# Patient Record
Sex: FEMALE | Race: WHITE | NOT HISPANIC OR LATINO | Employment: OTHER | URBAN - METROPOLITAN AREA
[De-identification: names, ages, dates, MRNs, and addresses within clinical notes are randomized per-mention and may not be internally consistent; named-entity substitution may affect disease eponyms.]

---

## 2017-01-01 ENCOUNTER — ANESTHESIA EVENT (OUTPATIENT)
Dept: PERIOP | Facility: HOSPITAL | Age: 82
End: 2017-01-01

## 2017-01-01 ENCOUNTER — APPOINTMENT (OUTPATIENT)
Dept: GENERAL RADIOLOGY | Facility: HOSPITAL | Age: 82
End: 2017-01-01
Attending: SURGERY

## 2017-01-01 ENCOUNTER — ANESTHESIA (OUTPATIENT)
Dept: PERIOP | Facility: HOSPITAL | Age: 82
End: 2017-01-01

## 2017-01-01 ENCOUNTER — APPOINTMENT (OUTPATIENT)
Dept: CT IMAGING | Facility: HOSPITAL | Age: 82
End: 2017-01-01

## 2017-01-01 ENCOUNTER — HOSPITAL ENCOUNTER (INPATIENT)
Facility: HOSPITAL | Age: 82
LOS: 4 days | Discharge: NURSING, REHAB OR LONG-TERM CARE | End: 2017-12-06
Attending: EMERGENCY MEDICINE | Admitting: INTERNAL MEDICINE

## 2017-01-01 VITALS
SYSTOLIC BLOOD PRESSURE: 107 MMHG | OXYGEN SATURATION: 94 % | DIASTOLIC BLOOD PRESSURE: 57 MMHG | TEMPERATURE: 97.1 F | WEIGHT: 110.4 LBS | HEART RATE: 86 BPM | RESPIRATION RATE: 16 BRPM | HEIGHT: 62 IN | BODY MASS INDEX: 20.32 KG/M2

## 2017-01-01 DIAGNOSIS — N39.0 ACUTE UTI: Primary | ICD-10-CM

## 2017-01-01 DIAGNOSIS — D64.9 ANEMIA, UNSPECIFIED TYPE: ICD-10-CM

## 2017-01-01 DIAGNOSIS — K94.03 COLOSTOMY MALFUNCTION (HCC): ICD-10-CM

## 2017-01-01 LAB
ABO GROUP BLD: NORMAL
ALBUMIN SERPL-MCNC: 2.7 G/DL (ref 3.5–5.2)
ALBUMIN SERPL-MCNC: 3 G/DL (ref 3.5–5.2)
ALBUMIN/GLOB SERPL: 0.6 G/DL
ALBUMIN/GLOB SERPL: 0.7 G/DL
ALP SERPL-CCNC: 120 U/L (ref 39–117)
ALP SERPL-CCNC: 95 U/L (ref 39–117)
ALT SERPL W P-5'-P-CCNC: 12 U/L (ref 1–33)
ALT SERPL W P-5'-P-CCNC: 9 U/L (ref 1–33)
ANION GAP SERPL CALCULATED.3IONS-SCNC: 10.7 MMOL/L
ANION GAP SERPL CALCULATED.3IONS-SCNC: 13.6 MMOL/L
AST SERPL-CCNC: 12 U/L (ref 1–32)
AST SERPL-CCNC: 13 U/L (ref 1–32)
BACTERIA SPEC AEROBE CULT: NORMAL
BACTERIA SPEC AEROBE CULT: NORMAL
BACTERIA UR QL AUTO: ABNORMAL /HPF
BASOPHILS # BLD AUTO: 0.01 10*3/MM3 (ref 0–0.2)
BASOPHILS # BLD AUTO: 0.01 10*3/MM3 (ref 0–0.2)
BASOPHILS NFR BLD AUTO: 0.1 % (ref 0–1.5)
BASOPHILS NFR BLD AUTO: 0.1 % (ref 0–1.5)
BILIRUB SERPL-MCNC: 0.2 MG/DL (ref 0.1–1.2)
BILIRUB SERPL-MCNC: <0.2 MG/DL (ref 0.1–1.2)
BILIRUB UR QL STRIP: NEGATIVE
BLD GP AB SCN SERPL QL: NEGATIVE
BUN BLD-MCNC: 23 MG/DL (ref 8–23)
BUN BLD-MCNC: 29 MG/DL (ref 8–23)
BUN/CREAT SERPL: 31.9 (ref 7–25)
BUN/CREAT SERPL: 34.1 (ref 7–25)
CALCIUM SPEC-SCNC: 8.6 MG/DL (ref 8.6–10.5)
CALCIUM SPEC-SCNC: 9.3 MG/DL (ref 8.6–10.5)
CHLORIDE SERPL-SCNC: 101 MMOL/L (ref 98–107)
CHLORIDE SERPL-SCNC: 104 MMOL/L (ref 98–107)
CLARITY UR: ABNORMAL
CO2 SERPL-SCNC: 24.3 MMOL/L (ref 22–29)
CO2 SERPL-SCNC: 25.4 MMOL/L (ref 22–29)
COLOR UR: YELLOW
CREAT BLD-MCNC: 0.72 MG/DL (ref 0.57–1)
CREAT BLD-MCNC: 0.85 MG/DL (ref 0.57–1)
DEPRECATED RDW RBC AUTO: 57.3 FL (ref 37–54)
DEPRECATED RDW RBC AUTO: 58.3 FL (ref 37–54)
EOSINOPHIL # BLD AUTO: 0.08 10*3/MM3 (ref 0–0.7)
EOSINOPHIL # BLD AUTO: 0.1 10*3/MM3 (ref 0–0.7)
EOSINOPHIL NFR BLD AUTO: 1.1 % (ref 0.3–6.2)
EOSINOPHIL NFR BLD AUTO: 1.1 % (ref 0.3–6.2)
ERYTHROCYTE [DISTWIDTH] IN BLOOD BY AUTOMATED COUNT: 18.4 % (ref 11.7–13)
ERYTHROCYTE [DISTWIDTH] IN BLOOD BY AUTOMATED COUNT: 18.4 % (ref 11.7–13)
FERRITIN SERPL-MCNC: 30.56 NG/ML (ref 13–150)
FOLATE SERPL-MCNC: >20 NG/ML (ref 4.78–24.2)
GFR SERPL CREATININE-BSD FRML MDRD: 64 ML/MIN/1.73
GFR SERPL CREATININE-BSD FRML MDRD: 77 ML/MIN/1.73
GLOBULIN UR ELPH-MCNC: 4.1 GM/DL
GLOBULIN UR ELPH-MCNC: 4.9 GM/DL
GLUCOSE BLD-MCNC: 83 MG/DL (ref 65–99)
GLUCOSE BLD-MCNC: 94 MG/DL (ref 65–99)
GLUCOSE UR STRIP-MCNC: NEGATIVE MG/DL
HCT VFR BLD AUTO: 27.3 % (ref 35.6–45.5)
HCT VFR BLD AUTO: 27.5 % (ref 35.6–45.5)
HCT VFR BLD AUTO: 28.1 % (ref 35.6–45.5)
HCT VFR BLD AUTO: 29 % (ref 35.6–45.5)
HCT VFR BLD AUTO: 29.9 % (ref 35.6–45.5)
HCT VFR BLD AUTO: 30.8 % (ref 35.6–45.5)
HGB BLD-MCNC: 7.8 G/DL (ref 11.9–15.5)
HGB BLD-MCNC: 7.8 G/DL (ref 11.9–15.5)
HGB BLD-MCNC: 8 G/DL (ref 11.9–15.5)
HGB BLD-MCNC: 8.3 G/DL (ref 11.9–15.5)
HGB BLD-MCNC: 8.6 G/DL (ref 11.9–15.5)
HGB BLD-MCNC: 8.8 G/DL (ref 11.9–15.5)
HGB UR QL STRIP.AUTO: ABNORMAL
HYALINE CASTS UR QL AUTO: ABNORMAL /LPF
IMM GRANULOCYTES # BLD: 0.02 10*3/MM3 (ref 0–0.03)
IMM GRANULOCYTES # BLD: 0.03 10*3/MM3 (ref 0–0.03)
IMM GRANULOCYTES NFR BLD: 0.3 % (ref 0–0.5)
IMM GRANULOCYTES NFR BLD: 0.3 % (ref 0–0.5)
IRON 24H UR-MRATE: 13 MCG/DL (ref 37–145)
IRON SATN MFR SERPL: 4 % (ref 20–50)
KETONES UR QL STRIP: NEGATIVE
LEUKOCYTE ESTERASE UR QL STRIP.AUTO: ABNORMAL
LYMPHOCYTES # BLD AUTO: 2.58 10*3/MM3 (ref 0.9–4.8)
LYMPHOCYTES # BLD AUTO: 2.82 10*3/MM3 (ref 0.9–4.8)
LYMPHOCYTES NFR BLD AUTO: 31.2 % (ref 19.6–45.3)
LYMPHOCYTES NFR BLD AUTO: 36.9 % (ref 19.6–45.3)
MCH RBC QN AUTO: 24.3 PG (ref 26.9–32)
MCH RBC QN AUTO: 24.7 PG (ref 26.9–32)
MCHC RBC AUTO-ENTMCNC: 28.6 G/DL (ref 32.4–36.3)
MCHC RBC AUTO-ENTMCNC: 28.6 G/DL (ref 32.4–36.3)
MCV RBC AUTO: 85 FL (ref 80.5–98.2)
MCV RBC AUTO: 86.5 FL (ref 80.5–98.2)
MONOCYTES # BLD AUTO: 0.57 10*3/MM3 (ref 0.2–1.2)
MONOCYTES # BLD AUTO: 0.86 10*3/MM3 (ref 0.2–1.2)
MONOCYTES NFR BLD AUTO: 8.2 % (ref 5–12)
MONOCYTES NFR BLD AUTO: 9.5 % (ref 5–12)
NEUTROPHILS # BLD AUTO: 3.73 10*3/MM3 (ref 1.9–8.1)
NEUTROPHILS # BLD AUTO: 5.21 10*3/MM3 (ref 1.9–8.1)
NEUTROPHILS NFR BLD AUTO: 53.4 % (ref 42.7–76)
NEUTROPHILS NFR BLD AUTO: 57.8 % (ref 42.7–76)
NITRITE UR QL STRIP: NEGATIVE
NRBC BLD MANUAL-RTO: 0 /100 WBC (ref 0–0)
PH UR STRIP.AUTO: 8.5 [PH] (ref 5–8)
PLATELET # BLD AUTO: 292 10*3/MM3 (ref 140–500)
PLATELET # BLD AUTO: 294 10*3/MM3 (ref 140–500)
PMV BLD AUTO: 10.3 FL (ref 6–12)
PMV BLD AUTO: 9.5 FL (ref 6–12)
POTASSIUM BLD-SCNC: 4.2 MMOL/L (ref 3.5–5.2)
POTASSIUM BLD-SCNC: 4.3 MMOL/L (ref 3.5–5.2)
PROT SERPL-MCNC: 6.8 G/DL (ref 6–8.5)
PROT SERPL-MCNC: 7.9 G/DL (ref 6–8.5)
PROT UR QL STRIP: ABNORMAL
RBC # BLD AUTO: 3.21 10*6/MM3 (ref 3.9–5.2)
RBC # BLD AUTO: 3.56 10*6/MM3 (ref 3.9–5.2)
RBC # UR: ABNORMAL /HPF
REF LAB TEST METHOD: ABNORMAL
RETICS/RBC NFR AUTO: 4.27 % (ref 0.5–1.5)
RH BLD: POSITIVE
SODIUM BLD-SCNC: 139 MMOL/L (ref 136–145)
SODIUM BLD-SCNC: 140 MMOL/L (ref 136–145)
SP GR UR STRIP: 1.02 (ref 1–1.03)
SQUAMOUS #/AREA URNS HPF: ABNORMAL /HPF
TIBC SERPL-MCNC: 341 MCG/DL
TRANS CELLS #/AREA URNS HPF: ABNORMAL /HPF
TRANSFERRIN SERPL-MCNC: 229 MG/DL (ref 200–360)
UROBILINOGEN UR QL STRIP: ABNORMAL
VIT B12 BLD-MCNC: 1190 PG/ML (ref 211–946)
WBC NRBC COR # BLD: 6.99 10*3/MM3 (ref 4.5–10.7)
WBC NRBC COR # BLD: 9.03 10*3/MM3 (ref 4.5–10.7)
WBC UR QL AUTO: ABNORMAL /HPF
YEAST URNS QL MICRO: ABNORMAL /HPF

## 2017-01-01 PROCEDURE — 85014 HEMATOCRIT: CPT | Performed by: HOSPITALIST

## 2017-01-01 PROCEDURE — 85018 HEMOGLOBIN: CPT | Performed by: HOSPITALIST

## 2017-01-01 PROCEDURE — 45915 REMOVE RECTAL OBSTRUCTION: CPT | Performed by: SURGERY

## 2017-01-01 PROCEDURE — 82607 VITAMIN B-12: CPT | Performed by: INTERNAL MEDICINE

## 2017-01-01 PROCEDURE — 85025 COMPLETE CBC W/AUTO DIFF WBC: CPT | Performed by: INTERNAL MEDICINE

## 2017-01-01 PROCEDURE — 83540 ASSAY OF IRON: CPT | Performed by: INTERNAL MEDICINE

## 2017-01-01 PROCEDURE — 25010000002 HEPARIN (PORCINE) PER 1000 UNITS: Performed by: INTERNAL MEDICINE

## 2017-01-01 PROCEDURE — 25010000002 MORPHINE PER 10 MG: Performed by: INTERNAL MEDICINE

## 2017-01-01 PROCEDURE — 87086 URINE CULTURE/COLONY COUNT: CPT | Performed by: PHYSICIAN ASSISTANT

## 2017-01-01 PROCEDURE — 25010000002 ONDANSETRON PER 1 MG: Performed by: INTERNAL MEDICINE

## 2017-01-01 PROCEDURE — 25010000002 CEFTRIAXONE PER 250 MG: Performed by: PHYSICIAN ASSISTANT

## 2017-01-01 PROCEDURE — 0WJP7ZZ INSPECTION OF GASTROINTESTINAL TRACT, VIA NATURAL OR ARTIFICIAL OPENING APPROACH: ICD-10-PCS | Performed by: SURGERY

## 2017-01-01 PROCEDURE — 0 DIATRIZOATE MEGLUMINE & SODIUM PER 1 ML: Performed by: PHYSICIAN ASSISTANT

## 2017-01-01 PROCEDURE — 74177 CT ABD & PELVIS W/CONTRAST: CPT

## 2017-01-01 PROCEDURE — 82746 ASSAY OF FOLIC ACID SERUM: CPT | Performed by: INTERNAL MEDICINE

## 2017-01-01 PROCEDURE — 86901 BLOOD TYPING SEROLOGIC RH(D): CPT | Performed by: ANESTHESIOLOGY

## 2017-01-01 PROCEDURE — 86850 RBC ANTIBODY SCREEN: CPT | Performed by: ANESTHESIOLOGY

## 2017-01-01 PROCEDURE — 86900 BLOOD TYPING SEROLOGIC ABO: CPT | Performed by: ANESTHESIOLOGY

## 2017-01-01 PROCEDURE — 82728 ASSAY OF FERRITIN: CPT | Performed by: INTERNAL MEDICINE

## 2017-01-01 PROCEDURE — 0 IOPAMIDOL 61 % SOLUTION: Performed by: EMERGENCY MEDICINE

## 2017-01-01 PROCEDURE — 99221 1ST HOSP IP/OBS SF/LOW 40: CPT | Performed by: SURGERY

## 2017-01-01 PROCEDURE — 81001 URINALYSIS AUTO W/SCOPE: CPT | Performed by: PHYSICIAN ASSISTANT

## 2017-01-01 PROCEDURE — 25010000002 PROPOFOL 10 MG/ML EMULSION: Performed by: ANESTHESIOLOGY

## 2017-01-01 PROCEDURE — 92610 EVALUATE SWALLOWING FUNCTION: CPT

## 2017-01-01 PROCEDURE — 99024 POSTOP FOLLOW-UP VISIT: CPT | Performed by: SURGERY

## 2017-01-01 PROCEDURE — 85025 COMPLETE CBC W/AUTO DIFF WBC: CPT | Performed by: PHYSICIAN ASSISTANT

## 2017-01-01 PROCEDURE — 25010000002 MIDAZOLAM PER 1 MG: Performed by: ANESTHESIOLOGY

## 2017-01-01 PROCEDURE — 85045 AUTOMATED RETICULOCYTE COUNT: CPT | Performed by: INTERNAL MEDICINE

## 2017-01-01 PROCEDURE — 80053 COMPREHEN METABOLIC PANEL: CPT | Performed by: PHYSICIAN ASSISTANT

## 2017-01-01 PROCEDURE — BT1BZZZ FLUOROSCOPY OF BLADDER AND URETHRA: ICD-10-PCS | Performed by: RADIOLOGY

## 2017-01-01 PROCEDURE — 74430 CONTRAST X-RAY BLADDER: CPT

## 2017-01-01 PROCEDURE — 99285 EMERGENCY DEPT VISIT HI MDM: CPT

## 2017-01-01 PROCEDURE — 84466 ASSAY OF TRANSFERRIN: CPT | Performed by: INTERNAL MEDICINE

## 2017-01-01 PROCEDURE — 80053 COMPREHEN METABOLIC PANEL: CPT | Performed by: INTERNAL MEDICINE

## 2017-01-01 RX ORDER — FUROSEMIDE 20 MG/1
20 TABLET ORAL DAILY
Status: DISCONTINUED | OUTPATIENT
Start: 2017-01-01 | End: 2017-01-01 | Stop reason: HOSPADM

## 2017-01-01 RX ORDER — ROCURONIUM BROMIDE 10 MG/ML
INJECTION, SOLUTION INTRAVENOUS AS NEEDED
Status: DISCONTINUED | OUTPATIENT
Start: 2017-01-01 | End: 2017-01-01 | Stop reason: SURG

## 2017-01-01 RX ORDER — SODIUM CHLORIDE, SODIUM LACTATE, POTASSIUM CHLORIDE, CALCIUM CHLORIDE 600; 310; 30; 20 MG/100ML; MG/100ML; MG/100ML; MG/100ML
9 INJECTION, SOLUTION INTRAVENOUS CONTINUOUS
Status: DISCONTINUED | OUTPATIENT
Start: 2017-01-01 | End: 2017-01-01

## 2017-01-01 RX ORDER — MIDAZOLAM HYDROCHLORIDE 1 MG/ML
2 INJECTION INTRAMUSCULAR; INTRAVENOUS
Status: DISCONTINUED | OUTPATIENT
Start: 2017-01-01 | End: 2017-01-01 | Stop reason: HOSPADM

## 2017-01-01 RX ORDER — POTASSIUM CHLORIDE 1.5 G/1.77G
20 POWDER, FOR SOLUTION ORAL DAILY
Status: DISCONTINUED | OUTPATIENT
Start: 2017-01-01 | End: 2017-01-01 | Stop reason: HOSPADM

## 2017-01-01 RX ORDER — CEFTRIAXONE SODIUM 1 G/50ML
1 INJECTION, SOLUTION INTRAVENOUS EVERY 24 HOURS
Status: DISCONTINUED | OUTPATIENT
Start: 2017-01-01 | End: 2017-01-01

## 2017-01-01 RX ORDER — LANSOPRAZOLE
30 KIT
Status: DISCONTINUED | OUTPATIENT
Start: 2017-01-01 | End: 2017-01-01 | Stop reason: HOSPADM

## 2017-01-01 RX ORDER — PROMETHAZINE HYDROCHLORIDE 25 MG/ML
6.25 INJECTION, SOLUTION INTRAMUSCULAR; INTRAVENOUS ONCE AS NEEDED
Status: DISCONTINUED | OUTPATIENT
Start: 2017-01-01 | End: 2017-01-01 | Stop reason: HOSPADM

## 2017-01-01 RX ORDER — SENNA AND DOCUSATE SODIUM 50; 8.6 MG/1; MG/1
1 TABLET, FILM COATED ORAL DAILY
Status: DISCONTINUED | OUTPATIENT
Start: 2017-01-01 | End: 2017-01-01 | Stop reason: HOSPADM

## 2017-01-01 RX ORDER — HEPARIN SODIUM 5000 [USP'U]/ML
5000 INJECTION, SOLUTION INTRAVENOUS; SUBCUTANEOUS EVERY 12 HOURS SCHEDULED
Status: DISCONTINUED | OUTPATIENT
Start: 2017-01-01 | End: 2017-01-01 | Stop reason: HOSPADM

## 2017-01-01 RX ORDER — MORPHINE SULFATE 20 MG/ML
7.5 SOLUTION ORAL EVERY 4 HOURS PRN
Status: ON HOLD | COMMUNITY
End: 2017-01-01

## 2017-01-01 RX ORDER — SODIUM CHLORIDE 0.9 % (FLUSH) 0.9 %
10 SYRINGE (ML) INJECTION AS NEEDED
Status: DISCONTINUED | OUTPATIENT
Start: 2017-01-01 | End: 2017-01-01 | Stop reason: HOSPADM

## 2017-01-01 RX ORDER — MIDAZOLAM HYDROCHLORIDE 1 MG/ML
1 INJECTION INTRAMUSCULAR; INTRAVENOUS
Status: DISCONTINUED | OUTPATIENT
Start: 2017-01-01 | End: 2017-01-01 | Stop reason: HOSPADM

## 2017-01-01 RX ORDER — NYSTATIN 100000 [USP'U]/G
POWDER TOPICAL 2 TIMES DAILY
Status: DISCONTINUED | OUTPATIENT
Start: 2017-01-01 | End: 2017-01-01

## 2017-01-01 RX ORDER — ONDANSETRON 2 MG/ML
4 INJECTION INTRAMUSCULAR; INTRAVENOUS EVERY 6 HOURS PRN
Status: DISCONTINUED | OUTPATIENT
Start: 2017-01-01 | End: 2017-01-01 | Stop reason: HOSPADM

## 2017-01-01 RX ORDER — CALCIUM CARBONATE 200(500)MG
1 TABLET,CHEWABLE ORAL AS NEEDED
Status: DISCONTINUED | OUTPATIENT
Start: 2017-01-01 | End: 2017-01-01 | Stop reason: HOSPADM

## 2017-01-01 RX ORDER — POTASSIUM CHLORIDE 20MEQ/15ML
20 LIQUID (ML) ORAL DAILY
Status: DISCONTINUED | OUTPATIENT
Start: 2017-01-01 | End: 2017-01-01 | Stop reason: CLARIF

## 2017-01-01 RX ORDER — FAMOTIDINE 10 MG/ML
20 INJECTION, SOLUTION INTRAVENOUS ONCE
Status: DISCONTINUED | OUTPATIENT
Start: 2017-01-01 | End: 2017-01-01 | Stop reason: HOSPADM

## 2017-01-01 RX ORDER — ALBUTEROL SULFATE 2.5 MG/3ML
2.5 SOLUTION RESPIRATORY (INHALATION) EVERY 4 HOURS PRN
COMMUNITY

## 2017-01-01 RX ORDER — FENTANYL CITRATE 50 UG/ML
50 INJECTION, SOLUTION INTRAMUSCULAR; INTRAVENOUS
Status: DISCONTINUED | OUTPATIENT
Start: 2017-01-01 | End: 2017-01-01 | Stop reason: HOSPADM

## 2017-01-01 RX ORDER — SODIUM CHLORIDE 9 MG/ML
50 INJECTION, SOLUTION INTRAVENOUS CONTINUOUS
Status: DISCONTINUED | OUTPATIENT
Start: 2017-01-01 | End: 2017-01-01

## 2017-01-01 RX ORDER — GABAPENTIN 100 MG/1
100 CAPSULE ORAL DAILY
Status: DISCONTINUED | OUTPATIENT
Start: 2017-01-01 | End: 2017-01-01 | Stop reason: HOSPADM

## 2017-01-01 RX ORDER — ALBUTEROL SULFATE 2.5 MG/3ML
2.5 SOLUTION RESPIRATORY (INHALATION) EVERY 4 HOURS PRN
Status: DISCONTINUED | OUTPATIENT
Start: 2017-01-01 | End: 2017-01-01 | Stop reason: HOSPADM

## 2017-01-01 RX ORDER — DIPHENOXYLATE HYDROCHLORIDE AND ATROPINE SULFATE 2.5; .025 MG/1; MG/1
1 TABLET ORAL DAILY
Status: DISCONTINUED | OUTPATIENT
Start: 2017-01-01 | End: 2017-01-01 | Stop reason: HOSPADM

## 2017-01-01 RX ORDER — HYDRALAZINE HYDROCHLORIDE 20 MG/ML
5 INJECTION INTRAMUSCULAR; INTRAVENOUS
Status: DISCONTINUED | OUTPATIENT
Start: 2017-01-01 | End: 2017-01-01 | Stop reason: HOSPADM

## 2017-01-01 RX ORDER — MORPHINE SULFATE 2 MG/ML
1 INJECTION, SOLUTION INTRAMUSCULAR; INTRAVENOUS EVERY 4 HOURS PRN
Status: DISCONTINUED | OUTPATIENT
Start: 2017-01-01 | End: 2017-01-01 | Stop reason: HOSPADM

## 2017-01-01 RX ORDER — NALBUPHINE HCL 10 MG/ML
2 AMPUL (ML) INJECTION EVERY 4 HOURS PRN
Status: DISCONTINUED | OUTPATIENT
Start: 2017-01-01 | End: 2017-01-01 | Stop reason: HOSPADM

## 2017-01-01 RX ORDER — SODIUM CHLORIDE 0.9 % (FLUSH) 0.9 %
1-10 SYRINGE (ML) INJECTION AS NEEDED
Status: DISCONTINUED | OUTPATIENT
Start: 2017-01-01 | End: 2017-01-01 | Stop reason: HOSPADM

## 2017-01-01 RX ORDER — NALOXONE HCL 0.4 MG/ML
0.4 VIAL (ML) INJECTION
Status: DISCONTINUED | OUTPATIENT
Start: 2017-01-01 | End: 2017-01-01 | Stop reason: HOSPADM

## 2017-01-01 RX ORDER — DIPHENHYDRAMINE HYDROCHLORIDE 50 MG/ML
12.5 INJECTION INTRAMUSCULAR; INTRAVENOUS
Status: DISCONTINUED | OUTPATIENT
Start: 2017-01-01 | End: 2017-01-01 | Stop reason: HOSPADM

## 2017-01-01 RX ORDER — POVIDONE-IODINE 10 MG/G
OINTMENT TOPICAL 2 TIMES DAILY
Status: ON HOLD | COMMUNITY
End: 2018-01-01

## 2017-01-01 RX ORDER — MORPHINE SULFATE 20 MG/ML
7.5 SOLUTION ORAL EVERY 4 HOURS PRN
Qty: 6.84 ML | Refills: 0 | Status: SHIPPED | OUTPATIENT
Start: 2017-01-01 | End: 2017-01-01

## 2017-01-01 RX ORDER — MORPHINE SULFATE 20 MG/ML
15 SOLUTION ORAL EVERY 4 HOURS PRN
Status: DISCONTINUED | OUTPATIENT
Start: 2017-01-01 | End: 2017-01-01 | Stop reason: HOSPADM

## 2017-01-01 RX ORDER — PROPOFOL 10 MG/ML
VIAL (ML) INTRAVENOUS AS NEEDED
Status: DISCONTINUED | OUTPATIENT
Start: 2017-01-01 | End: 2017-01-01 | Stop reason: SURG

## 2017-01-01 RX ORDER — NYSTATIN 100000 [USP'U]/G
POWDER TOPICAL EVERY 12 HOURS SCHEDULED
Status: DISCONTINUED | OUTPATIENT
Start: 2017-01-01 | End: 2017-01-01 | Stop reason: HOSPADM

## 2017-01-01 RX ORDER — FERROUS SULFATE 300 MG/5ML
300 LIQUID (ML) ORAL DAILY
Status: DISCONTINUED | OUTPATIENT
Start: 2017-01-01 | End: 2017-01-01 | Stop reason: HOSPADM

## 2017-01-01 RX ORDER — CEFTRIAXONE SODIUM 1 G/50ML
1 INJECTION, SOLUTION INTRAVENOUS ONCE
Status: COMPLETED | OUTPATIENT
Start: 2017-01-01 | End: 2017-01-01

## 2017-01-01 RX ORDER — POLYETHYLENE GLYCOL 3350 17 G/17G
17 POWDER, FOR SOLUTION ORAL DAILY
Status: DISCONTINUED | OUTPATIENT
Start: 2017-01-01 | End: 2017-01-01 | Stop reason: HOSPADM

## 2017-01-01 RX ORDER — FENTANYL CITRATE 50 UG/ML
INJECTION, SOLUTION INTRAMUSCULAR; INTRAVENOUS
Status: DISCONTINUED
Start: 2017-01-01 | End: 2017-01-01 | Stop reason: WASHOUT

## 2017-01-01 RX ORDER — MORPHINE SULFATE 20 MG/ML
7.5 SOLUTION ORAL EVERY 4 HOURS PRN
Status: DISCONTINUED | OUTPATIENT
Start: 2017-01-01 | End: 2017-01-01

## 2017-01-01 RX ORDER — POVIDONE-IODINE 10 MG/G
OINTMENT TOPICAL 2 TIMES DAILY
Status: DISCONTINUED | OUTPATIENT
Start: 2017-01-01 | End: 2017-01-01

## 2017-01-01 RX ORDER — NYSTATIN 100000 [USP'U]/G
1 POWDER TOPICAL 2 TIMES DAILY
COMMUNITY

## 2017-01-01 RX ORDER — ACETAMINOPHEN 160 MG/5ML
650 SOLUTION ORAL EVERY 4 HOURS PRN
Status: DISCONTINUED | OUTPATIENT
Start: 2017-01-01 | End: 2017-01-01 | Stop reason: HOSPADM

## 2017-01-01 RX ORDER — FENTANYL CITRATE 50 UG/ML
25 INJECTION, SOLUTION INTRAMUSCULAR; INTRAVENOUS
Status: DISCONTINUED | OUTPATIENT
Start: 2017-01-01 | End: 2017-01-01 | Stop reason: HOSPADM

## 2017-01-01 RX ORDER — NALBUPHINE HCL 10 MG/ML
10 AMPUL (ML) INJECTION EVERY 4 HOURS PRN
Status: DISCONTINUED | OUTPATIENT
Start: 2017-01-01 | End: 2017-01-01 | Stop reason: HOSPADM

## 2017-01-01 RX ORDER — PROMETHAZINE HYDROCHLORIDE 25 MG/1
25 TABLET ORAL ONCE AS NEEDED
Status: DISCONTINUED | OUTPATIENT
Start: 2017-01-01 | End: 2017-01-01 | Stop reason: HOSPADM

## 2017-01-01 RX ORDER — PROMETHAZINE HYDROCHLORIDE 25 MG/1
25 SUPPOSITORY RECTAL ONCE AS NEEDED
Status: DISCONTINUED | OUTPATIENT
Start: 2017-01-01 | End: 2017-01-01 | Stop reason: HOSPADM

## 2017-01-01 RX ORDER — ACETAMINOPHEN 160 MG/5ML
640 SOLUTION ORAL EVERY 6 HOURS PRN
Status: DISCONTINUED | OUTPATIENT
Start: 2017-01-01 | End: 2017-01-01 | Stop reason: HOSPADM

## 2017-01-01 RX ADMIN — MORPHINE SULFATE 15 MG: 100 SOLUTION ORAL at 00:05

## 2017-01-01 RX ADMIN — Medication 1 TABLET: at 09:17

## 2017-01-01 RX ADMIN — MORPHINE SULFATE 1 MG: 2 INJECTION, SOLUTION INTRAMUSCULAR; INTRAVENOUS at 20:02

## 2017-01-01 RX ADMIN — MORPHINE SULFATE 1 MG: 2 INJECTION, SOLUTION INTRAMUSCULAR; INTRAVENOUS at 03:17

## 2017-01-01 RX ADMIN — GABAPENTIN 100 MG: 100 CAPSULE ORAL at 08:49

## 2017-01-01 RX ADMIN — GABAPENTIN 100 MG: 100 CAPSULE ORAL at 09:18

## 2017-01-01 RX ADMIN — SUGAMMADEX 200 MG: 100 INJECTION, SOLUTION INTRAVENOUS at 18:01

## 2017-01-01 RX ADMIN — ONDANSETRON 4 MG: 2 INJECTION INTRAMUSCULAR; INTRAVENOUS at 09:57

## 2017-01-01 RX ADMIN — FUROSEMIDE 20 MG: 20 TABLET ORAL at 12:17

## 2017-01-01 RX ADMIN — Medication 1 TABLET: at 09:41

## 2017-01-01 RX ADMIN — HEPARIN SODIUM 5000 UNITS: 5000 INJECTION, SOLUTION INTRAVENOUS; SUBCUTANEOUS at 21:13

## 2017-01-01 RX ADMIN — NYSTATIN: 100000 POWDER TOPICAL at 21:13

## 2017-01-01 RX ADMIN — GABAPENTIN 100 MG: 100 CAPSULE ORAL at 09:41

## 2017-01-01 RX ADMIN — MORPHINE SULFATE 15 MG: 100 SOLUTION ORAL at 15:30

## 2017-01-01 RX ADMIN — POLYETHYLENE GLYCOL 3350 17 G: 17 POWDER, FOR SOLUTION ORAL at 09:20

## 2017-01-01 RX ADMIN — LANSOPRAZOLE 30 MG: KIT at 08:46

## 2017-01-01 RX ADMIN — ONDANSETRON 4 MG: 2 INJECTION INTRAMUSCULAR; INTRAVENOUS at 18:00

## 2017-01-01 RX ADMIN — MORPHINE SULFATE 1 MG: 2 INJECTION, SOLUTION INTRAMUSCULAR; INTRAVENOUS at 07:34

## 2017-01-01 RX ADMIN — POTASSIUM CHLORIDE 20 MEQ: 1.5 POWDER, FOR SOLUTION ORAL at 09:18

## 2017-01-01 RX ADMIN — NYSTATIN: 100000 POWDER TOPICAL at 09:18

## 2017-01-01 RX ADMIN — LANSOPRAZOLE 30 MG: KIT at 05:25

## 2017-01-01 RX ADMIN — HEPARIN SODIUM 5000 UNITS: 5000 INJECTION, SOLUTION INTRAVENOUS; SUBCUTANEOUS at 09:18

## 2017-01-01 RX ADMIN — SODIUM CHLORIDE 50 ML/HR: 9 INJECTION, SOLUTION INTRAVENOUS at 10:04

## 2017-01-01 RX ADMIN — Medication 1 TABLET: at 08:46

## 2017-01-01 RX ADMIN — MIDAZOLAM 0.5 MG: 1 INJECTION INTRAMUSCULAR; INTRAVENOUS at 17:15

## 2017-01-01 RX ADMIN — FUROSEMIDE 20 MG: 20 TABLET ORAL at 09:17

## 2017-01-01 RX ADMIN — SENNOSIDES AND DOCUSATE SODIUM 1 TABLET: 8.6; 5 TABLET ORAL at 12:14

## 2017-01-01 RX ADMIN — Medication 1 TABLET: at 15:32

## 2017-01-01 RX ADMIN — MORPHINE SULFATE 1 MG: 2 INJECTION, SOLUTION INTRAMUSCULAR; INTRAVENOUS at 03:16

## 2017-01-01 RX ADMIN — LANSOPRAZOLE 30 MG: KIT at 06:15

## 2017-01-01 RX ADMIN — SODIUM CHLORIDE 1000 ML: 9 INJECTION, SOLUTION INTRAVENOUS at 20:38

## 2017-01-01 RX ADMIN — HEPARIN SODIUM 5000 UNITS: 5000 INJECTION, SOLUTION INTRAVENOUS; SUBCUTANEOUS at 20:13

## 2017-01-01 RX ADMIN — HEPARIN SODIUM 5000 UNITS: 5000 INJECTION, SOLUTION INTRAVENOUS; SUBCUTANEOUS at 09:41

## 2017-01-01 RX ADMIN — HEPARIN SODIUM 5000 UNITS: 5000 INJECTION, SOLUTION INTRAVENOUS; SUBCUTANEOUS at 20:02

## 2017-01-01 RX ADMIN — POLYETHYLENE GLYCOL 3350 17 G: 17 POWDER, FOR SOLUTION ORAL at 09:41

## 2017-01-01 RX ADMIN — SODIUM CHLORIDE, POTASSIUM CHLORIDE, SODIUM LACTATE AND CALCIUM CHLORIDE 9 ML/HR: 600; 310; 30; 20 INJECTION, SOLUTION INTRAVENOUS at 18:38

## 2017-01-01 RX ADMIN — MORPHINE SULFATE 7.6 MG: 100 SOLUTION ORAL at 12:15

## 2017-01-01 RX ADMIN — MUPIROCIN: 20 OINTMENT TOPICAL at 20:13

## 2017-01-01 RX ADMIN — Medication 1 TABLET: at 12:15

## 2017-01-01 RX ADMIN — NYSTATIN: 100000 POWDER TOPICAL at 20:13

## 2017-01-01 RX ADMIN — GABAPENTIN 100 MG: 100 CAPSULE ORAL at 12:20

## 2017-01-01 RX ADMIN — SODIUM CHLORIDE 75 ML/HR: 9 INJECTION, SOLUTION INTRAVENOUS at 00:39

## 2017-01-01 RX ADMIN — PROPOFOL 100 MG: 10 INJECTION, EMULSION INTRAVENOUS at 17:50

## 2017-01-01 RX ADMIN — POTASSIUM CHLORIDE 20 MEQ: 1.5 POWDER, FOR SOLUTION ORAL at 12:16

## 2017-01-01 RX ADMIN — MORPHINE SULFATE 15 MG: 100 SOLUTION ORAL at 09:17

## 2017-01-01 RX ADMIN — SENNOSIDES AND DOCUSATE SODIUM 1 TABLET: 8.6; 5 TABLET ORAL at 09:41

## 2017-01-01 RX ADMIN — MORPHINE SULFATE 1 MG: 2 INJECTION, SOLUTION INTRAMUSCULAR; INTRAVENOUS at 08:18

## 2017-01-01 RX ADMIN — SENNOSIDES AND DOCUSATE SODIUM 1 TABLET: 8.6; 5 TABLET ORAL at 09:17

## 2017-01-01 RX ADMIN — FUROSEMIDE 20 MG: 20 TABLET ORAL at 08:46

## 2017-01-01 RX ADMIN — HEPARIN SODIUM 5000 UNITS: 5000 INJECTION, SOLUTION INTRAVENOUS; SUBCUTANEOUS at 00:44

## 2017-01-01 RX ADMIN — SENNOSIDES AND DOCUSATE SODIUM 1 TABLET: 8.6; 5 TABLET ORAL at 08:46

## 2017-01-01 RX ADMIN — MINERAL SUPPLEMENT IRON 300 MG / 5 ML STRENGTH LIQUID 100 PER BOX UNFLAVORED 300 MG: at 08:46

## 2017-01-01 RX ADMIN — MUPIROCIN: 20 OINTMENT TOPICAL at 09:43

## 2017-01-01 RX ADMIN — IOPAMIDOL 85 ML: 612 INJECTION, SOLUTION INTRAVENOUS at 18:10

## 2017-01-01 RX ADMIN — POLYETHYLENE GLYCOL 3350 17 G: 17 POWDER, FOR SOLUTION ORAL at 12:15

## 2017-01-01 RX ADMIN — POTASSIUM CHLORIDE 20 MEQ: 1.5 POWDER, FOR SOLUTION ORAL at 09:42

## 2017-01-01 RX ADMIN — CEFTRIAXONE SODIUM 1 G: 1 INJECTION, SOLUTION INTRAVENOUS at 20:58

## 2017-01-01 RX ADMIN — SODIUM CHLORIDE, POTASSIUM CHLORIDE, SODIUM LACTATE AND CALCIUM CHLORIDE 9 ML/HR: 600; 310; 30; 20 INJECTION, SOLUTION INTRAVENOUS at 17:15

## 2017-01-01 RX ADMIN — MINERAL SUPPLEMENT IRON 300 MG / 5 ML STRENGTH LIQUID 100 PER BOX UNFLAVORED 300 MG: at 12:15

## 2017-01-01 RX ADMIN — MORPHINE SULFATE 1 MG: 2 INJECTION, SOLUTION INTRAMUSCULAR; INTRAVENOUS at 12:12

## 2017-01-01 RX ADMIN — MORPHINE SULFATE 15 MG: 100 SOLUTION ORAL at 20:13

## 2017-01-01 RX ADMIN — SODIUM CHLORIDE 50 ML/HR: 9 INJECTION, SOLUTION INTRAVENOUS at 19:59

## 2017-01-01 RX ADMIN — MUPIROCIN: 20 OINTMENT TOPICAL at 21:13

## 2017-01-01 RX ADMIN — FUROSEMIDE 20 MG: 20 TABLET ORAL at 09:41

## 2017-01-01 RX ADMIN — MORPHINE SULFATE 1 MG: 2 INJECTION, SOLUTION INTRAMUSCULAR; INTRAVENOUS at 09:02

## 2017-01-01 RX ADMIN — MUPIROCIN: 20 OINTMENT TOPICAL at 09:18

## 2017-01-01 RX ADMIN — ROCURONIUM BROMIDE 30 MG: 10 INJECTION INTRAVENOUS at 17:50

## 2017-01-01 RX ADMIN — MORPHINE SULFATE 1 MG: 2 INJECTION, SOLUTION INTRAMUSCULAR; INTRAVENOUS at 00:37

## 2017-01-01 RX ADMIN — DIATRIZOATE MEGLUMINE AND DIATRIZOATE SODIUM 30 ML: 600; 100 SOLUTION ORAL; RECTAL at 16:33

## 2017-01-01 RX ADMIN — LANSOPRAZOLE 30 MG: KIT at 06:06

## 2017-01-01 RX ADMIN — MORPHINE SULFATE 1 MG: 2 INJECTION, SOLUTION INTRAMUSCULAR; INTRAVENOUS at 13:17

## 2017-01-01 RX ADMIN — POLYETHYLENE GLYCOL 3350 17 G: 17 POWDER, FOR SOLUTION ORAL at 08:46

## 2017-01-01 RX ADMIN — NYSTATIN: 100000 POWDER TOPICAL at 09:43

## 2017-01-01 RX ADMIN — MINERAL SUPPLEMENT IRON 300 MG / 5 ML STRENGTH LIQUID 100 PER BOX UNFLAVORED 300 MG: at 09:41

## 2017-01-01 RX ADMIN — MINERAL SUPPLEMENT IRON 300 MG / 5 ML STRENGTH LIQUID 100 PER BOX UNFLAVORED 300 MG: at 09:18

## 2017-08-08 ENCOUNTER — APPOINTMENT (OUTPATIENT)
Dept: GENERAL RADIOLOGY | Facility: HOSPITAL | Age: 82
End: 2017-08-08

## 2017-08-08 ENCOUNTER — APPOINTMENT (OUTPATIENT)
Dept: CT IMAGING | Facility: HOSPITAL | Age: 82
End: 2017-08-08

## 2017-08-08 ENCOUNTER — HOSPITAL ENCOUNTER (INPATIENT)
Facility: HOSPITAL | Age: 82
LOS: 3 days | Discharge: NURSING, REHAB OR LONG-TERM CARE | End: 2017-08-11
Attending: FAMILY MEDICINE | Admitting: INTERNAL MEDICINE

## 2017-08-08 DIAGNOSIS — J18.9 PNEUMONIA OF LEFT LOWER LOBE DUE TO INFECTIOUS ORGANISM: Primary | ICD-10-CM

## 2017-08-08 DIAGNOSIS — N20.0 STAGHORN CALCULUS: ICD-10-CM

## 2017-08-08 PROBLEM — N39.0 UTI (URINARY TRACT INFECTION): Status: ACTIVE | Noted: 2017-08-08

## 2017-08-08 PROBLEM — I48.91 A-FIB (HCC): Status: ACTIVE | Noted: 2017-08-08

## 2017-08-08 PROBLEM — J44.9 COPD (CHRONIC OBSTRUCTIVE PULMONARY DISEASE) (HCC): Status: ACTIVE | Noted: 2017-08-08

## 2017-08-08 LAB
ALBUMIN SERPL-MCNC: 3.3 G/DL (ref 3.5–5.2)
ALBUMIN/GLOB SERPL: 0.6 G/DL
ALP SERPL-CCNC: 116 U/L (ref 39–117)
ALT SERPL W P-5'-P-CCNC: 11 U/L (ref 1–33)
AMORPH URATE CRY URNS QL MICRO: ABNORMAL /HPF
ANION GAP SERPL CALCULATED.3IONS-SCNC: 13.3 MMOL/L
AST SERPL-CCNC: 13 U/L (ref 1–32)
BACTERIA UR QL AUTO: ABNORMAL /HPF
BASOPHILS # BLD AUTO: 0.01 10*3/MM3 (ref 0–0.2)
BASOPHILS NFR BLD AUTO: 0.1 % (ref 0–1.5)
BILIRUB SERPL-MCNC: 0.2 MG/DL (ref 0.1–1.2)
BILIRUB UR QL STRIP: NEGATIVE
BUN BLD-MCNC: 37 MG/DL (ref 8–23)
BUN/CREAT SERPL: 39.8 (ref 7–25)
CALCIUM SPEC-SCNC: 9.4 MG/DL (ref 8.6–10.5)
CHLORIDE SERPL-SCNC: 97 MMOL/L (ref 98–107)
CLARITY UR: ABNORMAL
CO2 SERPL-SCNC: 25.7 MMOL/L (ref 22–29)
COLOR UR: YELLOW
CREAT BLD-MCNC: 0.93 MG/DL (ref 0.57–1)
D-LACTATE SERPL-SCNC: 1.4 MMOL/L (ref 0.5–2)
DEPRECATED RDW RBC AUTO: 52.6 FL (ref 37–54)
EOSINOPHIL # BLD AUTO: 0 10*3/MM3 (ref 0–0.7)
EOSINOPHIL NFR BLD AUTO: 0 % (ref 0.3–6.2)
ERYTHROCYTE [DISTWIDTH] IN BLOOD BY AUTOMATED COUNT: 16.9 % (ref 11.7–13)
GFR SERPL CREATININE-BSD FRML MDRD: 58 ML/MIN/1.73
GLOBULIN UR ELPH-MCNC: 5.4 GM/DL
GLUCOSE BLD-MCNC: 124 MG/DL (ref 65–99)
GLUCOSE UR STRIP-MCNC: NEGATIVE MG/DL
HCT VFR BLD AUTO: 38.9 % (ref 35.6–45.5)
HGB BLD-MCNC: 11.3 G/DL (ref 11.9–15.5)
HGB UR QL STRIP.AUTO: ABNORMAL
HOLD SPECIMEN: NORMAL
HYALINE CASTS UR QL AUTO: ABNORMAL /LPF
IMM GRANULOCYTES # BLD: 0.04 10*3/MM3 (ref 0–0.03)
IMM GRANULOCYTES NFR BLD: 0.4 % (ref 0–0.5)
KETONES UR QL STRIP: NEGATIVE
LEUKOCYTE ESTERASE UR QL STRIP.AUTO: ABNORMAL
LIPASE SERPL-CCNC: 20 U/L (ref 13–60)
LYMPHOCYTES # BLD AUTO: 1.69 10*3/MM3 (ref 0.9–4.8)
LYMPHOCYTES NFR BLD AUTO: 16.8 % (ref 19.6–45.3)
MCH RBC QN AUTO: 24.6 PG (ref 26.9–32)
MCHC RBC AUTO-ENTMCNC: 29 G/DL (ref 32.4–36.3)
MCV RBC AUTO: 84.7 FL (ref 80.5–98.2)
MONOCYTES # BLD AUTO: 0.91 10*3/MM3 (ref 0.2–1.2)
MONOCYTES NFR BLD AUTO: 9 % (ref 5–12)
NEUTROPHILS # BLD AUTO: 7.42 10*3/MM3 (ref 1.9–8.1)
NEUTROPHILS NFR BLD AUTO: 73.7 % (ref 42.7–76)
NITRITE UR QL STRIP: NEGATIVE
NRBC BLD MANUAL-RTO: 0 /100 WBC (ref 0–0)
PH UR STRIP.AUTO: 8.5 [PH] (ref 5–8)
PLATELET # BLD AUTO: 302 10*3/MM3 (ref 140–500)
PMV BLD AUTO: 9.8 FL (ref 6–12)
POTASSIUM BLD-SCNC: 3.6 MMOL/L (ref 3.5–5.2)
PROT SERPL-MCNC: 8.7 G/DL (ref 6–8.5)
PROT UR QL STRIP: ABNORMAL
RBC # BLD AUTO: 4.59 10*6/MM3 (ref 3.9–5.2)
RBC # UR: ABNORMAL /HPF
REF LAB TEST METHOD: ABNORMAL
SODIUM BLD-SCNC: 136 MMOL/L (ref 136–145)
SP GR UR STRIP: 1.01 (ref 1–1.03)
SQUAMOUS #/AREA URNS HPF: ABNORMAL /HPF
TRI-PHOS CRY URNS QL MICRO: ABNORMAL /HPF
TROPONIN T SERPL-MCNC: <0.01 NG/ML (ref 0–0.03)
UROBILINOGEN UR QL STRIP: ABNORMAL
WBC NRBC COR # BLD: 10.07 10*3/MM3 (ref 4.5–10.7)
WBC UR QL AUTO: ABNORMAL /HPF
WHOLE BLOOD HOLD SPECIMEN: NORMAL

## 2017-08-08 PROCEDURE — 81003 URINALYSIS AUTO W/O SCOPE: CPT

## 2017-08-08 PROCEDURE — 80053 COMPREHEN METABOLIC PANEL: CPT

## 2017-08-08 PROCEDURE — 81001 URINALYSIS AUTO W/SCOPE: CPT

## 2017-08-08 PROCEDURE — 87086 URINE CULTURE/COLONY COUNT: CPT

## 2017-08-08 PROCEDURE — 93005 ELECTROCARDIOGRAM TRACING: CPT

## 2017-08-08 PROCEDURE — 25010000002 MEROPENEM: Performed by: FAMILY MEDICINE

## 2017-08-08 PROCEDURE — 25010000002 VANCOMYCIN 10 G RECONSTITUTED SOLUTION: Performed by: FAMILY MEDICINE

## 2017-08-08 PROCEDURE — 71020 HC CHEST PA AND LATERAL: CPT

## 2017-08-08 PROCEDURE — 0 IOPAMIDOL PER 1 ML: Performed by: FAMILY MEDICINE

## 2017-08-08 PROCEDURE — 87040 BLOOD CULTURE FOR BACTERIA: CPT | Performed by: FAMILY MEDICINE

## 2017-08-08 PROCEDURE — 83690 ASSAY OF LIPASE: CPT

## 2017-08-08 PROCEDURE — 83605 ASSAY OF LACTIC ACID: CPT | Performed by: FAMILY MEDICINE

## 2017-08-08 PROCEDURE — 99285 EMERGENCY DEPT VISIT HI MDM: CPT

## 2017-08-08 PROCEDURE — 51702 INSERT TEMP BLADDER CATH: CPT

## 2017-08-08 PROCEDURE — 85025 COMPLETE CBC W/AUTO DIFF WBC: CPT

## 2017-08-08 PROCEDURE — 93010 ELECTROCARDIOGRAM REPORT: CPT | Performed by: INTERNAL MEDICINE

## 2017-08-08 PROCEDURE — 87186 SC STD MICRODIL/AGAR DIL: CPT | Performed by: FAMILY MEDICINE

## 2017-08-08 PROCEDURE — 71275 CT ANGIOGRAPHY CHEST: CPT

## 2017-08-08 PROCEDURE — 87077 CULTURE AEROBIC IDENTIFY: CPT | Performed by: FAMILY MEDICINE

## 2017-08-08 PROCEDURE — 84484 ASSAY OF TROPONIN QUANT: CPT

## 2017-08-08 RX ORDER — ONDANSETRON 4 MG/1
4 TABLET, FILM COATED ORAL EVERY 4 HOURS PRN
COMMUNITY

## 2017-08-08 RX ORDER — OMEPRAZOLE 20 MG/1
20 CAPSULE, DELAYED RELEASE ORAL DAILY
COMMUNITY
End: 2018-01-01 | Stop reason: HOSPADM

## 2017-08-08 RX ORDER — ONDANSETRON 4 MG/1
4 TABLET, FILM COATED ORAL EVERY 6 HOURS PRN
Status: DISCONTINUED | OUTPATIENT
Start: 2017-08-08 | End: 2017-08-11 | Stop reason: HOSPADM

## 2017-08-08 RX ORDER — CALCIUM CARBONATE 200(500)MG
1 TABLET,CHEWABLE ORAL EVERY 6 HOURS PRN
COMMUNITY

## 2017-08-08 RX ORDER — ALUMINA, MAGNESIA, AND SIMETHICONE 2400; 2400; 240 MG/30ML; MG/30ML; MG/30ML
15 SUSPENSION ORAL ONCE
Status: COMPLETED | OUTPATIENT
Start: 2017-08-08 | End: 2017-08-08

## 2017-08-08 RX ORDER — SENNA AND DOCUSATE SODIUM 50; 8.6 MG/1; MG/1
1 TABLET, FILM COATED ORAL DAILY
Status: DISCONTINUED | OUTPATIENT
Start: 2017-08-09 | End: 2017-08-11 | Stop reason: HOSPADM

## 2017-08-08 RX ORDER — ACETAMINOPHEN 160 MG/5ML
640 SOLUTION ORAL EVERY 6 HOURS PRN
COMMUNITY

## 2017-08-08 RX ORDER — ONDANSETRON 2 MG/ML
4 INJECTION INTRAMUSCULAR; INTRAVENOUS EVERY 6 HOURS PRN
Status: DISCONTINUED | OUTPATIENT
Start: 2017-08-08 | End: 2017-08-11 | Stop reason: HOSPADM

## 2017-08-08 RX ORDER — FERROUS SULFATE 300 MG/5ML
300 LIQUID (ML) ORAL DAILY
COMMUNITY

## 2017-08-08 RX ORDER — SODIUM CHLORIDE 9 MG/ML
125 INJECTION, SOLUTION INTRAVENOUS CONTINUOUS
Status: DISCONTINUED | OUTPATIENT
Start: 2017-08-08 | End: 2017-08-11

## 2017-08-08 RX ORDER — MORPHINE SULFATE 100 MG/5ML
15 SOLUTION ORAL EVERY 4 HOURS PRN
Status: ON HOLD | COMMUNITY
End: 2017-08-11

## 2017-08-08 RX ORDER — POTASSIUM CHLORIDE 1.5 G/1.77G
20 POWDER, FOR SOLUTION ORAL DAILY
COMMUNITY
End: 2017-08-08 | Stop reason: SDUPTHER

## 2017-08-08 RX ORDER — SENNA AND DOCUSATE SODIUM 50; 8.6 MG/1; MG/1
1 TABLET, FILM COATED ORAL DAILY
Status: ON HOLD | COMMUNITY
End: 2018-01-01 | Stop reason: CLARIF

## 2017-08-08 RX ORDER — SODIUM CHLORIDE 0.9 % (FLUSH) 0.9 %
10 SYRINGE (ML) INJECTION AS NEEDED
Status: DISCONTINUED | OUTPATIENT
Start: 2017-08-08 | End: 2017-08-11 | Stop reason: HOSPADM

## 2017-08-08 RX ORDER — GABAPENTIN 100 MG/1
100 CAPSULE ORAL DAILY
Status: ON HOLD | COMMUNITY
End: 2018-01-01

## 2017-08-08 RX ORDER — FUROSEMIDE 20 MG/1
20 TABLET ORAL DAILY
COMMUNITY

## 2017-08-08 RX ORDER — FERROUS SULFATE 300 MG/5ML
300 LIQUID (ML) ORAL DAILY
Status: DISCONTINUED | OUTPATIENT
Start: 2017-08-09 | End: 2017-08-11 | Stop reason: HOSPADM

## 2017-08-08 RX ORDER — GABAPENTIN 100 MG/1
100 CAPSULE ORAL DAILY
Status: DISCONTINUED | OUTPATIENT
Start: 2017-08-09 | End: 2017-08-11 | Stop reason: HOSPADM

## 2017-08-08 RX ORDER — HEPARIN SODIUM 5000 [USP'U]/ML
5000 INJECTION, SOLUTION INTRAVENOUS; SUBCUTANEOUS EVERY 12 HOURS SCHEDULED
Status: DISCONTINUED | OUTPATIENT
Start: 2017-08-09 | End: 2017-08-11 | Stop reason: HOSPADM

## 2017-08-08 RX ORDER — POLYETHYLENE GLYCOL 3350 17 G/17G
17 POWDER, FOR SOLUTION ORAL DAILY PRN
COMMUNITY

## 2017-08-08 RX ORDER — SODIUM CHLORIDE 0.9 % (FLUSH) 0.9 %
1-10 SYRINGE (ML) INJECTION AS NEEDED
Status: DISCONTINUED | OUTPATIENT
Start: 2017-08-08 | End: 2017-08-11 | Stop reason: HOSPADM

## 2017-08-08 RX ORDER — FUROSEMIDE 20 MG/1
20 TABLET ORAL DAILY
Status: DISCONTINUED | OUTPATIENT
Start: 2017-08-09 | End: 2017-08-11 | Stop reason: HOSPADM

## 2017-08-08 RX ORDER — OXYBUTYNIN CHLORIDE 5 MG/5ML
5 SYRUP ORAL DAILY
COMMUNITY
End: 2017-01-01 | Stop reason: HOSPADM

## 2017-08-08 RX ORDER — OXYBUTYNIN CHLORIDE 5 MG/1
5 TABLET ORAL DAILY
COMMUNITY
End: 2017-08-08 | Stop reason: SDUPTHER

## 2017-08-08 RX ORDER — POTASSIUM CHLORIDE 20MEQ/15ML
20 LIQUID (ML) ORAL DAILY
COMMUNITY

## 2017-08-08 RX ADMIN — SODIUM CHLORIDE 125 ML/HR: 9 INJECTION, SOLUTION INTRAVENOUS at 17:18

## 2017-08-08 RX ADMIN — VANCOMYCIN HYDROCHLORIDE 1500 MG: 10 INJECTION, POWDER, LYOPHILIZED, FOR SOLUTION INTRAVENOUS at 19:38

## 2017-08-08 RX ADMIN — MEROPENEM 1 G: 1 INJECTION, POWDER, FOR SOLUTION INTRAVENOUS at 18:37

## 2017-08-08 RX ADMIN — IOPAMIDOL 85 ML: 755 INJECTION, SOLUTION INTRAVENOUS at 17:37

## 2017-08-08 RX ADMIN — LIDOCAINE HYDROCHLORIDE 15 ML: 20 SOLUTION ORAL; TOPICAL at 16:55

## 2017-08-08 RX ADMIN — SODIUM CHLORIDE 250 ML: 9 INJECTION, SOLUTION INTRAVENOUS at 17:16

## 2017-08-08 RX ADMIN — ALUMINUM HYDROXIDE, MAGNESIUM HYDROXIDE, AND DIMETHICONE 15 ML: 400; 400; 40 SUSPENSION ORAL at 16:55

## 2017-08-08 NOTE — PROGRESS NOTES
Clinical Pharmacy Services: Medication History    Simona Brooks is a 82 y.o. female presenting to [facility] with chief complaint of:  Abdominal pain     Past Medical History:  Past Medical History:   Diagnosis Date   • Anemia    • Atrial fibrillation    • Colitis    • COPD (chronic obstructive pulmonary disease)    • Dysphagia    • Hernia    • Hypokalemia    • Pneumonia        Allergies   Allergen Reactions   • Penicillins        Medication information was obtained from: Nursing home documentation     Medication notes: Copied from nursing home paperwork     Current Outpatient Medications:    Prior to Admission medications    Medication Sig Start Date End Date Taking? Authorizing Provider   acetaminophen (TYLENOL) 160 MG/5ML solution Take 640 mg by mouth Every 6 (Six) Hours As Needed for Mild Pain (1-3).   Yes Historical Provider, MD   calcium carbonate (TUMS) 500 MG chewable tablet Chew 1 tablet As Needed for Indigestion or Heartburn.   Yes Historical Provider, MD   ferrous sulfate 300 (60 FE) MG/5ML syrup 300 mg by Per G Tube route Daily.   Yes Historical Provider, MD   furosemide (LASIX) 20 MG tablet 20 mg by Per G Tube route Daily.   Yes Historical Provider, MD   gabapentin (NEURONTIN) 100 MG capsule Take 100 mg by mouth Daily.   Yes Historical Provider, MD   morphine 20 MG/ML concentrated solution Take 15 mg by mouth Every 4 (Four) Hours As Needed for Severe Pain (7-10).   Yes Historical Provider, MD   Multiple Vitamin (TAB-A-EBONY PO) 1 tablet by Per G Tube route Daily.   Yes Historical Provider, MD   omeprazole (priLOSEC) 20 MG capsule 20 mg by Per G Tube route Daily.   Yes Historical Provider, MD   ondansetron (ZOFRAN) 4 MG tablet Take 4 mg by mouth Every 6 (Six) Hours As Needed for Nausea or Vomiting.   Yes Historical Provider, MD   oxybutynin (DITROPAN) 5 MG/5ML syrup 5 mg by Per G Tube route Daily.   Yes Historical Provider, MD   polyethylene glycol (MIRALAX) packet Take 17 g by  mouth Daily As Needed.   Yes Historical Provider, MD   potassium chloride (KAYCIEL) 20 MEQ/15ML (10%) solution 20 mEq by Per G Tube route Daily.   Yes Historical Provider, MD   sennosides-docusate sodium (SENOKOT-S) 8.6-50 MG tablet 1 tablet by Per G Tube route Daily.   Yes Historical Provider, MD       This medication list is complete to the best of my knowledge as of 8/8/2017    Please call if questions.     Jackson Vasquez, Student Pharmacist

## 2017-08-08 NOTE — ED NOTES
Nursing home reports pt had nausea with vomiting yesterday. Pt reported epigastric pain and nausea this morning and was given Zofran. Pt states she felt better after the Zofran, but continues to have epigastric pain described as aching.      Rosalba James RN  08/08/17 7312

## 2017-08-08 NOTE — ED PROVIDER NOTES
" EMERGENCY DEPARTMENT ENCOUNTER    CHIEF COMPLAINT  Chief Complaint: Abd pain  History given by: Patient  History limited by: Vague historian  Room Number: 38/38  PMD: Leonor Echevarria MD      HPI:  Pt is a 82 y.o. female who presents complaining of \"burning\" epigastric Abd/Chest  pain onset this morning when she woke up. Pt has subxiphoid pain that is not reproducible by pressure that may or may not have been pleuritic (she is vague). Pt also c/o SOA. Pt denies cough, CP and fever. She has a Gtube and a soft history of dysphagia, but takes PO diet. No definite history of aspiration recently.    Duration:  7 hours  Onset: Constant  Timing: Constat  Location: Abd  Radiation: None  Quality: Sharp  Intensity/Severity: Moderate  Progression: Worsening  Associated Symptoms: SOA  Aggravating Factors: None  Alleviating Factors: None  Previous Episodes: Pt reports no prior episodes of Abd pain like this.  Treatment before arrival: None    PAST MEDICAL HISTORY  Active Ambulatory Problems     Diagnosis Date Noted   • No Active Ambulatory Problems     Resolved Ambulatory Problems     Diagnosis Date Noted   • No Resolved Ambulatory Problems     Past Medical History:   Diagnosis Date   • Anemia    • Atrial fibrillation    • Colitis    • COPD (chronic obstructive pulmonary disease)    • Dysphagia    • Hernia    • Hypokalemia    • Pneumonia        PAST SURGICAL HISTORY  History reviewed. No pertinent surgical history.    FAMILY HISTORY  History reviewed. No pertinent family history.    SOCIAL HISTORY  Social History     Social History   • Marital status:      Spouse name: N/A   • Number of children: N/A   • Years of education: N/A     Occupational History   • Not on file.     Social History Main Topics   • Smoking status: Not on file   • Smokeless tobacco: Not on file   • Alcohol use Not on file   • Drug use: Not on file   • Sexual activity: Not on file     Other Topics Concern   • Not on file     Social History " Narrative   • No narrative on file       ALLERGIES  Penicillins    REVIEW OF SYSTEMS  Review of Systems   Constitutional: Negative for fever.   HENT: Negative for sore throat.    Eyes: Negative.    Respiratory: Positive for shortness of breath. Negative for cough.    Cardiovascular: Negative for chest pain.   Gastrointestinal: Positive for abdominal pain. Negative for diarrhea and vomiting.   Genitourinary: Negative for dysuria.   Musculoskeletal: Negative for neck pain.   Skin: Negative for rash.   Allergic/Immunologic: Negative.    Neurological: Negative for weakness, numbness and headaches.   Hematological: Negative.    Psychiatric/Behavioral: Negative.    All other systems reviewed and are negative.      PHYSICAL EXAM  ED Triage Vitals   Temp Heart Rate Resp BP SpO2   08/08/17 1228 08/08/17 1228 08/08/17 1228 08/08/17 1228 08/08/17 1228   98.5 °F (36.9 °C) 78 18 155/65 99 %      Temp src Heart Rate Source Patient Position BP Location FiO2 (%)   -- 08/08/17 1402 08/08/17 1402 08/08/17 1402 --    Monitor Sitting Right arm        Physical Exam   Constitutional: She is oriented to person, place, and time and well-developed, well-nourished, and in no distress. No distress.   HENT:   Head: Normocephalic and atraumatic.   Eyes: EOM are normal. Pupils are equal, round, and reactive to light.   Neck: Normal range of motion. Neck supple.   Cardiovascular: Normal rate, regular rhythm and normal heart sounds.    Frequent ectopy.   Pulmonary/Chest: Effort normal and breath sounds normal. No respiratory distress.   Abdominal: Soft. There is no tenderness. There is no rebound and no guarding.   G tube in LUQ   Colostomy in LLQ   In dwelling barger catheter   Musculoskeletal: Normal range of motion. She exhibits no edema.   Right AKA amputation   Neurological: She is alert and oriented to person, place, and time. She has normal sensation and normal strength.   Skin: Skin is warm and dry. No rash noted.   Psychiatric: Mood and  affect normal.   Nursing note and vitals reviewed.      LAB RESULTS  Lab Results (last 24 hours)     Procedure Component Value Units Date/Time    CBC & Differential [[id number]] Collected:  08/08/17 1251    Specimen:  Blood Updated:  08/08/17 1307    Narrative:       The following orders were created for panel order CBC & Differential.  Procedure                               Abnormality         Status                     ---------                               -----------         ------                     CBC Auto Differential[[id number]]         Abnormal            Final result                 Please view results for these tests on the individual orders.    Comprehensive Metabolic Panel [[id number]]  (Abnormal) Collected:  08/08/17 1251    Specimen:  Blood Updated:  08/08/17 1329     Glucose 124 (H) mg/dL      BUN 37 (H) mg/dL      Creatinine 0.93 mg/dL      Sodium 136 mmol/L      Potassium 3.6 mmol/L      Chloride 97 (L) mmol/L      CO2 25.7 mmol/L      Calcium 9.4 mg/dL      Total Protein 8.7 (H) g/dL      Albumin 3.30 (L) g/dL      ALT (SGPT) 11 U/L      AST (SGOT) 13 U/L      Alkaline Phosphatase 116 U/L      Total Bilirubin 0.2 mg/dL      eGFR Non African Amer 58 (L) mL/min/1.73      Globulin 5.4 gm/dL      A/G Ratio 0.6 g/dL      BUN/Creatinine Ratio 39.8 (H)     Anion Gap 13.3 mmol/L     Narrative:       The MDRD GFR formula is only valid for adults with stable renal function between ages 18 and 70.    Lipase [[id number]]  (Normal) Collected:  08/08/17 1251    Specimen:  Blood Updated:  08/08/17 1329     Lipase 20 U/L     Troponin [[id number]]  (Normal) Collected:  08/08/17 1251    Specimen:  Blood Updated:  08/08/17 1329     Troponin T <0.010 ng/mL     Narrative:       Troponin T Reference Ranges:  Less than 0.03 ng/mL:    Negative for AMI  0.03 to 0.09 ng/mL:      Indeterminant for AMI  Greater than 0.09 ng/mL: Positive for AMI    CBC Auto Differential [[id number]]  (Abnormal) Collected:  08/08/17 1251    Specimen:   Blood Updated:  08/08/17 1307     WBC 10.07 10*3/mm3      RBC 4.59 10*6/mm3      Hemoglobin 11.3 (L) g/dL      Hematocrit 38.9 %      MCV 84.7 fL      MCH 24.6 (L) pg      MCHC 29.0 (L) g/dL      RDW 16.9 (H) %      RDW-SD 52.6 fl      MPV 9.8 fL      Platelets 302 10*3/mm3      Neutrophil % 73.7 %      Lymphocyte % 16.8 (L) %      Monocyte % 9.0 %      Eosinophil % 0.0 (L) %      Basophil % 0.1 %      Immature Grans % 0.4 %      Neutrophils, Absolute 7.42 10*3/mm3      Lymphocytes, Absolute 1.69 10*3/mm3      Monocytes, Absolute 0.91 10*3/mm3      Eosinophils, Absolute 0.00 10*3/mm3      Basophils, Absolute 0.01 10*3/mm3      Immature Grans, Absolute 0.04 (H) 10*3/mm3      nRBC 0.0 /100 WBC     Urinalysis With / Culture If Indicated [[id number]]  (Abnormal) Collected:  08/08/17 1255    Specimen:  Urine from Urine, Catheter Updated:  08/08/17 1329     Color, UA Yellow     Appearance, UA Cloudy (A)     pH, UA 8.5 (H)     Specific Gravity, UA 1.012     Glucose, UA Negative     Ketones, UA Negative     Bilirubin, UA Negative     Blood, UA Large (3+) (A)     Protein,  mg/dL (2+) (A)     Leuk Esterase, UA Large (3+) (A)     Nitrite, UA Negative     Urobilinogen, UA 0.2 E.U./dL    Urine Culture [[id number]] Collected:  08/08/17 1255    Specimen:  Urine from Urine, Catheter Updated:  08/08/17 1259    Urinalysis, Microscopic Only [[id number]]  (Abnormal) Collected:  08/08/17 1255    Specimen:  Urine from Urine, Catheter Updated:  08/08/17 1340     RBC, UA  /HPF      Unable to determine due to loaded field (A)     WBC, UA  /HPF      Unable to determine due to loaded field (A)     Bacteria, UA  /HPF      Unable to determine due to loaded field (A)     Squamous Epithelial Cells, UA  /HPF      Unable to determine due to loaded field (A)     Hyaline Casts, UA  /LPF      Unable to determine due to loaded field     Triple Phosphate Crystals, UA Large/3+ /HPF      Amorphous Crystals, UA Large/3+ /HPF      Methodology Manual Light  Microscopy    Urine Culture [[id number]] Collected:  08/08/17 1255    Specimen:  Urine from Urine, Catheter Updated:  08/08/17 1305    Lactic Acid, Plasma [[id number]]  (Normal) Collected:  08/08/17 1658    Specimen:  Blood Updated:  08/08/17 1730     Lactate 1.4 mmol/L           I ordered the above labs and reviewed the results    RADIOLOGY  CT Angiogram Chest With Contrast   Final Result   Dense consolidation and volume loss in the left lower lobe   consistent with atelectasis and probable pneumonia. There is mild linear   atelectasis in the right lower lobe. There is no CT evidence of   pulmonary embolism. A large staghorn calculus is partially imaged in the   left kidney.       This report was finalized on 8/8/2017 5:57 PM by Dr. Ken Ortiz MD.          XR Chest 2 View   Final Result           I ordered the above noted radiological studies. Interpreted by radiologist. Discussed with radiologist. Reviewed by me in PACS.       PROCEDURES  Procedures    EKG         EKG time: 1250   Rhythm/Rate: sinus rhythm, rate: NSR, 73   Normal P waves and normal OR interval   Normal QRS, Normal axis Occasional PAC  Normal ST and T waves    Interpreted Contemporaneously by me, independently viewed  No prior EKG for comparison      PROGRESS AND CONSULTS  ED Course     1651 Ordered GI cocktail and xylocaine for Abd pain.    1658 Ordered Lactic acid for further evaluation.    1706 BP- 122/59 HR- 76 Temp- 98.5 °F (36.9 °C) O2 sat- 96%  Rechecked pt. Pt is resting comfortably. Discussed with pt her improving pain and need for another XR. Pt states the GI cocktail had mild relief. Discussed plan for discharge and pt is agreeable. RTER warning were given and all questions were answered. Pt has epigastric sensitivity upon re-examination.    1710 ordered IVF for hydration. Ordered CTA chest for further evaluation.    CTA shows a left sided pneumonia and a staghorn calculus.    MEDICAL DECISION MAKING  Results were reviewed/discussed  with the patient and they were also made aware of online access. Pt also made aware that some labs, such as cultures, will not be resulted during ER visit and follow up with PMD is necessary.     MDM  Number of Diagnoses or Management Options     Amount and/or Complexity of Data Reviewed  Clinical lab tests: reviewed and ordered (Glucose 124 BUN 37)  Tests in the radiology section of CPT®: reviewed and ordered (CTA head shows  XR chest shows)  Tests in the medicine section of CPT®: reviewed and ordered (See EKG procedure note)  Decide to obtain previous medical records or to obtain history from someone other than the patient: yes  Review and summarize past medical records: yes  Independent visualization of images, tracings, or specimens: yes           DIAGNOSIS  Final diagnoses:   Pneumonia of left lower lobe due to infectious organism   Staghorn calculus       DISPOSITION  admit    Latest Documented Vital Signs:  As of 6:40 PM  BP- 139/57 HR- 74 Temp- 98.5 °F (36.9 °C) O2 sat- 95%    --  Documentation assistance provided by angel Wilson for .  Information recorded by the scribe was done at my direction and has been verified and validated by me.     Michael Wilson  08/08/17 1457       Michael Wilson  08/08/17 1610       Michael Wilson  08/08/17 1724       Michael Wilson  08/08/17 1728       Travis Peter MD  08/08/17 [id number]

## 2017-08-09 ENCOUNTER — APPOINTMENT (OUTPATIENT)
Dept: CT IMAGING | Facility: HOSPITAL | Age: 82
End: 2017-08-09
Attending: HOSPITALIST

## 2017-08-09 LAB
ALBUMIN SERPL-MCNC: 2.7 G/DL (ref 3.5–5.2)
ALBUMIN/GLOB SERPL: 0.6 G/DL
ALP SERPL-CCNC: 86 U/L (ref 39–117)
ALT SERPL W P-5'-P-CCNC: 10 U/L (ref 1–33)
ANION GAP SERPL CALCULATED.3IONS-SCNC: 13.1 MMOL/L
AST SERPL-CCNC: 12 U/L (ref 1–32)
BASOPHILS # BLD AUTO: 0.01 10*3/MM3 (ref 0–0.2)
BASOPHILS NFR BLD AUTO: 0.1 % (ref 0–1.5)
BILIRUB SERPL-MCNC: 0.2 MG/DL (ref 0.1–1.2)
BUN BLD-MCNC: 23 MG/DL (ref 8–23)
BUN/CREAT SERPL: 38.3 (ref 7–25)
CALCIUM SPEC-SCNC: 8.5 MG/DL (ref 8.6–10.5)
CHLORIDE SERPL-SCNC: 103 MMOL/L (ref 98–107)
CO2 SERPL-SCNC: 21.9 MMOL/L (ref 22–29)
CREAT BLD-MCNC: 0.6 MG/DL (ref 0.57–1)
DEPRECATED RDW RBC AUTO: 52.9 FL (ref 37–54)
EOSINOPHIL # BLD AUTO: 0 10*3/MM3 (ref 0–0.7)
EOSINOPHIL NFR BLD AUTO: 0 % (ref 0.3–6.2)
ERYTHROCYTE [DISTWIDTH] IN BLOOD BY AUTOMATED COUNT: 16.9 % (ref 11.7–13)
GFR SERPL CREATININE-BSD FRML MDRD: 96 ML/MIN/1.73
GLOBULIN UR ELPH-MCNC: 4.8 GM/DL
GLUCOSE BLD-MCNC: 105 MG/DL (ref 65–99)
HCT VFR BLD AUTO: 35.9 % (ref 35.6–45.5)
HGB BLD-MCNC: 10.3 G/DL (ref 11.9–15.5)
IMM GRANULOCYTES # BLD: 0.03 10*3/MM3 (ref 0–0.03)
IMM GRANULOCYTES NFR BLD: 0.4 % (ref 0–0.5)
LIPASE SERPL-CCNC: 20 U/L (ref 13–60)
LYMPHOCYTES # BLD AUTO: 1.9 10*3/MM3 (ref 0.9–4.8)
LYMPHOCYTES NFR BLD AUTO: 26.3 % (ref 19.6–45.3)
MCH RBC QN AUTO: 24.5 PG (ref 26.9–32)
MCHC RBC AUTO-ENTMCNC: 28.7 G/DL (ref 32.4–36.3)
MCV RBC AUTO: 85.3 FL (ref 80.5–98.2)
MONOCYTES # BLD AUTO: 0.65 10*3/MM3 (ref 0.2–1.2)
MONOCYTES NFR BLD AUTO: 9 % (ref 5–12)
NEUTROPHILS # BLD AUTO: 4.63 10*3/MM3 (ref 1.9–8.1)
NEUTROPHILS NFR BLD AUTO: 64.2 % (ref 42.7–76)
NRBC BLD MANUAL-RTO: 0 /100 WBC (ref 0–0)
PLATELET # BLD AUTO: 296 10*3/MM3 (ref 140–500)
PMV BLD AUTO: 10.2 FL (ref 6–12)
POTASSIUM BLD-SCNC: 3.3 MMOL/L (ref 3.5–5.2)
PROT SERPL-MCNC: 7.5 G/DL (ref 6–8.5)
RBC # BLD AUTO: 4.21 10*6/MM3 (ref 3.9–5.2)
SODIUM BLD-SCNC: 138 MMOL/L (ref 136–145)
WBC NRBC COR # BLD: 7.22 10*3/MM3 (ref 4.5–10.7)

## 2017-08-09 PROCEDURE — 74176 CT ABD & PELVIS W/O CONTRAST: CPT

## 2017-08-09 PROCEDURE — 94799 UNLISTED PULMONARY SVC/PX: CPT

## 2017-08-09 PROCEDURE — 25010000002 HEPARIN (PORCINE) PER 1000 UNITS: Performed by: INTERNAL MEDICINE

## 2017-08-09 PROCEDURE — 85025 COMPLETE CBC W/AUTO DIFF WBC: CPT | Performed by: INTERNAL MEDICINE

## 2017-08-09 PROCEDURE — 80053 COMPREHEN METABOLIC PANEL: CPT | Performed by: INTERNAL MEDICINE

## 2017-08-09 PROCEDURE — 25010000002 MEROPENEM: Performed by: INTERNAL MEDICINE

## 2017-08-09 PROCEDURE — 25010000002 ONDANSETRON PER 1 MG: Performed by: INTERNAL MEDICINE

## 2017-08-09 PROCEDURE — 83690 ASSAY OF LIPASE: CPT | Performed by: HOSPITALIST

## 2017-08-09 PROCEDURE — 87081 CULTURE SCREEN ONLY: CPT | Performed by: INTERNAL MEDICINE

## 2017-08-09 RX ADMIN — MINERAL SUPPLEMENT IRON 300 MG / 5 ML STRENGTH LIQUID 100 PER BOX UNFLAVORED 300 MG: at 08:09

## 2017-08-09 RX ADMIN — HEPARIN SODIUM 5000 UNITS: 5000 INJECTION, SOLUTION INTRAVENOUS; SUBCUTANEOUS at 00:56

## 2017-08-09 RX ADMIN — HEPARIN SODIUM 5000 UNITS: 5000 INJECTION, SOLUTION INTRAVENOUS; SUBCUTANEOUS at 20:57

## 2017-08-09 RX ADMIN — HEPARIN SODIUM 5000 UNITS: 5000 INJECTION, SOLUTION INTRAVENOUS; SUBCUTANEOUS at 08:09

## 2017-08-09 RX ADMIN — ONDANSETRON 4 MG: 2 INJECTION INTRAMUSCULAR; INTRAVENOUS at 01:33

## 2017-08-09 RX ADMIN — ONDANSETRON 4 MG: 2 INJECTION INTRAMUSCULAR; INTRAVENOUS at 07:30

## 2017-08-09 RX ADMIN — FUROSEMIDE 20 MG: 20 TABLET ORAL at 08:08

## 2017-08-09 RX ADMIN — MEROPENEM 500 MG: 500 INJECTION, POWDER, FOR SOLUTION INTRAVENOUS at 22:35

## 2017-08-09 RX ADMIN — MEROPENEM 500 MG: 500 INJECTION, POWDER, FOR SOLUTION INTRAVENOUS at 16:16

## 2017-08-09 RX ADMIN — MEROPENEM 1 G: 1 INJECTION, POWDER, FOR SOLUTION INTRAVENOUS at 05:34

## 2017-08-09 RX ADMIN — VANCOMYCIN HYDROCHLORIDE 750 MG: 750 INJECTION, POWDER, LYOPHILIZED, FOR SOLUTION INTRAVENOUS at 10:35

## 2017-08-09 RX ADMIN — DOCUSATE SODIUM -SENNOSIDES 1 TABLET: 50; 8.6 TABLET, COATED ORAL at 08:08

## 2017-08-09 RX ADMIN — SODIUM CHLORIDE 125 ML/HR: 9 INJECTION, SOLUTION INTRAVENOUS at 16:19

## 2017-08-09 RX ADMIN — GABAPENTIN 100 MG: 100 CAPSULE ORAL at 08:09

## 2017-08-09 NOTE — PLAN OF CARE
Problem: Skin Integrity Impairment, Risk/Actual (Adult)  Intervention: Promote/Optimize Nutrition    08/09/17 1405   Nutrition Interventions   Oral Nutrition Promotion nutritional therapy counseling provided. Nutrition assessment complete. Await po diet and resume of TF.         Goal: Skin Integrity/Wound Healing  Outcome: Ongoing (interventions implemented as appropriate)

## 2017-08-09 NOTE — CONSULTS
Group: [facility]         CONSULT NOTE    Patient Identification:  Simona Brooks  82 y.o.  female  1934  [id number]            Requesting physician: [facility] hospitalist Dr. Singer    Reason for Consultation:  Healthcare associated pneumonia    CC:     History of Present Illness:  82-year-old female admitted to the hospital with abdominal pain and possible pneumonia to the hospitalist service.  Patient not the best of historian and answers in yes and no only.  Apparently she had some abdominal pain with associated chest discomfort that woke her up last night.  Currently she denies any cough or shortness of breath.  Denies aspiration.  Denies hemoptysis.  No significant history of pneumonia in the past.  Nursing home had reported some nausea and vomiting.      Review of Systems  Constitutional: Negative for fever.   HENT: Negative for sore throat.    Eyes: Negative.    Respiratory: Positive for shortness of breath on admission none at this time. Negative for cough.    Cardiovascular: Negative for chest pain.   Gastrointestinal: Positive for abdominal pain on admission none at this time. Negative for diarrhea and vomiting.   Genitourinary: Negative for dysuria.   Musculoskeletal: Negative for neck pain.   Skin: Negative for rash.   Allergic/Immunologic: Negative.    Neurological: Negative for weakness, numbness and headaches.   Hematological: Negative.    Psychiatric/Behavioral: Negative.    All other systems reviewed and are negative.     Past Medical History:  Past Medical History:   Diagnosis Date   • Anemia    • Atrial fibrillation    • Colitis    • COPD (chronic obstructive pulmonary disease)    • Dysphagia    • Hernia    • Hypokalemia    • Pneumonia        Past Surgical History:  History reviewed. No pertinent surgical history.     Home Meds:  Prescriptions Prior to Admission   Medication Sig Dispense Refill Last Dose   • acetaminophen (TYLENOL) 160 MG/5ML solution Take 640 mg by mouth Every 6  "(Six) Hours As Needed for Mild Pain (1-3).      • calcium carbonate (TUMS) 500 MG chewable tablet Chew 1 tablet As Needed for Indigestion or Heartburn.      • ferrous sulfate 300 (60 FE) MG/5ML syrup 300 mg by Per G Tube route Daily.      • furosemide (LASIX) 20 MG tablet 20 mg by Per G Tube route Daily.      • gabapentin (NEURONTIN) 100 MG capsule Take 100 mg by mouth Daily.      • morphine 20 MG/ML concentrated solution Take 15 mg by mouth Every 4 (Four) Hours As Needed for Severe Pain (7-10).      • Multiple Vitamin (TAB-A-EBONY PO) 1 tablet by Per G Tube route Daily.      • omeprazole (priLOSEC) 20 MG capsule 20 mg by Per G Tube route Daily.      • ondansetron (ZOFRAN) 4 MG tablet Take 4 mg by mouth Every 6 (Six) Hours As Needed for Nausea or Vomiting.      • oxybutynin (DITROPAN) 5 MG/5ML syrup 5 mg by Per G Tube route Daily.      • polyethylene glycol (MIRALAX) packet Take 17 g by mouth Daily As Needed.      • potassium chloride (KAYCIEL) 20 MEQ/15ML (10%) solution 20 mEq by Per G Tube route Daily.      • sennosides-docusate sodium (SENOKOT-S) 8.6-50 MG tablet 1 tablet by Per G Tube route Daily.          Allergies:  Allergies   Allergen Reactions   • Penicillins      Tolerated meropenem in 08/2017.       Social History:   Social History     Social History   • Marital status:      Spouse name: N/A   • Number of children: N/A   • Years of education: N/A     Occupational History   • Not on file.     Social History Main Topics   • Smoking status: Former Smoker   • Smokeless tobacco: Not on file   • Alcohol use No   • Drug use: No   • Sexual activity: Defer     Other Topics Concern   • Not on file     Social History Narrative   • No narrative on file       Family History:  History reviewed. No pertinent family history.    Physical Exam:  /70 (BP Location: Left arm, Patient Position: Lying)  Pulse 95  Temp 97.7 °F (36.5 °C) (Oral)   Resp 20  Ht 62.4\" (158.5 cm)  Wt 118 lb 3.2 oz (53.6 kg)  SpO2 95% "  BMI 21.34 kg/m2 Body mass index is 21.34 kg/(m^2). 95% 118 lb 3.2 oz (53.6 kg)  Physical Exam  Elderly female resting comfortably no distress no labored breathing  Following simple commands  Oral cavity moist mucous membrane  Neck is supple no bruit slightly contracted  Chest diminished breath sounds rhonchi on the bases more prominent on the left side  CVS regular rate and rhythm no murmurs or gallop  Abdomen is soft nontender bowel sounds positive  Extremities no edema the sinuses no clubbing  CNS no deficits  No joint deformities skin rashes noted  No hallucination or lesion  Lab Review:   LABS:  Lab Results   Component Value Date    CALCIUM 8.5 (L) 08/09/2017     Results from last 7 days  Lab Units 08/09/17  0605 08/08/17  1251   SODIUM mmol/L 138 136   POTASSIUM mmol/L 3.3* 3.6   CHLORIDE mmol/L 103 97*   CO2 mmol/L 21.9* 25.7   BUN mg/dL 23 37*   CREATININE mg/dL 0.60 0.93   GLUCOSE mg/dL 105* 124*   CALCIUM mg/dL 8.5* 9.4   WBC 10*3/mm3 7.22 10.07   HEMOGLOBIN g/dL 10.3* 11.3*   PLATELETS 10*3/mm3 296 302   ALT (SGPT) U/L 10 11   AST (SGOT) U/L 12 13     Lab Results   Component Value Date    TROPONINT <0.010 08/08/2017       Results from last 7 days  Lab Units 08/08/17  1251   TROPONIN T ng/mL <0.010       Results from last 7 days  Lab Units 08/08/17  1848 08/08/17  1832 08/08/17  1255   BLOODCX  No growth at less than 24 hours No growth at less than 24 hours  --    URINECX   --   --  >100,000 CFU/mL Gram Negative Bacilli*  >100,000 CFU/mL Gram Negative Bacilli*       Results from last 7 days  Lab Units 08/08/17  1658   LACTATE mmol/L 1.4           No results found for: TSH  Estimated Creatinine Clearance: 45.9 mL/min (by C-G formula based on Cr of 0.6).    Results from last 7 days  Lab Units 08/08/17  1255   NITRITE UA  Negative   WBC UA /HPF Unable to determine due to loaded field*   BACTERIA UA /HPF Unable to determine due to loaded field*   SQUAM EPITHEL UA /HPF Unable to determine due to loaded field*    URINECX  >100,000 CFU/mL Gram Negative Bacilli*  >100,000 CFU/mL Gram Negative Bacilli*        Imaging: I personally visualized the images of scans/x-rays performed within last 3 days.      Assessment:  Healthcare associated pneumonia  UTI  COPD  A. 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  Agree with current antibiotic management.  Will order MRSA screen and de-escalate antibiotics based on cultures.  Aggressive pulmonate toilet.  Agree with ruling out aspiration.  Wean off oxygen.  We will follow.          Emily Garcia MD  8/9/2017  12:08 PM    EMR Dragon/Transcription disclaimer:     Much of this encounter note is an electronic transcription/translation of spoken language to printed text. The electronic translation of spoken language may permit erroneous, or at times, nonsensical words or phrases to be inadvertently transcribed; Although I have reviewed the note for such errors, some may still exist.

## 2017-08-09 NOTE — DISCHARGE PLACEMENT REQUEST
"Simona Kelsey (82 y.o. Female)     Date of Birth Social Security Number Address Home Phone MRN    1934  [address] [facility] [address] [phone] [id number]    Gnosticist Marital Status          Non-Orthodox        Admission Date Admission Type Admitting Provider Attending Provider Department, Room/Bed    8/8/17 Emergency Justice Ya MD Edling, Stephen A, MD [address], 621/1    Discharge Date Discharge Disposition Discharge Destination                      Attending Provider: Chet Singer MD     Allergies:  Penicillins    Isolation:  None   Infection:  None   Code Status:  FULL    Ht:  62.4\" (158.5 cm)   Wt:  118 lb 3.2 oz (53.6 kg)    Admission Cmt:  None   Principal Problem:  Pneumonia of left lower lobe due to infectious organism [J18.9]                 Active Insurance as of 8/8/2017     Primary Coverage     Payor Plan Insurance Group Employer/Plan Group    HUMANA MEDICARE REPLACEMENT HUMANA MEDICARE REPL [id number]     Payor Plan Address Payor Plan Phone Number Effective From Effective To    [address] [address] [phone] 1/1/2014     [place] [address]       Subscriber Name Subscriber Birth Date Member ID       SIMONA KELSEY 1934 [id number]                 Emergency Contacts      (Rel.) Home Phone Work Phone Mobile Phone    Jeyson Kelsey (Son) [phone] -- [phone]    Rashard Roque (Brother) [phone] -- [phone]    ToddMeliton garza (Son) [phone] -- [phone]              "

## 2017-08-09 NOTE — SIGNIFICANT NOTE
08/09/17 1029   Rehab Treatment   Discipline speech language pathologist   Rehab Evaluation   Evaluation Not Performed patient/family decline, not feeling well  (Pt refused eval d/t nausea.Pt has g-tube and ER MD reported that she still takes PO at NH but pt said she gets her food via tube.RN has requested NH records.Pt and family are poor historians.)   Recommendations   SLP - Next Appointment (Continue NPO and SLP will attempt to complete swallow eval tomorrow if pt is appropriate.)

## 2017-08-09 NOTE — PROGRESS NOTES
Discharge Planning Assessment  [facility]     Patient Name: Simona Brooks  MRN: [id number]  Today's Date: 8/9/2017    Admit Date: 8/8/2017          Discharge Needs Assessment       08/09/17 1412    Living Environment    Lives With facility resident    Living Arrangements extended care facility    Transportation Available ambulance    Living Environment    Quality Of Family Relationships supportive    Able to Return to Prior Living Arrangements yes    Discharge Needs Assessment    Concerns To Be Addressed denies needs/concerns at this time    Equipment Currently Used at Home wheelchair;hospital bed    Discharge Planning Comments Rich Brooks [phone]            Discharge Plan       08/09/17 1413    Case Management/Social Work Plan    Plan Return to [facility]    Patient/Family In Agreement With Plan yes    Additional Comments Patient very drowsy and has not arroused when knocking on door.  Left message for rich Benítez and awaiting return call.  Spoke with Michell - patient is from a  LTC bed at [facility] and can return.  Packet started and in cubbie.  CCP will follow.         Discharge Placement     Facility/Agency Request Status Selected? Address Phone Number Fax Number    [facility] Accepted    Yes [address] [facility] [address] [phone]                Demographic Summary       08/09/17 1408    Referral Information    Admission Type inpatient    Arrived From admitted as an inpatient;long-term care    Referral Source admission list    Reason For Consult discharge planning    Record Reviewed history and physical    Primary Care Physician Information    Name [facility]            Functional Status       08/09/17 1410    Functional Status Current    Ambulation 4-->completely dependent    Transferring 2-->assistive person    Toileting 3-->assistive equipment and person    Bathing 4-->completely dependent    Dressing 2-->assistive person    Eating  0-->independent    Communication 0-->understands/communicates without difficulty    Change in Functional Status Since Onset of Current Illness/Injury no    Functional Status Prior    Ambulation 3-->assistive equipment and person    Transferring 3-->assistive equipment and person    Toileting 2-->assistive person    Bathing 2-->assistive person    Dressing 2-->assistive person    Eating 0-->independent    Communication 0-->understands/communicates without difficulty    IADL    Medications assistive person    Meal Preparation assistive person    Housekeeping assistive person    Laundry assistive person    Shopping assistive person    Oral Care independent    Activity Tolerance    Current Activity Limitations other (see comments)   RT AKA    Usual Activity Tolerance poor    Current Activity Tolerance poor    Cognitive/Perceptual/Developmental    Current Mental Status/Cognitive Functioning no deficits noted    Recent Changes in Mental Status/Cognitive Functioning no changes            Psychosocial     None            Abuse/Neglect     None            Legal     None            Substance Abuse     None            Patient Forms     None          Becky S. [place], RN

## 2017-08-09 NOTE — PROGRESS NOTES
"Pharmacokinetic Consult - Vancomycin Dosing (Initial Note)    Simona Riker has been consulted for pharmacy to dose vancomycin for HCAP per Dr. Ya's request. Goal trough: 15-20 mg/L.     Other antimicrobials: Meropenem 1 g IV q12h    Relevant clinical data and objective history reviewed:  82 y.o. female 62.4\" (158.5 cm) 118 lb 3.2 oz (53.6 kg)   Nursing home patient presents to the hospital with discomfort in her chest and associated SOA. CT chest from 8/8 shows a dense consolidation and volume loss in the LLL consistent with probably PNA.    Creatinine   Date Value Ref Range Status   08/08/2017 0.93 0.57 - 1.00 mg/dL Final     BUN   Date Value Ref Range Status   08/08/2017 37 (H) 8 - 23 mg/dL Final     Estimated Creatinine Clearance: 39.5 mL/min (by C-G formula based on Cr of 0.93).    Lab Results   Component Value Date    WBC 10.07 08/08/2017     Temp Readings from Last 3 Encounters:   08/08/17 97.9 °F (36.6 °C) (Oral)      Baseline culture/source/susceptibility:  8/8: Urine cultures x 2-in process  8/8: Blood cultures x 2- in process    Assessment/Plan    1. Patient received a vancomycin loading dose of 1500 mg (20 mg/kg based on estimated wt) IV once in the ED. Will start a maintenance dose of 750 mg (~15 mg/kg) IV q24h around 0900 tomorrow so that it does not run at the same time as the meropenem.     2. Will monitor serum creatinine every 24 hours for the first 3 days then at least every 48 hours per dosing recommendations. SCr was 0.93 at baseline today before starting antibiotics.    3. Patient is on continuous fluids, which will help to prevent toxic accumulation; monitor for s/sxn of toxicity including increase in SCr and decrease in UOP. RN/physician should monitor for signs and symptoms of nephrotoxicity and/or ototoxicity including tinnitus, roaring, ringing or buzzing in ears, or nausea/vomiting, dizziness, and vertigo.    4. Pharmacy will continue to follow daily while on vancomycin and adjust as " needed.     Thank you for allowing me to participate in your patient's care,  Sunitha De Leon, Pharm.D., BCPS

## 2017-08-09 NOTE — CONSULTS
Adult Nutrition  Assessment/PES    Patient Name:  Simona Brooks  YOB: 1934  MRN: [id number]  Admit Date:  8/8/2017    Assessment Date:  8/9/2017     Comments:  Nutrition assessment complete. 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 Has Gtube for nocturnal TF but also takes Regular diet with thin liquids at NH.  When OK to resume, Osmolite 1.2 at 85cc/hr 7p-6a and 40cc water will mimic NH regimen.  Will follow.        Reason for Assessment       08/09/17 1344    Reason for Assessment    Reason For Assessment/Visit skin risk;admission assessment    Identified At Risk By Screening Criteria MST SCORE 2+    Diagnosis Diagnosis    Cardiac PAF    Pulmonary/Critical Care Pneumonia;COPD    Skin Pressure ulcer              Nutrition/Diet History       08/09/17 1345    Nutrition/Diet History    Typical Food/Fluid Intake NH: Regular with thin liquids; 7P Isosource HN ar 85cc/hr until 900cc infused. 40cc water while infusing.             Anthropometrics       08/09/17 1348    Anthropometrics    RD Documented Current Weight  53.6 kg (118 lb 2.7 oz)    Anthropometrics (Special Considerations)    Weight Used for Calculations 63.7 kg (140 lb 8 oz)   adjusted for amputation    RD Calculated BMI (kg/m2) 25.69    Usual Body Weight (UBW)    Usual Body Weight 58.1 kg (128 lb)   11/14/16    Weight Loss 4.536 kg (10 lb)    % Weight Loss  8 %    Weight Loss Time Frame 9 months    Body Mass Index (BMI)    BMI Grade 25 - 29.9 - overweight            Labs/Tests/Procedures/Meds       08/09/17 1353    Labs/Tests/Procedures/Meds    Diagnostic Test/Procedure Review reviewed, pertinent    Labs/Tests Review Reviewed;Glucose;K+;Alb    Procedure Review SLP    Swallow eval status Pending   pt refused    Type of SLP Evaluation Bedside    Medication Review Reviewed, pertinent;Diuretic;Antibiotic;Laxative    Significant Vitals reviewed            Physical Findings       08/09/17 1352    Physical Findings/Assessment    Additional Documentation Physical  Appearance (Group)    Physical Appearance    Overall Physical Appearance amputee   R AKA    Gastrointestinal feeding tube;colostomy    Tubes gastrostomy tube    Skin pressure ulcer(s);other (see comments)   stage 4 coccyx; fistula            Estimated/Assessed Needs       08/09/17 1355    Calculation Measurements    Weight Used For Calculations 53.5 kg (118 lb)    Estimated/Assessed Energy Needs    Energy Need Method Kcal/kg    kcal/kg 30    30 Kcal/Kg (kcal) 1605.72    Estimated Kcal Range  3556-5091    Estimated/Assessed Protein Needs    Weight Used for Protein Calculation 53.5 kg (118 lb)    Protein (gm/kg) 1.2    1.2 Gm Protein (gm) 64.23    Estimated/Assessed Fluid Needs    Fluid Need Method RDA method    RDA Method (mL)  1605            Nutrition Prescription Ordered       08/09/17 1356    Nutrition Prescription PO    Current PO Diet NPO      Problem/Interventions:        Problem 1       08/09/17 1358    Nutrition Diagnoses Problem 1    Problem 1 Inadequate Intake/Infusion    Inadequate Intake Type Enteral;Oral    Macronutrient Kcal;Protein    Signs/Symptoms (evidenced by) Unintended Weight Change    Unintended Weight Change Loss    Number of Pounds Lost 10lb    Weight loss time period 9 months              Intervention Goal       08/09/17 1356    Intervention Goal    General Maintain nutrition;Nutrition support treatment;Disease management/therapy;Meet nutritional needs for age/condition;Reduce/improve symptoms    PO Initiate feeding    TF/PN Inititiate TF/PN    Weight Maintain weight            Nutrition Intervention       08/09/17 1357    Nutrition Intervention    RD/Tech Action Care plan reviewd;Follow Tx progress;Await begin PO;Recommend/ordered    Recommended/Ordered EN            Nutrition Prescription       08/09/17 1358    Nutrition Prescription EN    Enteral Prescription Enteral to supply;Enteral begin/change    Enteral Route Gastrostomy    Product Osmolite 1.2 skyler    TF Delivery Method Cyclic     Cyclic TF Goal Rate (mL/hr) 85 mL/hr    Cyclic TF Starting Rate (mL/hr) 50 mL/hr    Cyclic TF Cycle Over (hrs)  7p-6a    Water flush (mL)  40 mL   x 11 hrs    Water Flush Frequency Per hour    New EN Prescription Ordered? No, recommended    EN to Supply    Kcal/Day 1122 Kcal/Day    Protein (gm/day) 51.43 gm/day    Meet Estimated Kcal Need (%) 84 %    Meet Estimated Protein Need (%) 80 %    TF Free H2O (mL) 766.7 mL    Total Free H2O (mL/day) 1207 mL/day            Education/Evaluation       08/09/17 1401    Education    Education Will Instruct as appropriate    Monitor/Evaluation    Monitor Per protocol;Symptoms;I&O;PO intake;Pertinent labs;TF delivery/tolerance;Weight;Skin status    Education Follow-up Reinforce PRN            Electronically signed by:  Elsa Fleming RD  08/09/17 2:01 PM

## 2017-08-09 NOTE — PLAN OF CARE
Problem: Patient Care Overview (Adult)  Goal: Plan of Care Review  Outcome: Ongoing (interventions implemented as appropriate)    08/09/17 0501   Coping/Psychosocial Response Interventions   Plan Of Care Reviewed With patient   Patient Care Overview   Progress no change   Outcome Evaluation   Outcome Summary/Follow up Plan Patient is being treated for LLL pneumonia. Lung sounds are clear and diminished. Non-productive cough and gagging noted. PT. given PRN anti-nausea med. VSS. Area around suprapubic cath. is more moist that rest of skin body surface area. RLE stump healed and is clean and dry. Post. debredied area of coccyx pink with no discharge noted. Very large area of involvement, so much so that bottom 3 inches of rectum is visible. Area adjacent to wound contains a 4X4.5 stage IV pressure ulcer. Eschar and mild serosaguinous fluid noted. Area irrigated with NS and sterile wet to dry dsg. with mepilex applied. Pt. does not complian of pain to the area. She does, however complain of intermittant buring/knawing pain in upper gastric region.          Problem: Dysphagia (Adult)  Goal: Identify Related Risk Factors and Signs and Symptoms  Outcome: Ongoing (interventions implemented as appropriate)  Goal: Functional/Safe Swallow  Outcome: Ongoing (interventions implemented as appropriate)  Goal: Compensatory Techniques to Improve Safety/Function with Swallowing  Outcome: Ongoing (interventions implemented as appropriate)    Problem: Mobility, Physical Impaired (Adult)  Goal: Identify Related Risk Factors and Signs and Symptoms  Outcome: Ongoing (interventions implemented as appropriate)  Goal: Enhanced Mobility Skills  Outcome: Ongoing (interventions implemented as appropriate)  Goal: Enhanced Functionality Ability  Outcome: Ongoing (interventions implemented as appropriate)

## 2017-08-09 NOTE — PLAN OF CARE
Problem: Skin Integrity Impairment, Risk/Actual (Adult)  Goal: Identify Related Risk Factors and Signs and Symptoms  Outcome: Ongoing (interventions implemented as appropriate)  Goal: Skin Integrity/Wound Healing  Outcome: Ongoing (interventions implemented as appropriate)    Problem: Pressure Ulcer Risk (Darin Scale) (Adult,Obstetrics,Pediatric)  Goal: Identify Related Risk Factors and Signs and Symptoms  Outcome: Ongoing (interventions implemented as appropriate)  Goal: Skin Integrity  Outcome: Ongoing (interventions implemented as appropriate)

## 2017-08-09 NOTE — NURSING NOTE
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  Wound approximately 1 x 1.3 cms; pink base with slight edge of purplish periwound skin.  Pt has prolapsed rectum and healed area noted at coccyx.  Cleansed with saline; applied GENBAND ag and covered with silicone border drsg.  Pt also has colostomy;  Appliance intact with good seal.  Ostomy supplies left at the bedside.  Pt on accumax mattress with pump.

## 2017-08-09 NOTE — H&P
Internal medicine history and physical    INTERNAL MEDICINE   [facility]       Patient Identification:  Name: Simona Brooks  Age: 82 y.o.  Sex: female  :  1934  MRN: [id number]                   Primary Care Physician: Leonor Echevarria MD                                   Chief Complaint:  Complaining of abdominal chest discomfort that woke her up this morning.    History of Present Illness:   Patient is a 82-year-old female who has conjugated past medical history and is a resident of nursing home woke up this morning and felt discomfort in the middle of her chest going into her abdominal area.  She has associated shortness of breath.By the time I saw the patient patient's symptom has resolved.  She is very vague in describing the timing and the clinical course for symptoms any other associated exacerbating or relieving factor.      Past Medical History:  Past Medical History:   Diagnosis Date   • Anemia    • Atrial fibrillation    • Colitis    • COPD (chronic obstructive pulmonary disease)    • Dysphagia    • Hernia    • Hypokalemia    • Pneumonia      Past Surgical History:  History reviewed. No pertinent surgical history.   Home Meds:  Prescriptions Prior to Admission   Medication Sig Dispense Refill Last Dose   • acetaminophen (TYLENOL) 160 MG/5ML solution Take 640 mg by mouth Every 6 (Six) Hours As Needed for Mild Pain (1-3).      • calcium carbonate (TUMS) 500 MG chewable tablet Chew 1 tablet As Needed for Indigestion or Heartburn.      • ferrous sulfate 300 (60 FE) MG/5ML syrup 300 mg by Per G Tube route Daily.      • furosemide (LASIX) 20 MG tablet 20 mg by Per G Tube route Daily.      • gabapentin (NEURONTIN) 100 MG capsule Take 100 mg by mouth Daily.      • morphine 20 MG/ML concentrated solution Take 15 mg by mouth Every 4 (Four) Hours As Needed for Severe Pain (7-10).      • Multiple Vitamin (TAB-A-EBONY PO) 1 tablet by Per G Tube route Daily.      • omeprazole (priLOSEC)  "20 MG capsule 20 mg by Per G Tube route Daily.      • ondansetron (ZOFRAN) 4 MG tablet Take 4 mg by mouth Every 6 (Six) Hours As Needed for Nausea or Vomiting.      • oxybutynin (DITROPAN) 5 MG/5ML syrup 5 mg by Per G Tube route Daily.      • polyethylene glycol (MIRALAX) packet Take 17 g by mouth Daily As Needed.      • potassium chloride (KAYCIEL) 20 MEQ/15ML (10%) solution 20 mEq by Per G Tube route Daily.      • sennosides-docusate sodium (SENOKOT-S) 8.6-50 MG tablet 1 tablet by Per G Tube route Daily.        Current Meds:     Current Facility-Administered Medications:   •  sodium chloride 0.9 % flush 10 mL, 10 mL, Intravenous, PRN, Travis Peter MD  •  sodium chloride 0.9 % infusion, 125 mL/hr, Intravenous, Continuous, Travis Peter MD, Last Rate: 125 mL/hr at 08/08/17 1718, 125 mL/hr at 08/08/17 1718  Allergies:  Allergies   Allergen Reactions   • Penicillins      Social History:   Social History   Substance Use Topics   • Smoking status: Former Smoker   • Smokeless tobacco: Not on file   • Alcohol use No      Family History:  History reviewed. No pertinent family history.       Review of Systems  See history of present illness and past medical history.    Constitutional: Negative for fever.   HENT: Negative for sore throat.    Eyes: Negative.    Respiratory: Positive for shortness of breath. Negative for cough.    Cardiovascular: Negative for chest pain.   Gastrointestinal: Positive for abdominal pain. Negative for diarrhea and vomiting.   Genitourinary: Negative for dysuria.   Musculoskeletal: Negative for neck pain.   Skin: Negative for rash.   Allergic/Immunologic: Negative.    Neurological: Negative for weakness, numbness and headaches.   Hematological: Negative.    Psychiatric/Behavioral: Negative.    Vitals:   /55 (BP Location: Left arm, Patient Position: Lying)  Pulse 88  Temp 97.9 °F (36.6 °C) (Oral)   Resp 16  Ht 62.4\" (158.5 cm)  Wt 118 lb 3.2 oz (53.6 kg)  SpO2 95%  BMI 21.34 " kg/m2  I/O:   Intake/Output Summary (Last 24 hours) at 08/08/17 2139  Last data filed at 08/08/17 1938   Gross per 24 hour   Intake              850 ml   Output              450 ml   Net              400 ml     Exam:  General Appearance:    Alert, cooperative, no distress, appears stated age,Does not appear to be toxic or septic    Head:    Normocephalic, without obvious abnormality, atraumatic   Eyes:    PERRL, conjunctiva/corneas clear, EOM's intact, both eyes   Ears:    Normal external ear canals, both ears   Nose:   Nares normal, septum midline, mucosa normal, no drainage    or sinus tenderness   Throat:   Lips, tongue, gums normal; oral mucosa pink and moist   Neck:   Supple, symmetrical, trachea midline, no adenopathy;     thyroid:  no enlargement/tenderness/nodules; no carotid    bruit or JVD   Back:     Symmetric, no curvature, ROM normal, Mild left CVA tenderness   Lungs:     Clear to auscultation bilaterally, respirations unlabored   Chest Wall:    No tenderness or deformity    Heart:    Regular rate and rhythm, S1 and S2 normal, no murmur, rub   or gallop   Abdomen:     Soft, left colostomy present,  non-tender, bowel sounds active all four quadrants,     no masses, no hepatomegaly, no splenomegaly   Extremities:   Right AKA    Pulses:   Pulses palpable in all extremities; symmetric all extremities   Skin:   Klimas stage IV sacrococcygeal decubitus ulcer present    Neurologic:   CNII-XII intact, motor strength grossly intact, sensation grossly intact to light touch, no focal deficits noted       Data Review:      I reviewed the patient's new clinical results.    Results from last 7 days  Lab Units 08/08/17  1251   WBC 10*3/mm3 10.07   HEMOGLOBIN g/dL 11.3*   PLATELETS 10*3/mm3 302       Results from last 7 days  Lab Units 08/08/17  1251   SODIUM mmol/L 136   POTASSIUM mmol/L 3.6   CHLORIDE mmol/L 97*   CO2 mmol/L 25.7   BUN mg/dL 37*   CREATININE mg/dL 0.93   CALCIUM mg/dL 9.4   GLUCOSE mg/dL 124*      Urinalysis reviewed  Assessment:  Active Hospital Problems (** Indicates Principal Problem)    Diagnosis Date Noted   • **Pneumonia of left lower lobe due to infectious organism [J18.9] 08/08/2017   • A-fib [I48.91] 08/08/2017   • COPD (chronic obstructive pulmonary disease) [J44.9] 08/08/2017   • UTI (urinary tract infection) [N39.0] 08/08/2017   • Calculus of left kidney [N20.0] 08/08/2017      Resolved Hospital Problems    Diagnosis Date Noted Date Resolved   No resolved problems to display.       Plan:  Admit the patient, start IV meropenem and vancomycin for healthcare associated left-sided pneumonia as well as calculus pyelonephritis involving the left kidney.  Get speech evaluation and is start her diet accordingly.  Further management as her condition evolves.    Justice Ya MD   8/8/2017  9:39 PM  Much of this encounter note is an electronic transcription/translation of spoken language to printed text. The electronic translation of spoken language may permit erroneous, or at times, nonsensical words or phrases to be inadvertently transcribed; Although I have reviewed the note for such errors, some may still exist

## 2017-08-09 NOTE — PROGRESS NOTES
"Pharmacokinetic Consult - Vancomycin Dosing (Follow-up Note)    Simona Brooks is on day 1 pharmacy to dose vancomycin for pneumonia.  Consult for Dr. Ya  Goal trough: 15-20 mg/L     Relevant clinical data and objective history reviewed:  82 y.o. female 62.4\" (158.5 cm) 118 lb 3.2 oz (53.6 kg)    Past Medical History:   Diagnosis Date   • Anemia    • Atrial fibrillation    • Colitis    • COPD (chronic obstructive pulmonary disease)    • Dysphagia    • Hernia    • Hypokalemia    • Pneumonia      Creatinine   Date Value Ref Range Status   08/09/2017 0.60 0.57 - 1.00 mg/dL Final   08/08/2017 0.93 0.57 - 1.00 mg/dL Final     BUN   Date Value Ref Range Status   08/09/2017 23 8 - 23 mg/dL Final     Estimated Creatinine Clearance: 45.9 mL/min (by C-G formula based on Cr of 0.6).    Lab Results   Component Value Date    WBC 7.22 08/09/2017     Temp Readings from Last 3 Encounters:   08/09/17 97.7 °F (36.5 °C) (Oral)     Baseline culture/source/susceptibility:   Blood= NG < 24 hours    IV Anti-Infectives     Ordered     Dose/Rate Route Frequency Start Stop    08/08/17 2335  vancomycin 750 mg/250 mL 0.9% NS add-vantage     Ordering Provider:  Justice Ya MD    750 mg  over 75 Minutes Intravenous Every 24 Hours 08/09/17 0900      08/08/17 2321  meropenem (MERREM) 1 g/100 mL 0.9% NS VTB (mbp)     Ordering Provider:  Justice Ya MD    1 g  over 30 Minutes Intravenous Every 12 Hours 08/09/17 0600      08/08/17 2321  Pharmacy to dose vancomycin     Ordering Provider:  Justice Ya MD     Does not apply Continuous PRN 08/08/17 2320             Assessment/Plan  Patient currently on vancomycin 750mg (14mg/kg) IV q24h.  Vancomycin trough ordered prior to morning dose on 8/11.  Pharmacy will continue to follow and adjust as needed.    Haydee De Souza, PharmD  8/9/2017  8:41 AM    "

## 2017-08-09 NOTE — PROGRESS NOTES
[facility]     LOS: 1 day     Subjective:  Nauseated, zofran    At [facility]    Objective:    Vital Signs:  Temp:  [97.7 °F (36.5 °C)-98.5 °F (36.9 °C)] 97.7 °F (36.5 °C)  Heart Rate:  [74-95] 95  Resp:  [16-20] 20  BP: (122-160)/(55-80) 160/70    General Appearance: no acute distress, appears comfortable  Heart: regular rate and rhythm  Lungs: clear to auscultation bilaterally, respirations unlabored, good air entry  Abdomen: soft, non-tender, no guarding, no rebound, non-distended  Extremities: no edema, no cyanosis  Neurology: speech normal, CN grossly normal  Psychiatric: normal mood and affect    Results Review:    Glucose   Date Value Ref Range Status   08/09/2017 105 (H) 65 - 99 mg/dL Final   08/08/2017 124 (H) 65 - 99 mg/dL Final       Results from last 7 days  Lab Units 08/09/17  0605   WBC 10*3/mm3 7.22   HEMOGLOBIN g/dL 10.3*   HEMATOCRIT % 35.9   PLATELETS 10*3/mm3 296       Results from last 7 days  Lab Units 08/09/17  0605   SODIUM mmol/L 138   POTASSIUM mmol/L 3.3*   CHLORIDE mmol/L 103   CO2 mmol/L 21.9*   BUN mg/dL 23   CREATININE mg/dL 0.60   CALCIUM mg/dL 8.5*   BILIRUBIN mg/dL 0.2   ALK PHOS U/L 86   ALT (SGPT) U/L 10   AST (SGOT) U/L 12   GLUCOSE mg/dL 105*                 Results from last 7 days  Lab Units 08/08/17  1251   TROPONIN T ng/mL <0.010     Blood Culture   Date Value Ref Range Status   08/08/2017 No growth at less than 24 hours  Preliminary                          I have reviewed daily medications and changes in CPOE    Scheduled meds    ferrous sulfate 300 mg Per G Tube Daily   furosemide 20 mg Per G Tube Daily   gabapentin 100 mg Oral Daily   heparin (porcine) 5,000 Units Subcutaneous Q12H   meropenem 1 g Intravenous Q12H   sennosides-docusate sodium 1 tablet Per G Tube Daily   vancomycin 750 mg Intravenous Q24H         Pharmacy to dose vancomycin     sodium chloride 125 mL/hr Last Rate: 125 mL/hr (08/08/17 0104)     PRN meds  ondansetron  •   ondansetron  •  Pharmacy to dose vancomycin  •  sodium chloride  •  sodium chloride    Blood cx negative  Urine cx in process    Principal Problem:    Pneumonia of left lower lobe due to infectious organism  Active Problems:    A-fib    COPD (chronic obstructive pulmonary disease)    UTI (urinary tract infection)    Calculus of left kidney        Assessment/Plan:      Pneumonia of left lower lobe due to infectious organism- vanc and merrem, pulmonary consult, IS, speech eval, patient has a feeding tube but is taking po at the nursing home      A-fib- currently in sinus, ekg with multiple PAC      COPD (chronic obstructive pulmonary disease)-saturations good      UTI (urinary tract infection) with chronic barger and likely staghorn stone (chronic)- urology to see      Calculus of left kidney    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

## 2017-08-09 NOTE — CONSULTS
FIRST UROLOGY CONSULT      Patient Identification:  NAME:  Simona Brooks  Age:  82 y.o.   Sex:  female   :  1934   MRN:  [id number]       Chief complaint: Fevers and confusion    History of present illness:  This is an 82-year-old female she lives in nursing facility.  She's very complex with multiple health issues.  She has stage IV decubitus.  She has a chronic suprapubic catheter..  Keep years since we have seen her in our office.  We have no idea who put her superpubic catheter in.  She has no nephrolithiasis and her imaging for her pneumonia on presentation revealed stones in the kidney.  Further imaging this morning shows a very large staghorn calculus in the left kidney and a smaller but sizable stone in the right kidney.  Neither of these are causing any hydronephrosis nor does she have any perinephric stranding or anything to indicate any obstructing stones are chronically infected stones.  She does have chronically abnormal urine because of her SP tube.  I did talk about this and and she gave me the impression she wants nothing done about the stones.      Past medical history:  Past Medical History:   Diagnosis Date   • Anemia    • Atrial fibrillation    • Colitis    • COPD (chronic obstructive pulmonary disease)    • Dysphagia    • Hernia    • Hypokalemia    • Pneumonia        Past surgical history:  History reviewed. No pertinent surgical history.    Allergies:  Penicillins    Home medications:  Prescriptions Prior to Admission   Medication Sig Dispense Refill Last Dose   • acetaminophen (TYLENOL) 160 MG/5ML solution Take 640 mg by mouth Every 6 (Six) Hours As Needed for Mild Pain (1-3).      • calcium carbonate (TUMS) 500 MG chewable tablet Chew 1 tablet As Needed for Indigestion or Heartburn.      • ferrous sulfate 300 (60 FE) MG/5ML syrup 300 mg by Per G Tube route Daily.      • furosemide (LASIX) 20 MG tablet 20 mg by Per G Tube route Daily.      • gabapentin (NEURONTIN) 100 MG  capsule Take 100 mg by mouth Daily.      • morphine 20 MG/ML concentrated solution Take 15 mg by mouth Every 4 (Four) Hours As Needed for Severe Pain (7-10).      • Multiple Vitamin (TAB-A-EBONY PO) 1 tablet by Per G Tube route Daily.      • omeprazole (priLOSEC) 20 MG capsule 20 mg by Per G Tube route Daily.      • ondansetron (ZOFRAN) 4 MG tablet Take 4 mg by mouth Every 6 (Six) Hours As Needed for Nausea or Vomiting.      • oxybutynin (DITROPAN) 5 MG/5ML syrup 5 mg by Per G Tube route Daily.      • polyethylene glycol (MIRALAX) packet Take 17 g by mouth Daily As Needed.      • potassium chloride (KAYCIEL) 20 MEQ/15ML (10%) solution 20 mEq by Per G Tube route Daily.      • sennosides-docusate sodium (SENOKOT-S) 8.6-50 MG tablet 1 tablet by Per G Tube route Daily.          Hospital medications:    ferrous sulfate 300 mg Per G Tube Daily   furosemide 20 mg Per G Tube Daily   gabapentin 100 mg Oral Daily   heparin (porcine) 5,000 Units Subcutaneous Q12H   meropenem 500 mg Intravenous Q8H   sennosides-docusate sodium 1 tablet Per G Tube Daily   vancomycin 750 mg Intravenous Q24H       Pharmacy to dose vancomycin     sodium chloride 125 mL/hr Last Rate: 125 mL/hr (17 1619)     ondansetron  •  ondansetron  •  Pharmacy to dose vancomycin  •  sodium chloride  •  sodium chloride    Family history:  History reviewed. No pertinent family history.    Social history:  Social History   Substance Use Topics   • Smoking status: Former Smoker   • Smokeless tobacco: None   • Alcohol use No       Review of systems:    Negative 12-system ROS except for the following:  Mild shortness of breath.  No fevers and chills at this time.  She has no chest pain.  She denies any flank pain.  She has no gross hematuria.  Her SP tube is draining well.  He is largely confined to bed.      Objective:  TMax 24 hours:   Temp (24hrs), Av.6 °F (35.9 °C), Min:94.3 °F (34.6 °C), Max:97.9 °F (36.6 °C)      Vitals Ranges:   Temp:  [94.3 °F (34.6  °C)-97.9 °F (36.6 °C)] 94.3 °F (34.6 °C)  Heart Rate:  [70-95] 70  Resp:  [16-22] 20  BP: (115-160)/(48-70) 115/48    Intake/Output Last 3 shifts:  I/O last 3 completed shifts:  In: 850 [IV Piggyback:850]  Out: 2700 [Urine:2700]     Physical Exam:       General Appearance:    Alert, cooperative, in no acute distress   Head:    Normocephalic, without obvious abnormality, atraumatic   Eyes:          PERRL, conjunctivae and corneas clear   Ears:    Normal external inspection   Throat:   No oral lesions, oral mucosa moist   Neck:   Supple, no LAD, trachea midline   Back:     No CVA tenderness   Lungs:     Respirations unlabored, symmetric excursion    Heart:    RRR, intact peripheral pulses   Abdomen:     Soft, NDNT, no masses, no guarding   :    No pelvic examination performed   Extremities:   No edema, no deformity   Skin:   No bleeding, bruising or rashes   Neuro/Psych:   Orientation intact,       Results review:   I reviewed the patient's new clinical results.    Data review:  Lab Results (last 24 hours)     Procedure Component Value Units Date/Time    Lactic Acid, Plasma [[id number]]  (Normal) Collected:  08/08/17 1658    Specimen:  Blood Updated:  08/08/17 1730     Lactate 1.4 mmol/L     Blood Culture [[id number]]  (Normal) Collected:  08/08/17 1832    Specimen:  Blood from Arm, Left Updated:  08/09/17 0701     Blood Culture No growth at less than 24 hours    Blood Culture [[id number]]  (Normal) Collected:  08/08/17 1848    Specimen:  Blood from Arm, Left Updated:  08/09/17 0701     Blood Culture No growth at less than 24 hours    CBC Auto Differential [[id number]]  (Abnormal) Collected:  08/09/17 0605    Specimen:  Blood Updated:  08/09/17 0701     WBC 7.22 10*3/mm3      RBC 4.21 10*6/mm3      Hemoglobin 10.3 (L) g/dL      Hematocrit 35.9 %      MCV 85.3 fL      MCH 24.5 (L) pg      MCHC 28.7 (L) g/dL      RDW 16.9 (H) %      RDW-SD 52.9 fl      MPV 10.2 fL      Platelets 296 10*3/mm3      Neutrophil % 64.2 %       Lymphocyte % 26.3 %      Monocyte % 9.0 %      Eosinophil % 0.0 (L) %      Basophil % 0.1 %      Immature Grans % 0.4 %      Neutrophils, Absolute 4.63 10*3/mm3      Lymphocytes, Absolute 1.90 10*3/mm3      Monocytes, Absolute 0.65 10*3/mm3      Eosinophils, Absolute 0.00 10*3/mm3      Basophils, Absolute 0.01 10*3/mm3      Immature Grans, Absolute 0.03 10*3/mm3      nRBC 0.0 /100 WBC     Comprehensive Metabolic Panel [[id number]]  (Abnormal) Collected:  08/09/17 0605    Specimen:  Blood Updated:  08/09/17 0705     Glucose 105 (H) mg/dL      BUN 23 mg/dL      Creatinine 0.60 mg/dL      Sodium 138 mmol/L      Potassium 3.3 (L) mmol/L      Chloride 103 mmol/L      CO2 21.9 (L) mmol/L      Calcium 8.5 (L) mg/dL      Total Protein 7.5 g/dL      Albumin 2.70 (L) g/dL      ALT (SGPT) 10 U/L      AST (SGOT) 12 U/L      Alkaline Phosphatase 86 U/L      Total Bilirubin 0.2 mg/dL      eGFR Non African Amer 96 mL/min/1.73      Globulin 4.8 gm/dL      A/G Ratio 0.6 g/dL      BUN/Creatinine Ratio 38.3 (H)     Anion Gap 13.1 mmol/L     Narrative:       The MDRD GFR formula is only valid for adults with stable renal function between ages 18 and 70.    Lipase [[id number]]  (Normal) Collected:  08/09/17 0605    Specimen:  Blood Updated:  08/09/17 1002     Lipase 20 U/L     Urine Culture [[id number]]  (Abnormal) Collected:  08/08/17 1255    Specimen:  Urine from Urine, Catheter Updated:  08/09/17 1050     Urine Culture >100,000 CFU/mL Gram Negative Bacilli (A)    Urine Culture [[id number]]  (Abnormal) Collected:  08/08/17 1255    Specimen:  Urine from Urine, Catheter Updated:  08/09/17 1056     Urine Culture >100,000 CFU/mL Gram Negative Bacilli (A)    MRSA Screen Culture [[id number]] Collected:  08/09/17 1601    Specimen:  Swab from Nares Updated:  08/09/17 1609           Imaging:  Imaging Results (last 24 hours)     Procedure Component Value Units Date/Time    CT Angiogram Chest With Contrast [[id number]] Collected:  08/08/17 8500      Updated:  08/08/17 1800    Narrative:       CT ANGIOGRAPHY IMAGING OF THE CHEST WITH PE PROTOCOL     CLINICAL HISTORY: Chest pain. Dyspnea.     TECHNIQUE: Spiral CT images were obtained through the chest during rapid  IV injection of contrast and were reconstructed in 3 mm thick axial  slices.     COMPARISON: Chest x-ray performed earlier today.     FINDINGS: The main pulmonary arteries and their lobar and segmental  branches are opacified, and demonstrate no filling defects. There is no  CT evidence of pulmonary embolism. There is no mediastinal or hilar or  axillary lymphadenopathy. The thoracic aorta is mildly ectatic and  calcified. There is no evidence of dissection. Lung window images  demonstrate moderate emphysematous changes throughout both lungs and  also interstitial thickening in the left lung consistent with fibrosis.  There is volume loss and dense consolidation in the left lower lobe  consistent with atelectasis and probable superimposed pneumonia. Mild  linear atelectasis is present in the right lower lobe. There are no  pleural effusions. There is a large fixed hiatal hernia. Images through  the upper abdomen partially show a large staghorn calculus within the  left kidney. There is rather marked exaggeration of the normal thoracic  kyphosis. Mild compression deformity of a lower thoracic region bodies  likely chronic. There is also moderate dextroscoliosis.       Impression:       Dense consolidation and volume loss in the left lower lobe  consistent with atelectasis and probable pneumonia. There is mild linear  atelectasis in the right lower lobe. There is no CT evidence of  pulmonary embolism. A large staghorn calculus is partially imaged in the  left kidney.     This report was finalized on 8/8/2017 5:57 PM by Dr. Ken Ortiz MD.       CT Abdomen Pelvis Without Contrast [[id number]] Collected:  08/09/17 1046     Updated:  08/09/17 1046    Narrative:       CT ABDOMEN AND PELVIS WITHOUT  CONTRAST     HISTORY: Upper abdominal pain and nausea.     TECHNIQUE: Axial CT images of the abdomen and pelvis were obtained  without administration of intravenous contrast. The patient was not  given oral contrast. Coronal and sagittal reformats were obtained.     COMPARISON: None.     FINDINGS: Evaluation is limited secondary to motion artifact and absence  of contrast. A moderate-sized hiatal hernia is imaged. There is a dense  consolidative process within the left lower lobe associated with volume  loss. No pleural or pericardial effusion is imaged.     The noncontrast attenuation of the liver is normal. No focal hepatic  lesions are imaged. The gallbladder is poorly imaged limiting  evaluation. The spleen is unremarkable apart from punctate calcific  granulomas. The pancreas is normal. A staghorn calculus is seen within  the left kidney; no hydronephrosis is present. A calculus within the  right kidney is present within the lower polar calyces and infundibulum  and extending into the pelvis. There is a prominent dilated right  extrarenal pelvis present. The urinary bladder is decompressed with a  suprapubic catheter. The morphology of the uterus is unremarkable.     The percutaneous gastrostomy tube is in place. The patient has a  left-sided colostomy. There is a large parastomal hernia containing  small bowel loops, colon and accompanying mesentery. The hernial defect  measures approximately 5.3 cm in the transverse plane.     No pathological retroperitoneal lymphadenopathy. No ascites is present.  Advanced atherosclerotic disease is seen within the abdominal aorta and  its branches.       Impression:       1.  Evaluation limited secondary to motion artifact and absence of  contrast. A T11 anterior compression deformity appears chronic.  2. Large left staghorn calculus. There is mild associated urothelial  thickening within the left renal pelvis. There is a smaller calculus  seen within the right kidney with  a dilated extrarenal pelvis.  3. Moderate-sized hiatal hernia containing an air-fluid level.  4. Large parastomal hernia on the left containing small and large bowel  loops. No evidence of bowel obstruction or other complication.                Assessment:     Principal Problem:    Pneumonia of left lower lobe due to infectious organism  Active Problems:    A-fib    COPD (chronic obstructive pulmonary disease)    UTI (urinary tract infection)    Calculus of left kidney    Staghorn calculus left kidney  NGB with SP tube    Plan:     I suspect we will do nothing about these calculi given the risks to extract these percutaneously I doubt she is getting any benefit.  She doesn't appear to have any recurrent pyelo issues or obstructing stones that need to be emergently addressed so I think just observation of these calculi are primarily her best interest.  I did try to give her son a call but he did not answer his home.    Kyle Paz MD  08/09/17  4:57 PM

## 2017-08-09 NOTE — PLAN OF CARE
"Problem: Patient Care Overview (Adult)  Goal: Plan of Care Review  Outcome: Ongoing (interventions implemented as appropriate)    08/09/17 5871   Coping/Psychosocial Response Interventions   Plan Of Care Reviewed With patient   Patient Care Overview   Progress no change   Outcome Evaluation   Outcome Summary/Follow up Plan patients main complaint today is nausea and \"just not feeling good\". noncooperative at times when related to repositioning. Records requested from multiple facilities to attempt to put together a medical history. Wound care orders received and accumax in plce.       Goal: Adult Individualization and Mutuality  Outcome: Ongoing (interventions implemented as appropriate)    Problem: Dysphagia (Adult)  Goal: Identify Related Risk Factors and Signs and Symptoms  Outcome: Outcome(s) achieved Date Met:  08/09/17  Goal: Functional/Safe Swallow  Outcome: Ongoing (interventions implemented as appropriate)  Goal: Compensatory Techniques to Improve Safety/Function with Swallowing  Outcome: Ongoing (interventions implemented as appropriate)    Problem: Mobility, Physical Impaired (Adult)  Goal: Identify Related Risk Factors and Signs and Symptoms  Outcome: Outcome(s) achieved Date Met:  08/09/17  Goal: Enhanced Mobility Skills  Outcome: Ongoing (interventions implemented as appropriate)  Goal: Enhanced Functionality Ability  Outcome: Ongoing (interventions implemented as appropriate)    Problem: Skin Integrity Impairment, Risk/Actual (Adult)  Goal: Identify Related Risk Factors and Signs and Symptoms  Outcome: Outcome(s) achieved Date Met:  08/09/17  Goal: Skin Integrity/Wound Healing  Outcome: Ongoing (interventions implemented as appropriate)    Problem: Pressure Ulcer Risk (Darin Scale) (Adult,Obstetrics,Pediatric)  Goal: Identify Related Risk Factors and Signs and Symptoms  Outcome: Outcome(s) achieved Date Met:  08/09/17  Goal: Skin Integrity  Outcome: Ongoing (interventions implemented as " appropriate)

## 2017-08-10 LAB
BACTERIA SPEC AEROBE CULT: ABNORMAL
BACTERIA SPEC AEROBE CULT: ABNORMAL

## 2017-08-10 PROCEDURE — 25010000002 MEROPENEM: Performed by: INTERNAL MEDICINE

## 2017-08-10 PROCEDURE — 25010000002 HEPARIN (PORCINE) PER 1000 UNITS: Performed by: INTERNAL MEDICINE

## 2017-08-10 PROCEDURE — 92610 EVALUATE SWALLOWING FUNCTION: CPT

## 2017-08-10 PROCEDURE — 25010000002 ONDANSETRON PER 1 MG: Performed by: INTERNAL MEDICINE

## 2017-08-10 RX ORDER — POTASSIUM CHLORIDE 1.5 G/1.77G
20 POWDER, FOR SOLUTION ORAL ONCE
Status: COMPLETED | OUTPATIENT
Start: 2017-08-11 | End: 2017-08-11

## 2017-08-10 RX ORDER — HYDROCODONE BITARTRATE AND ACETAMINOPHEN 5; 325 MG/1; MG/1
1 TABLET ORAL EVERY 4 HOURS PRN
Status: DISCONTINUED | OUTPATIENT
Start: 2017-08-10 | End: 2017-08-11 | Stop reason: HOSPADM

## 2017-08-10 RX ADMIN — HYDROCODONE BITARTRATE AND ACETAMINOPHEN 1 TABLET: 5; 325 TABLET ORAL at 22:54

## 2017-08-10 RX ADMIN — MEROPENEM 500 MG: 500 INJECTION, POWDER, FOR SOLUTION INTRAVENOUS at 22:34

## 2017-08-10 RX ADMIN — SODIUM CHLORIDE 125 ML/HR: 9 INJECTION, SOLUTION INTRAVENOUS at 22:34

## 2017-08-10 RX ADMIN — ONDANSETRON 4 MG: 2 INJECTION INTRAMUSCULAR; INTRAVENOUS at 22:54

## 2017-08-10 RX ADMIN — MINERAL SUPPLEMENT IRON 300 MG / 5 ML STRENGTH LIQUID 100 PER BOX UNFLAVORED 300 MG: at 08:06

## 2017-08-10 RX ADMIN — GABAPENTIN 100 MG: 100 CAPSULE ORAL at 08:06

## 2017-08-10 RX ADMIN — HYDROCODONE BITARTRATE AND ACETAMINOPHEN 1 TABLET: 5; 325 TABLET ORAL at 06:19

## 2017-08-10 RX ADMIN — ONDANSETRON 4 MG: 2 INJECTION INTRAMUSCULAR; INTRAVENOUS at 01:58

## 2017-08-10 RX ADMIN — FUROSEMIDE 20 MG: 20 TABLET ORAL at 08:06

## 2017-08-10 RX ADMIN — DOCUSATE SODIUM -SENNOSIDES 1 TABLET: 50; 8.6 TABLET, COATED ORAL at 08:06

## 2017-08-10 RX ADMIN — MEROPENEM 500 MG: 500 INJECTION, POWDER, FOR SOLUTION INTRAVENOUS at 05:53

## 2017-08-10 RX ADMIN — MEROPENEM 500 MG: 500 INJECTION, POWDER, FOR SOLUTION INTRAVENOUS at 14:19

## 2017-08-10 RX ADMIN — HEPARIN SODIUM 5000 UNITS: 5000 INJECTION, SOLUTION INTRAVENOUS; SUBCUTANEOUS at 08:06

## 2017-08-10 RX ADMIN — SODIUM CHLORIDE 125 ML/HR: 9 INJECTION, SOLUTION INTRAVENOUS at 05:44

## 2017-08-10 RX ADMIN — HEPARIN SODIUM 5000 UNITS: 5000 INJECTION, SOLUTION INTRAVENOUS; SUBCUTANEOUS at 20:18

## 2017-08-10 RX ADMIN — VANCOMYCIN HYDROCHLORIDE 750 MG: 750 INJECTION, POWDER, LYOPHILIZED, FOR SOLUTION INTRAVENOUS at 08:07

## 2017-08-10 NOTE — PLAN OF CARE
Problem: Patient Care Overview (Adult)  Goal: Plan of Care Review    08/10/17 1037   Coping/Psychosocial Response Interventions   Plan Of Care Reviewed With patient   Patient Care Overview   Progress improving   Outcome Evaluation   Outcome Summary/Follow up Plan Pt agreed to minimal trials of thins only. Delayed cough noted x1, do not suspect related d/t swallow function at this time. SLP recs clear liquids (no straws d/t pt refusal).          Problem: Inpatient SLP  Goal: Dysphagia- Patient will safely consume diet as per recommendation with no signs/symptoms of aspiration    08/10/17 1037   Safely Consume Diet   Safely Consume Diet- SLP, Date Established 08/10/17   Safely Consume Diet- SLP, Time to Achieve by discharge

## 2017-08-10 NOTE — PROGRESS NOTES
"      [facility] PULMON[facility]         Dr Diaz Sayied   LOS: 2 days   Patient Care Team:  Leonor Echevarria MD as PCP - General (Family Medicine)    Chief Complaint: Healthcare associated pneumonia    Interval History: More awake alert today.  Denies any specific complaints other than some leg tightness.    REVIEW OF SYSTEMS:   CARDIOVASCULAR: No chest pain, chest pressure or chest discomfort. No palpitations or edema.   RESPIRATORY: No shortness of breath, cough or sputum.   GASTROINTESTINAL: No anorexia, nausea, vomiting or diarrhea. No abdominal pain or blood.   HEMATOLOGIC: No bleeding or bruising.     Ventilator/Non-Invasive Ventilation Settings     None            Vital Signs  Temp:  [94.3 °F (34.6 °C)-99.4 °F (37.4 °C)] 98.1 °F (36.7 °C)  Heart Rate:  [70-82] 76  Resp:  [16-22] 16  BP: (115-152)/(48-63) 152/63    Intake/Output Summary (Last 24 hours) at 08/10/17 1247  Last data filed at 08/10/17 0015   Gross per 24 hour   Intake                0 ml   Output             1250 ml   Net            -1250 ml     Flowsheet Rows         First Filed Value    Admission Height  59\" (149.9 cm) Documented at 08/08/2017 1228    Admission Weight  155 lb (70.3 kg) Documented at 08/08/2017 1228          Physical Exam:   General Appearance:    Alert, cooperative, in no acute distress   Lungs:     Finished breath sounds occasional rhonchi on the bases     Heart:    Regular rhythm and normal rate, normal S1 and S2, no            murmur, no gallop, no rub, no click   Chest Wall:    No abnormalities observed   Abdomen:     Normal bowel sounds, no masses, no organomegaly, soft        non-tender, non-distended, no guarding, no rebound                tenderness   Extremities:   Status post right AKA, no edema, no cyanosis, no             redness     Results Review:          Results from last 7 days  Lab Units 08/09/17  0605 08/08/17  1251   SODIUM mmol/L 138 136   POTASSIUM mmol/L 3.3* 3.6   CHLORIDE mmol/L 103 97*   CO2 " mmol/L 21.9* 25.7   BUN mg/dL 23 37*   CREATININE mg/dL 0.60 0.93   GLUCOSE mg/dL 105* 124*   CALCIUM mg/dL 8.5* 9.4       Results from last 7 days  Lab Units 08/08/17  1251   TROPONIN T ng/mL <0.010       Results from last 7 days  Lab Units 08/09/17  0605 08/08/17  1251   WBC 10*3/mm3 7.22 10.07   HEMOGLOBIN g/dL 10.3* 11.3*   HEMATOCRIT % 35.9 38.9   PLATELETS 10*3/mm3 296 302                           I reviewed the patient's new clinical results.  I personally viewed and interpreted the patient's CXR        Medication Review:     ferrous sulfate 300 mg Per G Tube Daily   furosemide 20 mg Per G Tube Daily   gabapentin 100 mg Oral Daily   heparin (porcine) 5,000 Units Subcutaneous Q12H   meropenem 500 mg Intravenous Q8H   sennosides-docusate sodium 1 tablet Per G Tube Daily   vancomycin 750 mg Intravenous Q24H         Pharmacy to dose vancomycin     sodium chloride 125 mL/hr Last Rate: 125 mL/hr (08/10/17 0544)       ASSESSMENT:   Healthcare associated pneumonia  UTI  COPD  A. 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

## 2017-08-10 NOTE — PROGRESS NOTES
Continued Stay Note  [facility]     Patient Name: Simona Brooks  MRN: [id number]  Today's Date: 8/10/2017    Admit Date: 8/8/2017          Discharge Plan       08/10/17 1504    Case Management/Social Work Plan    Plan Return to [facility]    Patient/Family In Agreement With Plan yes    Additional Comments Attempted to call billy Coelho, but both numbers listed were not correct.  Received call from (step)son Jeyson, that plan is for patient to return to [facility] at OR.  Packet in cubbie.  CCP will continue to follow.               Discharge Codes     None            Becky S. [place], RN

## 2017-08-10 NOTE — PLAN OF CARE
Problem: Patient Care Overview (Adult)  Goal: Plan of Care Review  Outcome: Ongoing (interventions implemented as appropriate)    08/10/17 1806   Coping/Psychosocial Response Interventions   Plan Of Care Reviewed With patient   Patient Care Overview   Progress no change   Outcome Evaluation   Outcome Summary/Follow up Plan speech seen pt and pt is now on clear liq diet, refused to eat food for speech. still c/o of pain at times but slept alot today. cont vanc and merrem for pna       Goal: Adult Individualization and Mutuality  Outcome: Ongoing (interventions implemented as appropriate)  Goal: Discharge Needs Assessment  Outcome: Ongoing (interventions implemented as appropriate)    Problem: Dysphagia (Adult)  Goal: Functional/Safe Swallow  Outcome: Outcome(s) achieved Date Met:  08/10/17  Goal: Compensatory Techniques to Improve Safety/Function with Swallowing  Outcome: Ongoing (interventions implemented as appropriate)    Problem: Mobility, Physical Impaired (Adult)  Goal: Enhanced Mobility Skills  Outcome: Ongoing (interventions implemented as appropriate)  Goal: Enhanced Functionality Ability  Outcome: Ongoing (interventions implemented as appropriate)    Problem: Skin Integrity Impairment, Risk/Actual (Adult)  Goal: Skin Integrity/Wound Healing  Outcome: Ongoing (interventions implemented as appropriate)    Problem: Pressure Ulcer Risk (Darin Scale) (Adult,Obstetrics,Pediatric)  Goal: Skin Integrity  Outcome: Ongoing (interventions implemented as appropriate)

## 2017-08-10 NOTE — THERAPY EVALUATION
Acute Care - Speech Language Pathology   Swallow Initial Evaluation [facility]     Patient Name: Simona Brooks  : 1934  MRN: [id number]  Today's Date: 8/10/2017               Admit Date: 2017    SPEECH-LANGUAGE PATHOLOGY EVALUATION - SWALLOW  Subjective: The patient was seen on this date for a Clinical Swallow evaluation.  Patient was alert and cooperative.    The patient's history is significant for L lobe pna and G-tube placement d/t poor PO intake (per patient). Pt asking for water, refused straw trials or solids. Pt with strong, dry cough prior to PO. Pt on regular and thins at NH.   Objective: Textures given included thin liquid.  Assessment: Difficulties were noted with thin liquid, characterized by slight swallow delay, consistent double swallow/audible at times with thins via cup. Pt refused straw drinks. Voice clear post swallow.   SLP Findings:  Patient presents with s/s of oropharyngeal dysphagia.   Recommendations: Diet Textures: thin liquid, Medications should be taken whole with puree or crushed via G-tube.  Recommended Strategies: Upright for PO and no straw. Oral care before breakfast, after all meals and PRN.  Other Recommended Evaluations: Re-evaluation at bedside    Dysphagia therapy is recommended. Rationale: to upgrade diet as indicated.    Visit Dx:     ICD-10-CM ICD-9-CM   1. Pneumonia of left lower lobe due to infectious organism J18.9 483.8   2. Staghorn calculus N20.0 592.0     Patient Active Problem List   Diagnosis   • Pneumonia of left lower lobe due to infectious organism   • A-fib   • COPD (chronic obstructive pulmonary disease)   • UTI (urinary tract infection)   • Calculus of left kidney     Past Medical History:   Diagnosis Date   • Anemia    • Atrial fibrillation    • Colitis    • COPD (chronic obstructive pulmonary disease)    • Dysphagia    • Hernia    • Hypokalemia    • Pneumonia      History reviewed. No pertinent surgical history.       SWALLOW EVALUATION (last  72 hours)      Swallow Evaluation       08/10/17 1035                Rehab Evaluation    Document Type evaluation  -        Subjective Information agree to therapy  -        Symptoms Noted During/After Treatment none  -        General Information    Patient Profile Review yes  -        Subjective Patient Observations Pt asking for water  -        Pertinent History Of Current Problem Alternative means of nutrition d/t poor PO intake  -        Current Diet Limitations NPO  -        Prior Level of Function- Swallowing no diet consistency restrictions  -        Plans/Goals Discussed With patient  -        Barriers to Rehab medically complex  -        Clinical Impression    Patient's Goals For Discharge patient did not state  -        SLP Swallowing Diagnosis mild dysphagia  -        Rehab Potential/Prognosis, Swallowing good, to achieve stated therapy goals  -        Criteria for Skilled Therapeutic Interventions Met skilled criteria for dysphagia intervention met  -        FCM, Swallowing 3-->Level 3  -        Therapy Frequency PRN  -        Predicted Duration Therapy Interv (days) until discharge  -        SLP Diet Recommendation thin liquids  -        SLP Rec. for Method of Medication Administration meds whole in pudding/applesauce;meds via alternate route  -        Monitor For Signs Of Aspiration cough;elevated WBC count;gurgly voice;throat clearing;fever;upper respiratory infection  -        Anticipated Discharge Disposition placement for care  -        Pain Assessment    Pain Assessment No/denies pain  -        Oral Motor Structure and Function    Oral Motor Anatomy and Physiology patient demonstrates physiology that is WNL  -        Oral Motor Assessment Comment Pt with dentures in place  -          User Key  (r) = Recorded By, (t) = Taken By, (c) = Cosigned By    Initials Name Effective Dates     Angie Reno MS [facility]-SLP 07/13/17 -         EDUCATION  The patient  has been educated in the following areas:   Dysphagia (Swallowing Impairment) Modified Diet Instruction.    SLP Recommendation and Plan  SLP Swallowing Diagnosis: mild dysphagia  SLP Diet Recommendation: thin liquids     SLP Rec. for Method of Medication Administration: meds whole in pudding/applesauce, meds via alternate route  Monitor For Signs Of Aspiration: cough, elevated WBC count, gurgly voice, throat clearing, fever, upper respiratory infection     Criteria for Skilled Therapeutic Interventions Met: skilled criteria for dysphagia intervention met  Anticipated Discharge Disposition: placement for care  Rehab Potential/Prognosis, Swallowing: good, to achieve stated therapy goals  Therapy Frequency: PRN             Plan of Care Review  Plan Of Care Reviewed With: patient  Progress: improving  Outcome Summary/Follow up Plan: Pt agreed to minimal trials of thins only. Delayed cough noted x1, do not suspect related d/t swallow function at this time. SLP recs clear liquids (no straws d/t pt refusal).            SLP Goals       08/10/17 1037          Safely Consume Diet    Safely Consume Diet- SLP, Date Established 08/10/17  -      Safely Consume Diet- SLP, Time to Achieve by discharge  -        User Key  (r) = Recorded By, (t) = Taken By, (c) = Cosigned By    Initials Name Provider Type    MIKE Reno MS CCC-SLP Speech and Language Pathologist             SLP Outcome Measures (last 72 hours)      SLP Outcome Measures       08/10/17 1000          SLP Outcome Measures    Outcome Measure Used? Adult NOMS  -      FCM Scores    FCM Chosen Swallowing  -      Swallowing FCM Score 3  -        User Key  (r) = Recorded By, (t) = Taken By, (c) = Cosigned By    Initials Name Effective Dates    MIKE Reno MS CCC-[facility] 07/13/17 -            Time Calculation:         Time Calculation- SLP       08/10/17 1038          Time Calculation- [facility]    SLP Start Time 1000  -      SLP Stop Time 1030  -      SLP Time  Calculation (min) 30 min  -      SLP Received On 08/10/17  -        User Key  (r) = Recorded By, (t) = Taken By, (c) = Cosigned By    Initials Name Provider Type     Angie Reno MS CCC-SLP Speech and Language Pathologist          Therapy Charges for Today     Code Description Service Date Service Provider Modifiers Qty    [id number] HC ST EVAL ORAL PHARYNG SWALLOW 2 8/10/2017 Angie Reno MS CCC-SLP GN 1               Angie Reno MS CCC-SLP  8/10/2017

## 2017-08-10 NOTE — PROGRESS NOTES
[facility]     LOS: 2 days     Subjective:  No n/v/d    No cp    No soa    At [facility]    Objective:    Vital Signs:  Temp:  [98.1 °F (36.7 °C)-99.4 °F (37.4 °C)] 98.2 °F (36.8 °C)  Heart Rate:  [70-82] 79  Resp:  [16-18] 16  BP: (120-152)/(59-63) 120/61    General Appearance: no acute distress, appears comfortable  Heart: regular rate and rhythm  Lungs: clear to auscultation bilaterally, respirations unlabored, good air entry  Abdomen: soft, non-tender, no guarding, no rebound, non-distended, ostomy  Extremities: no edema, no cyanosis  Neurology: speech normal, CN grossly normal  Psychiatric: normal mood and affect    Results Review:    Glucose   Date Value Ref Range Status   08/09/2017 105 (H) 65 - 99 mg/dL Final   08/08/2017 124 (H) 65 - 99 mg/dL Final       Results from last 7 days  Lab Units 08/09/17  0605   WBC 10*3/mm3 7.22   HEMOGLOBIN g/dL 10.3*   HEMATOCRIT % 35.9   PLATELETS 10*3/mm3 296       Results from last 7 days  Lab Units 08/09/17  0605   SODIUM mmol/L 138   POTASSIUM mmol/L 3.3*   CHLORIDE mmol/L 103   CO2 mmol/L 21.9*   BUN mg/dL 23   CREATININE mg/dL 0.60   CALCIUM mg/dL 8.5*   BILIRUBIN mg/dL 0.2   ALK PHOS U/L 86   ALT (SGPT) U/L 10   AST (SGOT) U/L 12   GLUCOSE mg/dL 105*                 Results from last 7 days  Lab Units 08/08/17  1251   TROPONIN T ng/mL <0.010     Blood Culture   Date Value Ref Range Status   08/08/2017 No growth at less than 24 hours  Preliminary                          I have reviewed daily medications and changes in CPOE    Scheduled meds    ferrous sulfate 300 mg Per G Tube Daily   furosemide 20 mg Per G Tube Daily   gabapentin 100 mg Oral Daily   heparin (porcine) 5,000 Units Subcutaneous Q12H   meropenem 500 mg Intravenous Q8H   sennosides-docusate sodium 1 tablet Per G Tube Daily         Pharmacy to dose vancomycin     sodium chloride 125 mL/hr Last Rate: 125 mL/hr (08/10/17 0544)     PRN meds  HYDROcodone-acetaminophen  •   ondansetron  •  ondansetron  •  Pharmacy to dose vancomycin  •  sodium chloride  •  sodium chloride    Blood cx negative  Urine cx in process    Principal Problem:    Pneumonia of left lower lobe due to infectious organism  Active Problems:    A-fib    COPD (chronic obstructive pulmonary disease)    UTI (urinary tract infection)    Calculus of left kidney        Assessment/Plan:      Pneumonia of left lower lobe due to infectious organism- vanc and merrem, pulmonary consult, IS, speech eval below, patient has a feeding tube but is taking po at the nursing home;  Speech rec:Diet Textures: thin liquid, Medications should be taken whole with puree or crushed via G-tube.      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

## 2017-08-10 NOTE — PLAN OF CARE
Problem: Patient Care Overview (Adult)  Goal: Plan of Care Review  Outcome: Ongoing (interventions implemented as appropriate)    08/10/17 0522   Coping/Psychosocial Response Interventions   Plan Of Care Reviewed With patient   Patient Care Overview   Progress no change   Outcome Evaluation   Outcome Summary/Follow up Plan Pt. VSS. She states that she is miserable, in pain and tired of being sick. Nausea continues with not much relief per pt. standpoint. Repositioning frequently to facilitate circulation and avoidance of worsening pressure ulcers or development of new one. Dressing dry and intact. Pt., suprapubic cath did leak moderate amt. of urine. Drained accordingly and checked for kinks. Pt. could benefit from a PRN pain med. and narrowing of dose times for prn pherergan. Will continue to monitor.,          Problem: Dysphagia (Adult)  Goal: Functional/Safe Swallow  Outcome: Ongoing (interventions implemented as appropriate)  Goal: Compensatory Techniques to Improve Safety/Function with Swallowing  Outcome: Ongoing (interventions implemented as appropriate)    Problem: Mobility, Physical Impaired (Adult)  Goal: Enhanced Mobility Skills  Outcome: Ongoing (interventions implemented as appropriate)  Goal: Enhanced Functionality Ability  Outcome: Ongoing (interventions implemented as appropriate)    Problem: Skin Integrity Impairment, Risk/Actual (Adult)  Goal: Skin Integrity/Wound Healing  Outcome: Ongoing (interventions implemented as appropriate)    Problem: Pressure Ulcer Risk (Darin Scale) (Adult,Obstetrics,Pediatric)  Goal: Skin Integrity  Outcome: Ongoing (interventions implemented as appropriate)

## 2017-08-11 VITALS
HEART RATE: 83 BPM | OXYGEN SATURATION: 96 % | SYSTOLIC BLOOD PRESSURE: 158 MMHG | WEIGHT: 118.2 LBS | TEMPERATURE: 98.1 F | RESPIRATION RATE: 16 BRPM | DIASTOLIC BLOOD PRESSURE: 60 MMHG | HEIGHT: 62 IN | BODY MASS INDEX: 21.75 KG/M2

## 2017-08-11 PROBLEM — E87.6 HYPOKALEMIA: Status: RESOLVED | Noted: 2017-08-11 | Resolved: 2017-08-11

## 2017-08-11 PROBLEM — J15.9 HEALTHCARE ASSOCIATED BACTERIAL PNEUMONIA: Status: ACTIVE | Noted: 2017-08-11

## 2017-08-11 PROBLEM — Z93.59 SUPRAPUBIC CATHETER (HCC): Status: ACTIVE | Noted: 2017-08-11

## 2017-08-11 PROBLEM — L89.154 DECUBITUS ULCER OF COCCYGEAL REGION, STAGE 4 (HCC): Status: ACTIVE | Noted: 2017-08-11

## 2017-08-11 PROBLEM — E87.6 HYPOKALEMIA: Status: ACTIVE | Noted: 2017-08-11

## 2017-08-11 PROBLEM — J15.9 HEALTHCARE ASSOCIATED BACTERIAL PNEUMONIA: Status: RESOLVED | Noted: 2017-08-11 | Resolved: 2017-08-11

## 2017-08-11 PROBLEM — Z89.611 HISTORY OF RIGHT ABOVE KNEE AMPUTATION (HCC): Status: ACTIVE | Noted: 2017-08-11

## 2017-08-11 PROBLEM — N39.0 UTI (URINARY TRACT INFECTION): Status: RESOLVED | Noted: 2017-08-08 | Resolved: 2017-08-11

## 2017-08-11 LAB
ANION GAP SERPL CALCULATED.3IONS-SCNC: 13.2 MMOL/L
BASOPHILS # BLD AUTO: 0.01 10*3/MM3 (ref 0–0.2)
BASOPHILS NFR BLD AUTO: 0.2 % (ref 0–1.5)
BUN BLD-MCNC: 13 MG/DL (ref 8–23)
BUN/CREAT SERPL: 31.7 (ref 7–25)
CALCIUM SPEC-SCNC: 7.5 MG/DL (ref 8.6–10.5)
CHLORIDE SERPL-SCNC: 106 MMOL/L (ref 98–107)
CO2 SERPL-SCNC: 20.8 MMOL/L (ref 22–29)
CREAT BLD-MCNC: 0.41 MG/DL (ref 0.57–1)
DEPRECATED RDW RBC AUTO: 52.9 FL (ref 37–54)
EOSINOPHIL # BLD AUTO: 0.03 10*3/MM3 (ref 0–0.7)
EOSINOPHIL NFR BLD AUTO: 0.5 % (ref 0.3–6.2)
ERYTHROCYTE [DISTWIDTH] IN BLOOD BY AUTOMATED COUNT: 16.7 % (ref 11.7–13)
GFR SERPL CREATININE-BSD FRML MDRD: 149 ML/MIN/1.73
GLUCOSE BLD-MCNC: 61 MG/DL (ref 65–99)
HCT VFR BLD AUTO: 31.9 % (ref 35.6–45.5)
HGB BLD-MCNC: 9.1 G/DL (ref 11.9–15.5)
IMM GRANULOCYTES # BLD: 0.04 10*3/MM3 (ref 0–0.03)
IMM GRANULOCYTES NFR BLD: 0.7 % (ref 0–0.5)
LYMPHOCYTES # BLD AUTO: 1.69 10*3/MM3 (ref 0.9–4.8)
LYMPHOCYTES NFR BLD AUTO: 27.5 % (ref 19.6–45.3)
MCH RBC QN AUTO: 24.5 PG (ref 26.9–32)
MCHC RBC AUTO-ENTMCNC: 28.5 G/DL (ref 32.4–36.3)
MCV RBC AUTO: 85.8 FL (ref 80.5–98.2)
MONOCYTES # BLD AUTO: 0.47 10*3/MM3 (ref 0.2–1.2)
MONOCYTES NFR BLD AUTO: 7.6 % (ref 5–12)
MRSA SPEC QL CULT: NORMAL
NEUTROPHILS # BLD AUTO: 3.91 10*3/MM3 (ref 1.9–8.1)
NEUTROPHILS NFR BLD AUTO: 63.5 % (ref 42.7–76)
NRBC BLD MANUAL-RTO: 0 /100 WBC (ref 0–0)
PLATELET # BLD AUTO: 203 10*3/MM3 (ref 140–500)
PMV BLD AUTO: 9.9 FL (ref 6–12)
POTASSIUM BLD-SCNC: 3.2 MMOL/L (ref 3.5–5.2)
RBC # BLD AUTO: 3.72 10*6/MM3 (ref 3.9–5.2)
SODIUM BLD-SCNC: 140 MMOL/L (ref 136–145)
WBC NRBC COR # BLD: 6.15 10*3/MM3 (ref 4.5–10.7)

## 2017-08-11 PROCEDURE — 85007 BL SMEAR W/DIFF WBC COUNT: CPT | Performed by: HOSPITALIST

## 2017-08-11 PROCEDURE — 80048 BASIC METABOLIC PNL TOTAL CA: CPT | Performed by: HOSPITALIST

## 2017-08-11 PROCEDURE — 85025 COMPLETE CBC W/AUTO DIFF WBC: CPT | Performed by: HOSPITALIST

## 2017-08-11 PROCEDURE — 25010000002 HEPARIN (PORCINE) PER 1000 UNITS: Performed by: INTERNAL MEDICINE

## 2017-08-11 PROCEDURE — 25010000002 ONDANSETRON PER 1 MG: Performed by: INTERNAL MEDICINE

## 2017-08-11 PROCEDURE — 25010000002 MEROPENEM: Performed by: INTERNAL MEDICINE

## 2017-08-11 RX ORDER — LEVOFLOXACIN 500 MG/1
500 TABLET, FILM COATED ORAL EVERY 24 HOURS
Refills: 0
Start: 2017-08-11 | End: 2017-08-11 | Stop reason: HOSPADM

## 2017-08-11 RX ORDER — POTASSIUM CHLORIDE 750 MG/1
20 CAPSULE, EXTENDED RELEASE ORAL ONCE
Status: COMPLETED | OUTPATIENT
Start: 2017-08-11 | End: 2017-08-11

## 2017-08-11 RX ORDER — DEXTROSE AND SODIUM CHLORIDE 5; .45 G/100ML; G/100ML
100 INJECTION, SOLUTION INTRAVENOUS CONTINUOUS
Status: DISCONTINUED | OUTPATIENT
Start: 2017-08-11 | End: 2017-08-11

## 2017-08-11 RX ORDER — CEFDINIR 300 MG/1
300 CAPSULE ORAL 2 TIMES DAILY
Qty: 14 CAPSULE | Refills: 0
Start: 2017-08-11 | End: 2017-08-18

## 2017-08-11 RX ORDER — LEVOFLOXACIN 500 MG/1
500 TABLET, FILM COATED ORAL EVERY 24 HOURS
Status: DISCONTINUED | OUTPATIENT
Start: 2017-08-11 | End: 2017-08-11 | Stop reason: HOSPADM

## 2017-08-11 RX ORDER — DEXTROSE MONOHYDRATE 25 G/50ML
50 INJECTION, SOLUTION INTRAVENOUS
Status: DISCONTINUED | OUTPATIENT
Start: 2017-08-11 | End: 2017-08-11 | Stop reason: HOSPADM

## 2017-08-11 RX ORDER — DEXTROSE MONOHYDRATE 25 G/50ML
INJECTION, SOLUTION INTRAVENOUS
Status: DISCONTINUED
Start: 2017-08-11 | End: 2017-08-11 | Stop reason: HOSPADM

## 2017-08-11 RX ORDER — NICOTINE POLACRILEX 4 MG
15 LOZENGE BUCCAL
Status: CANCELLED | OUTPATIENT
Start: 2017-08-11

## 2017-08-11 RX ORDER — DEXTROSE MONOHYDRATE 25 G/50ML
25 INJECTION, SOLUTION INTRAVENOUS
Status: CANCELLED | OUTPATIENT
Start: 2017-08-11

## 2017-08-11 RX ORDER — MORPHINE SULFATE 100 MG/5ML
15 SOLUTION ORAL EVERY 4 HOURS PRN
Qty: 15 ML | Refills: 0 | Status: SHIPPED | OUTPATIENT
Start: 2017-08-11 | End: 2017-08-15

## 2017-08-11 RX ADMIN — HYDROCODONE BITARTRATE AND ACETAMINOPHEN 1 TABLET: 5; 325 TABLET ORAL at 03:36

## 2017-08-11 RX ADMIN — LEVOFLOXACIN 500 MG: 500 TABLET, FILM COATED ORAL at 13:28

## 2017-08-11 RX ADMIN — MINERAL SUPPLEMENT IRON 300 MG / 5 ML STRENGTH LIQUID 100 PER BOX UNFLAVORED 300 MG: at 08:22

## 2017-08-11 RX ADMIN — DOCUSATE SODIUM -SENNOSIDES 1 TABLET: 50; 8.6 TABLET, COATED ORAL at 08:22

## 2017-08-11 RX ADMIN — SODIUM CHLORIDE 125 ML/HR: 9 INJECTION, SOLUTION INTRAVENOUS at 06:00

## 2017-08-11 RX ADMIN — GABAPENTIN 100 MG: 100 CAPSULE ORAL at 08:22

## 2017-08-11 RX ADMIN — POTASSIUM CHLORIDE 20 MEQ: 750 CAPSULE, EXTENDED RELEASE ORAL at 13:29

## 2017-08-11 RX ADMIN — ONDANSETRON 4 MG: 2 INJECTION INTRAMUSCULAR; INTRAVENOUS at 06:00

## 2017-08-11 RX ADMIN — HYDROCODONE BITARTRATE AND ACETAMINOPHEN 1 TABLET: 5; 325 TABLET ORAL at 13:29

## 2017-08-11 RX ADMIN — DEXTROSE AND SODIUM CHLORIDE 100 ML/HR: 5; .45 INJECTION, SOLUTION INTRAVENOUS at 07:57

## 2017-08-11 RX ADMIN — MEROPENEM 500 MG: 500 INJECTION, POWDER, FOR SOLUTION INTRAVENOUS at 06:00

## 2017-08-11 RX ADMIN — FUROSEMIDE 20 MG: 20 TABLET ORAL at 08:22

## 2017-08-11 RX ADMIN — HEPARIN SODIUM 5000 UNITS: 5000 INJECTION, SOLUTION INTRAVENOUS; SUBCUTANEOUS at 08:22

## 2017-08-11 RX ADMIN — POTASSIUM CHLORIDE 20 MEQ: 1.5 POWDER, FOR SOLUTION ORAL at 03:35

## 2017-08-11 NOTE — PROGRESS NOTES
"[facility]         Dr Diaz Sayied   LOS: 3 days   Patient Care Team:  Leonor Echevarria MD as PCP - General (Family Medicine)    Chief Complaint: Healthcare associated pneumonia    Interval History: c/o nerves.breathing ok    REVIEW OF SYSTEMS:   CARDIOVASCULAR: No chest pain, chest pressure or chest discomfort. No palpitations or edema.   RESPIRATORY: No shortness of breath, cough or sputum.   GASTROINTESTINAL: No anorexia, nausea, vomiting or diarrhea. No abdominal pain or blood.   HEMATOLOGIC: No bleeding or bruising.     Ventilator/Non-Invasive Ventilation Settings     None            Vital Signs  Temp:  [97.9 °F (36.6 °C)-98.3 °F (36.8 °C)] 98.1 °F (36.7 °C)  Heart Rate:  [77-83] 83  Resp:  [16] 16  BP: (120-158)/(60-65) 158/60    Intake/Output Summary (Last 24 hours) at 08/11/17 1114  Last data filed at 08/11/17 0614   Gross per 24 hour   Intake                0 ml   Output             2850 ml   Net            -2850 ml     Flowsheet Rows         First Filed Value    Admission Height  59\" (149.9 cm) Documented at 08/08/2017 1228    Admission Weight  155 lb (70.3 kg) Documented at 08/08/2017 1228          Physical Exam:   General Appearance:    Alert, cooperative, in no acute distress   Lungs:     Finished breath sounds occasional rhonchi on the bases     Heart:    Regular rhythm and normal rate, normal S1 and S2, no            murmur, no gallop, no rub, no click   Chest Wall:    No abnormalities observed   Abdomen:     Normal bowel sounds, no masses, no organomegaly, soft        non-tender, non-distended, no guarding, no rebound                tenderness   Extremities:   Status post right AKA, no edema, no cyanosis, no             redness     Results Review:          Results from last 7 days  Lab Units 08/11/17  0531 08/09/17  0605 08/08/17  1251   SODIUM mmol/L 140 138 136   POTASSIUM mmol/L 3.2* 3.3* 3.6   CHLORIDE mmol/L 106 103 97*   CO2 mmol/L 20.8* 21.9* 25.7   BUN mg/dL 13 23 " 37*   CREATININE mg/dL 0.41* 0.60 0.93   GLUCOSE mg/dL 61* 105* 124*   CALCIUM mg/dL 7.5* 8.5* 9.4       Results from last 7 days  Lab Units 08/08/17  1251   TROPONIN T ng/mL <0.010       Results from last 7 days  Lab Units 08/11/17  0531 08/09/17  0605 08/08/17  1251   WBC 10*3/mm3 6.15 7.22 10.07   HEMOGLOBIN g/dL 9.1* 10.3* 11.3*   HEMATOCRIT % 31.9* 35.9 38.9   PLATELETS 10*3/mm3 203 296 302                           I reviewed the patient's new clinical results.  I personally viewed and interpreted the patient's CXR        Medication Review:     dextrose      ferrous sulfate 300 mg Per G Tube Daily   furosemide 20 mg Per G Tube Daily   gabapentin 100 mg Oral Daily   heparin (porcine) 5,000 Units Subcutaneous Q12H   levoFLOXacin 500 mg Oral Q24H   meropenem 500 mg Intravenous Q8H   sennosides-docusate sodium 1 tablet Per G Tube Daily         dextrose 5 % and sodium chloride 0.45 % 100 mL/hr Last Rate: 100 mL/hr (08/11/17 0757)       ASSESSMENT:   Healthcare associated pneumonia  UTI  COPD  A. 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

## 2017-08-11 NOTE — PLAN OF CARE
Problem: Patient Care Overview (Adult)  Goal: Plan of Care Review  Outcome: Ongoing (interventions implemented as appropriate)    08/11/17 0542   Coping/Psychosocial Response Interventions   Plan Of Care Reviewed With patient   Patient Care Overview   Progress no change   Outcome Evaluation   Outcome Summary/Follow up Plan Pt. VSS. Refuses food, but not water. Moaning and complaining of nausea and pain. Her leg was massaged with lotion and she was repositioned frequently. Pt. states she is miserable. Was awake most of the night.          Problem: Dysphagia (Adult)  Goal: Compensatory Techniques to Improve Safety/Function with Swallowing  Outcome: Ongoing (interventions implemented as appropriate)    Problem: Mobility, Physical Impaired (Adult)  Goal: Enhanced Mobility Skills  Outcome: Ongoing (interventions implemented as appropriate)  Goal: Enhanced Functionality Ability  Outcome: Ongoing (interventions implemented as appropriate)    Problem: Skin Integrity Impairment, Risk/Actual (Adult)  Goal: Skin Integrity/Wound Healing  Outcome: Ongoing (interventions implemented as appropriate)    Problem: Pressure Ulcer Risk (Darin Scale) (Adult,Obstetrics,Pediatric)  Goal: Skin Integrity  Outcome: Ongoing (interventions implemented as appropriate)

## 2017-08-11 NOTE — PROGRESS NOTES
Continued Stay Note  [facility]     Patient Name: Simona Brooks  MRN: [id number]  Today's Date: 8/11/2017    Admit Date: 8/8/2017          Discharge Plan       08/11/17 1330    Case Management/Social Work Plan    Plan Return to [facility]    Patient/Family In Agreement With Plan yes    Additional Comments Spoke with son Jeyson and he is agreeable to patient returning to [facility].  Spoke with [facility] - patient will return to IC level bed.  [facility] scheduled for 2:30 p.m.  Packet to RN.    Final Note    Final Note Patient will be DC'd to IC level bed at [facility] via [facility].              Discharge Codes       08/11/17 1330    Discharge Codes    Discharge Codes 04  Discharged/transferred to intermediate care facility (ICF)        Expected Discharge Date and Time     Expected Discharge Date Expected Discharge Time    Aug 11, 2017             Becky S. [place], RN

## 2017-08-11 NOTE — PLAN OF CARE
Problem: Patient Care Overview (Adult)  Goal: Plan of Care Review  Outcome: Ongoing (interventions implemented as appropriate)    08/11/17 1236   Coping/Psychosocial Response Interventions   Plan Of Care Reviewed With patient   Patient Care Overview   Progress improving   Outcome Evaluation   Outcome Summary/Follow up Plan pt to D/C back to [facility]         Problem: Dysphagia (Adult)  Goal: Compensatory Techniques to Improve Safety/Function with Swallowing  Outcome: Ongoing (interventions implemented as appropriate)    Problem: Mobility, Physical Impaired (Adult)  Goal: Enhanced Mobility Skills  Outcome: Ongoing (interventions implemented as appropriate)  Goal: Enhanced Functionality Ability  Outcome: Ongoing (interventions implemented as appropriate)    Problem: Skin Integrity Impairment, Risk/Actual (Adult)  Goal: Skin Integrity/Wound Healing  Outcome: Ongoing (interventions implemented as appropriate)    Problem: Pressure Ulcer Risk (Darin Scale) (Adult,Obstetrics,Pediatric)  Goal: Skin Integrity  Outcome: Ongoing (interventions implemented as appropriate)

## 2017-08-11 NOTE — DISCHARGE SUMMARY
NAME: Simona Brooks ADMIT: 2017   : 1934  PCP: Leonor Echevarria MD    MRN: [id number] LOS: 3 days   AGE/SEX: 82 y.o. female  ROOM: [facility]     Date of Admission:  2017  Date of Discharge:  2017    PCP: Leonor Echevarria MD      CHIEF COMPLAINT  Abdominal Pain      DISCHARGE DIAGNOSIS  Active Hospital Problems (** Indicates Principal Problem)    Diagnosis Date Noted   • **Healthcare associated bacterial pneumonia - possibly due to gram-negative organism [J15.9] 2017   • Suprapubic catheter [Z93.59] 2017   • Hypokalemia [E87.6] 2017   • Decubitus ulcer of coccygeal region, stage 4 (present on admission) [L89.154] 2017   • History of right above knee amputation [Z89.611] 2017   • Atrial fibrillation [I48.91] 2017   • COPD (chronic obstructive pulmonary disease) [J44.9] 2017   • UTI / pyelonephritis related to staghorn calculus  2017   • Calculus of left kidney [N20.0] 2017      Resolved Hospital Problems    Diagnosis Date Noted Date Resolved   No resolved problems to display.       SECONDARY DIAGNOSES  Past Medical History:   Diagnosis Date   • Anemia    • Atrial fibrillation    • Colitis    • COPD (chronic obstructive pulmonary disease)    • Dysphagia    • Hernia    • Hypokalemia    • Pneumonia        CONSULTS   Treatment Team     Provider Relationship Specialty Contact    Gelacio Cullen MD Attending --  [phone]    Kyle Paz MD Consulting Physician Urology  [phone]    Darrell Resendiz MD Consulting Physician Pulmonary Disease  [phone]          PROCEDURES PERFORMED  CT Abdomen Pelvis Without Contrast   Final Result   1.  Evaluation limited secondary to motion artifact and absence of   contrast.    2. Large left staghorn calculus. There is mild associated urothelial   thickening within the left renal pelvis. There is a smaller calculus   seen within the right kidney with a dilated extrarenal pelvis.   3.  Moderate-sized hiatal hernia containing an air-fluid level.   4. Large parastomal hernia on the left containing small and large bowel   loops. No evidence of bowel obstruction or other complication.       CT Angiogram Chest With Contrast   Final Result   Dense consolidation and volume loss in the left lower lobe   consistent with atelectasis and probable pneumonia. There is mild linear   atelectasis in the right lower lobe. There is no CT evidence of   pulmonary embolism. A large staghorn calculus is partially imaged in the   left kidney.             HOSPITAL COURSE  Ms. Brooks is a 82 y.o. female who presented to [facility] initially complaining of chest discomfort and SOA.  Please see the admitting history and physical for further details.  She was found to have a LLL pneumonia and was admitted to the hospital for further evaluation and treatment.  She was seen by pulmonology.  This was treated as a healthcare associated pneumonia.  Cultures have been negative and treatment going forward will be with oral Levaquin targeting community-acquired but also possible gram-negative organisms.  She was seen by urology as well for presence of staghorn calculus.  He recommended conservative management only as a felt she was asymptomatic.  Urine culture grew Proteus resistant to Levaquin.  Since it's not completely clear what caused her presenting symptoms will treat this as a calculus-related urinary tract infection.  Cefdinir should cover both pneumonia and her urine adequately.  She overall feels better and is anxious to get back to her nursing home.  Should be followed up with her normal provider there on Monday next week.  No changes to her medications except addition of Levaquin to treat pneumonia for 10 days.        PHYSICAL EXAM  Temp:  [97.9 °F (36.6 °C)-98.3 °F (36.8 °C)] 98.1 °F (36.7 °C)  Heart Rate:  [77-83] 83  Resp:  [16] 16  BP: (120-158)/(60-65) 158/60  Body mass index is 21.34  kg/(m^2).  Physical Exam   Constitutional: She is oriented to person, place, and time. No distress.   Cardiovascular: Normal rate and regular rhythm.    No murmur heard.  Pulmonary/Chest: Effort normal and breath sounds normal.   Few rhonchi.  diminshed BS   Abdominal: Soft. Bowel sounds are normal. She exhibits no distension. There is no tenderness.   Musculoskeletal: Normal range of motion. She exhibits deformity (right AKA). She exhibits no edema.   Neurological: She is alert and oriented to person, place, and time.   Skin: Skin is warm and dry. She is not diaphoretic.         CONDITION ON DISCHARGE  Stable.      DISCHARGE DISPOSITION   Skilled Nursing Facility ([facility] - External)  [facility]      DISCHARGE MEDICATIONS   Simona Brooks   Home Medication Instructions ROSEANNE:[id number]    Printed on:08/11/17 0980   Medication Information                      acetaminophen (TYLENOL) 160 MG/5ML solution  Take 640 mg by mouth Every 6 (Six) Hours As Needed for Mild Pain (1-3).             calcium carbonate (TUMS) 500 MG chewable tablet  Chew 1 tablet As Needed for Indigestion or Heartburn.             cefdinir (OMNICEF) 300 MG capsule  Take 1 capsule by mouth 2 (Two) Times a Day for 7 days.             ferrous sulfate 300 (60 FE) MG/5ML syrup  300 mg by Per G Tube route Daily.             furosemide (LASIX) 20 MG tablet  20 mg by Per G Tube route Daily.             gabapentin (NEURONTIN) 100 MG capsule  Take 100 mg by mouth Daily.             morphine 20 MG/ML concentrated solution  Take 0.75 mL by mouth Every 4 (Four) Hours As Needed for Severe Pain (7-10) for up to 4 days.             Multiple Vitamin (TAB-A-EBONY PO)  1 tablet by Per G Tube route Daily.             omeprazole (priLOSEC) 20 MG capsule  20 mg by Per G Tube route Daily.             ondansetron (ZOFRAN) 4 MG tablet  Take 4 mg by mouth Every 6 (Six) Hours As Needed for Nausea or Vomiting.             oxybutynin (DITROPAN) 5 MG/5ML syrup  5 mg  by Per G Tube route Daily.             polyethylene glycol (MIRALAX) packet  Take 17 g by mouth Daily As Needed.             potassium chloride (KAYCIEL) 20 MEQ/15ML (10%) solution  20 mEq by Per G Tube route Daily.             sennosides-docusate sodium (SENOKOT-S) 8.6-50 MG tablet  1 tablet by Per G Tube route Daily.               Diet Instructions     Diet:       Diet Texture / Consistency:  Soft Texture   Select Texture:  [employer]   Fluid Consistency:  Thin   Diet Textures: thin liquid, Medications should be taken whole with puree or crushed via G-tube.  Upright for PO and no straw. Oral care before breakfast, after all meals and PRN.                Activity Instructions     Activity as Tolerated                 No future appointments.  Follow-up Information     Follow up with [facility] .    Specialties:  Skilled Nursing Facility, Intermediate Care Facility    Contact information:    [address] [facility] [address] [phone]        Follow up with Leonor Echevarria MD Follow up in 3 day(s).    Specialty:  Family Medicine    Contact information:    [address] [address] [phone]              Gelacio Cullen MD  [facility]  08/11/17  12:25 PM      Time: greater than 30 minutes.

## 2017-08-13 LAB
BACTERIA SPEC AEROBE CULT: NORMAL
BACTERIA SPEC AEROBE CULT: NORMAL

## 2017-10-21 ENCOUNTER — LAB REQUISITION (OUTPATIENT)
Dept: LAB | Facility: HOSPITAL | Age: 82
End: 2017-10-21

## 2017-10-21 DIAGNOSIS — Z00.00 ROUTINE GENERAL MEDICAL EXAMINATION AT A HEALTH CARE FACILITY: ICD-10-CM

## 2017-10-21 LAB
BASOPHILS # BLD AUTO: 0.01 10*3/MM3 (ref 0–0.2)
BASOPHILS NFR BLD AUTO: 0.1 % (ref 0–1.5)
DEPRECATED RDW RBC AUTO: 57 FL (ref 37–54)
EOSINOPHIL # BLD AUTO: 0.02 10*3/MM3 (ref 0–0.7)
EOSINOPHIL NFR BLD AUTO: 0.2 % (ref 0.3–6.2)
ERYTHROCYTE [DISTWIDTH] IN BLOOD BY AUTOMATED COUNT: 17.9 % (ref 11.7–13)
HCT VFR BLD AUTO: 33.9 % (ref 35.6–45.5)
HGB BLD-MCNC: 9.7 G/DL (ref 11.9–15.5)
IMM GRANULOCYTES # BLD: 0.12 10*3/MM3 (ref 0–0.03)
IMM GRANULOCYTES NFR BLD: 0.9 % (ref 0–0.5)
LYMPHOCYTES # BLD AUTO: 1.53 10*3/MM3 (ref 0.9–4.8)
LYMPHOCYTES NFR BLD AUTO: 12 % (ref 19.6–45.3)
MCH RBC QN AUTO: 25 PG (ref 26.9–32)
MCHC RBC AUTO-ENTMCNC: 28.6 G/DL (ref 32.4–36.3)
MCV RBC AUTO: 87.4 FL (ref 80.5–98.2)
MONOCYTES # BLD AUTO: 0.54 10*3/MM3 (ref 0.2–1.2)
MONOCYTES NFR BLD AUTO: 4.2 % (ref 5–12)
NEUTROPHILS # BLD AUTO: 10.51 10*3/MM3 (ref 1.9–8.1)
NEUTROPHILS NFR BLD AUTO: 82.6 % (ref 42.7–76)
PLATELET # BLD AUTO: 338 10*3/MM3 (ref 140–500)
PMV BLD AUTO: 11.6 FL (ref 6–12)
RBC # BLD AUTO: 3.88 10*6/MM3 (ref 3.9–5.2)
WBC NRBC COR # BLD: 12.73 10*3/MM3 (ref 4.5–10.7)

## 2017-10-21 PROCEDURE — 85025 COMPLETE CBC W/AUTO DIFF WBC: CPT

## 2017-12-02 PROBLEM — N39.0 ACUTE UTI: Status: ACTIVE | Noted: 2017-01-01

## 2017-12-02 PROBLEM — K94.03 COLOSTOMY MALFUNCTION (HCC): Status: ACTIVE | Noted: 2017-01-01

## 2017-12-02 PROBLEM — D64.9 ANEMIA: Status: ACTIVE | Noted: 2017-01-01

## 2017-12-02 NOTE — ED PROVIDER NOTES
History    The patient presents to the ED via EMS from NH with an ostomy in place at her LLQ in need of a wound check. The patient explains that the RN noticed she had stool production in her brief instead of her ostomy so she was sent to the ED for further evaluation. No other complaints at this time.    On physical exam, AOx3, NAD, PERRL, CTAB, moist MM, abd soft, non-tender and non-distend, ostomy LLQ with no stool output       I supervised care provided by the midlevel provider.  We have discussed this patient's history, physical exam, and treatment plan. I have reviewed the note and personally saw and examined the patient and agree with the plan of care.    Documentation assistance provided by angel Wilson for .  Information recorded by the scribe was done at my direction and has been verified and validated by me.       Otto Wilson  12/02/17 9686       Govind Kim MD  12/06/17 7198

## 2017-12-02 NOTE — ED PROVIDER NOTES
EMERGENCY DEPARTMENT ENCOUNTER    CHIEF COMPLAINT  Chief Complaint: Wound Check  History given by: Patient, NH  History limited by: Cooperation/Dementia  Room Number: P491/1  PMD: Leonor Echevarria MD      HPI:  Pt is a 83 y.o. female who presents from NH for wound check. Per NH, pt has a decubitus ulcer over the coccyx that has been draining. Pt reports some pain of the R AKA, but denies any other pain. Per NH, she has also had no output from ostomy and instead had output per rectum.       Timing: constant  Location: coccyx  Radiation: none  Quality: wound  Intensity/Severity: pt denies any pain  Progression: unchanged  Associated Symptoms: none  Aggravating Factors: none  Alleviating Factors: none  Previous Episodes: none  Treatment before arrival: none    PAST MEDICAL HISTORY  Active Ambulatory Problems     Diagnosis Date Noted   • Atrial fibrillation 08/08/2017   • COPD (chronic obstructive pulmonary disease) 08/08/2017   • Calculus of left kidney 08/08/2017   • Suprapubic catheter 08/11/2017   • Decubitus ulcer of coccygeal region, stage 4 (present on admission) 08/11/2017   • History of right above knee amputation 08/11/2017   • Acute UTI 12/02/2017     Resolved Ambulatory Problems     Diagnosis Date Noted   • UTI (urinary tract infection) 08/08/2017   • Healthcare associated bacterial pneumonia - possibly due to gram-negative organism 08/11/2017   • Hypokalemia 08/11/2017     Past Medical History:   Diagnosis Date   • Anemia    • Atrial fibrillation    • Colitis    • COPD (chronic obstructive pulmonary disease)    • Dysphagia    • Hernia    • Hypokalemia    • Pneumonia        PAST SURGICAL HISTORY  Past Surgical History:   Procedure Laterality Date   • ABDOMINAL SURGERY     • EXPLORATORY LAPAROTOMY N/A 12/3/2017    Procedure: disimpaction of ostomy;  Surgeon: Neris Mcgee MD;  Location: [facility] OR;  Service:        FAMILY HISTORY  History reviewed. No pertinent family history.    SOCIAL  HISTORY  Social History     Social History   • Marital status:      Spouse name: N/A   • Number of children: N/A   • Years of education: N/A     Occupational History   • Not on file.     Social History Main Topics   • Smoking status: Former Smoker   • Smokeless tobacco: Never Used   • Alcohol use No   • Drug use: No   • Sexual activity: Defer     Other Topics Concern   • Not on file     Social History Narrative       ALLERGIES  Penicillins    REVIEW OF SYSTEMS  Review of Systems   Constitutional: Negative for chills and fever.   HENT: Negative for sore throat.    Respiratory: Negative for cough.    Gastrointestinal: Negative for anal bleeding, blood in stool, nausea and vomiting.   Genitourinary: Negative for dysuria.   Musculoskeletal: Negative for back pain.   Psychiatric/Behavioral: The patient is not nervous/anxious.    All other systems reviewed and are negative.      PHYSICAL EXAM  ED Triage Vitals   Temp Heart Rate Resp BP SpO2   -- 12/02/17 1538 12/02/17 1538 12/02/17 1538 12/02/17 1538    62 18 132/60 95 %      Temp src Heart Rate Source Patient Position BP Location FiO2 (%)   -- -- -- -- --              Physical Exam   Constitutional: She is oriented to person, place, and time and well-developed, well-nourished, and in no distress. No distress.   HENT:   Head: Normocephalic and atraumatic.   Eyes: EOM are normal. Pupils are equal, round, and reactive to light.   Neck: Normal range of motion. Neck supple.   Cardiovascular: Normal rate, regular rhythm and normal heart sounds.    Pulmonary/Chest: Effort normal and breath sounds normal. No respiratory distress.   Abdominal: Soft. There is no tenderness. There is no rebound and no guarding.   LLQ colostomy without output. G-tube in place.  Abd soft.    Genitourinary:   Genitourinary Comments: Suprapubic catheter. There is a hole assumed to be her anus with mild fecal discharge. Possible prolapsed uterus.    Musculoskeletal: Normal range of motion. She  exhibits no edema.   R leg AKA   Neurological: She is alert and oriented to person, place, and time. She has normal sensation and normal strength.   Skin: Skin is warm and dry. No rash noted.   Psychiatric: Mood and affect normal.   Nursing note and vitals reviewed.      LAB RESULTS      I ordered the above labs and reviewed the results    RADIOLOGY  CT Abdomen Pelvis With Contrast   Final Result   Final result by Kendrick Angeles MD (12/02/17 19:02:48)     Narrative:     CT ABDOMEN PELVIS W CONTRAST-     CLINICAL INFORMATION:  Poorly functioning ostomy, stool coming from  anus.     COMPARISON: CT abdomen and pelvis 08/09/2017.     FINDINGS: The afferent and efferent loops of the distal descending colon  are pulled to the skin surface at the site of colostomy. At this  location just deep to the stoma, there is a large amount of fecal  material which has accumulated into a 7.2 x 5 cm collection. The  afferent loop is mildly distended leading to this location, the efferent  loop is collapsed leading to the anus. There is contrast material  demonstrate within a normal caliber small bowel.     The patient's Hardy catheter tubing tip is located within the distal  right ureter. It is causing right-sided hydronephrosis. There is  uroepithelial thickening of the renal pelvis with calculi and/or debris  layering at this location. Pyelonephritis not excluded. Delayed right  nephrogram. Large staghorn left-sided calculus, the left renal cortical  pattern of enhancement is satisfactory, no obstructive uropathy.     The G-tube position is satisfactory.     Gallstones and/or sludge within the gallbladder. No biliary duct  dilatation. No pancreatic abnormality. The spleen is normal in size.     There is a moderate size hiatal hernia. Along its perimeter there is an  area of consolidation/collapse involving the left lower lobe.     The liver is satisfactory in appearance. The adrenal glands are normal.  There is atherosclerotic  calcification of a normal caliber aorta.  Occlusion or high-grade obstruction of the right common iliac artery.  There is reconstitution distally. No free air or free intraperitoneal  fluid identified. No acute osseous abnormality identified.      Findings of this report discussed with Kory Blanca in the emergency room  at the time of completion, 6:35 PM.     Radiation dose reduction techniques were utilized, including automated  exposure control and exposure modulation based on body size.     This report was finalized on 12/2/2017 7:02 PM by Dr. Kendrick Angeles MD.                I ordered the above noted radiological studies. Interpreted by radiologist. Discussed with radiologist (Dr. Angeles). Reviewed by me in PACS.       PROCEDURES  Procedures      PROGRESS AND CONSULTS  ED Course   4:13 PM  Reviewed pt's history and workup with Dr. Kim.  After a bedside evaluation; Dr Kim agrees with the plan of care  7:19 PM  Rechecked with pt. Informed the pt of her CT result and retracted her suprapubic catheter.   7:58 PM  Discussed the pt with Dr. Mcgee (Surgeon). Dr. Mcgee confirms that this is not an emergent situation and should resolve without intervention.       MEDICAL DECISION MAKING  Results were reviewed/discussed with the patient and they were also made aware of online access. Pt also made aware that some labs, such as cultures, will not be resulted during ER visit and follow up with PMD is necessary.     MDM  Number of Diagnoses or Management Options     Amount and/or Complexity of Data Reviewed  Clinical lab tests: ordered and reviewed  Tests in the radiology section of CPT®: ordered and reviewed  Decide to obtain previous medical records or to obtain history from someone other than the patient: yes  Review and summarize past medical records: yes (Admitted 8/8/-8/11/17 for PNA)           DIAGNOSIS  Final diagnoses:   Acute UTI   Colostomy malfunction   Anemia, unspecified type        DISPOSITION  DISCHARGE    Patient discharged in stable condition.    Reviewed implications of results, diagnosis, meds, responsibility to follow up, warning signs and symptoms of possible worsening, potential complications and reasons to return to ER.    Patient/Family voiced understanding of above instructions.    Discussed plan for discharge, as there is no emergent indication for admission.  Pt/family is agreeable and understands need for follow up and repeat testing.  Pt is aware that discharge does not mean that nothing is wrong but it indicates no emergency is present that requires admission and they must continue care with follow-up as given below or physician of their choice.     FOLLOW-UP  No follow-up provider specified.       Medication List      Notice     No changes were made to your prescriptions during this visit.            Latest Documented Vital Signs:  As of 5:02 PM  BP- 122/72 HR- 82 Temp- 97.5 °F (36.4 °C) (Oral) O2 sat- 97%    --  Documentation assistance provided by angel Guerin for Kory Blanca PA-C.  Information recorded by the scribe was done at my direction and has been verified and validated by me.       Simone Guerin  12/02/17 2008       RK Love  12/04/17 2175

## 2017-12-03 PROBLEM — R82.90 ABNORMAL URINE: Status: ACTIVE | Noted: 2017-01-01

## 2017-12-03 NOTE — PROGRESS NOTES
[facility]     LOS: 1 day     Name: Simona Brooks  Age: 83 y.o.  Sex: female  :  1934  MRN: [id number]         Primary Care Physician: Leonor Echevarria MD    NPO Diet    Subjective   Patient upset about being in the hospital and wants to be discharged. Denies any pain, shortness of breath. Discussed with RN.    Objective   Temp:  [98 °F (36.7 °C)-98.6 °F (37 °C)] 98.2 °F (36.8 °C)  Heart Rate:  [62-89] 88  Resp:  [16-18] 16  BP: (101-138)/(48-75) 125/75  SpO2:  [95 %-100 %] 96 %  on   ;   O2 Device: room air  Body mass index is 20.19 kg/(m^2).    Physical Exam   Constitutional: No distress.   Cardiovascular: Normal rate and regular rhythm.    Pulmonary/Chest: Effort normal and breath sounds normal. No respiratory distress.   Abdominal: Soft. There is no tenderness. There is no rebound and no guarding.   Ostomy on left, PEG, SP cath   Musculoskeletal:   Right AKA   Neurological: She is alert.   initially thought she was at [facility]. Oriented to self, year (17), and seemingly situation     Reviewed medications and new clinical results    ferrous sulfate 300 mg Per G Tube Daily   furosemide 20 mg Per G Tube Daily   gabapentin 100 mg Oral Daily   heparin (porcine) 5,000 Units Subcutaneous Q12H   lansoprazole 30 mg Per G Tube Q AM   multivitamin 1 tablet Per G Tube Daily   polyethylene glycol 17 g Oral Daily   potassium chloride 20 mEq Per G Tube Daily   sennosides-docusate sodium 1 tablet Per G Tube Daily       sodium chloride 75 mL/hr Last Rate: 75 mL/hr (17 0039)       Results from last 7 days  Lab Units 17  0754 17  1737   WBC 10*3/mm3 6.99 9.03   HEMOGLOBIN g/dL 7.8* 8.8*   PLATELETS 10*3/mm3 292 294       Results from last 7 days  Lab Units 17  0754 17  1632   SODIUM mmol/L 139 140   POTASSIUM mmol/L 4.2 4.3   CHLORIDE mmol/L 104 101   CO2 mmol/L 24.3 25.4   BUN mg/dL 23 29*   CREATININE mg/dL 0.72 0.85    CALCIUM mg/dL 8.6 9.3   GLUCOSE mg/dL 83 94     Estimated Creatinine Clearance: 42.1 mL/min (by C-G formula based on Cr of 0.72).  Lab Results   Component Value Date    URINECX Culture in progress 12/02/2017     Assessment/Plan   Active Hospital Problems (** Indicates Principal Problem)    Diagnosis Date Noted   • Abnormal urine [R82.90] 12/03/2017   • Colostomy malfunction [K94.03] 12/02/2017   • Anemia [D64.9] 12/02/2017   • Decubitus ulcer of coccygeal region, stage 4 (present on admission) [L89.154] 08/11/2017   • History of right above knee amputation [Z89.611] 08/11/2017   • Suprapubic catheter [Z93.59] 08/11/2017   • Calculus of left kidney [N20.0] 08/08/2017   • Atrial fibrillation [I48.91] 08/08/2017      Resolved Hospital Problems    Diagnosis Date Noted Date Resolved   No resolved problems to display.     · Surgery and urology to see. SP cath to be changed by urology per RN  · Urine culture in progress. received a dose of ceftriaxone in ER  · SLP eval swallow  · WOCN  · Occult blood pending. hgb was 9.1-11.3 earlier this year. If drops further will stop heparin (started for DVT prophylaxis). Check again this afternoon.    Fausto Olivier MD   12/03/17  10:06 AM

## 2017-12-03 NOTE — ED NOTES
Syringe does not fit connecter, unable to obtain fresh urine sample from catheter tube. Notified RK Ariza. Orders received to obtain urine from catheter bag.      Callie Olivier RN  12/02/17 2005

## 2017-12-03 NOTE — ANESTHESIA PREPROCEDURE EVALUATION
Anesthesia Evaluation     Patient summary reviewed and Nursing notes reviewed   no history of anesthetic complications:  NPO Solid Status: > 6 hours       Airway   Mallampati: II  TM distance: >3 FB  Neck ROM: full  no difficulty expected  Dental    (+) lower dentures and upper dentures    Pulmonary - normal exam   (+) COPD mild,   Cardiovascular - normal exam    ECG reviewed    (+) dysrhythmias (sinus rythem today) Atrial Fib,   (-) angina      Neuro/Psych  GI/Hepatic/Renal/Endo      Musculoskeletal     Abdominal    Substance History      OB/GYN          Other   (+) blood dyscrasia (anemic Hb 7.9)                                     Anesthesia Plan    ASA 3     general     Anesthetic plan and risks discussed with patient.

## 2017-12-03 NOTE — H&P
Internal medicine history and physical    INTERNAL MEDICINE   [facility]       Patient Identification:  Name: Simona Brooks  Age: 83 y.o.  Sex: female  :  1934  MRN: [id number]                   Primary Care Physician: Leonor Echevarria MD                                   Chief Complaint:  Sent from nursing home for evaluation of possible colostomy malfunction    History of Present Illness:   Patient herself denies any specific symptoms.  Patient is a 82-year-old female who has a very complicated past medical history consisting of presence of PEG tube, history of abdominal surgery with colostomy for reasons unclear, history of suprapubic catheter and right above-knee amputation has been residing at nursing home was suspected to have increasing drainage from the long-term sacrococcygeal decubiti.  At the same time it was mentioned that patient's colostomy is not working and she doesn't have any output and it for quite some time.  Instead she is passing stool per rectum.  Incidentally patient does not have any symptoms herself.      Past Medical History:  Past Medical History:   Diagnosis Date   • Anemia    • Atrial fibrillation    • Colitis    • COPD (chronic obstructive pulmonary disease)    • Dysphagia    • Hernia    • Hypokalemia    • Pneumonia      Past Surgical History:  Past Surgical History:   Procedure Laterality Date   • ABDOMINAL SURGERY        Home Meds:  Prescriptions Prior to Admission   Medication Sig Dispense Refill Last Dose   • acetaminophen (TYLENOL) 160 MG/5ML solution Take 640 mg by mouth Every 6 (Six) Hours As Needed for Mild Pain (1-3).      • calcium carbonate (TUMS) 500 MG chewable tablet Chew 1 tablet As Needed for Indigestion or Heartburn.      • ferrous sulfate 300 (60 FE) MG/5ML syrup 300 mg by Per G Tube route Daily.      • furosemide (LASIX) 20 MG tablet 20 mg by Per G Tube route Daily.      • gabapentin (NEURONTIN) 100 MG capsule Take 100 mg by mouth  "Daily.      • Multiple Vitamin (TAB-A-EBONY PO) 1 tablet by Per G Tube route Daily.      • omeprazole (priLOSEC) 20 MG capsule 20 mg by Per G Tube route Daily.      • ondansetron (ZOFRAN) 4 MG tablet Take 4 mg by mouth Every 6 (Six) Hours As Needed for Nausea or Vomiting.      • oxybutynin (DITROPAN) 5 MG/5ML syrup 5 mg by Per G Tube route Daily.      • polyethylene glycol (MIRALAX) packet Take 17 g by mouth Daily As Needed.      • potassium chloride (KAYCIEL) 20 MEQ/15ML (10%) solution 20 mEq by Per G Tube route Daily.      • sennosides-docusate sodium (SENOKOT-S) 8.6-50 MG tablet 1 tablet by Per G Tube route Daily.        Current Meds:     Current Facility-Administered Medications:   •  Insert peripheral IV, , , Once **AND** sodium chloride 0.9 % flush 10 mL, 10 mL, Intravenous, PRN, RK Love  Allergies:  Allergies   Allergen Reactions   • Penicillins      Tolerated meropenem in 08/2017.     Social History:   Social History   Substance Use Topics   • Smoking status: Former Smoker   • Smokeless tobacco: Never Used   • Alcohol use No      Family History:  No family history on file.       Review of Systems  See history of present illness and past medical history. Patient specifically denies any symptoms..  Constitutional: Negative for chills and fever.   HENT: Negative for sore throat.    Respiratory: Negative for cough.    Gastrointestinal: Negative for nausea and vomiting.   Genitourinary: Negative for dysuria.   Musculoskeletal: Negative for back pain.   Psychiatric/Behavioral: The patient is not nervous/anxious.      Vitals:   /68 (BP Location: Left arm)  Pulse 82  Temp 98 °F (36.7 °C) (Oral)   Resp 16  Ht 62\" (157.5 cm)  Wt 110 lb 6.4 oz (50.1 kg)  SpO2 97%  BMI 20.19 kg/m2  I/O: No intake or output data in the 24 hours ending 12/02/17 2229  Exam:  General Appearance:    Awake alert nontoxic-appearing chronically ill elderly female who claims that despite the fact that she has G-tube she eats " by mouth    Head:    Normocephalic, without obvious abnormality, atraumatic   Eyes:    PERRL, conjunctiva/corneas clear, EOM's intact, both eyes   Ears:    Normal external ear canals, both ears   Nose:   Nares normal, septum midline, mucosa normal, no drainage    or sinus tenderness   Throat:   Lips, tongue, Dry oral mucosa.     Neck:   Supple, symmetrical, trachea midline, no adenopathy;     thyroid:  no enlargement/tenderness/nodules; no carotid    bruit or JVD   Back:     Symmetric, no curvature, ROM normal, no CVA tenderness   Lungs:     Clear to auscultation bilaterally, respirations unlabored   Chest Wall:    No tenderness or deformity    Heart:    Regular rate and rhythm, S1 and S2 normal, no murmur, rub   or gallop   Abdomen:     Soft,G-tube in place with minimal purulent drainage around the G-tube exit site which is packed with gauze but no cellulitis, suprapubic catheter in place, left lower quadrant ostomy in place with a bag being empty and non-tender, bowel sounds active all four quadrants,     no masses, no hepatomegaly, no splenomegaly  Coccygeal area, anal and perineal area examined.  She has some superficial ulceration on the left side of the vaginal orifice.  She appears to have what looks like prolapsing uterus at the vaginal orifice.  There is a small opening posterior to the vaginal orifice which looks like possible previous anal orifice no obvious stool seen at this time.  Posterior to that opening there is a superficial healing ulcer with surrounding scarring and minimal inflammation.     Extremities:   Right AKA, left leg is atrophied,    Pulses:   Pulses palpable in all extremities; symmetric all extremities   Skin:   Skin color normal, Skin is warm and dry,  no rashes or palpable lesions   Neurologic:   Grossly nonfocal       Data Review:      I reviewed the patient's new clinical results.    Results from last 7 days  Lab Units 12/02/17  1737   WBC 10*3/mm3 9.03   HEMOGLOBIN g/dL 8.8*    PLATELETS 10*3/mm3 294       Results from last 7 days  Lab Units 12/02/17  1632   SODIUM mmol/L 140   POTASSIUM mmol/L 4.3   CHLORIDE mmol/L 101   CO2 mmol/L 25.4   BUN mg/dL 29*   CREATININE mg/dL 0.85   CALCIUM mg/dL 9.3   GLUCOSE mg/dL 94   CT scan of the abdomen and pelvis reviewed    The afferent and efferent loops of the distal descending colon  are pulled to the skin surface at the site of colostomy. At this  location just deep to the stoma, there is a large amount of fecal  material which has accumulated into a 7.2 x 5 cm collection. The  afferent loop is mildly distended leading to this location, the efferent  loop is collapsed leading to the anus. There is contrast material  demonstrate within a normal caliber small bowel.     The patient's Hardy catheter tubing tip is located within the distal  right ureter. It is causing right-sided hydronephrosis. There is  uroepithelial thickening of the renal pelvis with calculi and/or debris  layering at this location. Pyelonephritis not excluded. Delayed right  nephrogram. Large staghorn left-sided calculus, the left renal cortical  pattern of enhancement is satisfactory, no obstructive uropathy.     The G-tube position is satisfactory.     Gallstones and/or sludge within the gallbladder. No biliary duct  dilatation. No pancreatic abnormality. The spleen is normal in size.     There is a moderate size hiatal hernia. Along its perimeter there is an  area of consolidation/collapse involving the left lower lobe.     The liver is satisfactory in appearance. The adrenal glands are normal.  There is atherosclerotic calcification of a normal caliber aorta.  Occlusion or high-grade obstruction of the right common iliac artery.  There is reconstitution distally. No free air or free intraperitoneal  fluid identified. No acute osseous abnormality identified.   Assessment:  Active Hospital Problems (** Indicates Principal Problem)    Diagnosis Date Noted   • Acute UTI [N39.0]  12/02/2017   • Colostomy malfunction [K94.03] 12/02/2017   • Anemia [D64.9] 12/02/2017   • Decubitus ulcer of coccygeal region, stage 4 (present on admission) [L89.154] 08/11/2017   • History of right above knee amputation [Z89.611] 08/11/2017   • Suprapubic catheter [Z93.59] 08/11/2017   • Calculus of left kidney [N20.0] 08/08/2017   • Atrial fibrillation [I48.91] 08/08/2017      Resolved Hospital Problems    Diagnosis Date Noted Date Resolved   No resolved problems to display.     1-colostomy malfunction and production of stool through anus in a patient who has colostomy.  It appears that isn't diverging colostomy with stool impaction noted the afferent and efferent loops.  Plan general surgery consultation for opinion in terms of face and intervention is needed  2-abnormal positioning of the suprapubic catheter into the right ureter causing hydronephrosis plan is to get urology evaluation  3-abnormal urinalysis with no systemic symptoms of urinary tract infection likely due to chronic indwelling suprapubic catheter plan is to observe off of antibiotic therapy she already has received 1 dose of antibiotic in the emergency room consider antibiotic therapy if urine culture is positive for single pathogen and if she has symptoms that can be monitored for effectiveness of treatment.  4-coccygeal decubitus ulcer no clear evidence of active infection or worsening last general surgery opinion to comment on this status of the wound and to see if any intervention is needed  5-possible uterine prolapse  6-incomplete database  7-peripheral vascular disease with right AKA  8-history of dysphagia status post PEG tube placement but currently allowed by mouth per patient plan is to get speech evaluation proper swallow assessment before oral intake is established  9-severe anemia.  Check anemia studies including stool for Hemoccult    Justice Ya MD   12/2/2017  10:29 PM  Much of this encounter note is an electronic  transcription/translation of spoken language to printed text. The electronic translation of spoken language may permit erroneous, or at times, nonsensical words or phrases to be inadvertently transcribed; Although I have reviewed the note for such errors, some may still exist

## 2017-12-03 NOTE — CONSULTS
CC: nonfunctioning ostomy     Patient is a 83 y.o. female referred by Dr. Ya for evaluation of a nonfunctioning ostomy.  Patient was transferred from a nursing home secondary to a nonfunctioning ostomy.  Patient does not know how long she has been at the nursing home.  Patient does not know why she has an ostomy PEG tube or suprapubic catheter.  The majority of her history is obtained from chart.  Patient denies nausea, vomiting, abdominal pain, fever, or chills.  Patient reports that she eats by mouth and her PEG tube is used for her medications.  Patient does have a large decubitus ulcer.  Patient also has a right AKA.  Possibly the colostomy was for diversion of stool or healing of the decubitus ulcer??    Past Medical History:   Diagnosis Date   • Anemia    • Atrial fibrillation    • Colitis    • COPD (chronic obstructive pulmonary disease)    • Dysphagia    • Hernia    • Hypokalemia    • Pneumonia      Past Surgical History:   Procedure Laterality Date   • ABDOMINAL SURGERY       History reviewed. No pertinent family history.  Social History   Substance Use Topics   • Smoking status: Former Smoker   • Smokeless tobacco: Never Used   • Alcohol use No         Current Facility-Administered Medications:   •  [MAR Hold] acetaminophen (TYLENOL) 160 MG/5ML solution 640 mg, 640 mg, Oral, Q6H PRN, Justice Ya MD  •  [MAR Hold] acetaminophen (TYLENOL) 160 MG/5ML solution 650 mg, 650 mg, Oral, Q4H PRN, Justice Ya MD  •  [MAR Hold] calcium carbonate (TUMS) chewable tablet 500 mg (200 mg elemental), 1 tablet, Oral, PRN, Justice Ya MD  •  famotidine (PEPCID) injection 20 mg, 20 mg, Intravenous, Once, Jona Mar MD  •  fentaNYL (SUBLIMAZE) 0.05 MG/ML injection  - ADS Override Pull, , , ,   •  fentaNYL citrate (PF) (SUBLIMAZE) injection 50 mcg, 50 mcg, Intravenous, Q10 Min PRN, Jona Mar MD  •  [MAR Hold] ferrous sulfate 300 (60 Fe) MG/5ML syrup 300 mg, 300 mg, Per G Tube, Daily, Justice Ya MD,  300 mg at 12/03/17 0846  •  [MAR Hold] furosemide (LASIX) tablet 20 mg, 20 mg, Per G Tube, Daily, uJstice Ya MD, 20 mg at 12/03/17 0846  •  gabapentin (NEURONTIN) capsule 100 mg, 100 mg, Oral, Daily, Justice Ya MD, 100 mg at 12/03/17 0849  •  [MAR Hold] heparin (porcine) 5000 UNIT/ML injection 5,000 Units, 5,000 Units, Subcutaneous, Q12H, Justice Ya MD, Stopped at 12/03/17 1219  •  lactated ringers infusion, 9 mL/hr, Intravenous, Continuous, Jona Mar MD, Last Rate: 9 mL/hr at 12/03/17 1715, 9 mL/hr at 12/03/17 1715  •  [MAR Hold] lansoprazole (FIRST) oral suspension 30 mg, 30 mg, Per G Tube, Q AM, Justice Ya MD, 30 mg at 12/03/17 0846  •  midazolam (VERSED) injection 1 mg, 1 mg, Intravenous, Q5 Min PRN, 0.5 mg at 12/03/17 1715 **OR** midazolam (VERSED) injection 2 mg, 2 mg, Intravenous, Q5 Min PRN, Jona Mar MD  •  [MAR Hold] morphine injection 1 mg, 1 mg, Intravenous, Q4H PRN, 1 mg at 12/03/17 1212 **AND** [MAR Hold] naloxone (NARCAN) injection 0.4 mg, 0.4 mg, Intravenous, Q5 Min PRN, Justice Ya MD  •  [MAR Hold] multivitamin (THERAGRAN) tablet 1 tablet, 1 tablet, Per G Tube, Daily, Justice Ya MD, 1 tablet at 12/03/17 0846  •  [MAR Hold] ondansetron (ZOFRAN) injection 4 mg, 4 mg, Intravenous, Q6H PRN, Justice Ya MD  •  [MAR Hold] polyethylene glycol (MIRALAX) powder 17 g, 17 g, Oral, Daily, Justice Ya MD, 17 g at 12/03/17 0846  •  potassium chloride (KLOR-CON) packet 20 mEq, 20 mEq, Per G Tube, Daily, Fausto Olivier MD  •  [MAR Hold] sennosides-docusate sodium (SENOKOT-S) 8.6-50 MG tablet 1 tablet, 1 tablet, Per G Tube, Daily, Jawed MD Felton, 1 tablet at 12/03/17 0846  •  [MAR Hold] sodium chloride 0.9 % flush 1-10 mL, 1-10 mL, Intravenous, PRN, Justice Ya MD  •  sodium chloride 0.9 % flush 1-10 mL, 1-10 mL, Intravenous, PRN, Jona Mar MD  •  Insert peripheral IV, , , Once **AND** sodium chloride 0.9 % flush 10 mL, 10 mL, Intravenous, PRN, RK Love  •   sodium chloride 0.9 % infusion, 50 mL/hr, Intravenous, Continuous, Faustocatrachita Olivier MD, Last Rate: 50 mL/hr at 12/03/17 1215, 50 mL/hr at 12/03/17 1215    Review of Systems   General: Patient reports poor health  Eyes: No eye problems  Ears, nose, mouth and throat: No rhinitis, no hearing problems, no chronic cough  Cardiovascular/heart: Denies palpitations, syncope or chest pain  Respiratory/lung: Denies shortness of breath, hemoptysis, or dyspnea on exertion   Genital/urinary: Suprapubic catheter  Hematological/lymphatic: Denies anemia or other problems  Musculoskeletal: Unable to walk, AKA on the right  Skin: Decubitus ulcer  Neurological: No seizures or other neurological problems  Psychiatric: None  Endocrine: Negative  Gastro-intestinal: Ostomy, chronic constipation  All other systems have been reviewed and are negative.  Vitals:    12/03/17 0436 12/03/17 1400 12/03/17 1634 12/03/17 1716   BP: 125/75 123/66     BP Location: Left arm Right arm     Patient Position:  Lying     Pulse: 88 82 89 85   Resp: 16 16 16 15   Temp: 98.2 °F (36.8 °C) 97.9 °F (36.6 °C)     TempSrc: Oral Oral     SpO2: 96% 99% 97% 100%   Weight:       Height:           Physical Exam  General/physcological:   Alert and oriented x3 in no acute distress  HEENT: Normal cephalic, atraumatic, PERRLA, EOMI, sclera anicteric, moist mucous membranes, neck is supple, no JVD, no carotid bruits, no thyromegaly no adenopathy  Respiratory: CTA and percussion  CVA: RRR, normal S1-S2, no murmurs, no gallops or rubs  GI: Positive BS, soft, nondistended, nontender, no rebound, no guarding, right upper quadrant PEG tube site is okay, left lower quadrant ostomy with hard stool at opening, large hernia around ostomy, suprapubic catheter  Musculoskeletal: Right AKA, left leg atrophy, no clubbing, no cyanosis or edema  Neurovascular: Grossly intact    Results from last 7 days  Lab Units 12/03/17  1548 12/03/17  0754   WBC 10*3/mm3  --  6.99   HEMOGLOBIN g/dL 7.8*  7.8*   HEMATOCRIT % 27.5* 27.3*   PLATELETS 10*3/mm3  --  292       Results from last 7 days  Lab Units 12/03/17  0754   SODIUM mmol/L 139   POTASSIUM mmol/L 4.2   CHLORIDE mmol/L 104   CO2 mmol/L 24.3   BUN mg/dL 23   CREATININE mg/dL 0.72   GLUCOSE mg/dL 83   CALCIUM mg/dL 8.6     CT scan has been reviewed and reveals possible obstructing a ferritin plan of a loop colostomy    Assessment:  Nonfunctioning colostomy  Plan:  I have advised the patient that we will try to examine the ostomy under anesthesia and try to get it to open up but it may have to be revised.  Patient understands this and wishes to proceed.    Neris Mcgee MD  General, Minimally Invasive and Endoscopic Surgery  [facility]    [address] [place] [address], [address]  [place], [place], [address]  P: [phone]  F: [phone]    Cc:  Leonor Echevarria MD

## 2017-12-03 NOTE — ANESTHESIA POSTPROCEDURE EVALUATION
"Patient: Simona Brooks    Procedure Summary     Date Anesthesia Start Anesthesia Stop Room / Location    12/03/17 5534 1817  [facility] / [facility]       Procedure Diagnosis Surgeon Provider    disimpaction of ostomy (N/A Abdomen) No diagnosis on file. MD Anibal Cote MD          Anesthesia Type: general  Last vitals  BP   128/40 (12/03/17 1813)   Temp   36.5 °C (97.7 °F) (12/03/17 1813)   Pulse   80 (12/03/17 1813)   Resp   12 (12/03/17 1813)     SpO2   98 % (12/03/17 1813)     Post Anesthesia Care and Evaluation    Patient location during evaluation: bedside  Patient participation: complete - patient participated  Level of consciousness: awake and alert  Pain management: adequate  Airway patency: patent  Anesthetic complications: No anesthetic complications    Cardiovascular status: acceptable  Respiratory status: acceptable  Hydration status: acceptable    Comments: /40 (BP Location: Right arm, Patient Position: Lying)  Pulse 80  Temp 36.5 °C (97.7 °F) (Oral)   Resp 12  Ht 62\" (157.5 cm)  Wt 110 lb 6.4 oz (50.1 kg)  SpO2 98%  BMI 20.19 kg/m2      "

## 2017-12-03 NOTE — CONSULTS
[facility]ardo [facility]      Referring Provider: JODIE  Reason for Consultation: LEFT RENAL STONE HYDRO      Patient Care Team:  Leonor Echevarria MD as PCP - General (Family Medicine)    Chief complaint PT UNAWARE     Subjective .     History of present illness:  83 YOWF NHP SEEN DR HIGH 2015  ADMITTED INCARCERATED BOWEL  AND CT FINDINGS ( FILMS REVIEWED BY ME ) OF LEFT STAGHORN STONE MISGUIDED S/P TUBE OCCLUDING DISTAL RT URETER  PT HAS NO F/C FLAN PAIN  NO DRAINAGE     Review of Systems  12 POINT NEG ROS  PERTINENT NO FLANK PAIN F/C      Past Medical History:   Diagnosis Date   • Anemia    • Atrial fibrillation    • Colitis    • COPD (chronic obstructive pulmonary disease)    • Dysphagia    • Hernia    • Hypokalemia    • Pneumonia      Past Surgical History:   Procedure Laterality Date   • ABDOMINAL SURGERY       No family history on file.  Social History   Substance Use Topics   • Smoking status: Former Smoker   • Smokeless tobacco: Never Used   • Alcohol use No     Prescriptions Prior to Admission   Medication Sig Dispense Refill Last Dose   • acetaminophen (TYLENOL) 160 MG/5ML solution Take 640 mg by mouth Every 6 (Six) Hours As Needed for Mild Pain (1-3).      • albuterol (PROVENTIL) (2.5 MG/3ML) 0.083% nebulizer solution Take 2.5 mg by nebulization Every 4 (Four) Hours As Needed for Wheezing.      • calcium carbonate (TUMS) 500 MG chewable tablet Chew 1 tablet As Needed for Indigestion or Heartburn.      • ferrous sulfate 300 (60 FE) MG/5ML syrup 300 mg by Per G Tube route Daily.      • furosemide (LASIX) 20 MG tablet 20 mg by Per G Tube route Daily.      • gabapentin (NEURONTIN) 100 MG capsule Take 100 mg by mouth Daily.      • morphine 100 MG/5ML solution concentrated solution Take 7.5 mg by mouth Every 4 (Four) Hours As Needed.      • Multiple Vitamin (TAB-A-EBONY PO) 1 tablet by Per G Tube route Daily.      • mupirocin (BACTROBAN) 2 % ointment Apply  topically 2 (Two) Times a Day. Wash G-Tube site and  "apply every shift      • nystatin (nystatin) 971225 UNIT/GM powder Apply  topically 2 (Two) Times a Day. Clean G-Tube site with NS and apply      • omeprazole (priLOSEC) 20 MG capsule 20 mg by Per G Tube route Daily.      • oxybutynin (DITROPAN) 5 MG/5ML syrup 5 mg by Per G Tube route Daily.      • potassium chloride (KAYCIEL) 20 MEQ/15ML (10%) solution 20 mEq by Per G Tube route Daily.      • povidone-iodine (BETADINE) 10 % ointment Apply  topically 2 (Two) Times a Day. Apply Betadine moist gauze to wound bed and cover with abd pad      • sennosides-docusate sodium (SENOKOT-S) 8.6-50 MG tablet 1 tablet by Per G Tube route Daily.      • ondansetron (ZOFRAN) 4 MG tablet Take 4 mg by mouth Every 6 (Six) Hours As Needed for Nausea or Vomiting.      • polyethylene glycol (MIRALAX) packet Take 17 g by mouth Daily As Needed.          ferrous sulfate 300 mg Per G Tube Daily   furosemide 20 mg Per G Tube Daily   gabapentin 100 mg Oral Daily   heparin (porcine) 5,000 Units Subcutaneous Q12H   lansoprazole 30 mg Per G Tube Q AM   multivitamin 1 tablet Per G Tube Daily   polyethylene glycol 17 g Oral Daily   potassium chloride 20 mEq Per G Tube Daily   sennosides-docusate sodium 1 tablet Per G Tube Daily     Allergies:   Penicillins    Objective     Vital Signs   Temp:  [98 °F (36.7 °C)-98.6 °F (37 °C)] 98.2 °F (36.8 °C)  Heart Rate:  [62-89] 88  Resp:  [16-18] 16  BP: (101-138)/(48-75) 125/75    Intake/Output Summary (Last 24 hours) at 12/03/17 1216  Last data filed at 12/03/17 0901   Gross per 24 hour   Intake               50 ml   Output              550 ml   Net             -500 ml     Flowsheet Rows         First Filed Value    Admission Height  62\" (157.5 cm) Documented at 12/02/2017 1618    Admission Weight  120 lb (54.4 kg) Documented at 12/02/2017 1618          Physical Exam:   General Appearance: alert, appears stated age and cooperative  ABD SOFT G -TUBE LARGE ABD WALL HERNIA  S/PTUBE  REPLACED AND CHANGED WITH " MITCHELL KENDRICK REDIRECTED TO LEFT  IRRIGATES  RT AKA   BACK NO DEFORMITY TENDERNESS      Results Review:   I reviewed the patient's new clinical results.  Results for orders placed or performed during the hospital encounter of 12/02/17   Urine Culture - Urine, Urine, Clean Catch   Result Value Ref Range    Urine Culture Culture in progress    Comprehensive Metabolic Panel   Result Value Ref Range    Glucose 94 65 - 99 mg/dL    BUN 29 (H) 8 - 23 mg/dL    Creatinine 0.85 0.57 - 1.00 mg/dL    Sodium 140 136 - 145 mmol/L    Potassium 4.3 3.5 - 5.2 mmol/L    Chloride 101 98 - 107 mmol/L    CO2 25.4 22.0 - 29.0 mmol/L    Calcium 9.3 8.6 - 10.5 mg/dL    Total Protein 7.9 6.0 - 8.5 g/dL    Albumin 3.00 (L) 3.50 - 5.20 g/dL    ALT (SGPT) 12 1 - 33 U/L    AST (SGOT) 13 1 - 32 U/L    Alkaline Phosphatase 120 (H) 39 - 117 U/L    Total Bilirubin 0.2 0.1 - 1.2 mg/dL    eGFR Non African Amer 64 >60 mL/min/1.73    Globulin 4.9 gm/dL    A/G Ratio 0.6 g/dL    BUN/Creatinine Ratio 34.1 (H) 7.0 - 25.0    Anion Gap 13.6 mmol/L   CBC Auto Differential   Result Value Ref Range    WBC 9.03 4.50 - 10.70 10*3/mm3    RBC 3.56 (L) 3.90 - 5.20 10*6/mm3    Hemoglobin 8.8 (L) 11.9 - 15.5 g/dL    Hematocrit 30.8 (L) 35.6 - 45.5 %    MCV 86.5 80.5 - 98.2 fL    MCH 24.7 (L) 26.9 - 32.0 pg    MCHC 28.6 (L) 32.4 - 36.3 g/dL    RDW 18.4 (H) 11.7 - 13.0 %    RDW-SD 58.3 (H) 37.0 - 54.0 fl    MPV 10.3 6.0 - 12.0 fL    Platelets 294 140 - 500 10*3/mm3    Neutrophil % 57.8 42.7 - 76.0 %    Lymphocyte % 31.2 19.6 - 45.3 %    Monocyte % 9.5 5.0 - 12.0 %    Eosinophil % 1.1 0.3 - 6.2 %    Basophil % 0.1 0.0 - 1.5 %    Immature Grans % 0.3 0.0 - 0.5 %    Neutrophils, Absolute 5.21 1.90 - 8.10 10*3/mm3    Lymphocytes, Absolute 2.82 0.90 - 4.80 10*3/mm3    Monocytes, Absolute 0.86 0.20 - 1.20 10*3/mm3    Eosinophils, Absolute 0.10 0.00 - 0.70 10*3/mm3    Basophils, Absolute 0.01 0.00 - 0.20 10*3/mm3    Immature Grans, Absolute 0.03 0.00 - 0.03 10*3/mm3   Urinalysis  With / Culture If Indicated - Urine, Catheter   Result Value Ref Range    Color, UA Yellow Yellow, Straw    Appearance, UA Cloudy (A) Clear    pH, UA 8.5 (H) 5.0 - 8.0    Specific Gravity, UA 1.018 1.005 - 1.030    Glucose, UA Negative Negative    Ketones, UA Negative Negative    Bilirubin, UA Negative Negative    Blood, UA Large (3+) (A) Negative    Protein,  mg/dL (2+) (A) Negative    Leuk Esterase, UA Large (3+) (A) Negative    Nitrite, UA Negative Negative    Urobilinogen, UA 0.2 E.U./dL 0.2 - 1.0 E.U./dL   Urinalysis, Microscopic Only - Urine, Clean Catch   Result Value Ref Range    RBC, UA Too Numerous to Count (A) None Seen, 0-2 /HPF    WBC, UA Too Numerous to Count (A) None Seen, 0-2 /HPF    Bacteria, UA 4+ (A) None Seen /HPF    Squamous Epithelial Cells, UA 0-2 None Seen, 0-2 /HPF    Transitional Epithelial Cells, UA 0-2 0 - 2 /HPF    Yeast, UA Small/1+ Yeast None Seen /HPF    Hyaline Casts, UA None Seen None Seen /LPF    Methodology Manual Light Microscopy    CBC Auto Differential   Result Value Ref Range    WBC 6.99 4.50 - 10.70 10*3/mm3    RBC 3.21 (L) 3.90 - 5.20 10*6/mm3    Hemoglobin 7.8 (L) 11.9 - 15.5 g/dL    Hematocrit 27.3 (L) 35.6 - 45.5 %    MCV 85.0 80.5 - 98.2 fL    MCH 24.3 (L) 26.9 - 32.0 pg    MCHC 28.6 (L) 32.4 - 36.3 g/dL    RDW 18.4 (H) 11.7 - 13.0 %    RDW-SD 57.3 (H) 37.0 - 54.0 fl    MPV 9.5 6.0 - 12.0 fL    Platelets 292 140 - 500 10*3/mm3    Neutrophil % 53.4 42.7 - 76.0 %    Lymphocyte % 36.9 19.6 - 45.3 %    Monocyte % 8.2 5.0 - 12.0 %    Eosinophil % 1.1 0.3 - 6.2 %    Basophil % 0.1 0.0 - 1.5 %    Immature Grans % 0.3 0.0 - 0.5 %    Neutrophils, Absolute 3.73 1.90 - 8.10 10*3/mm3    Lymphocytes, Absolute 2.58 0.90 - 4.80 10*3/mm3    Monocytes, Absolute 0.57 0.20 - 1.20 10*3/mm3    Eosinophils, Absolute 0.08 0.00 - 0.70 10*3/mm3    Basophils, Absolute 0.01 0.00 - 0.20 10*3/mm3    Immature Grans, Absolute 0.02 0.00 - 0.03 10*3/mm3    nRBC 0.0 0.0 - 0.0 /100 WBC    Comprehensive Metabolic Panel   Result Value Ref Range    Glucose 83 65 - 99 mg/dL    BUN 23 8 - 23 mg/dL    Creatinine 0.72 0.57 - 1.00 mg/dL    Sodium 139 136 - 145 mmol/L    Potassium 4.2 3.5 - 5.2 mmol/L    Chloride 104 98 - 107 mmol/L    CO2 24.3 22.0 - 29.0 mmol/L    Calcium 8.6 8.6 - 10.5 mg/dL    Total Protein 6.8 6.0 - 8.5 g/dL    Albumin 2.70 (L) 3.50 - 5.20 g/dL    ALT (SGPT) 9 1 - 33 U/L    AST (SGOT) 12 1 - 32 U/L    Alkaline Phosphatase 95 39 - 117 U/L    Total Bilirubin <0.2 0.1 - 1.2 mg/dL    eGFR Non African Amer 77 >60 mL/min/1.73    Globulin 4.1 gm/dL    A/G Ratio 0.7 g/dL    BUN/Creatinine Ratio 31.9 (H) 7.0 - 25.0    Anion Gap 10.7 mmol/L   Vitamin B12   Result Value Ref Range    Vitamin B-12 1190 (H) 211 - 946 pg/mL   Folate   Result Value Ref Range    Folate >20.00 4.78 - 24.20 ng/mL   Ferritin   Result Value Ref Range    Ferritin 30.56 13.00 - 150.00 ng/mL   Iron Profile   Result Value Ref Range    Iron 13 (L) 37 - 145 mcg/dL    Iron Saturation 4 (L) 20 - 50 %    Transferrin 229 200 - 360 mg/dL    TIBC 341 mcg/dL   Reticulocytes   Result Value Ref Range    Reticulocyte % 4.27 (H) 0.50 - 1.50 %     No procedure found.  Assessment/Plan     Active Problems:    Atrial fibrillation    Calculus of left kidney    Suprapubic catheter    Decubitus ulcer of coccygeal region, stage 4 (present on admission)    History of right above knee amputation    Colostomy malfunction    Anemia    Abnormal urine      NURSING HOME PT  NGB CHRONIC SUPRAPUBIC TUBE RT HYDRO FROM SMALL CONTRACTED BLADDER AND TUBE OCCLUDING RT DISTAL URETER LEFT STAGHORN PROBABLY REFLUXING URETERS CATHETER REPLACED PLAN CYSTOGRAM AFTER ANTICIPATED SURGERY PER DR MADDEN      I discussed the patients findings and my recommendations with patient    Mino Carrillo MD  12/03/17  12:16 PM

## 2017-12-03 NOTE — ANESTHESIA PROCEDURE NOTES
Airway  Urgency: elective      General Information and Staff    Patient location during procedure: OR  Anesthesiologist: RENDER, PRESTON RAY    Indications and Patient Condition  Indications for airway management: airway protection    Preoxygenated: yes  MILS maintained throughout  Mask difficulty assessment: 1 - vent by mask    Final Airway Details  Final airway type: endotracheal airway      Successful airway: ETT    Successful intubation technique: direct laryngoscopy  Blade: Garcia  Blade size: #3  ETT size: 7.0 mm  Cormack-Lehane Classification: grade I - full view of glottis  Placement verified by: chest auscultation and capnometry   Number of attempts at approach: 1

## 2017-12-03 NOTE — PLAN OF CARE
Problem: Patient Care Overview (Adult)  Goal: Plan of Care Review  Outcome: Ongoing (interventions implemented as appropriate)    12/03/17 3512   Coping/Psychosocial Response Interventions   Plan Of Care Reviewed With patient   Patient Care Overview   Progress no change   Outcome Evaluation   Outcome Summary/Follow up Plan Pt very upset she has been sent to the hospital. Anxious to be dc'd. Can be uncooperative at times. Cath changed per DrDavy Call to pre-op pt but pt refusing. States she has not talked to Dr. Mcgee. Pre-op holding notified.        Goal: Adult Individualization and Mutuality  Outcome: Ongoing (interventions implemented as appropriate)  Goal: Discharge Needs Assessment  Outcome: Ongoing (interventions implemented as appropriate)    Problem: Pain, Acute (Adult)  Goal: Identify Related Risk Factors and Signs and Symptoms  Outcome: Outcome(s) achieved Date Met:  12/03/17  Goal: Acceptable Pain Control/Comfort Level  Outcome: Ongoing (interventions implemented as appropriate)    Problem: Skin Integrity Impairment, Risk/Actual (Adult)  Goal: Identify Related Risk Factors and Signs and Symptoms  Outcome: Ongoing (interventions implemented as appropriate)  Goal: Skin Integrity/Wound Healing  Outcome: Ongoing (interventions implemented as appropriate)

## 2017-12-04 NOTE — PROGRESS NOTES
[facility]     LOS: 2 days     Name: Simona Brooks  Age: 83 y.o.  Sex: female  :  1934  MRN: [id number]         Primary Care Physician: Leonor Echevarria MD    Diet Regular    Subjective   No new complaints. No abdominal pain.    Objective   Temp:  [96.8 °F (36 °C)-98 °F (36.7 °C)] 96.8 °F (36 °C)  Heart Rate:  [78-90] 78  Resp:  [12-16] 16  BP: (116-137)/(40-84) 118/60  SpO2:  [96 %-100 %] 100 %  on  Flow (L/min):  [2] 2;   O2 Device: room air  Body mass index is 20.19 kg/(m^2).    Physical Exam   Constitutional: No distress.   Cardiovascular: Normal rate and regular rhythm.    Pulmonary/Chest: Effort normal and breath sounds normal. No respiratory distress.   Abdominal: Soft. There is no tenderness. There is no rebound and no guarding.   Ostomy on left with stool (dark brown/green). PEG, SP cath   Musculoskeletal:   Right AKA   Neurological: She is alert.     Reviewed medications and new clinical results    ferrous sulfate 300 mg Per G Tube Daily   furosemide 20 mg Per G Tube Daily   gabapentin 100 mg Oral Daily   heparin (porcine) 5,000 Units Subcutaneous Q12H   lansoprazole 30 mg Per G Tube Q AM   multivitamin 1 tablet Per G Tube Daily   mupirocin  Topical BID   nystatin  Topical BID   polyethylene glycol 17 g Oral Daily   potassium chloride 20 mEq Per G Tube Daily   povidone-iodine  Topical BID   sennosides-docusate sodium 1 tablet Per G Tube Daily       lactated ringers 9 mL/hr Last Rate: Stopped (17)   sodium chloride 50 mL/hr Last Rate: Stopped (17 0735)       Results from last 7 days  Lab Units 17  1548 17  0754 17  1737   WBC 10*3/mm3  --  6.99 9.03   HEMOGLOBIN g/dL 7.8* 7.8* 8.8*   PLATELETS 10*3/mm3  --  292 294       Results from last 7 days  Lab Units 17  0754 17  1632   SODIUM mmol/L 139 140   POTASSIUM mmol/L 4.2 4.3   CHLORIDE mmol/L 104 101   CO2 mmol/L 24.3 25.4   BUN mg/dL 23 29*    CREATININE mg/dL 0.72 0.85   CALCIUM mg/dL 8.6 9.3   GLUCOSE mg/dL 83 94     Estimated Creatinine Clearance: 42.1 mL/min (by C-G formula based on Cr of 0.72).  Lab Results   Component Value Date    URINECX >100,000 CFU/mL Mixed Yolis Isolated 12/02/2017     Assessment/Plan   Active Hospital Problems (** Indicates Principal Problem)    Diagnosis Date Noted   • Abnormal urine [R82.90] 12/03/2017   • Colostomy malfunction [K94.03] 12/02/2017   • Anemia [D64.9] 12/02/2017   • Decubitus ulcer of coccygeal region, stage 4 (present on admission) [L89.154] 08/11/2017   • History of right above knee amputation [Z89.611] 08/11/2017   • Suprapubic catheter [Z93.59] 08/11/2017   • Calculus of left kidney [N20.0] 08/08/2017   • Atrial fibrillation [I48.91] 08/08/2017      Resolved Hospital Problems    Diagnosis Date Noted Date Resolved   No resolved problems to display.     · Further plans per surgery, urology. Discussed with RN.  · Urine culture mixed. Received a dose of ceftriaxone in ER  · SLP eval swallow. WOCN  · Occult blood pending. Hgb low but seems stable. Continue to monitor. hgb was 9.1-11.3 earlier this year. If drops further will stop heparin (started for DVT prophylaxis). Today's Hgb pending.     Fausto Olivier MD   12/04/17  10:22 AM

## 2017-12-04 NOTE — PROGRESS NOTES
[facility] Progress Note    Resting comfortably  I cant seem to see her cystogram and when I do I will add an addendum.  Not read yet by radiologist either.    Known left stag and now with rt hydro with indwelling sp tube.    Hold on plans until we see what her cystogram shows.

## 2017-12-04 NOTE — PLAN OF CARE
Problem: Patient Care Overview (Adult)  Goal: Plan of Care Review  Outcome: Ongoing (interventions implemented as appropriate)  Continue symtem management and comfort care    12/03/17 1455 12/03/17 2002 12/04/17 0505   Coping/Psychosocial Response Interventions   Plan Of Care Reviewed With --  patient --    Patient Care Overview   Progress no change --  --    Outcome Evaluation   Outcome Summary/Follow up Plan --  --  Patient continues to leak stool from her rectum. Refusing treatment at the begining of shift but later she was very pleasant and cooperative. No complaints of pain.       Goal: Adult Individualization and Mutuality  Outcome: Ongoing (interventions implemented as appropriate)  Goal: Discharge Needs Assessment  Outcome: Ongoing (interventions implemented as appropriate)    Problem: Pain, Acute (Adult)  Goal: Acceptable Pain Control/Comfort Level  Outcome: Ongoing (interventions implemented as appropriate)    Problem: Skin Integrity Impairment, Risk/Actual (Adult)  Goal: Identify Related Risk Factors and Signs and Symptoms  Outcome: Ongoing (interventions implemented as appropriate)  Goal: Skin Integrity/Wound Healing  Outcome: Ongoing (interventions implemented as appropriate)

## 2017-12-04 NOTE — NURSING NOTE
12/04/17 1457   Pressure Ulcer 12/02/17 2200 coccyx   Date first assessed/Time first assessed: 12/02/17 2200   Present On Admission (Pressure Ulcer): yes;picture taken  Location: coccyx   Dressing Appearance (open to air)   Pressure Ulcer Appearance (unable to visulaize due to tunnel)   Periwound Area pink;dry;intact  (scaring due to previous PU)   Length (Pressure Ulcer) (cm) 0.5   Width (Pressure Ulcer) (cm) 0.5   Depth (Pressure Ulcer) (cm) 5.5   Dressing (recommend strip saline gauze packing)   Low Air loss mattress ordered. Scaring noted around PI due to previous wound.

## 2017-12-04 NOTE — OP NOTE
Pre-op Dx:  Colostomy impaction    Post-op Dx: Same    Procedure:  Ostomy disimpaction     Surgeon:  Neris Mcgee M.D.    Anesthesia:  GET    EBL:  none    Specimen:  none    Complications:  None    Findings:   afferent limp of a loop colostomy  impacted    Indications:  This is a pleasant 83 year old female that presented to ER with impacted afferent limp of a loop of colostomy    Procedure: After general endotracheal anesthesia, patient was prepped and draped in the usual sterile fashion.  An ostomy appliance was removed.  After removal of the ostomy appliance, the colostomy was inspected which revealed a loop colostomy with an afferent limp impaction. Digital disimpaction was carried out until soft fecal material was expressed. A new ostomy appliance was placed.  The patient was transferred to recovery room in stable condition.    Neris Mcgee MD  General, Minimally Invasive and Endoscopic Surgery  [facility]    [address], [address]  [place], [place], [address]  P: [phone]  F: [phone]    Cc:  Leonor Echevarria MD

## 2017-12-04 NOTE — DISCHARGE PLACEMENT REQUEST
"Simona Kelsey (83 y.o. Female)     Date of Birth Social Security Number Address Home Phone MRN    1934  [address] [facility] [address] [phone] [id number]    Yazdanism Marital Status          Non-Gnosticism        Admission Date Admission Type Admitting Provider Attending Provider Department, Room/Bed    12/2/17 Emergency Justice Ya MD Nguyen, Fausto Toth MD [address], P491/1    Discharge Date Discharge Disposition Discharge Destination                      Attending Provider: Fausto Olivier MD     Allergies:  Penicillins    Isolation:  None   Infection:  None   Code Status:  FULL    Ht:  62\" (157.5 cm)   Wt:  110 lb 6.4 oz (50.1 kg)    Admission Cmt:  None   Principal Problem:  None                Active Insurance as of 12/2/2017     Primary Coverage     Payor Plan Insurance Group Employer/Plan Group    HUMANA MEDICARE REPLACEMENT HUMANA MEDICARE REPL [id number]     Payor Plan Address Payor Plan Phone Number Effective From Effective To    [address] [address] [phone] 1/1/2014     [place] [address]       Subscriber Name Subscriber Birth Date Member ID       SIMONA KELSEY 1934 [id number]           Secondary Coverage     Payor Plan Insurance Group Employer/Plan Group    [place]      Payor Plan Address Payor Plan Phone Number Effective From Effective To    [address] [phone] 8/8/2017     [place] [address]       Subscriber Name Subscriber Birth Date Member ID       SIMONA KELSEY 1934 [id number]                 Emergency Contacts      (Rel.) Home Phone Work Phone Mobile Phone    Meliton Kelsey (Son) [phone] -- [phone]    Jeyson Kelsey (Son) [phone] -- [phone]    Rashard Roque (Brother) [phone] -- [phone]              "

## 2017-12-04 NOTE — PROGRESS NOTES
Discharge Planning Assessment  [facility]     Patient Name: Siomna Brooks  MRN: [id number]  Today's Date: 12/4/2017    Admit Date: 12/2/2017          Discharge Needs Assessment     None            Discharge Plan       12/04/17 1606    Case Management/Social Work Plan    Additional Comments The patient is from [facility] per Chuy Hernandez RN, CCP.        Discharge Placement     Facility/Agency Request Status Selected? Address Phone Number Fax Number    [facility] Pending - Request Sent     [address] [address] [phone]                Demographic Summary     None            Functional Status     None            Psychosocial     None            Abuse/Neglect     None            Legal     None            Substance Abuse     None            Patient Forms     None          Serenity Hernandez, RN

## 2017-12-04 NOTE — SIGNIFICANT NOTE
12/04/17 1559   Rehab Treatment   Discipline speech language pathologist   Rehab Evaluation   Evaluation Not Performed other (see comments);patient unavailable for evaluation  (Pt off the floor in xray. ST to follow up as able. Pt on regular diet.)

## 2017-12-04 NOTE — PLAN OF CARE
Problem: Patient Care Overview (Adult)  Goal: Plan of Care Review  Outcome: Ongoing (interventions implemented as appropriate)    12/04/17 1826   Coping/Psychosocial Response Interventions   Plan Of Care Reviewed With patient   Patient Care Overview   Progress no change   Outcome Evaluation   Outcome Summary/Follow up Plan Pt c/o stomach pain and overall not feeling well. Ostomy functioning, brown stool output. Medicated with Morphine. Low air loss mattress ordered. Will continue to monitor.       Goal: Adult Individualization and Mutuality  Outcome: Ongoing (interventions implemented as appropriate)  Goal: Discharge Needs Assessment  Outcome: Ongoing (interventions implemented as appropriate)    Problem: Pain, Acute (Adult)  Goal: Acceptable Pain Control/Comfort Level  Outcome: Ongoing (interventions implemented as appropriate)    Problem: Skin Integrity Impairment, Risk/Actual (Adult)  Goal: Identify Related Risk Factors and Signs and Symptoms  Outcome: Ongoing (interventions implemented as appropriate)  Goal: Skin Integrity/Wound Healing  Outcome: Ongoing (interventions implemented as appropriate)    Problem: Perioperative Period (Adult)  Goal: Signs and Symptoms of Listed Potential Problems Will be Absent or Manageable (Perioperative Period)  Outcome: Ongoing (interventions implemented as appropriate)

## 2017-12-05 NOTE — PROGRESS NOTES
Chief Complaint: POD # 1    Subjective   Pt tolerating diet, ostomy functioning  Objective     Vital signs in last 24 hours:  Temp:  [97.1 °F (36.2 °C)-98.4 °F (36.9 °C)] 97.1 °F (36.2 °C)  Heart Rate:  [78-86] 78  Resp:  [16-20] 16  BP: (113-124)/(42-69) 124/69    Intake/Output last 3 shifts:  I/O last 3 completed shifts:  In: 0   Out: 3300 [Urine:2550; Stool:750]    Intake/Output this shift:  I/O this shift:  In: 50 [Other:50]  Out: 1000 [Urine:800; Stool:200]    Physical Exam:  Respiratory: CTA, good inspiratory effort  CV: RRR  Abd: + BS, soft, ND, NT, no rebound, no guarding, loop colostomy functioning    Assessment/Plan   S/P disimpaction of loop colostomy   Doing well  Will sign off  F/u as needed    Neris Mcgee MD

## 2017-12-05 NOTE — PLAN OF CARE
Problem: Patient Care Overview (Adult)  Goal: Plan of Care Review  Outcome: Ongoing (interventions implemented as appropriate)  Continue symptom management and comfort care.    12/04/17 1826 12/04/17 2202 12/05/17 0428   Coping/Psychosocial Response Interventions   Plan Of Care Reviewed With --  patient --    Patient Care Overview   Progress no change --  --    Outcome Evaluation   Outcome Summary/Follow up Plan --  --  Patient very reseless this evening. Using her call light every 5 minutes. Packed her tunneling pressure ulcer with iodaform. Pain medication given once for nerve pain in her left leg. She rested well after that.       Goal: Adult Individualization and Mutuality  Outcome: Ongoing (interventions implemented as appropriate)  Goal: Discharge Needs Assessment  Outcome: Ongoing (interventions implemented as appropriate)    Problem: Pain, Acute (Adult)  Goal: Acceptable Pain Control/Comfort Level  Outcome: Ongoing (interventions implemented as appropriate)    Problem: Skin Integrity Impairment, Risk/Actual (Adult)  Goal: Identify Related Risk Factors and Signs and Symptoms  Outcome: Ongoing (interventions implemented as appropriate)  Goal: Skin Integrity/Wound Healing  Outcome: Ongoing (interventions implemented as appropriate)

## 2017-12-05 NOTE — CONSULTS
"Adult Nutrition  Assessment/PES    Patient Name:  Simona Brooks  YOB: 1934  MRN: [id number]  Admit Date:  12/2/2017    Assessment Date:  12/5/2017    Comments:    Consulted to evaluate TF regimen - pt gets Isosource at NH, infuses at night from 7 pm until 900 ml delivered via PEG. Pt is on a PO diet but reportedly eats very little. Will order TF regimen as pt gets at NH, substituting our formula equivalent of Jevity 1.5. D/w RN July. Following.           Reason for Assessment       12/05/17 0942    Reason for Assessment    Reason For Assessment/Visit physician consult;TF;skin risk;identified at risk by screening criteria    Gastrointestinal Colostomy    Infectious Disease UTI    Ortho AKA    Skin Pressure ulcer              Nutrition/Diet History       12/05/17 0945    Nutrition/Diet History    Patient Reported Diet/Restrictions/Preferences regular    Typical Food/Fluid Intake Takes PO but also TF at night - reportedly uses Isosource from 7 pm until 900 ml infuses @ 85 cc/hr. RN reports pt does not take much by mouth.            Anthropometrics       12/05/17 0946    Anthropometrics    RD Documented Current Weight  49.9 kg (110 lb)    Anthropometrics (Special Considerations)    Height Used for Calculations 1.575 m (5' 2\")    RD Calculated     Body Mass Index (BMI)    BMI Grade 19.1 - 24.9 - normal            Labs/Tests/Procedures/Meds       12/05/17 0945    Labs/Tests/Procedures/Meds    Diagnostic Test/Procedure Review reviewed, pertinent   colostomy revision d/t nonfunctioning at NH    Labs/Tests Review Reviewed;Alb;Hgb Hct    Medication Review Reviewed, pertinent;Multivitamin;Diuretic    Significant Vitals reviewed            Physical Findings       12/05/17 0947    Physical Findings/Assessment    Additional Documentation Physical Appearance (Group)    Physical Appearance    Gastrointestinal feeding tube;colostomy   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    Tubes peg tube    Skin " non-healing wound(s);pressure ulcer(s)   RN reports pt's bottom very pink,scarred; current coccyx wound is tunneled, depth UTD            Estimated/Assessed Needs       12/05/17 0948    Calculation Measurements    Weight Used For Calculations 49.9 kg (110 lb)    Estimated/Assessed Energy Needs    Energy Need Method Kcal/kg    kcal/kg 30-35         Estimated Kcal Range  3556-2115    Estimated/Assessed Protein Needs    Weight Used for Protein Calculation 49.9 kg (110 lb)    Protein (gm/kg) 1.2-1.5         Estimated Protein Range 60-74g    Estimated/Assessed Fluid Needs    Fluid Need Method RDA method    RDA Method (mL)  1496            Nutrition Prescription Ordered       12/05/17 0949    Nutrition Prescription PO    Current PO Diet Regular    Fluid Consistency Thin    Nutrition Prescription EN    Enteral Route PEG   uses Isosource at NH            Evaluation of Received Nutrient/Fluid Intake       12/05/17 0955    PO Evaluation    % PO Intake refused today's breakfast; pt off floor, in OR most of yesterday - no intake             Problem/Interventions:        Problem 1       12/05/17 0956    Nutrition Diagnoses Problem 1    Problem 1 Increased Nutrient Needs    Macronutrient Kcal;Fluid;Protein    Etiology (related to) Medical Diagnosis    Skin Pressure ulcer;Non healing wound    Signs/Symptoms (evidenced by) Report of Mnimal PO Intake                    Intervention Goal       12/05/17 0956    Intervention Goal    General Maintain nutrition;Nutrition support treatment    PO Tolerate PO;PO intake (%)    PO Intake % 25 %    TF/PN Inititiate TF/PN   will resume noc TF as pt on at NH - sub our formula equivalent    Weight Maintain weight            Nutrition Intervention       12/05/17 0957    Nutrition Intervention    RD/Tech Action Recommend/ordered;Interview for preference;Encourage intake;Follow Tx progress;Care plan reviewd    Recommended/Ordered EN            Nutrition Prescription       12/05/17 0957    Nutrition  Prescription EN    Enteral Prescription Enteral begin/change;Enteral to supply    Enteral Route PEG    Product Jevity 1.5 skyler    TF Delivery Method Cyclic    Cyclic TF Goal Rate (mL/hr) 85 mL/hr    Cyclic TF Cycle Over (hrs)  starts at 7 pm, infuses until 900 mL delivered (should take ~ 10.5 hr)    Water flush (mL)  40 mL    Water Flush Frequency Per hour    New EN Prescription Ordered? Yes    EN to Supply    Kcal/Day 1350 Kcal/Day    Protein (gm/day) 57 gm/day    Meet Estimated Kcal Need (%) 90 %    Meet Estimated Protein Need (%) 95 %    TF Free H2O (mL) 684 mL    Total Free H2O (mL/day) 1104 mL/day            Education/Evaluation       12/05/17 0958    Education    Education Education not appropriate at this time    Monitor/Evaluation    Monitor Per protocol        Electronically signed by:  Helen Fabian RD  12/05/17 9:58 AM

## 2017-12-05 NOTE — PROGRESS NOTES
Discharge Planning Assessment  [facility]     Patient Name: Simona Brooks  MRN: [id number]  Today's Date: 12/5/2017    Admit Date: 12/2/2017          Discharge Needs Assessment     None            Discharge Plan       12/05/17 1305    Case Management/Social Work Plan    Additional Comments Report [phone] and fax [phone] and her room is 415 at [facility]. LEOLA Hernandez RN, CCP.       12/05/17 1303    Case Management/Social Work Plan    Additional Comments Per [facility] they will not require a pre-auth. at discharge. The patient is from a medicaid bed hold and they can accept back to a medicaid bed at discharge. LEOLA Hernandez RN, CCP.         Discharge Placement     Facility/Agency Request Status Selected? Address Phone Number Fax Number    [facility] Accepted    Yes [address] [address] [phone]                Demographic Summary     None            Functional Status     None            Psychosocial     None            Abuse/Neglect     None            Legal     None            Substance Abuse     None            Patient Forms     None          Serenity Hernandez, PEEWEE

## 2017-12-05 NOTE — PROGRESS NOTES
[facility] Progress Note    Cystogram quite interesting with barger catheter obstructing orifice- this was changed out and no reflux noted.  Looks like this was a coude catheter.    We will recheck a renal u/s at some point to assure this is draining.

## 2017-12-05 NOTE — PROGRESS NOTES
[facility]     LOS: 3 days     Name: Simona Brooks  Age: 83 y.o.  Sex: female  :  1934  MRN: [id number]         Primary Care Physician: Leonor Echevarria MD    Jevity 1.5 (High Willem High Pro With Fiber); Tube Feeding Type: Cyclic / Intermittent; Feeding Start Time: 7:00 PM; Feeding End Time: 6:00 AM; Cyclic Feeding Start Rate (mL/hr): 50; To Goal Rate of (mL/hr): 85; Supplemental Bolus Feeding: No; Total ...    Subjective   No new complaints. No abdominal pain. Discussed with RN    Objective   Temp:  [97.1 °F (36.2 °C)-98.4 °F (36.9 °C)] 97.1 °F (36.2 °C)  Heart Rate:  [78-86] 78  Resp:  [16-20] 16  BP: (113-124)/(42-72) 124/69  SpO2:  [97 %-98 %] 97 %  on   ;   O2 Device: room air  Body mass index is 20.19 kg/(m^2).    Physical Exam   Constitutional: No distress.   Cardiovascular: Normal rate and regular rhythm.    Pulmonary/Chest: Effort normal and breath sounds normal. No respiratory distress.   Abdominal: Soft. There is no tenderness. There is no rebound and no guarding.   Ostomy on left with stool (dark brown/green). PEG, SP cath   Musculoskeletal:   Right AKA   Neurological: She is alert.     Reviewed medications and new clinical results    ferrous sulfate 300 mg Per G Tube Daily   furosemide 20 mg Per G Tube Daily   gabapentin 100 mg Oral Daily   heparin (porcine) 5,000 Units Subcutaneous Q12H   lansoprazole 30 mg Per G Tube Q AM   multivitamin 1 tablet Per G Tube Daily   mupirocin  Topical Q12H   nystatin  Topical Q12H   polyethylene glycol 17 g Oral Daily   potassium chloride 20 mEq Per G Tube Daily   sennosides-docusate sodium 1 tablet Per G Tube Daily       lactated ringers 9 mL/hr Last Rate: Stopped (17)   sodium chloride 50 mL/hr Last Rate: Stopped (17 1335)       Results from last 7 days  Lab Units 17  1430 17  1548 17  0754 17  1737   WBC 10*3/mm3  --   --  6.99 9.03   HEMOGLOBIN g/dL 8.3*  7.8* 7.8* 8.8*   PLATELETS 10*3/mm3  --   --  292 294       Results from last 7 days  Lab Units 12/03/17  0754 12/02/17  1632   SODIUM mmol/L 139 140   POTASSIUM mmol/L 4.2 4.3   CHLORIDE mmol/L 104 101   CO2 mmol/L 24.3 25.4   BUN mg/dL 23 29*   CREATININE mg/dL 0.72 0.85   CALCIUM mg/dL 8.6 9.3   GLUCOSE mg/dL 83 94     Estimated Creatinine Clearance: 42.1 mL/min (by C-G formula based on Cr of 0.72).  Lab Results   Component Value Date    URINECX >100,000 CFU/mL Mixed Yolis Isolated 12/02/2017     Assessment/Plan   Active Hospital Problems (** Indicates Principal Problem)    Diagnosis Date Noted   • Abnormal urine [R82.90] 12/03/2017   • Colostomy malfunction [K94.03] 12/02/2017   • Anemia [D64.9] 12/02/2017   • Decubitus ulcer of coccygeal region, stage 4 (present on admission) [L89.154] 08/11/2017   • History of right above knee amputation [Z89.611] 08/11/2017   • Suprapubic catheter [Z93.59] 08/11/2017   • Calculus of left kidney [N20.0] 08/08/2017   • Atrial fibrillation [I48.91] 08/08/2017      Resolved Hospital Problems    Diagnosis Date Noted Date Resolved   No resolved problems to display.     · Further plans per surgery, urology. Recheck renal US at some point.  · SLP eval swallow noted  · Occult blood pending. Hgb low but seems stable. Continue to monitor. hgb was 9.1-11.3 earlier this year.  · Back to [facility] on discharge when OK with all.    Fausto Olivier MD   12/05/17  4:00 PM

## 2017-12-05 NOTE — PROGRESS NOTES
Discharge Planning Assessment  [facility]     Patient Name: Simona Brooks  MRN: [id number]  Today's Date: 12/5/2017    Admit Date: 12/2/2017          Discharge Needs Assessment     None            Discharge Plan       12/05/17 1303    Case Management/Social Work Plan    Additional Comments [facility] not require a pre-auth. at discharge. The patient is from a medicaid bed hold and they can accept back to a medicaid bed at discharge. LEOLA Hernandez RN, CCP.         Discharge Placement     Facility/Agency Request Status Selected? Address Phone Number Fax Number    [facility] Accepted    Yes [address] [address] [phone]                Demographic Summary     None            Functional Status     None            Psychosocial     None            Abuse/Neglect     None            Legal     None            Substance Abuse     None            Patient Forms     None          Serenity Hernandez, RN

## 2017-12-05 NOTE — THERAPY EVALUATION
Acute Care - Speech Language Pathology   Swallow [facility]     Patient Name: Simona Brooks  : 1934  MRN: [id number]  Today's Date: 2017               Admit Date: 2017    SPEECH-LANGUAGE PATHOLOGY EVALUATION - SWALLOW  Subjective: The patient was seen on this date for a Clinical Swallow evaluation.  Patient was alert and cooperative.  Significant history: Pt admitted with colostomy malfunction. Pt referred for Bedside Swallow Evaluation because she has a PEG in place. PEG is used to supplement pt's nutritional needs because she eats very little.  Objective: Textures given included thin liquid and regular consistency.  Assessment: Difficulties were noted with none of the above consistencies. Pt refused trials of pureed and mech soft. Pt spit out a portion of the regular trial due to taste, however, mastication appeared functional. Pt had numerous complaints of the taste of food, especially here at the hospital.  SLP Findings:  Patient presents with functional swallow, without esophageal component.   Recommendations: Diet Textures: thin liquid, regular consistency food.  Medications should be taken whole with thin liquids.    Recommended Strategies: Upright for PO and small bites and sips. Oral care before breakfast, after all meals and PRN.    Dysphagia therapy is not recommended. Rationale: swallow is functional.      Visit Dx:     ICD-10-CM ICD-9-CM   1. Acute UTI N39.0 599.0   2. Colostomy malfunction K94.03 569.62   3. Anemia, unspecified type D64.9 285.9     Patient Active Problem List   Diagnosis   • Atrial fibrillation   • COPD (chronic obstructive pulmonary disease)   • Calculus of left kidney   • Suprapubic catheter   • Decubitus ulcer of coccygeal region, stage 4 (present on admission)   • History of right above knee amputation   • Acute UTI   • Colostomy malfunction   • Anemia   • Abnormal urine     Past Medical History:   Diagnosis Date   • Anemia    • Atrial  fibrillation    • Colitis    • COPD (chronic obstructive pulmonary disease)    • Dysphagia    • Hernia    • Hypokalemia    • Pneumonia      Past Surgical History:   Procedure Laterality Date   • ABDOMINAL SURGERY     • EXPLORATORY LAPAROTOMY N/A 12/3/2017    Procedure: disimpaction of ostomy;  Surgeon: Neris Mcgee MD;  Location: [facility] MAIN OR;  Service:           SWALLOW EVALUATION (last 72 hours)      Swallow Evaluation       12/05/17 1100                Rehab Evaluation    Document Type evaluation  -AW        Subjective Information no complaints;agree to therapy  -AW        Patient Effort, Rehab Treatment adequate  -AW        Symptoms Noted During/After Treatment none  -AW        General Information    Patient Profile Review yes  -AW        Current Diet Limitations regular solid;thin liquids  -AW        Plans/Goals Discussed With patient  -AW        Barriers to Rehab none identified  -AW        Clinical Impression    Patient's Goals For Discharge patient did not state  -AW        SLP Swallowing Diagnosis other (see comments)   functional swallow  -AW        Rehab Potential/Prognosis, Swallowing good, to achieve stated therapy goals  -AW        Criteria for Skilled Therapeutic Interventions Met no problems identified which require skilled intervention  -AW        FCM, Swallowing 7-->Level 7  -AW        Therapy Frequency PRN  -AW        SLP Diet Recommendation regular textures;thin liquids  -AW        Recommended Feeding/Eating Techniques maintain upright posture during/after eating for 30 mins  -AW        SLP Rec. for Method of Medication Administration meds whole with thin liquid  -AW        Monitor For Signs Of Aspiration upper respiratory infection;pneumonia;right lower lobe infiltrates  -AW        Anticipated Discharge Disposition home with assist  -AW        Pain Assessment    Pain Assessment No/denies pain  -AW        Cognitive Assessment/Intervention    Current Cognitive/Communication Assessment  functional   for eval  -AW        Orientation Status oriented x 4  -AW        Follows Commands/Answers Questions 100% of the time;able to follow single-step instructions  -AW        Personal Safety mild impairment  -AW        Personal Safety Interventions fall prevention program maintained  -AW        Oral Motor Structure and Function    Oral Motor Anatomy and Physiology patient demonstrates anatomy that is WNL  -AW        Dentition Assessment upper dentures/partial in place;lower dentures/partial in place  -AW        Secretion Management WNL/WFL  -AW        Mucosal Quality moist, healthy  -AW        Volitional Swallow no difficulties initiating volitional swallow  -AW        Oral Musculature General Assessment WNL (within normal limits)  -AW          User Key  (r) = Recorded By, (t) = Taken By, (c) = Cosigned By    Initials Name Effective Dates    AW Carla Mcdonald MS [facility]-SLP 04/13/15 -         EDUCATION  The patient has been educated in the following areas:   Dysphagia (Swallowing Impairment).    SLP Recommendation and Plan  SLP Swallowing Diagnosis: other (see comments) (functional swallow)  SLP Diet Recommendation: regular textures, thin liquids  Recommended Feeding/Eating Techniques: maintain upright posture during/after eating for 30 mins  SLP Rec. for Method of Medication Administration: meds whole with thin liquid  Monitor For Signs Of Aspiration: upper respiratory infection, pneumonia, right lower lobe infiltrates     Criteria for Skilled Therapeutic Interventions Met: no problems identified which require skilled intervention  Anticipated Discharge Disposition: home with assist  Rehab Potential/Prognosis, Swallowing: good, to achieve stated therapy goals  Therapy Frequency: PRN                         SLP Outcome Measures (last 72 hours)      SLP Outcome Measures       12/05/17 1100          SLP Outcome Measures    Outcome Measure Used? Adult NOMS  -AW      FCM Scores    FCM Chosen Swallowing  -AW       Swallowing FCM Score 7  -AW        User Key  (r) = Recorded By, (t) = Taken By, (c) = Cosigned By    Initials Name Effective Dates    ALLI Mcdonald MS CCC-SLP 04/13/15 -            Time Calculation:         Time Calculation- SLP       12/05/17 1122          Time Calculation- SLP    SLP Start Time 0830  -AW      SLP Stop Time 0900  -AW      SLP Time Calculation (min) 30 min  -AW      SLP Received On 12/05/17  -AW        User Key  (r) = Recorded By, (t) = Taken By, (c) = Cosigned By    Initials Name Provider Type    ALLI Mcdonald MS CCC-SLP Speech and Language Pathologist          Therapy Charges for Today     Code Description Service Date Service Provider Modifiers Qty    [id number] HC ST EVAL ORAL PHARYNG SWALLOW 2 12/5/2017 Carla Mcdonald MS CCC-SLP GN 1               Carla Mcdonald MS CCC-MEENA  12/5/2017

## 2017-12-05 NOTE — NURSING NOTE
12/05/17 1147   Colostomy 12/02/17 2200   Placement Date/Time: 12/02/17 2200   Existing LDA: present on admission to this facility   Stoma Appearance round;moist;red   Peristomal Skin dry;intact   Appliance 2-piece;drainable pouch;intact;no leakage;changed   Accessories/Skin Care cleansed with water;skin barrier ring   Stoma Function stool   Stool Color brown   Stool output (mL) 200

## 2017-12-05 NOTE — PLAN OF CARE
Problem: Skin Integrity Impairment, Risk/Actual (Adult)  Intervention: Promote/Optimize Nutrition    12/05/17 1004   Nutrition Interventions   Oral Nutrition Promotion other (see comments)  (Nocturnal TF support via PEG)         Goal: Skin Integrity/Wound Healing  Outcome: Ongoing (interventions implemented as appropriate)    12/05/17 1004   Skin Integrity Impairment, Risk/Actual (Adult)   Skin Integrity/Wound Healing making progress toward outcome

## 2017-12-06 NOTE — DISCHARGE SUMMARY
[facility]    Date of Discharge:  12/6/2017    PCP: Leonor Echevarria MD    Discharge Diagnosis:   Active Hospital Problems (** Indicates Principal Problem)    Diagnosis Date Noted   • Abnormal urine [R82.90] 12/03/2017   • Colostomy malfunction [K94.03] 12/02/2017   • Anemia [D64.9] 12/02/2017   • Decubitus ulcer of coccygeal region, stage 4 (present on admission) [L89.154] 08/11/2017   • History of right above knee amputation [Z89.611] 08/11/2017   • Suprapubic catheter [Z93.59] 08/11/2017   • Calculus of left kidney [N20.0] 08/08/2017   • Atrial fibrillation [I48.91] 08/08/2017      Resolved Hospital Problems    Diagnosis Date Noted Date Resolved   No resolved problems to display.     Procedures Performed  Procedure(s):  disimpaction of ostomy     Consulting Physician(s)     Provider Relationship Specialty    Kyle Paz MD Consulting Physician Urology        Hospital Course  Please see history and physical for details. Patient is a 83 y.o. female did not have any complaints was sent in for possible malfunction of her colostomy. She has a history of PEG tube and colostomy as well as a suprapubic catheter and a right above-the-knee amputation and she is a nursing home resident. She also has a sacral decubitus. It was a superficial healing ulcer on exam with surrounding scarring and minimal inflammation. She was seen in consultation by general surgery. She was taken to the OR and underwent digital disimpaction.     Urology saw her. She has a history of a left kidney stone, known left staghorn, chronic suprapubic due to neurogenic bladder. Right hydronephrosis and on CT. Urine culture was mixed. She had a cystogram which showed chronic contracted bladder with suprapubic catheter in good position. There is no evidence of ureteral obstruction by the catheter. Urology recommended a recheck renal ultrasound at some point to assure that it was  draining.    Surgery and urology have cleared the patient to be discharged back to facility. She did have a drop in her hemoglobin but hemoglobin has remained stable. Likely some of that drop is dilutional. Occult blood has been ordered but that is pending. Plan is for her to go back to facility. She can follow-up at the nursing home results of occult stool and further workup/evaluation deferred to nursing home physician. Hemoglobin the past has been anywhere from 9.1-11.3. Hemoglobin today is 8.6.    On CT scan there is also consolidation/collapse of the left lower lobe. However she was not septic/toxic did not have any respiratory complaints. She has no fever and WBC is normal.    Condition on Discharge: Improved    Temp:  [97 °F (36.1 °C)-97.7 °F (36.5 °C)] 97.1 °F (36.2 °C)  Heart Rate:  [84-92] 86  Resp:  [14-16] 16  BP: ()/(57-66) 107/57  Body mass index is 20.19 kg/(m^2).    Physical Exam   Constitutional: No distress.   Cardiovascular: Normal rate and regular rhythm.    Pulmonary/Chest: Effort normal and breath sounds normal. No respiratory distress.   Abdominal: Soft. There is no tenderness. There is no rebound and no guarding.   Musculoskeletal:   Right AKA   Neurological: She is alert.     Discharge Medications   Todd Simona   Home Medication Instructions ROSEANNE:[id number]    Printed on:12/06/17 2827   Medication Information                      acetaminophen (TYLENOL) 160 MG/5ML solution  Take 640 mg by mouth Every 6 (Six) Hours As Needed for Mild Pain (1-3).             albuterol (PROVENTIL) (2.5 MG/3ML) 0.083% nebulizer solution  Take 2.5 mg by nebulization Every 4 (Four) Hours As Needed for Wheezing.             calcium carbonate (TUMS) 500 MG chewable tablet  Chew 1 tablet As Needed for Indigestion or Heartburn.             ferrous sulfate 300 (60 FE) MG/5ML syrup  300 mg by Per G Tube route Daily.             furosemide (LASIX) 20 MG tablet  20 mg by Per G Tube route Daily.              gabapentin (NEURONTIN) 100 MG capsule  Take 100 mg by mouth Daily.             morphine 100 MG/5ML solution concentrated solution  Take 0.38 mL by mouth Every 4 (Four) Hours As Needed (pain) for up to 3 days.             Multiple Vitamin (TAB-A-EBONY PO)  1 tablet by Per G Tube route Daily.             mupirocin (BACTROBAN) 2 % ointment  Apply  topically 2 (Two) Times a Day. Wash G-Tube site and apply every shift             nystatin (nystatin) 772368 UNIT/GM powder  Apply  topically 2 (Two) Times a Day. Clean G-Tube site with NS and apply             omeprazole (priLOSEC) 20 MG capsule  20 mg by Per G Tube route Daily.             ondansetron (ZOFRAN) 4 MG tablet  Take 4 mg by mouth Every 6 (Six) Hours As Needed for Nausea or Vomiting.             polyethylene glycol (MIRALAX) packet  Take 17 g by mouth Daily As Needed.             potassium chloride (KAYCIEL) 20 MEQ/15ML (10%) solution  20 mEq by Per G Tube route Daily.             povidone-iodine (BETADINE) 10 % ointment  Apply  topically 2 (Two) Times a Day. Apply Betadine moist gauze to wound bed and cover with abd pad             sennosides-docusate sodium (SENOKOT-S) 8.6-50 MG tablet  1 tablet by Per G Tube route Daily.                Diet Instructions     Diet: Tube Feeding; Bolus; continue as before       Discharge Diet:  Tube Feeding   Feeding Type:  Bolus   Formula, Amount & Frequency:  continue as before                Activity Instructions     Activity as Tolerated       Per PT                Additional Instructions for the Follow-ups that You Need to Schedule     Call MD for problems / concerns.    As directed              Follow-up Information     Follow up with [facility] .    Specialties:  Skilled Nursing Facility, Intermediate Care Facility    Contact information:    [address] [place] [address] [phone]        Follow up with Kyle Paz MD. Call in 4 week(s).    Specialty:  Urology    Contact  information:    [address]  [address]  [facility] [address]  [phone]          Follow up with Neris Mcgee MD .    Specialty:  General Surgery    Contact information:    [address]    [facility] [id number] [phone]          Follow up with Leonor Echevarria MD .    Specialty:  Family Medicine    Contact information:    [address]  [facility] [address]  [facility] [address]  [phone]           Fausto Olivier MD  12/06/17  4:17 PM    Discharge time spent greater than 30 minutes.

## 2017-12-06 NOTE — PLAN OF CARE
Problem: Patient Care Overview (Adult)  Goal: Plan of Care Review  Outcome: Ongoing (interventions implemented as appropriate)    12/06/17 0525   Coping/Psychosocial Response Interventions   Plan Of Care Reviewed With patient   Patient Care Overview   Progress improving   Outcome Evaluation   Outcome Summary/Follow up Plan Pt c/o pain thru out the shift, pain meds given several during times. Repositioned q2  VSS Will continue to monitor       Goal: Adult Individualization and Mutuality  Outcome: Ongoing (interventions implemented as appropriate)  Goal: Discharge Needs Assessment  Outcome: Ongoing (interventions implemented as appropriate)    Problem: Pain, Acute (Adult)  Goal: Acceptable Pain Control/Comfort Level  Outcome: Ongoing (interventions implemented as appropriate)    Problem: Skin Integrity Impairment, Risk/Actual (Adult)  Goal: Skin Integrity/Wound Healing  Outcome: Ongoing (interventions implemented as appropriate)

## 2017-12-06 NOTE — PROGRESS NOTES
Discharge Planning Assessment  Psychiatric     Patient Name: Simona Brooks  MRN: [id number]  Today's Date: 12/6/2017    Admit Date: 12/2/2017          Discharge Needs Assessment     None            Discharge Plan       12/06/17 1726    Case Management/Social Work Plan    Additional Comments Spoke with Meliton/son, Bandar/Step son and the patient's brother they are all in agreement with the discharge plan for the patient to return to [facility], medicaid bed long term by ambulance.  LEOLA hernandez RN, CCP.     Final Note    Final Note [facility], medicaid bed, long term by [facility]. LEOLA Hernandez Rn, CCP.      12/06/17 1622    Case Management/Social Work Plan    Plan [facility] bed available    Patient/Family In Agreement With Plan yes    Additional Comments Dayanara Hernandez spoke with Bandar and notified of DC orders......................Jennyfer Brunosn RN        Discharge Placement     Facility/Agency Request Status Selected? Address Phone Number Fax Number    [facility] Accepted    Yes [address] [facility] [id number] [phone]        Expected Discharge Date and Time     Expected Discharge Date Expected Discharge Time    Dec 6, 2017               Demographic Summary     None            Functional Status     None            Psychosocial     None            Abuse/Neglect     None            Legal     None            Substance Abuse     None            Patient Forms     None          Serenity Hernandez, RN

## 2017-12-06 NOTE — PROGRESS NOTES
[facility]     LOS: 4 days     Name: Simona Brooks  Age: 83 y.o.  Sex: female  :  1934  MRN: [id number]         Primary Care Physician: Leonor Echevarria MD    Jevity 1.5 (High Willem High Pro With Fiber); Tube Feeding Type: Cyclic / Intermittent; Feeding Start Time: 7:00 PM; Feeding End Time: 6:00 AM; Cyclic Feeding Start Rate (mL/hr): 50; To Goal Rate of (mL/hr): 85; Supplemental Bolus Feeding: No; Total ...    Subjective   No new complaints. States she feels fine.    Objective   Temp:  [97 °F (36.1 °C)-97.7 °F (36.5 °C)] 97.1 °F (36.2 °C)  Heart Rate:  [78-92] 86  Resp:  [14-16] 16  BP: ()/(57-69) 107/57  SpO2:  [94 %-100 %] 94 %  on   ;   O2 Device: room air  Body mass index is 20.19 kg/(m^2).    Physical Exam   Constitutional: No distress.   Cardiovascular: Normal rate and regular rhythm.    Pulmonary/Chest: Effort normal and breath sounds normal. No respiratory distress.   Abdominal: Soft. There is no tenderness. There is no rebound and no guarding.   Musculoskeletal:   Right AKA   Neurological: She is alert.     Reviewed medications and new clinical results    ferrous sulfate 300 mg Per G Tube Daily   furosemide 20 mg Per G Tube Daily   gabapentin 100 mg Oral Daily   heparin (porcine) 5,000 Units Subcutaneous Q12H   lansoprazole 30 mg Per G Tube Q AM   multivitamin 1 tablet Per G Tube Daily   mupirocin  Topical Q12H   nystatin  Topical Q12H   polyethylene glycol 17 g Oral Daily   potassium chloride 20 mEq Per G Tube Daily   sennosides-docusate sodium 1 tablet Per G Tube Daily       lactated ringers 9 mL/hr Last Rate: Stopped (17)   sodium chloride 50 mL/hr Last Rate: 50 mL/hr (17 1004)       Results from last 7 days  Lab Units 17  0639 17  1625 17  1430 17  1548 17  0754 17  1737   WBC 10*3/mm3  --   --   --   --  6.99 9.03   HEMOGLOBIN g/dL 8.6* 8.0* 8.3* 7.8* 7.8* 8.8*   PLATELETS  10*3/mm3  --   --   --   --  292 294       Results from last 7 days  Lab Units 12/03/17  0754 12/02/17  1632   SODIUM mmol/L 139 140   POTASSIUM mmol/L 4.2 4.3   CHLORIDE mmol/L 104 101   CO2 mmol/L 24.3 25.4   BUN mg/dL 23 29*   CREATININE mg/dL 0.72 0.85   CALCIUM mg/dL 8.6 9.3   GLUCOSE mg/dL 83 94     Estimated Creatinine Clearance: 42.1 mL/min (by C-G formula based on Cr of 0.72).  Lab Results   Component Value Date    URINECX >100,000 CFU/mL Mixed Yolis Isolated 12/02/2017     Assessment/Plan   Active Hospital Problems (** Indicates Principal Problem)    Diagnosis Date Noted   • Abnormal urine [R82.90] 12/03/2017   • Colostomy malfunction [K94.03] 12/02/2017   • Anemia [D64.9] 12/02/2017   • Decubitus ulcer of coccygeal region, stage 4 (present on admission) [L89.154] 08/11/2017   • History of right above knee amputation [Z89.611] 08/11/2017   • Suprapubic catheter [Z93.59] 08/11/2017   • Calculus of left kidney [N20.0] 08/08/2017   • Atrial fibrillation [I48.91] 08/08/2017      Resolved Hospital Problems    Diagnosis Date Noted Date Resolved   No resolved problems to display.     · Awaiting further recommendations per urology. Recheck renal US at some point.  · Hgb low but stable. Continue to monitor. hgb was 9.1-11.3 earlier this year.  · Back to [facility] on discharge when OK with all.    Fausto Olivier MD   12/06/17  1:16 PM

## 2017-12-06 NOTE — PROGRESS NOTES
Case Management Discharge Note    Final Note: [facility], medicaid bed, long term by [facility]. LEOLA Hernandez Rn, CCP.    Discharge Placement     Facility/Agency Request Status Selected? Address Phone Number Fax Number    [facility] Accepted    [address] [facility] [address] [phone]        [facility]: [facility]    Discharge Codes: 04  Discharged/transferred to intermediate care facility (ICF) ([facility], medicaid bed, long term)

## 2017-12-06 NOTE — PROGRESS NOTES
Continued Stay Note  [facility]     Patient Name: Simona Brooks  MRN: [id number]  Today's Date: 12/6/2017    Admit Date: 12/2/2017          Discharge Plan       12/06/17 1035    Case Management/Social Work Plan    Plan [facility] bed available    Patient/Family In Agreement With Plan yes    Additional Comments VM message for son Meliton Brooks, #[phone] for attempt to screen. Spoke with patient and she could not give the name of the facility but knew she came from a facility and agreed to return.  Per Michell, patient is from [facility] and has a medicaid bedhold and may return.  Report [phone], fax [phone] and room is #[id number].  ............................Jennyfer Brunson RN              Discharge Codes     None            Jennyfer Brunson RN

## 2017-12-06 NOTE — PLAN OF CARE
Problem: Patient Care Overview (Adult)  Goal: Plan of Care Review  Outcome: Ongoing (interventions implemented as appropriate)    12/05/17 1900   Coping/Psychosocial Response Interventions   Plan Of Care Reviewed With patient   Patient Care Overview   Progress no change   Outcome Evaluation   Outcome Summary/Follow up Plan Pt anxious to be dc'd. C/o bed being uncomfortable, stomach being upset, and food not tasting well. Medicated x 1 for pain. No other changes. Will continue to monitor.       Goal: Adult Individualization and Mutuality  Outcome: Ongoing (interventions implemented as appropriate)  Goal: Discharge Needs Assessment  Outcome: Ongoing (interventions implemented as appropriate)    Problem: Pain, Acute (Adult)  Goal: Acceptable Pain Control/Comfort Level  Outcome: Ongoing (interventions implemented as appropriate)    Problem: Skin Integrity Impairment, Risk/Actual (Adult)  Goal: Identify Related Risk Factors and Signs and Symptoms  Outcome: Outcome(s) achieved Date Met:  12/05/17  Goal: Skin Integrity/Wound Healing  Outcome: Ongoing (interventions implemented as appropriate)    Problem: Perioperative Period (Adult)  Goal: Signs and Symptoms of Listed Potential Problems Will be Absent or Manageable (Perioperative Period)  Outcome: Ongoing (interventions implemented as appropriate)

## 2017-12-06 NOTE — PROGRESS NOTES
Continued Stay Note  [facility]     Patient Name: Simona Brooks  MRN: [id number]  Today's Date: 12/6/2017    Admit Date: 12/2/2017          Discharge Plan       12/06/17 1622    Case Management/Social Work Plan    Plan [facility] bed available    Patient/Family In Agreement With Plan yes    Additional Comments Dayanara Hernandez spoke with Bandar and notified of DC orders......................Jennyfer Brunson RN              Discharge Codes     None        Expected Discharge Date and Time     Expected Discharge Date Expected Discharge Time    Dec 6, 2017             Jennyfer Brunson RN

## 2017-12-06 NOTE — PROGRESS NOTES
[facility]    Physicians Statement of Medical Necessity for Ambulance Transportation    It is medically necessary for:    Patient Name: Simona Brooks    Insurance Information:      To be transported by ambulance:    From (if nursing facility, specify level of care: skilled, detention, etc): [facility]    To (specify level of care if nursing facility): [facility]    Date of Service: 12/6/2017      For dialysis patients state date dialysis began:     Diagnosis:    Past Medical/Surgical History:  Past Medical History:   Diagnosis Date   • Anemia    • Atrial fibrillation    • Colitis    • COPD (chronic obstructive pulmonary disease)    • Dysphagia    • Hernia    • Hypokalemia    • Pneumonia       Past Surgical History:   Procedure Laterality Date   • ABDOMINAL SURGERY     • EXPLORATORY LAPAROTOMY N/A 12/3/2017    Procedure: disimpaction of ostomy;  Surgeon: Neris Mcgee MD;  Location: [facility];  Service:         Current Objective Medical Evidence(including physical exam finding to support reason for limitations):    Immobilization syndrome  Confused/combative: may require restraints    Other:     Physician Signature:           (RN,NP,PA,CAN, Discharge Planner)Jennyfer Brunson RN   Date/Time: 12/6/2017        4:38 PM       Printed Name:    __________________________________    [facility]   Phone: [phone] Phone: [phone] Phone: [phone]   Fax: [phone] Fax: [phone] Fax: [phone]

## 2018-01-01 ENCOUNTER — ANESTHESIA (OUTPATIENT)
Dept: GASTROENTEROLOGY | Facility: HOSPITAL | Age: 83
End: 2018-01-01

## 2018-01-01 ENCOUNTER — HOSPITAL ENCOUNTER (EMERGENCY)
Facility: HOSPITAL | Age: 83
Discharge: NURSING, REHAB OR LONG-TERM CARE | End: 2018-02-01
Attending: EMERGENCY MEDICINE | Admitting: NURSE PRACTITIONER

## 2018-01-01 ENCOUNTER — APPOINTMENT (OUTPATIENT)
Dept: GENERAL RADIOLOGY | Facility: HOSPITAL | Age: 83
End: 2018-01-01

## 2018-01-01 ENCOUNTER — HOSPITAL ENCOUNTER (INPATIENT)
Facility: HOSPITAL | Age: 83
LOS: 10 days | Discharge: HOSPICE | End: 2018-10-11
Attending: EMERGENCY MEDICINE | Admitting: HOSPITALIST

## 2018-01-01 ENCOUNTER — APPOINTMENT (OUTPATIENT)
Dept: ULTRASOUND IMAGING | Facility: HOSPITAL | Age: 83
End: 2018-01-01

## 2018-01-01 ENCOUNTER — DOCUMENTATION (OUTPATIENT)
Dept: OTHER | Facility: HOSPITAL | Age: 83
End: 2018-01-01

## 2018-01-01 ENCOUNTER — HOSPITAL ENCOUNTER (INPATIENT)
Facility: HOSPITAL | Age: 83
LOS: 11 days | End: 2018-10-22
Attending: HOSPITALIST | Admitting: HOSPITALIST

## 2018-01-01 ENCOUNTER — ANESTHESIA EVENT (OUTPATIENT)
Dept: GASTROENTEROLOGY | Facility: HOSPITAL | Age: 83
End: 2018-01-01

## 2018-01-01 ENCOUNTER — HOSPITAL ENCOUNTER (INPATIENT)
Facility: HOSPITAL | Age: 83
LOS: 3 days | Discharge: NURSING, REHAB OR LONG-TERM CARE | End: 2018-06-24
Attending: EMERGENCY MEDICINE | Admitting: INTERNAL MEDICINE

## 2018-01-01 ENCOUNTER — HOSPITAL ENCOUNTER (EMERGENCY)
Facility: HOSPITAL | Age: 83
Discharge: HOME | End: 2018-04-14
Attending: EMERGENCY MEDICINE | Admitting: EMERGENCY MEDICINE

## 2018-01-01 ENCOUNTER — APPOINTMENT (OUTPATIENT)
Dept: CT IMAGING | Facility: HOSPITAL | Age: 83
End: 2018-01-01

## 2018-01-01 ENCOUNTER — APPOINTMENT (OUTPATIENT)
Dept: GENERAL RADIOLOGY | Facility: HOSPITAL | Age: 83
End: 2018-01-01
Attending: INTERNAL MEDICINE

## 2018-01-01 VITALS
TEMPERATURE: 99.2 F | HEART RATE: 86 BPM | HEIGHT: 66 IN | DIASTOLIC BLOOD PRESSURE: 63 MMHG | BODY MASS INDEX: 19.29 KG/M2 | WEIGHT: 120 LBS | OXYGEN SATURATION: 97 % | RESPIRATION RATE: 20 BRPM | SYSTOLIC BLOOD PRESSURE: 113 MMHG

## 2018-01-01 VITALS
WEIGHT: 113 LBS | HEART RATE: 91 BPM | DIASTOLIC BLOOD PRESSURE: 60 MMHG | SYSTOLIC BLOOD PRESSURE: 126 MMHG | RESPIRATION RATE: 16 BRPM | TEMPERATURE: 97 F | OXYGEN SATURATION: 93 % | HEIGHT: 66 IN | BODY MASS INDEX: 18.16 KG/M2

## 2018-01-01 VITALS
TEMPERATURE: 97.7 F | HEIGHT: 62 IN | OXYGEN SATURATION: 92 % | BODY MASS INDEX: 22.45 KG/M2 | WEIGHT: 122 LBS | DIASTOLIC BLOOD PRESSURE: 56 MMHG | RESPIRATION RATE: 18 BRPM | HEART RATE: 83 BPM | SYSTOLIC BLOOD PRESSURE: 106 MMHG

## 2018-01-01 VITALS
TEMPERATURE: 97.5 F | OXYGEN SATURATION: 95 % | DIASTOLIC BLOOD PRESSURE: 59 MMHG | WEIGHT: 115.5 LBS | HEART RATE: 86 BPM | RESPIRATION RATE: 16 BRPM | HEIGHT: 62 IN | SYSTOLIC BLOOD PRESSURE: 102 MMHG | BODY MASS INDEX: 21.25 KG/M2

## 2018-01-01 VITALS — OXYGEN SATURATION: 93 % | DIASTOLIC BLOOD PRESSURE: 39 MMHG | SYSTOLIC BLOOD PRESSURE: 71 MMHG | TEMPERATURE: 96.1 F

## 2018-01-01 DIAGNOSIS — K92.2 GASTROINTESTINAL HEMORRHAGE, UNSPECIFIED GASTROINTESTINAL HEMORRHAGE TYPE: Primary | ICD-10-CM

## 2018-01-01 DIAGNOSIS — T85.528A GASTROJEJUNOSTOMY TUBE DISLODGEMENT: Primary | ICD-10-CM

## 2018-01-01 DIAGNOSIS — K92.1 GASTROINTESTINAL HEMORRHAGE WITH MELENA: ICD-10-CM

## 2018-01-01 DIAGNOSIS — J18.9 HAP (HOSPITAL-ACQUIRED PNEUMONIA): ICD-10-CM

## 2018-01-01 DIAGNOSIS — K92.2 GASTROINTESTINAL HEMORRHAGE, UNSPECIFIED GASTROINTESTINAL HEMORRHAGE TYPE: ICD-10-CM

## 2018-01-01 DIAGNOSIS — K62.3 RECTAL PROLAPSE: ICD-10-CM

## 2018-01-01 DIAGNOSIS — Z43.1 ATTENTION TO G-TUBE (HCC): Primary | ICD-10-CM

## 2018-01-01 DIAGNOSIS — J90 PLEURAL EFFUSION: Primary | ICD-10-CM

## 2018-01-01 DIAGNOSIS — D64.9 ACUTE ON CHRONIC ANEMIA: ICD-10-CM

## 2018-01-01 DIAGNOSIS — E87.5 HYPERKALEMIA: ICD-10-CM

## 2018-01-01 DIAGNOSIS — N39.0 ACUTE UTI: ICD-10-CM

## 2018-01-01 DIAGNOSIS — R77.8 ELEVATED TROPONIN: ICD-10-CM

## 2018-01-01 DIAGNOSIS — D50.9 IRON DEFICIENCY ANEMIA, UNSPECIFIED IRON DEFICIENCY ANEMIA TYPE: ICD-10-CM

## 2018-01-01 DIAGNOSIS — N17.9 AKI (ACUTE KIDNEY INJURY) (HCC): ICD-10-CM
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
## 2018-01-01 DIAGNOSIS — D64.9 ANEMIA, UNSPECIFIED TYPE: ICD-10-CM

## 2018-01-01 LAB
ABO + RH BLD: NORMAL
ABO GROUP BLD: NORMAL
ABO GROUP BLD: NORMAL
ALBUMIN SERPL-MCNC: 2.1 G/DL (ref 3.5–5.2)
ALBUMIN SERPL-MCNC: 2.1 G/DL (ref 3.5–5.2)
ALBUMIN SERPL-MCNC: 2.2 G/DL (ref 3.5–5.2)
ALBUMIN SERPL-MCNC: 2.2 G/DL (ref 3.5–5.2)
ALBUMIN SERPL-MCNC: 2.3 G/DL (ref 3.5–5.2)
ALBUMIN SERPL-MCNC: 2.3 G/DL (ref 3.5–5.2)
ALBUMIN SERPL-MCNC: 2.4 G/DL (ref 3.5–5.2)
ALBUMIN SERPL-MCNC: 2.4 G/DL (ref 3.5–5.2)
ALBUMIN SERPL-MCNC: 2.6 G/DL (ref 3.5–5.2)
ALBUMIN SERPL-MCNC: 2.8 G/DL (ref 3.5–5.2)
ALBUMIN/GLOB SERPL: 0.6 G/DL
ALBUMIN/GLOB SERPL: 0.6 G/DL
ALP SERPL-CCNC: 133 U/L (ref 39–117)
ALP SERPL-CCNC: 242 U/L (ref 39–117)
ALT SERPL W P-5'-P-CCNC: 15 U/L (ref 1–33)
ALT SERPL W P-5'-P-CCNC: 16 U/L (ref 1–33)
AMORPH URATE CRY URNS QL MICRO: ABNORMAL /HPF
ANION GAP SERPL CALCULATED.3IONS-SCNC: 10.4 MMOL/L
ANION GAP SERPL CALCULATED.3IONS-SCNC: 10.8 MMOL/L
ANION GAP SERPL CALCULATED.3IONS-SCNC: 10.9 MMOL/L
ANION GAP SERPL CALCULATED.3IONS-SCNC: 11.1 MMOL/L
ANION GAP SERPL CALCULATED.3IONS-SCNC: 11.5 MMOL/L
ANION GAP SERPL CALCULATED.3IONS-SCNC: 11.7 MMOL/L
ANION GAP SERPL CALCULATED.3IONS-SCNC: 12.3 MMOL/L
ANION GAP SERPL CALCULATED.3IONS-SCNC: 12.6 MMOL/L
ANION GAP SERPL CALCULATED.3IONS-SCNC: 12.8 MMOL/L
ANION GAP SERPL CALCULATED.3IONS-SCNC: 13 MMOL/L
ANION GAP SERPL CALCULATED.3IONS-SCNC: 13.2 MMOL/L
ANION GAP SERPL CALCULATED.3IONS-SCNC: 13.7 MMOL/L
ANION GAP SERPL CALCULATED.3IONS-SCNC: 13.8 MMOL/L
ANION GAP SERPL CALCULATED.3IONS-SCNC: 9.2 MMOL/L
ARTERIAL PATENCY WRIST A: POSITIVE
AST SERPL-CCNC: 16 U/L (ref 1–32)
AST SERPL-CCNC: 19 U/L (ref 1–32)
ATMOSPHERIC PRESS: 757.2 MMHG
B PERT DNA SPEC QL NAA+PROBE: NOT DETECTED
BACTERIA SPEC AEROBE CULT: ABNORMAL
BACTERIA SPEC AEROBE CULT: ABNORMAL
BACTERIA SPEC AEROBE CULT: NORMAL
BACTERIA SPEC AEROBE CULT: NORMAL
BACTERIA UR QL AUTO: ABNORMAL /HPF
BACTERIA UR QL AUTO: ABNORMAL /HPF
BASE EXCESS BLDA CALC-SCNC: 1.2 MMOL/L (ref 0–2)
BASOPHILS # BLD AUTO: 0.01 10*3/MM3 (ref 0–0.2)
BASOPHILS # BLD AUTO: 0.01 10*3/MM3 (ref 0–0.2)
BASOPHILS # BLD AUTO: 0.02 10*3/MM3 (ref 0–0.2)
BASOPHILS # BLD AUTO: 0.03 10*3/MM3 (ref 0–0.2)
BASOPHILS NFR BLD AUTO: 0.1 % (ref 0–1.5)
BASOPHILS NFR BLD AUTO: 0.3 % (ref 0–1.5)
BASOPHILS NFR BLD AUTO: 0.4 % (ref 0–1.5)
BASOPHILS NFR BLD AUTO: 0.5 % (ref 0–1.5)
BASOPHILS NFR BLD AUTO: 0.6 % (ref 0–1.5)
BDY SITE: ABNORMAL
BH BB BLOOD EXPIRATION DATE: NORMAL
BH BB BLOOD TYPE BARCODE: 5100
BH BB DISPENSE STATUS: NORMAL
BH BB PRODUCT CODE: NORMAL
BH BB UNIT NUMBER: NORMAL
BILIRUB SERPL-MCNC: 0.2 MG/DL (ref 0.1–1.2)
BILIRUB SERPL-MCNC: <0.2 MG/DL (ref 0.1–1.2)
BILIRUB UR QL STRIP: NEGATIVE
BILIRUB UR QL STRIP: NEGATIVE
BLD GP AB SCN SERPL QL: NEGATIVE
BLD GP AB SCN SERPL QL: NEGATIVE
BUN BLD-MCNC: 103 MG/DL (ref 8–23)
BUN BLD-MCNC: 109 MG/DL (ref 8–23)
BUN BLD-MCNC: 12 MG/DL (ref 8–23)
BUN BLD-MCNC: 12 MG/DL (ref 8–23)
BUN BLD-MCNC: 13 MG/DL (ref 8–23)
BUN BLD-MCNC: 17 MG/DL (ref 8–23)
BUN BLD-MCNC: 18 MG/DL (ref 8–23)
BUN BLD-MCNC: 21 MG/DL (ref 8–23)
BUN BLD-MCNC: 24 MG/DL (ref 8–23)
BUN BLD-MCNC: 25 MG/DL (ref 8–23)
BUN BLD-MCNC: 33 MG/DL (ref 8–23)
BUN BLD-MCNC: 39 MG/DL (ref 8–23)
BUN BLD-MCNC: 47 MG/DL (ref 8–23)
BUN BLD-MCNC: 73 MG/DL (ref 8–23)
BUN/CREAT SERPL: 11.5 (ref 7–25)
BUN/CREAT SERPL: 15.2 (ref 7–25)
BUN/CREAT SERPL: 18 (ref 7–25)
BUN/CREAT SERPL: 20 (ref 7–25)
BUN/CREAT SERPL: 22 (ref 7–25)
BUN/CREAT SERPL: 24.1 (ref 7–25)
BUN/CREAT SERPL: 24.7 (ref 7–25)
BUN/CREAT SERPL: 32 (ref 7–25)
BUN/CREAT SERPL: 39.1 (ref 7–25)
BUN/CREAT SERPL: 39.8 (ref 7–25)
BUN/CREAT SERPL: 47.1 (ref 7–25)
BUN/CREAT SERPL: 48.4 (ref 7–25)
BUN/CREAT SERPL: 53.4 (ref 7–25)
BUN/CREAT SERPL: 54.5 (ref 7–25)
C PNEUM DNA NPH QL NAA+NON-PROBE: NOT DETECTED
CALCIUM SPEC-SCNC: 7.6 MG/DL (ref 8.6–10.5)
CALCIUM SPEC-SCNC: 7.6 MG/DL (ref 8.6–10.5)
CALCIUM SPEC-SCNC: 7.7 MG/DL (ref 8.6–10.5)
CALCIUM SPEC-SCNC: 7.8 MG/DL (ref 8.6–10.5)
CALCIUM SPEC-SCNC: 7.9 MG/DL (ref 8.6–10.5)
CALCIUM SPEC-SCNC: 8 MG/DL (ref 8.6–10.5)
CALCIUM SPEC-SCNC: 8.1 MG/DL (ref 8.6–10.5)
CALCIUM SPEC-SCNC: 8.1 MG/DL (ref 8.6–10.5)
CALCIUM SPEC-SCNC: 8.2 MG/DL (ref 8.6–10.5)
CALCIUM SPEC-SCNC: 8.3 MG/DL (ref 8.6–10.5)
CALCIUM SPEC-SCNC: 8.6 MG/DL (ref 8.6–10.5)
CHLORIDE SERPL-SCNC: 101 MMOL/L (ref 98–107)
CHLORIDE SERPL-SCNC: 102 MMOL/L (ref 98–107)
CHLORIDE SERPL-SCNC: 104 MMOL/L (ref 98–107)
CHLORIDE SERPL-SCNC: 105 MMOL/L (ref 98–107)
CHLORIDE SERPL-SCNC: 106 MMOL/L (ref 98–107)
CHLORIDE SERPL-SCNC: 110 MMOL/L (ref 98–107)
CHLORIDE SERPL-SCNC: 110 MMOL/L (ref 98–107)
CHLORIDE SERPL-SCNC: 111 MMOL/L (ref 98–107)
CHLORIDE SERPL-SCNC: 112 MMOL/L (ref 98–107)
CHLORIDE SERPL-SCNC: 114 MMOL/L (ref 98–107)
CHLORIDE SERPL-SCNC: 115 MMOL/L (ref 98–107)
CHLORIDE SERPL-SCNC: 117 MMOL/L (ref 98–107)
CHLORIDE UR-SCNC: <20 MMOL/L
CLARITY UR: ABNORMAL
CLARITY UR: ABNORMAL
CO2 SERPL-SCNC: 16.2 MMOL/L (ref 22–29)
CO2 SERPL-SCNC: 16.7 MMOL/L (ref 22–29)
CO2 SERPL-SCNC: 17.8 MMOL/L (ref 22–29)
CO2 SERPL-SCNC: 18.2 MMOL/L (ref 22–29)
CO2 SERPL-SCNC: 18.4 MMOL/L (ref 22–29)
CO2 SERPL-SCNC: 18.8 MMOL/L (ref 22–29)
CO2 SERPL-SCNC: 20.3 MMOL/L (ref 22–29)
CO2 SERPL-SCNC: 20.6 MMOL/L (ref 22–29)
CO2 SERPL-SCNC: 20.9 MMOL/L (ref 22–29)
CO2 SERPL-SCNC: 22 MMOL/L (ref 22–29)
CO2 SERPL-SCNC: 23.1 MMOL/L (ref 22–29)
CO2 SERPL-SCNC: 23.2 MMOL/L (ref 22–29)
CO2 SERPL-SCNC: 23.5 MMOL/L (ref 22–29)
CO2 SERPL-SCNC: 24.3 MMOL/L (ref 22–29)
COLOR UR: YELLOW
COLOR UR: YELLOW
CREAT BLD-MCNC: 0.59 MG/DL (ref 0.57–1)
CREAT BLD-MCNC: 0.6 MG/DL (ref 0.57–1)
CREAT BLD-MCNC: 0.64 MG/DL (ref 0.57–1)
CREAT BLD-MCNC: 0.73 MG/DL (ref 0.57–1)
CREAT BLD-MCNC: 0.87 MG/DL (ref 0.57–1)
CREAT BLD-MCNC: 0.97 MG/DL (ref 0.57–1)
CREAT BLD-MCNC: 1 MG/DL (ref 0.57–1)
CREAT BLD-MCNC: 1.03 MG/DL (ref 0.57–1)
CREAT BLD-MCNC: 1.04 MG/DL (ref 0.57–1)
CREAT BLD-MCNC: 1.12 MG/DL (ref 0.57–1)
CREAT BLD-MCNC: 1.18 MG/DL (ref 0.57–1)
CREAT BLD-MCNC: 1.55 MG/DL (ref 0.57–1)
CREAT BLD-MCNC: 2 MG/DL (ref 0.57–1)
CREAT BLD-MCNC: 2.13 MG/DL (ref 0.57–1)
CREAT UR-MCNC: 36.8 MG/DL
CYTO UR: NORMAL
D-LACTATE SERPL-SCNC: 1.1 MMOL/L (ref 0.5–2)
DACRYOCYTES BLD QL SMEAR: NORMAL
DACRYOCYTES BLD QL SMEAR: NORMAL
DEPRECATED RDW RBC AUTO: 52.4 FL (ref 37–54)
DEPRECATED RDW RBC AUTO: 52.5 FL (ref 37–54)
DEPRECATED RDW RBC AUTO: 53.5 FL (ref 37–54)
DEPRECATED RDW RBC AUTO: 53.7 FL (ref 37–54)
DEPRECATED RDW RBC AUTO: 61.8 FL (ref 37–54)
DEPRECATED RDW RBC AUTO: 62.4 FL (ref 37–54)
DEPRECATED RDW RBC AUTO: 63 FL (ref 37–54)
DEPRECATED RDW RBC AUTO: 63 FL (ref 37–54)
DEPRECATED RDW RBC AUTO: 63.3 FL (ref 37–54)
DEPRECATED RDW RBC AUTO: 65.2 FL (ref 37–54)
DEPRECATED RDW RBC AUTO: 67.8 FL (ref 37–54)
DEPRECATED RDW RBC AUTO: 68.6 FL (ref 37–54)
DEPRECATED RDW RBC AUTO: 69.8 FL (ref 37–54)
EOSINOPHIL # BLD AUTO: 0.01 10*3/MM3 (ref 0–0.7)
EOSINOPHIL # BLD AUTO: 0.01 10*3/MM3 (ref 0–0.7)
EOSINOPHIL # BLD AUTO: 0.02 10*3/MM3 (ref 0–0.7)
EOSINOPHIL # BLD AUTO: 0.02 10*3/MM3 (ref 0–0.7)
EOSINOPHIL # BLD AUTO: 0.13 10*3/MM3 (ref 0–0.7)
EOSINOPHIL # BLD AUTO: 0.14 10*3/MM3 (ref 0–0.7)
EOSINOPHIL # BLD AUTO: 0.15 10*3/MM3 (ref 0–0.7)
EOSINOPHIL # BLD AUTO: 0.2 10*3/MM3 (ref 0–0.7)
EOSINOPHIL # BLD AUTO: 0.29 10*3/MM3 (ref 0–0.7)
EOSINOPHIL NFR BLD AUTO: 0.1 % (ref 0.3–6.2)
EOSINOPHIL NFR BLD AUTO: 0.3 % (ref 0.3–6.2)
EOSINOPHIL NFR BLD AUTO: 1.8 % (ref 0.3–6.2)
EOSINOPHIL NFR BLD AUTO: 2.4 % (ref 0.3–6.2)
EOSINOPHIL NFR BLD AUTO: 2.7 % (ref 0.3–6.2)
EOSINOPHIL NFR BLD AUTO: 2.9 % (ref 0.3–6.2)
EOSINOPHIL NFR BLD AUTO: 3.5 % (ref 0.3–6.2)
ERYTHROCYTE [DISTWIDTH] IN BLOOD BY AUTOMATED COUNT: 15.9 % (ref 11.7–13)
ERYTHROCYTE [DISTWIDTH] IN BLOOD BY AUTOMATED COUNT: 16.1 % (ref 11.7–13)
ERYTHROCYTE [DISTWIDTH] IN BLOOD BY AUTOMATED COUNT: 16.3 % (ref 11.7–13)
ERYTHROCYTE [DISTWIDTH] IN BLOOD BY AUTOMATED COUNT: 16.5 % (ref 11.7–13)
ERYTHROCYTE [DISTWIDTH] IN BLOOD BY AUTOMATED COUNT: 18.6 % (ref 11.7–13)
ERYTHROCYTE [DISTWIDTH] IN BLOOD BY AUTOMATED COUNT: 18.7 % (ref 11.7–13)
ERYTHROCYTE [DISTWIDTH] IN BLOOD BY AUTOMATED COUNT: 19.4 % (ref 11.7–13)
ERYTHROCYTE [DISTWIDTH] IN BLOOD BY AUTOMATED COUNT: 19.6 % (ref 11.7–13)
ERYTHROCYTE [DISTWIDTH] IN BLOOD BY AUTOMATED COUNT: 19.7 % (ref 11.7–13)
ERYTHROCYTE [DISTWIDTH] IN BLOOD BY AUTOMATED COUNT: 19.8 % (ref 11.7–13)
ERYTHROCYTE [DISTWIDTH] IN BLOOD BY AUTOMATED COUNT: 20.4 % (ref 11.7–13)
ERYTHROCYTE [DISTWIDTH] IN BLOOD BY AUTOMATED COUNT: 20.5 % (ref 11.7–13)
ERYTHROCYTE [DISTWIDTH] IN BLOOD BY AUTOMATED COUNT: 20.6 % (ref 11.7–13)
FERRITIN SERPL-MCNC: 27.79 NG/ML (ref 13–150)
FLUAV H1 2009 PAND RNA NPH QL NAA+PROBE: NOT DETECTED
FLUAV H1 HA GENE NPH QL NAA+PROBE: NOT DETECTED
FLUAV H3 RNA NPH QL NAA+PROBE: NOT DETECTED
FLUAV SUBTYP SPEC NAA+PROBE: NOT DETECTED
FLUBV RNA ISLT QL NAA+PROBE: NOT DETECTED
GAS FLOW AIRWAY: 3 LPM
GFR SERPL CREATININE-BSD FRML MDRD: 22 ML/MIN/1.73
GFR SERPL CREATININE-BSD FRML MDRD: 24 ML/MIN/1.73
GFR SERPL CREATININE-BSD FRML MDRD: 32 ML/MIN/1.73
GFR SERPL CREATININE-BSD FRML MDRD: 44 ML/MIN/1.73
GFR SERPL CREATININE-BSD FRML MDRD: 46 ML/MIN/1.73
GFR SERPL CREATININE-BSD FRML MDRD: 50 ML/MIN/1.73
GFR SERPL CREATININE-BSD FRML MDRD: 51 ML/MIN/1.73
GFR SERPL CREATININE-BSD FRML MDRD: 53 ML/MIN/1.73
GFR SERPL CREATININE-BSD FRML MDRD: 55 ML/MIN/1.73
GFR SERPL CREATININE-BSD FRML MDRD: 62 ML/MIN/1.73
GFR SERPL CREATININE-BSD FRML MDRD: 76 ML/MIN/1.73
GFR SERPL CREATININE-BSD FRML MDRD: 89 ML/MIN/1.73
GFR SERPL CREATININE-BSD FRML MDRD: 95 ML/MIN/1.73
GFR SERPL CREATININE-BSD FRML MDRD: 97 ML/MIN/1.73
GLOBULIN UR ELPH-MCNC: 4.5 GM/DL
GLOBULIN UR ELPH-MCNC: 4.7 GM/DL
GLUCOSE BLD-MCNC: 108 MG/DL (ref 65–99)
GLUCOSE BLD-MCNC: 112 MG/DL (ref 65–99)
GLUCOSE BLD-MCNC: 124 MG/DL (ref 65–99)
GLUCOSE BLD-MCNC: 178 MG/DL (ref 65–99)
GLUCOSE BLD-MCNC: 202 MG/DL (ref 65–99)
GLUCOSE BLD-MCNC: 57 MG/DL (ref 65–99)
GLUCOSE BLD-MCNC: 58 MG/DL (ref 65–99)
GLUCOSE BLD-MCNC: 68 MG/DL (ref 65–99)
GLUCOSE BLD-MCNC: 71 MG/DL (ref 65–99)
GLUCOSE BLD-MCNC: 73 MG/DL (ref 65–99)
GLUCOSE BLD-MCNC: 73 MG/DL (ref 65–99)
GLUCOSE BLD-MCNC: 79 MG/DL (ref 65–99)
GLUCOSE BLD-MCNC: 82 MG/DL (ref 65–99)
GLUCOSE BLD-MCNC: 83 MG/DL (ref 65–99)
GLUCOSE BLDC GLUCOMTR-MCNC: 106 MG/DL (ref 70–130)
GLUCOSE BLDC GLUCOMTR-MCNC: 141 MG/DL (ref 70–130)
GLUCOSE BLDC GLUCOMTR-MCNC: 56 MG/DL (ref 70–130)
GLUCOSE BLDC GLUCOMTR-MCNC: 57 MG/DL (ref 70–130)
GLUCOSE BLDC GLUCOMTR-MCNC: 68 MG/DL (ref 70–130)
GLUCOSE BLDC GLUCOMTR-MCNC: 70 MG/DL (ref 70–130)
GLUCOSE BLDC GLUCOMTR-MCNC: 71 MG/DL (ref 70–130)
GLUCOSE BLDC GLUCOMTR-MCNC: 72 MG/DL (ref 70–130)
GLUCOSE BLDC GLUCOMTR-MCNC: 74 MG/DL (ref 70–130)
GLUCOSE BLDC GLUCOMTR-MCNC: 75 MG/DL (ref 70–130)
GLUCOSE BLDC GLUCOMTR-MCNC: 77 MG/DL (ref 70–130)
GLUCOSE BLDC GLUCOMTR-MCNC: 81 MG/DL (ref 70–130)
GLUCOSE BLDC GLUCOMTR-MCNC: 86 MG/DL (ref 70–130)
GLUCOSE BLDC GLUCOMTR-MCNC: 89 MG/DL (ref 70–130)
GLUCOSE UR STRIP-MCNC: ABNORMAL MG/DL
GLUCOSE UR STRIP-MCNC: NEGATIVE MG/DL
HADV DNA SPEC NAA+PROBE: NOT DETECTED
HCO3 BLDA-SCNC: 26.8 MMOL/L (ref 22–28)
HCOV 229E RNA SPEC QL NAA+PROBE: NOT DETECTED
HCOV HKU1 RNA SPEC QL NAA+PROBE: NOT DETECTED
HCOV NL63 RNA SPEC QL NAA+PROBE: NOT DETECTED
HCOV OC43 RNA SPEC QL NAA+PROBE: NOT DETECTED
HCT VFR BLD AUTO: 24.2 % (ref 35.6–45.5)
HCT VFR BLD AUTO: 25.1 % (ref 35.6–45.5)
HCT VFR BLD AUTO: 27.1 % (ref 35.6–45.5)
HCT VFR BLD AUTO: 28.1 % (ref 35.6–45.5)
HCT VFR BLD AUTO: 28.2 % (ref 35.6–45.5)
HCT VFR BLD AUTO: 28.4 % (ref 35.6–45.5)
HCT VFR BLD AUTO: 28.7 % (ref 35.6–45.5)
HCT VFR BLD AUTO: 29 % (ref 35.6–45.5)
HCT VFR BLD AUTO: 29.1 % (ref 35.6–45.5)
HCT VFR BLD AUTO: 29.2 % (ref 35.6–45.5)
HCT VFR BLD AUTO: 29.3 % (ref 35.6–45.5)
HCT VFR BLD AUTO: 30.4 % (ref 35.6–45.5)
HCT VFR BLD AUTO: 30.5 % (ref 35.6–45.5)
HCT VFR BLD AUTO: 30.6 % (ref 35.6–45.5)
HCT VFR BLD AUTO: 31.1 % (ref 35.6–45.5)
HCT VFR BLD AUTO: 31.4 % (ref 35.6–45.5)
HCT VFR BLD AUTO: 33 % (ref 35.6–45.5)
HCT VFR BLD AUTO: 33.6 % (ref 35.6–45.5)
HCT VFR BLD AUTO: 35.7 % (ref 35.6–45.5)
HEMOCCULT STL QL: POSITIVE
HGB BLD-MCNC: 11.3 G/DL (ref 11.9–15.5)
HGB BLD-MCNC: 6.5 G/DL (ref 11.9–15.5)
HGB BLD-MCNC: 7.4 G/DL (ref 11.9–15.5)
HGB BLD-MCNC: 8.1 G/DL (ref 11.9–15.5)
HGB BLD-MCNC: 8.2 G/DL (ref 11.9–15.5)
HGB BLD-MCNC: 8.3 G/DL (ref 11.9–15.5)
HGB BLD-MCNC: 8.4 G/DL (ref 11.9–15.5)
HGB BLD-MCNC: 8.5 G/DL (ref 11.9–15.5)
HGB BLD-MCNC: 8.5 G/DL (ref 11.9–15.5)
HGB BLD-MCNC: 8.6 G/DL (ref 11.9–15.5)
HGB BLD-MCNC: 8.8 G/DL (ref 11.9–15.5)
HGB BLD-MCNC: 8.8 G/DL (ref 11.9–15.5)
HGB BLD-MCNC: 8.9 G/DL (ref 11.9–15.5)
HGB BLD-MCNC: 9 G/DL (ref 11.9–15.5)
HGB BLD-MCNC: 9 G/DL (ref 11.9–15.5)
HGB BLD-MCNC: 9.1 G/DL (ref 11.9–15.5)
HGB BLD-MCNC: 9.7 G/DL (ref 11.9–15.5)
HGB BLD-MCNC: 9.8 G/DL (ref 11.9–15.5)
HGB BLD-MCNC: 9.9 G/DL (ref 11.9–15.5)
HGB UR QL STRIP.AUTO: ABNORMAL
HGB UR QL STRIP.AUTO: ABNORMAL
HMPV RNA NPH QL NAA+NON-PROBE: NOT DETECTED
HPIV1 RNA SPEC QL NAA+PROBE: NOT DETECTED
HPIV2 RNA SPEC QL NAA+PROBE: NOT DETECTED
HPIV3 RNA NPH QL NAA+PROBE: NOT DETECTED
HPIV4 P GENE NPH QL NAA+PROBE: NOT DETECTED
HYALINE CASTS UR QL AUTO: ABNORMAL /LPF
HYALINE CASTS UR QL AUTO: ABNORMAL /LPF
HYPOCHROMIA BLD QL: NORMAL
IMM GRANULOCYTES # BLD: 0.02 10*3/MM3 (ref 0–0.03)
IMM GRANULOCYTES # BLD: 0.03 10*3/MM3 (ref 0–0.03)
IMM GRANULOCYTES # BLD: 0.05 10*3/MM3 (ref 0–0.03)
IMM GRANULOCYTES # BLD: 0.07 10*3/MM3 (ref 0–0.03)
IMM GRANULOCYTES # BLD: 0.08 10*3/MM3 (ref 0–0.03)
IMM GRANULOCYTES # BLD: 0.12 10*3/MM3 (ref 0–0.03)
IMM GRANULOCYTES # BLD: 0.13 10*3/MM3 (ref 0–0.03)
IMM GRANULOCYTES NFR BLD: 0.3 % (ref 0–0.5)
IMM GRANULOCYTES NFR BLD: 0.4 % (ref 0–0.5)
IMM GRANULOCYTES NFR BLD: 0.5 % (ref 0–0.5)
IMM GRANULOCYTES NFR BLD: 0.5 % (ref 0–0.5)
IMM GRANULOCYTES NFR BLD: 0.6 % (ref 0–0.5)
IMM GRANULOCYTES NFR BLD: 0.6 % (ref 0–0.5)
IMM GRANULOCYTES NFR BLD: 0.7 % (ref 0–0.5)
IMM GRANULOCYTES NFR BLD: 0.7 % (ref 0–0.5)
IMM GRANULOCYTES NFR BLD: 1 % (ref 0–0.5)
INR PPP: 1.02 (ref 0.9–1.1)
IRON 24H UR-MRATE: 15 MCG/DL (ref 37–145)
IRON SATN MFR SERPL: 5 % (ref 20–50)
KETONES UR QL STRIP: NEGATIVE
KETONES UR QL STRIP: NEGATIVE
L PNEUMO1 AG UR QL IA: NEGATIVE
LAB AP CASE REPORT: NORMAL
LEUKOCYTE ESTERASE UR QL STRIP.AUTO: ABNORMAL
LEUKOCYTE ESTERASE UR QL STRIP.AUTO: ABNORMAL
LYMPHOCYTES # BLD AUTO: 1 10*3/MM3 (ref 0.9–4.8)
LYMPHOCYTES # BLD AUTO: 1.24 10*3/MM3 (ref 0.9–4.8)
LYMPHOCYTES # BLD AUTO: 1.26 10*3/MM3 (ref 0.9–4.8)
LYMPHOCYTES # BLD AUTO: 1.87 10*3/MM3 (ref 0.9–4.8)
LYMPHOCYTES # BLD AUTO: 1.9 10*3/MM3 (ref 0.9–4.8)
LYMPHOCYTES # BLD AUTO: 2.14 10*3/MM3 (ref 0.9–4.8)
LYMPHOCYTES # BLD AUTO: 2.28 10*3/MM3 (ref 0.9–4.8)
LYMPHOCYTES # BLD AUTO: 2.34 10*3/MM3 (ref 0.9–4.8)
LYMPHOCYTES # BLD AUTO: 2.75 10*3/MM3 (ref 0.9–4.8)
LYMPHOCYTES NFR BLD AUTO: 11.7 % (ref 19.6–45.3)
LYMPHOCYTES NFR BLD AUTO: 12.8 % (ref 19.6–45.3)
LYMPHOCYTES NFR BLD AUTO: 12.8 % (ref 19.6–45.3)
LYMPHOCYTES NFR BLD AUTO: 15.3 % (ref 19.6–45.3)
LYMPHOCYTES NFR BLD AUTO: 32.6 % (ref 19.6–45.3)
LYMPHOCYTES NFR BLD AUTO: 32.8 % (ref 19.6–45.3)
LYMPHOCYTES NFR BLD AUTO: 37.7 % (ref 19.6–45.3)
LYMPHOCYTES NFR BLD AUTO: 40.8 % (ref 19.6–45.3)
LYMPHOCYTES NFR BLD AUTO: 8.8 % (ref 19.6–45.3)
M PNEUMO IGG SER IA-ACNC: NOT DETECTED
MAGNESIUM SERPL-MCNC: 1.6 MG/DL (ref 1.6–2.4)
MAGNESIUM SERPL-MCNC: 1.7 MG/DL (ref 1.6–2.4)
MAGNESIUM SERPL-MCNC: 2 MG/DL (ref 1.6–2.4)
MCH RBC QN AUTO: 24.6 PG (ref 26.9–32)
MCH RBC QN AUTO: 25.3 PG (ref 26.9–32)
MCH RBC QN AUTO: 25.8 PG (ref 26.9–32)
MCH RBC QN AUTO: 26.3 PG (ref 26.9–32)
MCH RBC QN AUTO: 26.3 PG (ref 26.9–32)
MCH RBC QN AUTO: 26.4 PG (ref 26.9–32)
MCH RBC QN AUTO: 26.6 PG (ref 26.9–32)
MCH RBC QN AUTO: 26.8 PG (ref 26.9–32)
MCH RBC QN AUTO: 26.9 PG (ref 26.9–32)
MCH RBC QN AUTO: 27 PG (ref 26.9–32)
MCH RBC QN AUTO: 27.2 PG (ref 26.9–32)
MCH RBC QN AUTO: 27.3 PG (ref 26.9–32)
MCH RBC QN AUTO: 27.4 PG (ref 26.9–32)
MCHC RBC AUTO-ENTMCNC: 26.9 G/DL (ref 32.4–36.3)
MCHC RBC AUTO-ENTMCNC: 28.2 G/DL (ref 32.4–36.3)
MCHC RBC AUTO-ENTMCNC: 28.6 G/DL (ref 32.4–36.3)
MCHC RBC AUTO-ENTMCNC: 29.3 G/DL (ref 32.4–36.3)
MCHC RBC AUTO-ENTMCNC: 29.3 G/DL (ref 32.4–36.3)
MCHC RBC AUTO-ENTMCNC: 29.4 G/DL (ref 32.4–36.3)
MCHC RBC AUTO-ENTMCNC: 29.4 G/DL (ref 32.4–36.3)
MCHC RBC AUTO-ENTMCNC: 29.5 G/DL (ref 32.4–36.3)
MCHC RBC AUTO-ENTMCNC: 29.9 G/DL (ref 32.4–36.3)
MCHC RBC AUTO-ENTMCNC: 29.9 G/DL (ref 32.4–36.3)
MCHC RBC AUTO-ENTMCNC: 30.1 G/DL (ref 32.4–36.3)
MCHC RBC AUTO-ENTMCNC: 30.9 G/DL (ref 32.4–36.3)
MCHC RBC AUTO-ENTMCNC: 31.7 G/DL (ref 32.4–36.3)
MCV RBC AUTO: 86.4 FL (ref 80.5–98.2)
MCV RBC AUTO: 87.4 FL (ref 80.5–98.2)
MCV RBC AUTO: 87.7 FL (ref 80.5–98.2)
MCV RBC AUTO: 88 FL (ref 80.5–98.2)
MCV RBC AUTO: 89.4 FL (ref 80.5–98.2)
MCV RBC AUTO: 89.6 FL (ref 80.5–98.2)
MCV RBC AUTO: 89.7 FL (ref 80.5–98.2)
MCV RBC AUTO: 90.9 FL (ref 80.5–98.2)
MCV RBC AUTO: 91.2 FL (ref 80.5–98.2)
MCV RBC AUTO: 91.7 FL (ref 80.5–98.2)
MCV RBC AUTO: 91.8 FL (ref 80.5–98.2)
MCV RBC AUTO: 92.1 FL (ref 80.5–98.2)
MCV RBC AUTO: 92.4 FL (ref 80.5–98.2)
MODALITY: ABNORMAL
MONOCYTES # BLD AUTO: 0.44 10*3/MM3 (ref 0.2–1.2)
MONOCYTES # BLD AUTO: 0.51 10*3/MM3 (ref 0.2–1.2)
MONOCYTES # BLD AUTO: 0.56 10*3/MM3 (ref 0.2–1.2)
MONOCYTES # BLD AUTO: 0.58 10*3/MM3 (ref 0.2–1.2)
MONOCYTES # BLD AUTO: 0.59 10*3/MM3 (ref 0.2–1.2)
MONOCYTES # BLD AUTO: 0.68 10*3/MM3 (ref 0.2–1.2)
MONOCYTES # BLD AUTO: 0.73 10*3/MM3 (ref 0.2–1.2)
MONOCYTES # BLD AUTO: 0.94 10*3/MM3 (ref 0.2–1.2)
MONOCYTES # BLD AUTO: 0.98 10*3/MM3 (ref 0.2–1.2)
MONOCYTES NFR BLD AUTO: 10.2 % (ref 5–12)
MONOCYTES NFR BLD AUTO: 10.3 % (ref 5–12)
MONOCYTES NFR BLD AUTO: 11 % (ref 5–12)
MONOCYTES NFR BLD AUTO: 4 % (ref 5–12)
MONOCYTES NFR BLD AUTO: 5.5 % (ref 5–12)
MONOCYTES NFR BLD AUTO: 5.6 % (ref 5–12)
MONOCYTES NFR BLD AUTO: 5.8 % (ref 5–12)
MONOCYTES NFR BLD AUTO: 6.2 % (ref 5–12)
MONOCYTES NFR BLD AUTO: 9.3 % (ref 5–12)
MRSA SPEC QL CULT: NORMAL
NEUTROPHILS # BLD AUTO: 12.33 10*3/MM3 (ref 1.9–8.1)
NEUTROPHILS # BLD AUTO: 13.3 10*3/MM3 (ref 1.9–8.1)
NEUTROPHILS # BLD AUTO: 14.5 10*3/MM3 (ref 1.9–8.1)
NEUTROPHILS # BLD AUTO: 2.32 10*3/MM3 (ref 1.9–8.1)
NEUTROPHILS # BLD AUTO: 3.08 10*3/MM3 (ref 1.9–8.1)
NEUTROPHILS # BLD AUTO: 3.65 10*3/MM3 (ref 1.9–8.1)
NEUTROPHILS # BLD AUTO: 3.89 10*3/MM3 (ref 1.9–8.1)
NEUTROPHILS # BLD AUTO: 6.18 10*3/MM3 (ref 1.9–8.1)
NEUTROPHILS # BLD AUTO: 6.36 10*3/MM3 (ref 1.9–8.1)
NEUTROPHILS NFR BLD AUTO: 44.1 % (ref 42.7–76)
NEUTROPHILS NFR BLD AUTO: 50 % (ref 42.7–76)
NEUTROPHILS NFR BLD AUTO: 53.7 % (ref 42.7–76)
NEUTROPHILS NFR BLD AUTO: 54.5 % (ref 42.7–76)
NEUTROPHILS NFR BLD AUTO: 74.9 % (ref 42.7–76)
NEUTROPHILS NFR BLD AUTO: 81.2 % (ref 42.7–76)
NEUTROPHILS NFR BLD AUTO: 81.5 % (ref 42.7–76)
NEUTROPHILS NFR BLD AUTO: 82.3 % (ref 42.7–76)
NEUTROPHILS NFR BLD AUTO: 87 % (ref 42.7–76)
NITRITE UR QL STRIP: POSITIVE
NITRITE UR QL STRIP: POSITIVE
NRBC BLD MANUAL-RTO: 0 /100 WBC (ref 0–0)
NT-PROBNP SERPL-MCNC: 4471 PG/ML (ref 0–1800)
PATH REPORT.FINAL DX SPEC: NORMAL
PATH REPORT.GROSS SPEC: NORMAL
PCO2 BLDA: 45 MM HG (ref 35–45)
PH BLDA: 7.38 PH UNITS (ref 7.35–7.45)
PH UR STRIP.AUTO: 6.5 [PH] (ref 5–8)
PH UR STRIP.AUTO: 7.5 [PH] (ref 5–8)
PHOSPHATE SERPL-MCNC: 2 MG/DL (ref 2.5–4.5)
PHOSPHATE SERPL-MCNC: 2.3 MG/DL (ref 2.5–4.5)
PHOSPHATE SERPL-MCNC: 2.4 MG/DL (ref 2.5–4.5)
PHOSPHATE SERPL-MCNC: 2.5 MG/DL (ref 2.5–4.5)
PHOSPHATE SERPL-MCNC: 2.5 MG/DL (ref 2.5–4.5)
PHOSPHATE SERPL-MCNC: 2.7 MG/DL (ref 2.5–4.5)
PHOSPHATE SERPL-MCNC: 2.8 MG/DL (ref 2.5–4.5)
PHOSPHATE SERPL-MCNC: 2.9 MG/DL (ref 2.5–4.5)
PHOSPHATE SERPL-MCNC: 3.7 MG/DL (ref 2.5–4.5)
PLAT MORPH BLD: NORMAL
PLAT MORPH BLD: NORMAL
PLATELET # BLD AUTO: 165 10*3/MM3 (ref 140–500)
PLATELET # BLD AUTO: 165 10*3/MM3 (ref 140–500)
PLATELET # BLD AUTO: 169 10*3/MM3 (ref 140–500)
PLATELET # BLD AUTO: 173 10*3/MM3 (ref 140–500)
PLATELET # BLD AUTO: 179 10*3/MM3 (ref 140–500)
PLATELET # BLD AUTO: 179 10*3/MM3 (ref 140–500)
PLATELET # BLD AUTO: 196 10*3/MM3 (ref 140–500)
PLATELET # BLD AUTO: 211 10*3/MM3 (ref 140–500)
PLATELET # BLD AUTO: 211 10*3/MM3 (ref 140–500)
PLATELET # BLD AUTO: 255 10*3/MM3 (ref 140–500)
PLATELET # BLD AUTO: 272 10*3/MM3 (ref 140–500)
PLATELET # BLD AUTO: 301 10*3/MM3 (ref 140–500)
PLATELET # BLD AUTO: 349 10*3/MM3 (ref 140–500)
PMV BLD AUTO: 10 FL (ref 6–12)
PMV BLD AUTO: 10.1 FL (ref 6–12)
PMV BLD AUTO: 10.5 FL (ref 6–12)
PMV BLD AUTO: 10.6 FL (ref 6–12)
PMV BLD AUTO: 10.7 FL (ref 6–12)
PMV BLD AUTO: 10.8 FL (ref 6–12)
PMV BLD AUTO: 10.9 FL (ref 6–12)
PMV BLD AUTO: 11.2 FL (ref 6–12)
PMV BLD AUTO: 9.7 FL (ref 6–12)
PMV BLD AUTO: 9.8 FL (ref 6–12)
PMV BLD AUTO: 9.8 FL (ref 6–12)
PO2 BLDA: 67.5 MM HG (ref 80–100)
POTASSIUM BLD-SCNC: 2.9 MMOL/L (ref 3.5–5.2)
POTASSIUM BLD-SCNC: 3 MMOL/L (ref 3.5–5.2)
POTASSIUM BLD-SCNC: 3.2 MMOL/L (ref 3.5–5.2)
POTASSIUM BLD-SCNC: 3.5 MMOL/L (ref 3.5–5.2)
POTASSIUM BLD-SCNC: 3.6 MMOL/L (ref 3.5–5.2)
POTASSIUM BLD-SCNC: 3.7 MMOL/L (ref 3.5–5.2)
POTASSIUM BLD-SCNC: 3.7 MMOL/L (ref 3.5–5.2)
POTASSIUM BLD-SCNC: 3.9 MMOL/L (ref 3.5–5.2)
POTASSIUM BLD-SCNC: 4 MMOL/L (ref 3.5–5.2)
POTASSIUM BLD-SCNC: 4 MMOL/L (ref 3.5–5.2)
POTASSIUM BLD-SCNC: 4.1 MMOL/L (ref 3.5–5.2)
POTASSIUM BLD-SCNC: 4.2 MMOL/L (ref 3.5–5.2)
POTASSIUM BLD-SCNC: 4.5 MMOL/L (ref 3.5–5.2)
POTASSIUM BLD-SCNC: 4.7 MMOL/L (ref 3.5–5.2)
POTASSIUM BLD-SCNC: 5 MMOL/L (ref 3.5–5.2)
POTASSIUM BLD-SCNC: 6.3 MMOL/L (ref 3.5–5.2)
PROCALCITONIN SERPL-MCNC: 0.48 NG/ML (ref 0.1–0.25)
PROT SERPL-MCNC: 7.3 G/DL (ref 6–8.5)
PROT SERPL-MCNC: 7.3 G/DL (ref 6–8.5)
PROT UR QL STRIP: ABNORMAL
PROT UR QL STRIP: ABNORMAL
PROTHROMBIN TIME: 13.2 SECONDS (ref 11.7–14.2)
RBC # BLD AUTO: 2.64 10*6/MM3 (ref 3.9–5.2)
RBC # BLD AUTO: 3.08 10*6/MM3 (ref 3.9–5.2)
RBC # BLD AUTO: 3.14 10*6/MM3 (ref 3.9–5.2)
RBC # BLD AUTO: 3.17 10*6/MM3 (ref 3.9–5.2)
RBC # BLD AUTO: 3.18 10*6/MM3 (ref 3.9–5.2)
RBC # BLD AUTO: 3.2 10*6/MM3 (ref 3.9–5.2)
RBC # BLD AUTO: 3.31 10*6/MM3 (ref 3.9–5.2)
RBC # BLD AUTO: 3.34 10*6/MM3 (ref 3.9–5.2)
RBC # BLD AUTO: 3.42 10*6/MM3 (ref 3.9–5.2)
RBC # BLD AUTO: 3.49 10*6/MM3 (ref 3.9–5.2)
RBC # BLD AUTO: 3.57 10*6/MM3 (ref 3.9–5.2)
RBC # BLD AUTO: 3.76 10*6/MM3 (ref 3.9–5.2)
RBC # BLD AUTO: 4.13 10*6/MM3 (ref 3.9–5.2)
RBC # UR: ABNORMAL /HPF
RBC # UR: ABNORMAL /HPF
REF LAB TEST METHOD: ABNORMAL
REF LAB TEST METHOD: ABNORMAL
RH BLD: POSITIVE
RH BLD: POSITIVE
RHINOVIRUS RNA SPEC NAA+PROBE: DETECTED
RSV RNA NPH QL NAA+NON-PROBE: NOT DETECTED
SAO2 % BLDCOA: 92.7 % (ref 92–99)
SODIUM BLD-SCNC: 136 MMOL/L (ref 136–145)
SODIUM BLD-SCNC: 137 MMOL/L (ref 136–145)
SODIUM BLD-SCNC: 139 MMOL/L (ref 136–145)
SODIUM BLD-SCNC: 140 MMOL/L (ref 136–145)
SODIUM BLD-SCNC: 140 MMOL/L (ref 136–145)
SODIUM BLD-SCNC: 142 MMOL/L (ref 136–145)
SODIUM BLD-SCNC: 144 MMOL/L (ref 136–145)
SODIUM BLD-SCNC: 146 MMOL/L (ref 136–145)
SODIUM BLD-SCNC: 149 MMOL/L (ref 136–145)
SODIUM BLD-SCNC: 151 MMOL/L (ref 136–145)
SODIUM UR-SCNC: 23 MMOL/L
SP GR UR STRIP: 1.01 (ref 1–1.03)
SP GR UR STRIP: 1.02 (ref 1–1.03)
SQUAMOUS #/AREA URNS HPF: ABNORMAL /HPF
SQUAMOUS #/AREA URNS HPF: ABNORMAL /HPF
T&S EXPIRATION DATE: NORMAL
T&S EXPIRATION DATE: NORMAL
TIBC SERPL-MCNC: 326 MCG/DL (ref 298–536)
TOTAL RATE: 20 BREATHS/MINUTE
TRANSFERRIN SERPL-MCNC: 219 MG/DL (ref 200–360)
TRI-PHOS CRY URNS QL MICRO: ABNORMAL /HPF
TROPONIN T SERPL-MCNC: 0.1 NG/ML (ref 0–0.03)
TROPONIN T SERPL-MCNC: 0.1 NG/ML (ref 0–0.03)
TROPONIN T SERPL-MCNC: <0.01 NG/ML (ref 0–0.03)
TSH SERPL DL<=0.05 MIU/L-ACNC: 0.71 MIU/ML (ref 0.27–4.2)
UNIT  ABO: NORMAL
UNIT  RH: NORMAL
UREASE TISS QL: NEGATIVE
UROBILINOGEN UR QL STRIP: ABNORMAL
UROBILINOGEN UR QL STRIP: ABNORMAL
VANCOMYCIN SERPL-MCNC: 17.1 MCG/ML (ref 5–40)
VANCOMYCIN SERPL-MCNC: 20 MCG/ML (ref 5–40)
VANCOMYCIN SERPL-MCNC: 27.2 MCG/ML (ref 5–40)
WBC MORPH BLD: NORMAL
WBC MORPH BLD: NORMAL
WBC NRBC COR # BLD: 14.16 10*3/MM3 (ref 4.5–10.7)
WBC NRBC COR # BLD: 16.18 10*3/MM3 (ref 4.5–10.7)
WBC NRBC COR # BLD: 17.79 10*3/MM3 (ref 4.5–10.7)
WBC NRBC COR # BLD: 5.25 10*3/MM3 (ref 4.5–10.7)
WBC NRBC COR # BLD: 5.74 10*3/MM3 (ref 4.5–10.7)
WBC NRBC COR # BLD: 6.92 10*3/MM3 (ref 4.5–10.7)
WBC NRBC COR # BLD: 7.14 10*3/MM3 (ref 4.5–10.7)
WBC NRBC COR # BLD: 7.23 10*3/MM3 (ref 4.5–10.7)
WBC NRBC COR # BLD: 7.28 10*3/MM3 (ref 4.5–10.7)
WBC NRBC COR # BLD: 7.3 10*3/MM3 (ref 4.5–10.7)
WBC NRBC COR # BLD: 7.56 10*3/MM3 (ref 4.5–10.7)
WBC NRBC COR # BLD: 7.83 10*3/MM3 (ref 4.5–10.7)
WBC NRBC COR # BLD: 8.25 10*3/MM3 (ref 4.5–10.7)
WBC UR QL AUTO: ABNORMAL /HPF
WBC UR QL AUTO: ABNORMAL /HPF

## 2018-01-01 PROCEDURE — 93010 ELECTROCARDIOGRAM REPORT: CPT | Performed by: INTERNAL MEDICINE

## 2018-01-01 PROCEDURE — 85014 HEMATOCRIT: CPT | Performed by: INTERNAL MEDICINE

## 2018-01-01 PROCEDURE — 87486 CHLMYD PNEUM DNA AMP PROBE: CPT | Performed by: INTERNAL MEDICINE

## 2018-01-01 PROCEDURE — 86923 COMPATIBILITY TEST ELECTRIC: CPT

## 2018-01-01 PROCEDURE — 94799 UNLISTED PULMONARY SVC/PX: CPT

## 2018-01-01 PROCEDURE — 25010000002 ONDANSETRON PER 1 MG: Performed by: HOSPITALIST

## 2018-01-01 PROCEDURE — 25010000002 HALOPERIDOL LACTATE PER 5 MG: Performed by: INTERNAL MEDICINE

## 2018-01-01 PROCEDURE — 99285 EMERGENCY DEPT VISIT HI MDM: CPT

## 2018-01-01 PROCEDURE — P9612 CATHETERIZE FOR URINE SPEC: HCPCS

## 2018-01-01 PROCEDURE — 87081 CULTURE SCREEN ONLY: CPT | Performed by: INTERNAL MEDICINE

## 2018-01-01 PROCEDURE — 99232 SBSQ HOSP IP/OBS MODERATE 35: CPT | Performed by: INTERNAL MEDICINE

## 2018-01-01 PROCEDURE — 83735 ASSAY OF MAGNESIUM: CPT | Performed by: INTERNAL MEDICINE

## 2018-01-01 PROCEDURE — 25010000002 CEFTRIAXONE PER 250 MG: Performed by: EMERGENCY MEDICINE

## 2018-01-01 PROCEDURE — 80053 COMPREHEN METABOLIC PANEL: CPT | Performed by: NURSE PRACTITIONER

## 2018-01-01 PROCEDURE — 84484 ASSAY OF TROPONIN QUANT: CPT | Performed by: EMERGENCY MEDICINE

## 2018-01-01 PROCEDURE — 94640 AIRWAY INHALATION TREATMENT: CPT

## 2018-01-01 PROCEDURE — 25010000002 MORPHINE PER 10 MG: Performed by: INTERNAL MEDICINE

## 2018-01-01 PROCEDURE — 71046 X-RAY EXAM CHEST 2 VIEWS: CPT

## 2018-01-01 PROCEDURE — 99284 EMERGENCY DEPT VISIT MOD MDM: CPT

## 2018-01-01 PROCEDURE — 83880 ASSAY OF NATRIURETIC PEPTIDE: CPT | Performed by: EMERGENCY MEDICINE

## 2018-01-01 PROCEDURE — 25010000002 MORPHINE PER 10 MG: Performed by: HOSPITALIST

## 2018-01-01 PROCEDURE — 81001 URINALYSIS AUTO W/SCOPE: CPT | Performed by: PHYSICIAN ASSISTANT

## 2018-01-01 PROCEDURE — 71045 X-RAY EXAM CHEST 1 VIEW: CPT

## 2018-01-01 PROCEDURE — 99222 1ST HOSP IP/OBS MODERATE 55: CPT | Performed by: INTERNAL MEDICINE

## 2018-01-01 PROCEDURE — 80053 COMPREHEN METABOLIC PANEL: CPT | Performed by: EMERGENCY MEDICINE

## 2018-01-01 PROCEDURE — 85025 COMPLETE CBC W/AUTO DIFF WBC: CPT | Performed by: HOSPITALIST

## 2018-01-01 PROCEDURE — 25010000002 CEFEPIME PER 500 MG: Performed by: HOSPITALIST

## 2018-01-01 PROCEDURE — 87899 AGENT NOS ASSAY W/OPTIC: CPT | Performed by: INTERNAL MEDICINE

## 2018-01-01 PROCEDURE — 43239 EGD BIOPSY SINGLE/MULTIPLE: CPT | Performed by: INTERNAL MEDICINE

## 2018-01-01 PROCEDURE — 36430 TRANSFUSION BLD/BLD COMPNT: CPT

## 2018-01-01 PROCEDURE — 82570 ASSAY OF URINE CREATININE: CPT | Performed by: INTERNAL MEDICINE

## 2018-01-01 PROCEDURE — 80048 BASIC METABOLIC PNL TOTAL CA: CPT | Performed by: INTERNAL MEDICINE

## 2018-01-01 PROCEDURE — 25010000002 VANCOMYCIN PER 500 MG: Performed by: EMERGENCY MEDICINE

## 2018-01-01 PROCEDURE — 84100 ASSAY OF PHOSPHORUS: CPT | Performed by: INTERNAL MEDICINE

## 2018-01-01 PROCEDURE — 85018 HEMOGLOBIN: CPT | Performed by: INTERNAL MEDICINE

## 2018-01-01 PROCEDURE — 83540 ASSAY OF IRON: CPT | Performed by: INTERNAL MEDICINE

## 2018-01-01 PROCEDURE — 93005 ELECTROCARDIOGRAM TRACING: CPT | Performed by: EMERGENCY MEDICINE

## 2018-01-01 PROCEDURE — 63710000001 INSULIN REGULAR HUMAN PER 5 UNITS: Performed by: PHYSICIAN ASSISTANT

## 2018-01-01 PROCEDURE — 80069 RENAL FUNCTION PANEL: CPT | Performed by: INTERNAL MEDICINE

## 2018-01-01 PROCEDURE — 25010000002 LORAZEPAM PER 2 MG: Performed by: HOSPITALIST

## 2018-01-01 PROCEDURE — 81001 URINALYSIS AUTO W/SCOPE: CPT | Performed by: NURSE PRACTITIONER

## 2018-01-01 PROCEDURE — P9016 RBC LEUKOCYTES REDUCED: HCPCS

## 2018-01-01 PROCEDURE — 74018 RADEX ABDOMEN 1 VIEW: CPT

## 2018-01-01 PROCEDURE — 87077 CULTURE AEROBIC IDENTIFY: CPT | Performed by: HOSPITALIST

## 2018-01-01 PROCEDURE — 76775 US EXAM ABDO BACK WALL LIM: CPT

## 2018-01-01 PROCEDURE — 83605 ASSAY OF LACTIC ACID: CPT | Performed by: PHYSICIAN ASSISTANT

## 2018-01-01 PROCEDURE — 82272 OCCULT BLD FECES 1-3 TESTS: CPT | Performed by: NURSE PRACTITIONER

## 2018-01-01 PROCEDURE — 82803 BLOOD GASES ANY COMBINATION: CPT

## 2018-01-01 PROCEDURE — 25010000002 LORAZEPAM PER 2 MG: Performed by: INTERNAL MEDICINE

## 2018-01-01 PROCEDURE — 80202 ASSAY OF VANCOMYCIN: CPT | Performed by: HOSPITALIST

## 2018-01-01 PROCEDURE — 25810000003 DEXTROSE-NACL PER 500 ML: Performed by: INTERNAL MEDICINE

## 2018-01-01 PROCEDURE — 82962 GLUCOSE BLOOD TEST: CPT

## 2018-01-01 PROCEDURE — 87186 SC STD MICRODIL/AGAR DIL: CPT | Performed by: HOSPITALIST

## 2018-01-01 PROCEDURE — 87086 URINE CULTURE/COLONY COUNT: CPT | Performed by: HOSPITALIST

## 2018-01-01 PROCEDURE — 88312 SPECIAL STAINS GROUP 1: CPT | Performed by: INTERNAL MEDICINE

## 2018-01-01 PROCEDURE — 36415 COLL VENOUS BLD VENIPUNCTURE: CPT | Performed by: HOSPITALIST

## 2018-01-01 PROCEDURE — 86850 RBC ANTIBODY SCREEN: CPT | Performed by: EMERGENCY MEDICINE

## 2018-01-01 PROCEDURE — 80202 ASSAY OF VANCOMYCIN: CPT | Performed by: INTERNAL MEDICINE

## 2018-01-01 PROCEDURE — 85027 COMPLETE CBC AUTOMATED: CPT | Performed by: INTERNAL MEDICINE

## 2018-01-01 PROCEDURE — 25010000002 PROPOFOL 10 MG/ML EMULSION: Performed by: ANESTHESIOLOGY

## 2018-01-01 PROCEDURE — 84145 PROCALCITONIN (PCT): CPT | Performed by: PHYSICIAN ASSISTANT

## 2018-01-01 PROCEDURE — 85007 BL SMEAR W/DIFF WBC COUNT: CPT | Performed by: INTERNAL MEDICINE

## 2018-01-01 PROCEDURE — 25010000002 CEFEPIME PER 500 MG: Performed by: INTERNAL MEDICINE

## 2018-01-01 PROCEDURE — 92610 EVALUATE SWALLOWING FUNCTION: CPT

## 2018-01-01 PROCEDURE — 82728 ASSAY OF FERRITIN: CPT | Performed by: INTERNAL MEDICINE

## 2018-01-01 PROCEDURE — 84132 ASSAY OF SERUM POTASSIUM: CPT | Performed by: INTERNAL MEDICINE

## 2018-01-01 PROCEDURE — 85025 COMPLETE CBC W/AUTO DIFF WBC: CPT | Performed by: INTERNAL MEDICINE

## 2018-01-01 PROCEDURE — 86900 BLOOD TYPING SEROLOGIC ABO: CPT

## 2018-01-01 PROCEDURE — 92526 ORAL FUNCTION THERAPY: CPT | Performed by: SPEECH-LANGUAGE PATHOLOGIST

## 2018-01-01 PROCEDURE — 87040 BLOOD CULTURE FOR BACTERIA: CPT | Performed by: PHYSICIAN ASSISTANT

## 2018-01-01 PROCEDURE — 86900 BLOOD TYPING SEROLOGIC ABO: CPT | Performed by: EMERGENCY MEDICINE

## 2018-01-01 PROCEDURE — 25010000002 HYDROMORPHONE PER 4 MG: Performed by: INTERNAL MEDICINE

## 2018-01-01 PROCEDURE — 88305 TISSUE EXAM BY PATHOLOGIST: CPT | Performed by: INTERNAL MEDICINE

## 2018-01-01 PROCEDURE — 87581 M.PNEUMON DNA AMP PROBE: CPT | Performed by: INTERNAL MEDICINE

## 2018-01-01 PROCEDURE — 85025 COMPLETE CBC W/AUTO DIFF WBC: CPT | Performed by: NURSE PRACTITIONER

## 2018-01-01 PROCEDURE — 0DJ08ZZ INSPECTION OF UPPER INTESTINAL TRACT, VIA NATURAL OR ARTIFICIAL OPENING ENDOSCOPIC: ICD-10-PCS | Performed by: INTERNAL MEDICINE

## 2018-01-01 PROCEDURE — 85007 BL SMEAR W/DIFF WBC COUNT: CPT | Performed by: HOSPITALIST

## 2018-01-01 PROCEDURE — 85610 PROTHROMBIN TIME: CPT | Performed by: EMERGENCY MEDICINE

## 2018-01-01 PROCEDURE — 25010000002 CEFEPIME PER 500 MG: Performed by: EMERGENCY MEDICINE

## 2018-01-01 PROCEDURE — 86901 BLOOD TYPING SEROLOGIC RH(D): CPT | Performed by: PHYSICIAN ASSISTANT

## 2018-01-01 PROCEDURE — 84443 ASSAY THYROID STIM HORMONE: CPT | Performed by: HOSPITALIST

## 2018-01-01 PROCEDURE — 87798 DETECT AGENT NOS DNA AMP: CPT | Performed by: INTERNAL MEDICINE

## 2018-01-01 PROCEDURE — 86901 BLOOD TYPING SEROLOGIC RH(D): CPT | Performed by: EMERGENCY MEDICINE

## 2018-01-01 PROCEDURE — 84300 ASSAY OF URINE SODIUM: CPT | Performed by: INTERNAL MEDICINE

## 2018-01-01 PROCEDURE — 36600 WITHDRAWAL OF ARTERIAL BLOOD: CPT

## 2018-01-01 PROCEDURE — 87633 RESP VIRUS 12-25 TARGETS: CPT | Performed by: INTERNAL MEDICINE

## 2018-01-01 PROCEDURE — 25010000002 VANCOMYCIN PER 500 MG: Performed by: HOSPITALIST

## 2018-01-01 PROCEDURE — 82436 ASSAY OF URINE CHLORIDE: CPT | Performed by: INTERNAL MEDICINE

## 2018-01-01 PROCEDURE — 84466 ASSAY OF TRANSFERRIN: CPT | Performed by: INTERNAL MEDICINE

## 2018-01-01 PROCEDURE — 25010000002 HYDROMORPHONE 1 MG/ML SOLUTION: Performed by: INTERNAL MEDICINE

## 2018-01-01 PROCEDURE — 86900 BLOOD TYPING SEROLOGIC ABO: CPT | Performed by: PHYSICIAN ASSISTANT

## 2018-01-01 PROCEDURE — 25010000002 CALCIUM GLUCONATE PER 10 ML: Performed by: PHYSICIAN ASSISTANT

## 2018-01-01 PROCEDURE — 0 DIATRIZOATE MEGLUMINE & SODIUM PER 1 ML: Performed by: EMERGENCY MEDICINE

## 2018-01-01 PROCEDURE — 99283 EMERGENCY DEPT VISIT LOW MDM: CPT

## 2018-01-01 PROCEDURE — 84484 ASSAY OF TROPONIN QUANT: CPT | Performed by: HOSPITALIST

## 2018-01-01 PROCEDURE — 86850 RBC ANTIBODY SCREEN: CPT | Performed by: PHYSICIAN ASSISTANT

## 2018-01-01 PROCEDURE — 85025 COMPLETE CBC W/AUTO DIFF WBC: CPT | Performed by: EMERGENCY MEDICINE

## 2018-01-01 PROCEDURE — 0DB68ZX EXCISION OF STOMACH, VIA NATURAL OR ARTIFICIAL OPENING ENDOSCOPIC, DIAGNOSTIC: ICD-10-PCS | Performed by: INTERNAL MEDICINE

## 2018-01-01 RX ORDER — DEXTROSE MONOHYDRATE 25 G/50ML
25 INJECTION, SOLUTION INTRAVENOUS
Status: DISCONTINUED | OUTPATIENT
Start: 2018-01-01 | End: 2018-01-01

## 2018-01-01 RX ORDER — ACETAMINOPHEN 650 MG/1
650 SUPPOSITORY RECTAL EVERY 4 HOURS PRN
Status: DISCONTINUED | OUTPATIENT
Start: 2018-01-01 | End: 2018-01-01 | Stop reason: HOSPADM

## 2018-01-01 RX ORDER — IPRATROPIUM BROMIDE AND ALBUTEROL SULFATE 2.5; .5 MG/3ML; MG/3ML
3 SOLUTION RESPIRATORY (INHALATION) EVERY 6 HOURS PRN
Status: CANCELLED | OUTPATIENT
Start: 2018-01-01

## 2018-01-01 RX ORDER — HALOPERIDOL 5 MG/ML
2 INJECTION INTRAMUSCULAR EVERY 4 HOURS PRN
Status: DISCONTINUED | OUTPATIENT
Start: 2018-01-01 | End: 2018-01-01 | Stop reason: HOSPADM

## 2018-01-01 RX ORDER — SODIUM CHLORIDE 9 MG/ML
100 INJECTION, SOLUTION INTRAVENOUS CONTINUOUS
Status: DISCONTINUED | OUTPATIENT
Start: 2018-01-01 | End: 2018-01-01

## 2018-01-01 RX ORDER — HYDROCODONE BITARTRATE AND ACETAMINOPHEN 5; 325 MG/1; MG/1
1 TABLET ORAL EVERY 4 HOURS PRN
Status: DISCONTINUED | OUTPATIENT
Start: 2018-01-01 | End: 2018-01-01

## 2018-01-01 RX ORDER — LORAZEPAM 2 MG/ML
1 INJECTION INTRAMUSCULAR
Status: CANCELLED | OUTPATIENT
Start: 2018-01-01 | End: 2018-01-01

## 2018-01-01 RX ORDER — LEVOFLOXACIN 5 MG/ML
750 INJECTION, SOLUTION INTRAVENOUS ONCE
Status: DISCONTINUED | OUTPATIENT
Start: 2018-01-01 | End: 2018-01-01

## 2018-01-01 RX ORDER — LORAZEPAM 2 MG/ML
1 INJECTION INTRAMUSCULAR
Status: DISPENSED | OUTPATIENT
Start: 2018-01-01 | End: 2018-01-01

## 2018-01-01 RX ORDER — ACETAMINOPHEN 325 MG/1
650 TABLET ORAL EVERY 4 HOURS PRN
Status: CANCELLED | OUTPATIENT
Start: 2018-01-01

## 2018-01-01 RX ORDER — LORAZEPAM 2 MG/ML
1 CONCENTRATE ORAL
Status: CANCELLED | OUTPATIENT
Start: 2018-01-01 | End: 2018-01-01

## 2018-01-01 RX ORDER — MULTIVITAMIN WITH FOLIC ACID 400 MCG
1 TABLET ORAL DAILY
COMMUNITY

## 2018-01-01 RX ORDER — PROMETHAZINE HYDROCHLORIDE 6.25 MG/5ML
6.25 SYRUP ORAL EVERY 4 HOURS PRN
Status: DISCONTINUED | OUTPATIENT
Start: 2018-01-01 | End: 2018-01-01 | Stop reason: HOSPADM

## 2018-01-01 RX ORDER — ACETAMINOPHEN 160 MG/5ML
640 SOLUTION ORAL EVERY 6 HOURS PRN
Status: DISCONTINUED | OUTPATIENT
Start: 2018-01-01 | End: 2018-01-01

## 2018-01-01 RX ORDER — MORPHINE SULFATE 20 MG/ML
15 SOLUTION ORAL EVERY 4 HOURS
Status: ON HOLD | COMMUNITY
End: 2018-01-01

## 2018-01-01 RX ORDER — PROMETHAZINE HYDROCHLORIDE 12.5 MG/1
6.25 SUPPOSITORY RECTAL EVERY 4 HOURS PRN
Status: DISCONTINUED | OUTPATIENT
Start: 2018-01-01 | End: 2018-01-01 | Stop reason: HOSPADM

## 2018-01-01 RX ORDER — MORPHINE SULFATE 20 MG/ML
20 SOLUTION ORAL
Status: ACTIVE | OUTPATIENT
Start: 2018-01-01 | End: 2018-01-01

## 2018-01-01 RX ORDER — SULFAMETHOXAZOLE AND TRIMETHOPRIM 800; 160 MG/1; MG/1
1 TABLET ORAL 2 TIMES DAILY
COMMUNITY
Start: 2018-01-01 | End: 2018-01-01 | Stop reason: HOSPADM

## 2018-01-01 RX ORDER — DEXTROSE MONOHYDRATE 25 G/50ML
50 INJECTION, SOLUTION INTRAVENOUS ONCE
Status: COMPLETED | OUTPATIENT
Start: 2018-01-01 | End: 2018-01-01

## 2018-01-01 RX ORDER — LORAZEPAM 2 MG/ML
0.5 CONCENTRATE ORAL
Status: ACTIVE | OUTPATIENT
Start: 2018-01-01 | End: 2018-01-01

## 2018-01-01 RX ORDER — LORAZEPAM 2 MG/ML
1 INJECTION INTRAMUSCULAR
Status: DISCONTINUED | OUTPATIENT
Start: 2018-01-01 | End: 2018-01-01 | Stop reason: HOSPADM

## 2018-01-01 RX ORDER — ALPRAZOLAM 1 MG/1
1 TABLET ORAL NIGHTLY PRN
COMMUNITY

## 2018-01-01 RX ORDER — IPRATROPIUM BROMIDE AND ALBUTEROL SULFATE 2.5; .5 MG/3ML; MG/3ML
3 SOLUTION RESPIRATORY (INHALATION) EVERY 4 HOURS PRN
Status: DISCONTINUED | OUTPATIENT
Start: 2018-01-01 | End: 2018-01-01

## 2018-01-01 RX ORDER — DOCUSATE SODIUM 50 MG/5 ML
100 LIQUID (ML) ORAL 2 TIMES DAILY
COMMUNITY

## 2018-01-01 RX ORDER — CARVEDILOL 3.12 MG/1
3.12 TABLET ORAL 2 TIMES DAILY WITH MEALS
COMMUNITY

## 2018-01-01 RX ORDER — ACETAMINOPHEN 160 MG/5ML
650 SOLUTION ORAL EVERY 4 HOURS PRN
Status: DISCONTINUED | OUTPATIENT
Start: 2018-01-01 | End: 2018-01-01 | Stop reason: HOSPADM

## 2018-01-01 RX ORDER — GLYCOPYRROLATE 0.2 MG/ML
0.2 INJECTION INTRAMUSCULAR; INTRAVENOUS
Status: DISCONTINUED | OUTPATIENT
Start: 2018-01-01 | End: 2018-01-01 | Stop reason: HOSPADM

## 2018-01-01 RX ORDER — VANCOMYCIN HYDROCHLORIDE 1 G/200ML
20 INJECTION, SOLUTION INTRAVENOUS ONCE
Status: COMPLETED | OUTPATIENT
Start: 2018-01-01 | End: 2018-01-01

## 2018-01-01 RX ORDER — ONDANSETRON 4 MG/1
4 TABLET, FILM COATED ORAL EVERY 6 HOURS PRN
Status: DISCONTINUED | OUTPATIENT
Start: 2018-01-01 | End: 2018-01-01

## 2018-01-01 RX ORDER — HALOPERIDOL 5 MG/ML
1 INJECTION INTRAMUSCULAR EVERY 6 HOURS PRN
Status: DISCONTINUED | OUTPATIENT
Start: 2018-01-01 | End: 2018-01-01

## 2018-01-01 RX ORDER — SODIUM CHLORIDE 0.9 % (FLUSH) 0.9 %
10 SYRINGE (ML) INJECTION AS NEEDED
Status: DISCONTINUED | OUTPATIENT
Start: 2018-01-01 | End: 2018-01-01 | Stop reason: HOSPADM

## 2018-01-01 RX ORDER — MORPHINE SULFATE 20 MG/ML
10 SOLUTION ORAL
Status: DISCONTINUED | OUTPATIENT
Start: 2018-01-01 | End: 2018-01-01 | Stop reason: HOSPADM

## 2018-01-01 RX ORDER — PROMETHAZINE HYDROCHLORIDE 25 MG/ML
12.5 INJECTION, SOLUTION INTRAMUSCULAR; INTRAVENOUS EVERY 4 HOURS PRN
Status: CANCELLED | OUTPATIENT
Start: 2018-01-01

## 2018-01-01 RX ORDER — LORAZEPAM 2 MG/ML
1 CONCENTRATE ORAL
Status: ACTIVE | OUTPATIENT
Start: 2018-01-01 | End: 2018-01-01

## 2018-01-01 RX ORDER — LORAZEPAM 2 MG/ML
0.5 CONCENTRATE ORAL
Status: CANCELLED | OUTPATIENT
Start: 2018-01-01 | End: 2018-01-01

## 2018-01-01 RX ORDER — PANTOPRAZOLE SODIUM 40 MG/1
40 TABLET, DELAYED RELEASE ORAL
Status: DISCONTINUED | OUTPATIENT
Start: 2018-01-01 | End: 2018-01-01 | Stop reason: HOSPADM

## 2018-01-01 RX ORDER — LORAZEPAM 2 MG/ML
2 INJECTION INTRAMUSCULAR
Status: CANCELLED | OUTPATIENT
Start: 2018-01-01 | End: 2018-01-01

## 2018-01-01 RX ORDER — PROMETHAZINE HYDROCHLORIDE 25 MG/1
12.5 TABLET ORAL EVERY 4 HOURS PRN
Status: CANCELLED | OUTPATIENT
Start: 2018-01-01

## 2018-01-01 RX ORDER — MORPHINE SULFATE 20 MG/ML
15 SOLUTION ORAL ONCE
Status: COMPLETED | OUTPATIENT
Start: 2018-01-01 | End: 2018-01-01

## 2018-01-01 RX ORDER — GLYCOPYRROLATE 0.2 MG/ML
0.2 INJECTION INTRAMUSCULAR; INTRAVENOUS
Status: CANCELLED | OUTPATIENT
Start: 2018-01-01

## 2018-01-01 RX ORDER — HALOPERIDOL 1 MG/1
1 TABLET ORAL EVERY 4 HOURS PRN
Status: DISCONTINUED | OUTPATIENT
Start: 2018-01-01 | End: 2018-01-01 | Stop reason: HOSPADM

## 2018-01-01 RX ORDER — PROMETHAZINE HYDROCHLORIDE 25 MG/1
12.5 TABLET ORAL EVERY 4 HOURS PRN
Status: DISCONTINUED | OUTPATIENT
Start: 2018-01-01 | End: 2018-01-01 | Stop reason: HOSPADM

## 2018-01-01 RX ORDER — MORPHINE SULFATE 20 MG/ML
15 SOLUTION ORAL ONCE
Status: DISCONTINUED | OUTPATIENT
Start: 2018-01-01 | End: 2018-01-01

## 2018-01-01 RX ORDER — ACETAMINOPHEN 160 MG/5ML
650 SOLUTION ORAL EVERY 4 HOURS PRN
Status: CANCELLED | OUTPATIENT
Start: 2018-01-01

## 2018-01-01 RX ORDER — OXYCODONE HYDROCHLORIDE AND ACETAMINOPHEN 5; 325 MG/1; MG/1
1 TABLET ORAL ONCE
Status: COMPLETED | OUTPATIENT
Start: 2018-01-01 | End: 2018-01-01

## 2018-01-01 RX ORDER — ALUMINA, MAGNESIA, AND SIMETHICONE 2400; 2400; 240 MG/30ML; MG/30ML; MG/30ML
15 SUSPENSION ORAL EVERY 6 HOURS PRN
Status: DISCONTINUED | OUTPATIENT
Start: 2018-01-01 | End: 2018-01-01

## 2018-01-01 RX ORDER — PROMETHAZINE HYDROCHLORIDE 6.25 MG/5ML
12.5 SYRUP ORAL EVERY 4 HOURS PRN
Status: DISCONTINUED | OUTPATIENT
Start: 2018-01-01 | End: 2018-01-01 | Stop reason: HOSPADM

## 2018-01-01 RX ORDER — IPRATROPIUM BROMIDE AND ALBUTEROL SULFATE 2.5; .5 MG/3ML; MG/3ML
3 SOLUTION RESPIRATORY (INHALATION) EVERY 6 HOURS PRN
Status: DISCONTINUED | OUTPATIENT
Start: 2018-01-01 | End: 2018-01-01 | Stop reason: HOSPADM

## 2018-01-01 RX ORDER — PROMETHAZINE HYDROCHLORIDE 6.25 MG/5ML
12.5 SYRUP ORAL EVERY 4 HOURS PRN
Status: CANCELLED | OUTPATIENT
Start: 2018-01-01

## 2018-01-01 RX ORDER — PROMETHAZINE HYDROCHLORIDE 12.5 MG/1
12.5 SUPPOSITORY RECTAL EVERY 4 HOURS PRN
Status: DISCONTINUED | OUTPATIENT
Start: 2018-01-01 | End: 2018-01-01 | Stop reason: HOSPADM

## 2018-01-01 RX ORDER — DEXTROSE AND SODIUM CHLORIDE 5; .9 G/100ML; G/100ML
100 INJECTION, SOLUTION INTRAVENOUS CONTINUOUS
Status: DISCONTINUED | OUTPATIENT
Start: 2018-01-01 | End: 2018-01-01

## 2018-01-01 RX ORDER — PROMETHAZINE HYDROCHLORIDE 25 MG/ML
6.25 INJECTION, SOLUTION INTRAMUSCULAR; INTRAVENOUS EVERY 4 HOURS PRN
Status: CANCELLED | OUTPATIENT
Start: 2018-01-01

## 2018-01-01 RX ORDER — DIPHENOXYLATE HYDROCHLORIDE AND ATROPINE SULFATE 2.5; .025 MG/1; MG/1
1 TABLET ORAL
Status: CANCELLED | OUTPATIENT
Start: 2018-01-01

## 2018-01-01 RX ORDER — ACETAMINOPHEN 325 MG/1
650 TABLET ORAL EVERY 4 HOURS PRN
Status: DISCONTINUED | OUTPATIENT
Start: 2018-01-01 | End: 2018-01-01 | Stop reason: HOSPADM

## 2018-01-01 RX ORDER — HYDROMORPHONE HYDROCHLORIDE 1 MG/ML
0.5 INJECTION, SOLUTION INTRAMUSCULAR; INTRAVENOUS; SUBCUTANEOUS
Status: DISCONTINUED | OUTPATIENT
Start: 2018-01-01 | End: 2018-01-01

## 2018-01-01 RX ORDER — LORAZEPAM 2 MG/ML
0.5 CONCENTRATE ORAL
Status: DISCONTINUED | OUTPATIENT
Start: 2018-01-01 | End: 2018-01-01 | Stop reason: HOSPADM

## 2018-01-01 RX ORDER — PROPOFOL 10 MG/ML
VIAL (ML) INTRAVENOUS AS NEEDED
Status: DISCONTINUED | OUTPATIENT
Start: 2018-01-01 | End: 2018-01-01 | Stop reason: SURG

## 2018-01-01 RX ORDER — PROMETHAZINE HYDROCHLORIDE 25 MG/1
6.25 TABLET ORAL EVERY 4 HOURS PRN
Status: CANCELLED | OUTPATIENT
Start: 2018-01-01

## 2018-01-01 RX ORDER — LORAZEPAM 2 MG/ML
2 INJECTION INTRAMUSCULAR
Status: DISCONTINUED | OUTPATIENT
Start: 2018-01-01 | End: 2018-01-01 | Stop reason: HOSPADM

## 2018-01-01 RX ORDER — GLYCOPYRROLATE 0.2 MG/ML
0.4 INJECTION INTRAMUSCULAR; INTRAVENOUS
Status: DISCONTINUED | OUTPATIENT
Start: 2018-01-01 | End: 2018-01-01 | Stop reason: HOSPADM

## 2018-01-01 RX ORDER — HALOPERIDOL 1 MG/1
2 TABLET ORAL EVERY 4 HOURS PRN
Status: DISCONTINUED | OUTPATIENT
Start: 2018-01-01 | End: 2018-01-01 | Stop reason: HOSPADM

## 2018-01-01 RX ORDER — ACETAMINOPHEN 650 MG/1
650 SUPPOSITORY RECTAL EVERY 4 HOURS PRN
Status: CANCELLED | OUTPATIENT
Start: 2018-01-01

## 2018-01-01 RX ORDER — MORPHINE SULFATE 15 MG/1
15 TABLET ORAL EVERY 4 HOURS PRN
Status: DISCONTINUED | OUTPATIENT
Start: 2018-01-01 | End: 2018-01-01 | Stop reason: HOSPADM

## 2018-01-01 RX ORDER — SUCRALFATE ORAL 1 G/10ML
1 SUSPENSION ORAL 4 TIMES DAILY
Status: DISCONTINUED | OUTPATIENT
Start: 2018-01-01 | End: 2018-01-01 | Stop reason: HOSPADM

## 2018-01-01 RX ORDER — NYSTATIN 100000 [USP'U]/G
1 POWDER TOPICAL EVERY 12 HOURS SCHEDULED
Status: DISCONTINUED | OUTPATIENT
Start: 2018-01-01 | End: 2018-01-01 | Stop reason: HOSPADM

## 2018-01-01 RX ORDER — SODIUM CHLORIDE 0.9 % (FLUSH) 0.9 %
10 SYRINGE (ML) INJECTION AS NEEDED
Status: CANCELLED | OUTPATIENT
Start: 2018-01-01

## 2018-01-01 RX ORDER — CASTOR OIL AND BALSAM, PERU 788; 87 MG/G; MG/G
1 OINTMENT TOPICAL 2 TIMES DAILY
Status: ON HOLD | COMMUNITY
End: 2018-01-01

## 2018-01-01 RX ORDER — LEVOFLOXACIN 500 MG/1
500 TABLET, FILM COATED ORAL EVERY 24 HOURS
Status: COMPLETED | OUTPATIENT
Start: 2018-01-01 | End: 2018-01-01

## 2018-01-01 RX ORDER — ONDANSETRON 4 MG/1
4 TABLET, ORALLY DISINTEGRATING ORAL EVERY 6 HOURS PRN
Status: DISCONTINUED | OUTPATIENT
Start: 2018-01-01 | End: 2018-01-01

## 2018-01-01 RX ORDER — ONDANSETRON 4 MG/1
4 TABLET, ORALLY DISINTEGRATING ORAL EVERY 6 HOURS PRN
Status: DISCONTINUED | OUTPATIENT
Start: 2018-01-01 | End: 2018-01-01 | Stop reason: HOSPADM

## 2018-01-01 RX ORDER — LORAZEPAM 2 MG/ML
2 INJECTION INTRAMUSCULAR
Status: ACTIVE | OUTPATIENT
Start: 2018-01-01 | End: 2018-01-01

## 2018-01-01 RX ORDER — ALBUTEROL SULFATE 2.5 MG/3ML
2.5 SOLUTION RESPIRATORY (INHALATION) EVERY 4 HOURS PRN
Status: DISCONTINUED | OUTPATIENT
Start: 2018-01-01 | End: 2018-01-01 | Stop reason: HOSPADM

## 2018-01-01 RX ORDER — GABAPENTIN 100 MG/1
100 CAPSULE ORAL DAILY
Qty: 3 CAPSULE | Refills: 0 | Status: SHIPPED | OUTPATIENT
Start: 2018-01-01

## 2018-01-01 RX ORDER — PROMETHAZINE HYDROCHLORIDE 25 MG/ML
12.5 INJECTION, SOLUTION INTRAMUSCULAR; INTRAVENOUS EVERY 4 HOURS PRN
Status: DISCONTINUED | OUTPATIENT
Start: 2018-01-01 | End: 2018-01-01 | Stop reason: HOSPADM

## 2018-01-01 RX ORDER — LIDOCAINE HYDROCHLORIDE 20 MG/ML
INJECTION, SOLUTION INFILTRATION; PERINEURAL AS NEEDED
Status: DISCONTINUED | OUTPATIENT
Start: 2018-01-01 | End: 2018-01-01 | Stop reason: SURG

## 2018-01-01 RX ORDER — MORPHINE SULFATE 10 MG/ML
6 INJECTION INTRAMUSCULAR; INTRAVENOUS; SUBCUTANEOUS
Status: DISCONTINUED | OUTPATIENT
Start: 2018-01-01 | End: 2018-01-01 | Stop reason: HOSPADM

## 2018-01-01 RX ORDER — LORAZEPAM 2 MG/ML
1 CONCENTRATE ORAL
Status: DISCONTINUED | OUTPATIENT
Start: 2018-01-01 | End: 2018-01-01 | Stop reason: HOSPADM

## 2018-01-01 RX ORDER — HALOPERIDOL 2 MG/ML
2 SOLUTION ORAL EVERY 4 HOURS PRN
Status: DISCONTINUED | OUTPATIENT
Start: 2018-01-01 | End: 2018-01-01 | Stop reason: HOSPADM

## 2018-01-01 RX ORDER — MORPHINE SULFATE 10 MG/ML
6 INJECTION INTRAMUSCULAR; INTRAVENOUS; SUBCUTANEOUS
Status: CANCELLED | OUTPATIENT
Start: 2018-01-01 | End: 2018-01-01

## 2018-01-01 RX ORDER — NICOTINE POLACRILEX 4 MG
15 LOZENGE BUCCAL
Status: DISCONTINUED | OUTPATIENT
Start: 2018-01-01 | End: 2018-01-01

## 2018-01-01 RX ORDER — PROMETHAZINE HYDROCHLORIDE 25 MG/1
6.25 TABLET ORAL EVERY 4 HOURS PRN
Status: DISCONTINUED | OUTPATIENT
Start: 2018-01-01 | End: 2018-01-01 | Stop reason: HOSPADM

## 2018-01-01 RX ORDER — HALOPERIDOL 2 MG/ML
1 SOLUTION ORAL EVERY 4 HOURS PRN
Status: DISCONTINUED | OUTPATIENT
Start: 2018-01-01 | End: 2018-01-01 | Stop reason: HOSPADM

## 2018-01-01 RX ORDER — PANTOPRAZOLE SODIUM 40 MG/1
40 TABLET, DELAYED RELEASE ORAL
Start: 2018-01-01

## 2018-01-01 RX ORDER — CEFTRIAXONE SODIUM 1 G/50ML
1 INJECTION, SOLUTION INTRAVENOUS ONCE
Status: COMPLETED | OUTPATIENT
Start: 2018-01-01 | End: 2018-01-01

## 2018-01-01 RX ORDER — SODIUM CHLORIDE 9 MG/ML
50 INJECTION, SOLUTION INTRAVENOUS CONTINUOUS
Status: DISCONTINUED | OUTPATIENT
Start: 2018-01-01 | End: 2018-01-01

## 2018-01-01 RX ORDER — GLYCOPYRROLATE 0.2 MG/ML
0.4 INJECTION INTRAMUSCULAR; INTRAVENOUS
Status: CANCELLED | OUTPATIENT
Start: 2018-01-01

## 2018-01-01 RX ORDER — LORAZEPAM 1 MG/1
1 TABLET ORAL EVERY 6 HOURS PRN
Status: DISCONTINUED | OUTPATIENT
Start: 2018-01-01 | End: 2018-01-01

## 2018-01-01 RX ORDER — POTASSIUM CHLORIDE 1.5 G/1.77G
40 POWDER, FOR SOLUTION ORAL AS NEEDED
Status: DISCONTINUED | OUTPATIENT
Start: 2018-01-01 | End: 2018-01-01

## 2018-01-01 RX ORDER — MORPHINE SULFATE 20 MG/ML
10 SOLUTION ORAL
Status: CANCELLED | OUTPATIENT
Start: 2018-01-01 | End: 2018-01-01

## 2018-01-01 RX ORDER — MORPHINE SULFATE 20 MG/ML
5 SOLUTION ORAL
Status: ACTIVE | OUTPATIENT
Start: 2018-01-01 | End: 2018-01-01

## 2018-01-01 RX ORDER — FOLIC ACID 1 MG/1
1 TABLET ORAL DAILY
COMMUNITY

## 2018-01-01 RX ORDER — MORPHINE SULFATE 20 MG/ML
5 SOLUTION ORAL
Status: CANCELLED | OUTPATIENT
Start: 2018-01-01 | End: 2018-01-01

## 2018-01-01 RX ORDER — ACETAMINOPHEN 325 MG/1
650 TABLET ORAL EVERY 4 HOURS PRN
Status: DISCONTINUED | OUTPATIENT
Start: 2018-01-01 | End: 2018-01-01

## 2018-01-01 RX ORDER — HALOPERIDOL 5 MG/ML
1 INJECTION INTRAMUSCULAR EVERY 4 HOURS PRN
Status: DISCONTINUED | OUTPATIENT
Start: 2018-01-01 | End: 2018-01-01 | Stop reason: HOSPADM

## 2018-01-01 RX ORDER — MORPHINE SULFATE 20 MG/ML
15 SOLUTION ORAL EVERY 4 HOURS
Qty: 15 ML | Refills: 0
Start: 2018-01-01 | End: 2018-01-01

## 2018-01-01 RX ORDER — PROMETHAZINE HYDROCHLORIDE 12.5 MG/1
12.5 SUPPOSITORY RECTAL EVERY 4 HOURS PRN
Status: CANCELLED | OUTPATIENT
Start: 2018-01-01

## 2018-01-01 RX ORDER — LORAZEPAM 2 MG/ML
0.5 INJECTION INTRAMUSCULAR
Status: CANCELLED | OUTPATIENT
Start: 2018-01-01 | End: 2018-01-01

## 2018-01-01 RX ORDER — ONDANSETRON 4 MG/1
4 TABLET, FILM COATED ORAL EVERY 6 HOURS PRN
Status: DISCONTINUED | OUTPATIENT
Start: 2018-01-01 | End: 2018-01-01 | Stop reason: HOSPADM

## 2018-01-01 RX ORDER — MORPHINE SULFATE 10 MG/ML
6 INJECTION INTRAMUSCULAR; INTRAVENOUS; SUBCUTANEOUS
Status: ACTIVE | OUTPATIENT
Start: 2018-01-01 | End: 2018-01-01

## 2018-01-01 RX ORDER — PROMETHAZINE HYDROCHLORIDE 12.5 MG/1
6.25 SUPPOSITORY RECTAL EVERY 4 HOURS PRN
Status: CANCELLED | OUTPATIENT
Start: 2018-01-01

## 2018-01-01 RX ORDER — HALOPERIDOL 5 MG/ML
1 INJECTION INTRAMUSCULAR EVERY 4 HOURS PRN
Status: CANCELLED | OUTPATIENT
Start: 2018-01-01

## 2018-01-01 RX ORDER — BISACODYL 5 MG/1
5 TABLET, DELAYED RELEASE ORAL DAILY PRN
Status: DISCONTINUED | OUTPATIENT
Start: 2018-01-01 | End: 2018-01-01 | Stop reason: HOSPADM

## 2018-01-01 RX ORDER — PROPOFOL 10 MG/ML
VIAL (ML) INTRAVENOUS CONTINUOUS PRN
Status: DISCONTINUED | OUTPATIENT
Start: 2018-01-01 | End: 2018-01-01 | Stop reason: SURG

## 2018-01-01 RX ORDER — PANTOPRAZOLE SODIUM 40 MG/1
40 TABLET, DELAYED RELEASE ORAL
Status: CANCELLED | OUTPATIENT
Start: 2018-01-01

## 2018-01-01 RX ORDER — MORPHINE SULFATE 20 MG/ML
20 SOLUTION ORAL
Status: CANCELLED | OUTPATIENT
Start: 2018-01-01 | End: 2018-01-01

## 2018-01-01 RX ORDER — MORPHINE SULFATE 20 MG/ML
5 SOLUTION ORAL
Status: DISCONTINUED | OUTPATIENT
Start: 2018-01-01 | End: 2018-01-01 | Stop reason: HOSPADM

## 2018-01-01 RX ORDER — SODIUM CHLORIDE 0.9 % (FLUSH) 0.9 %
1-10 SYRINGE (ML) INJECTION AS NEEDED
Status: DISCONTINUED | OUTPATIENT
Start: 2018-01-01 | End: 2018-01-01

## 2018-01-01 RX ORDER — PROMETHAZINE HYDROCHLORIDE 25 MG/ML
6.25 INJECTION, SOLUTION INTRAMUSCULAR; INTRAVENOUS EVERY 4 HOURS PRN
Status: DISCONTINUED | OUTPATIENT
Start: 2018-01-01 | End: 2018-01-01 | Stop reason: HOSPADM

## 2018-01-01 RX ORDER — IPRATROPIUM BROMIDE AND ALBUTEROL SULFATE 2.5; .5 MG/3ML; MG/3ML
3 SOLUTION RESPIRATORY (INHALATION)
Status: DISCONTINUED | OUTPATIENT
Start: 2018-01-01 | End: 2018-01-01

## 2018-01-01 RX ORDER — ZIPRASIDONE MESYLATE 20 MG/ML
20 INJECTION, POWDER, LYOPHILIZED, FOR SOLUTION INTRAMUSCULAR EVERY 6 HOURS PRN
Status: DISCONTINUED | OUTPATIENT
Start: 2018-01-01 | End: 2018-01-01

## 2018-01-01 RX ORDER — GABAPENTIN 100 MG/1
100 CAPSULE ORAL DAILY
Status: DISCONTINUED | OUTPATIENT
Start: 2018-01-01 | End: 2018-01-01 | Stop reason: HOSPADM

## 2018-01-01 RX ORDER — SODIUM POLYSTYRENE SULFONATE 15 G/60ML
30 SUSPENSION ORAL; RECTAL ONCE
Status: COMPLETED | OUTPATIENT
Start: 2018-01-01 | End: 2018-01-01

## 2018-01-01 RX ORDER — LORAZEPAM 0.5 MG/1
0.5 TABLET ORAL NIGHTLY
Status: DISCONTINUED | OUTPATIENT
Start: 2018-01-01 | End: 2018-01-01

## 2018-01-01 RX ORDER — DEXTROSE MONOHYDRATE 50 MG/ML
100 INJECTION, SOLUTION INTRAVENOUS CONTINUOUS
Status: DISCONTINUED | OUTPATIENT
Start: 2018-01-01 | End: 2018-01-01

## 2018-01-01 RX ORDER — MORPHINE SULFATE 20 MG/ML
20 SOLUTION ORAL
Status: DISCONTINUED | OUTPATIENT
Start: 2018-01-01 | End: 2018-01-01 | Stop reason: HOSPADM

## 2018-01-01 RX ORDER — LORAZEPAM 2 MG/ML
0.5 INJECTION INTRAMUSCULAR
Status: DISPENSED | OUTPATIENT
Start: 2018-01-01 | End: 2018-01-01

## 2018-01-01 RX ORDER — LANSOPRAZOLE
30 KIT
Status: DISCONTINUED | OUTPATIENT
Start: 2018-01-01 | End: 2018-01-01 | Stop reason: HOSPADM

## 2018-01-01 RX ORDER — HALOPERIDOL 1 MG/1
1 TABLET ORAL EVERY 4 HOURS PRN
Status: CANCELLED | OUTPATIENT
Start: 2018-01-01

## 2018-01-01 RX ORDER — LORAZEPAM 2 MG/ML
2 CONCENTRATE ORAL
Status: ACTIVE | OUTPATIENT
Start: 2018-01-01 | End: 2018-01-01

## 2018-01-01 RX ORDER — HALOPERIDOL 2 MG/ML
1 SOLUTION ORAL EVERY 4 HOURS PRN
Status: CANCELLED | OUTPATIENT
Start: 2018-01-01

## 2018-01-01 RX ORDER — PANTOPRAZOLE SODIUM 40 MG/10ML
40 INJECTION, POWDER, LYOPHILIZED, FOR SOLUTION INTRAVENOUS
Status: DISCONTINUED | OUTPATIENT
Start: 2018-01-01 | End: 2018-01-01

## 2018-01-01 RX ORDER — OXYBUTYNIN CHLORIDE 5 MG/5ML
5 SYRUP ORAL DAILY
COMMUNITY

## 2018-01-01 RX ORDER — PANTOPRAZOLE SODIUM 40 MG/10ML
80 INJECTION, POWDER, LYOPHILIZED, FOR SOLUTION INTRAVENOUS ONCE
Status: COMPLETED | OUTPATIENT
Start: 2018-01-01 | End: 2018-01-01

## 2018-01-01 RX ORDER — NALOXONE HYDROCHLORIDE 1 MG/ML
0.4 INJECTION INTRAMUSCULAR; INTRAVENOUS; SUBCUTANEOUS AS NEEDED
Status: DISCONTINUED | OUTPATIENT
Start: 2018-01-01 | End: 2018-01-01

## 2018-01-01 RX ORDER — SODIUM CHLORIDE 450 MG/100ML
100 INJECTION, SOLUTION INTRAVENOUS CONTINUOUS
Status: DISCONTINUED | OUTPATIENT
Start: 2018-01-01 | End: 2018-01-01

## 2018-01-01 RX ORDER — ACETAMINOPHEN 160 MG/5ML
640 SOLUTION ORAL EVERY 6 HOURS PRN
Status: DISCONTINUED | OUTPATIENT
Start: 2018-01-01 | End: 2018-01-01 | Stop reason: ALTCHOICE

## 2018-01-01 RX ORDER — HALOPERIDOL 2 MG/ML
2 SOLUTION ORAL EVERY 4 HOURS PRN
Status: CANCELLED | OUTPATIENT
Start: 2018-01-01

## 2018-01-01 RX ORDER — SODIUM CHLORIDE, SODIUM LACTATE, POTASSIUM CHLORIDE, CALCIUM CHLORIDE 600; 310; 30; 20 MG/100ML; MG/100ML; MG/100ML; MG/100ML
30 INJECTION, SOLUTION INTRAVENOUS CONTINUOUS
Status: DISCONTINUED | OUTPATIENT
Start: 2018-01-01 | End: 2018-01-01

## 2018-01-01 RX ORDER — LORAZEPAM 2 MG/ML
0.5 INJECTION INTRAMUSCULAR
Status: DISCONTINUED | OUTPATIENT
Start: 2018-01-01 | End: 2018-01-01 | Stop reason: HOSPADM

## 2018-01-01 RX ORDER — ONDANSETRON 2 MG/ML
4 INJECTION INTRAMUSCULAR; INTRAVENOUS EVERY 6 HOURS PRN
Status: DISCONTINUED | OUTPATIENT
Start: 2018-01-01 | End: 2018-01-01 | Stop reason: HOSPADM

## 2018-01-01 RX ORDER — SENNA AND DOCUSATE SODIUM 50; 8.6 MG/1; MG/1
1 TABLET, FILM COATED ORAL DAILY
Status: DISCONTINUED | OUTPATIENT
Start: 2018-01-01 | End: 2018-01-01 | Stop reason: HOSPADM

## 2018-01-01 RX ORDER — DIPHENOXYLATE HYDROCHLORIDE AND ATROPINE SULFATE 2.5; .025 MG/1; MG/1
1 TABLET ORAL
Status: DISCONTINUED | OUTPATIENT
Start: 2018-01-01 | End: 2018-01-01 | Stop reason: HOSPADM

## 2018-01-01 RX ORDER — MORPHINE SULFATE 20 MG/ML
10 SOLUTION ORAL
Status: ACTIVE | OUTPATIENT
Start: 2018-01-01 | End: 2018-01-01

## 2018-01-01 RX ORDER — LORAZEPAM 2 MG/ML
2 CONCENTRATE ORAL
Status: CANCELLED | OUTPATIENT
Start: 2018-01-01 | End: 2018-01-01

## 2018-01-01 RX ORDER — MORPHINE SULFATE 20 MG/ML
15 SOLUTION ORAL EVERY 4 HOURS PRN
Status: DISCONTINUED | OUTPATIENT
Start: 2018-01-01 | End: 2018-01-01 | Stop reason: CLARIF

## 2018-01-01 RX ORDER — ALBUTEROL SULFATE 2.5 MG/3ML
2.5 SOLUTION RESPIRATORY (INHALATION) EVERY 4 HOURS PRN
Status: DISCONTINUED | OUTPATIENT
Start: 2018-01-01 | End: 2018-01-01

## 2018-01-01 RX ORDER — FERROUS SULFATE 300 MG/5ML
300 LIQUID (ML) ORAL DAILY
Status: DISCONTINUED | OUTPATIENT
Start: 2018-01-01 | End: 2018-01-01

## 2018-01-01 RX ORDER — LORAZEPAM 2 MG/ML
2 CONCENTRATE ORAL
Status: DISCONTINUED | OUTPATIENT
Start: 2018-01-01 | End: 2018-01-01 | Stop reason: HOSPADM

## 2018-01-01 RX ORDER — NYSTATIN 100000 [USP'U]/G
POWDER TOPICAL EVERY 12 HOURS SCHEDULED
Status: DISCONTINUED | OUTPATIENT
Start: 2018-01-01 | End: 2018-01-01 | Stop reason: HOSPADM

## 2018-01-01 RX ORDER — MAGNESIUM OXIDE 400 MG/1
400 TABLET ORAL 2 TIMES DAILY
Status: DISCONTINUED | OUTPATIENT
Start: 2018-01-01 | End: 2018-01-01

## 2018-01-01 RX ORDER — POTASSIUM CHLORIDE 750 MG/1
40 CAPSULE, EXTENDED RELEASE ORAL AS NEEDED
Status: DISCONTINUED | OUTPATIENT
Start: 2018-01-01 | End: 2018-01-01

## 2018-01-01 RX ORDER — DEXTROSE MONOHYDRATE 25 G/50ML
INJECTION, SOLUTION INTRAVENOUS
Status: COMPLETED
Start: 2018-01-01 | End: 2018-01-01

## 2018-01-01 RX ORDER — SCOLOPAMINE TRANSDERMAL SYSTEM 1 MG/1
1 PATCH, EXTENDED RELEASE TRANSDERMAL
Status: DISCONTINUED | OUTPATIENT
Start: 2018-01-01 | End: 2018-01-01 | Stop reason: HOSPADM

## 2018-01-01 RX ORDER — MORPHINE SULFATE 20 MG/ML
15 SOLUTION ORAL EVERY 4 HOURS
Qty: 15 ML | Refills: 0 | Status: SHIPPED | OUTPATIENT
Start: 2018-01-01

## 2018-01-01 RX ORDER — HALOPERIDOL 1 MG/1
2 TABLET ORAL EVERY 4 HOURS PRN
Status: CANCELLED | OUTPATIENT
Start: 2018-01-01

## 2018-01-01 RX ORDER — HALOPERIDOL 5 MG/ML
2 INJECTION INTRAMUSCULAR EVERY 4 HOURS PRN
Status: CANCELLED | OUTPATIENT
Start: 2018-01-01

## 2018-01-01 RX ORDER — PANTOPRAZOLE SODIUM 40 MG/1
40 TABLET, DELAYED RELEASE ORAL
Status: DISCONTINUED | OUTPATIENT
Start: 2018-01-01 | End: 2018-01-01 | Stop reason: SDUPTHER

## 2018-01-01 RX ORDER — ACETAMINOPHEN 325 MG/1
650 TABLET ORAL EVERY 6 HOURS PRN
Status: DISCONTINUED | OUTPATIENT
Start: 2018-01-01 | End: 2018-01-01 | Stop reason: HOSPADM

## 2018-01-01 RX ORDER — ONDANSETRON 2 MG/ML
4 INJECTION INTRAMUSCULAR; INTRAVENOUS EVERY 6 HOURS PRN
Status: DISCONTINUED | OUTPATIENT
Start: 2018-01-01 | End: 2018-01-01

## 2018-01-01 RX ORDER — SCOLOPAMINE TRANSDERMAL SYSTEM 1 MG/1
1 PATCH, EXTENDED RELEASE TRANSDERMAL
Status: CANCELLED | OUTPATIENT
Start: 2018-01-01

## 2018-01-01 RX ORDER — CASTOR OIL AND BALSAM, PERU 788; 87 MG/G; MG/G
1 OINTMENT TOPICAL EVERY 12 HOURS SCHEDULED
Status: DISCONTINUED | OUTPATIENT
Start: 2018-01-01 | End: 2018-01-01

## 2018-01-01 RX ORDER — POTASSIUM CHLORIDE 7.45 MG/ML
10 INJECTION INTRAVENOUS
Status: DISCONTINUED | OUTPATIENT
Start: 2018-01-01 | End: 2018-01-01

## 2018-01-01 RX ORDER — PROMETHAZINE HYDROCHLORIDE 6.25 MG/5ML
6.25 SYRUP ORAL EVERY 4 HOURS PRN
Status: CANCELLED | OUTPATIENT
Start: 2018-01-01

## 2018-01-01 RX ORDER — DEXTROSE AND SODIUM CHLORIDE 5; .45 G/100ML; G/100ML
75 INJECTION, SOLUTION INTRAVENOUS CONTINUOUS
Status: DISCONTINUED | OUTPATIENT
Start: 2018-01-01 | End: 2018-01-01

## 2018-01-01 RX ADMIN — LANSOPRAZOLE 30 MG: KIT at 09:32

## 2018-01-01 RX ADMIN — SUCRALFATE 1 G: 1 SUSPENSION ORAL at 13:27

## 2018-01-01 RX ADMIN — IPRATROPIUM BROMIDE AND ALBUTEROL SULFATE 3 ML: 2.5; .5 SOLUTION RESPIRATORY (INHALATION) at 19:32

## 2018-01-01 RX ADMIN — GLYCOPYRROLATE 0.4 MG: 0.2 INJECTION, SOLUTION INTRAMUSCULAR; INTRAVENOUS at 17:21

## 2018-01-01 RX ADMIN — MORPHINE SULFATE 4 MG: 4 INJECTION INTRAVENOUS at 08:48

## 2018-01-01 RX ADMIN — DEXTROSE MONOHYDRATE 50 ML: 25 INJECTION, SOLUTION INTRAVENOUS at 08:44

## 2018-01-01 RX ADMIN — MORPHINE SULFATE 10 MG: 100 SOLUTION ORAL at 18:58

## 2018-01-01 RX ADMIN — MORPHINE SULFATE 15 MG: 15 TABLET ORAL at 18:28

## 2018-01-01 RX ADMIN — CEFEPIME HYDROCHLORIDE 2 G: 2 INJECTION, POWDER, FOR SOLUTION INTRAVENOUS at 05:16

## 2018-01-01 RX ADMIN — LIDOCAINE HYDROCHLORIDE 50 MG: 20 INJECTION, SOLUTION INFILTRATION; PERINEURAL at 13:42

## 2018-01-01 RX ADMIN — ACETAMINOPHEN 640 MG: 325 SOLUTION ORAL at 01:34

## 2018-01-01 RX ADMIN — MINERAL SUPPLEMENT IRON 300 MG / 5 ML STRENGTH LIQUID 100 PER BOX UNFLAVORED 300 MG: at 09:02

## 2018-01-01 RX ADMIN — SUCRALFATE 1 G: 1 SUSPENSION ORAL at 17:29

## 2018-01-01 RX ADMIN — MORPHINE SULFATE 15 MG: 15 TABLET ORAL at 15:27

## 2018-01-01 RX ADMIN — CEFEPIME HYDROCHLORIDE 2 G: 2 INJECTION, POWDER, FOR SOLUTION INTRAVENOUS at 04:58

## 2018-01-01 RX ADMIN — MORPHINE SULFATE 4 MG: 4 INJECTION, SOLUTION INTRAMUSCULAR; INTRAVENOUS at 17:19

## 2018-01-01 RX ADMIN — MORPHINE SULFATE 4 MG: 4 INJECTION, SOLUTION INTRAMUSCULAR; INTRAVENOUS at 20:10

## 2018-01-01 RX ADMIN — MORPHINE SULFATE 4 MG: 4 INJECTION, SOLUTION INTRAMUSCULAR; INTRAVENOUS at 01:10

## 2018-01-01 RX ADMIN — ALBUTEROL SULFATE 10 MG: 2.5 SOLUTION RESPIRATORY (INHALATION) at 03:23

## 2018-01-01 RX ADMIN — LORAZEPAM 1 MG: 2 INJECTION INTRAMUSCULAR; INTRAVENOUS at 20:52

## 2018-01-01 RX ADMIN — CEFEPIME HYDROCHLORIDE 2 G: 2 INJECTION, POWDER, FOR SOLUTION INTRAVENOUS at 17:38

## 2018-01-01 RX ADMIN — LORAZEPAM 1 MG: 1 TABLET ORAL at 13:27

## 2018-01-01 RX ADMIN — LORAZEPAM 1 MG: 2 INJECTION INTRAMUSCULAR; INTRAVENOUS at 18:10

## 2018-01-01 RX ADMIN — IPRATROPIUM BROMIDE AND ALBUTEROL SULFATE 3 ML: 2.5; .5 SOLUTION RESPIRATORY (INHALATION) at 13:07

## 2018-01-01 RX ADMIN — PROPOFOL 50 MG: 10 INJECTION, EMULSION INTRAVENOUS at 13:42

## 2018-01-01 RX ADMIN — SUCRALFATE 1 G: 1 SUSPENSION ORAL at 09:08

## 2018-01-01 RX ADMIN — SODIUM CHLORIDE 50 ML/HR: 9 INJECTION, SOLUTION INTRAVENOUS at 16:04

## 2018-01-01 RX ADMIN — MORPHINE SULFATE 4 MG: 4 INJECTION INTRAVENOUS at 09:21

## 2018-01-01 RX ADMIN — IPRATROPIUM BROMIDE AND ALBUTEROL SULFATE 3 ML: 2.5; .5 SOLUTION RESPIRATORY (INHALATION) at 11:57

## 2018-01-01 RX ADMIN — LORAZEPAM 1 MG: 2 INJECTION INTRAMUSCULAR; INTRAVENOUS at 17:36

## 2018-01-01 RX ADMIN — MUPIROCIN 1 APPLICATION: 20 OINTMENT TOPICAL at 00:36

## 2018-01-01 RX ADMIN — ACETAMINOPHEN 650 MG: 325 TABLET ORAL at 09:20

## 2018-01-01 RX ADMIN — MORPHINE SULFATE 4 MG: 4 INJECTION, SOLUTION INTRAMUSCULAR; INTRAVENOUS at 13:10

## 2018-01-01 RX ADMIN — GABAPENTIN 100 MG: 100 CAPSULE ORAL at 08:43

## 2018-01-01 RX ADMIN — MORPHINE SULFATE 4 MG: 4 INJECTION, SOLUTION INTRAMUSCULAR; INTRAVENOUS at 17:20

## 2018-01-01 RX ADMIN — SODIUM CHLORIDE, POTASSIUM CHLORIDE, SODIUM LACTATE AND CALCIUM CHLORIDE: 600; 310; 30; 20 INJECTION, SOLUTION INTRAVENOUS at 13:42

## 2018-01-01 RX ADMIN — LANSOPRAZOLE 30 MG: KIT at 06:41

## 2018-01-01 RX ADMIN — IPRATROPIUM BROMIDE AND ALBUTEROL SULFATE 3 ML: 2.5; .5 SOLUTION RESPIRATORY (INHALATION) at 12:54

## 2018-01-01 RX ADMIN — LORAZEPAM 1 MG: 2 INJECTION INTRAMUSCULAR; INTRAVENOUS at 17:19

## 2018-01-01 RX ADMIN — GLYCOPYRROLATE 0.4 MG: 0.2 INJECTION, SOLUTION INTRAMUSCULAR; INTRAVENOUS at 04:44

## 2018-01-01 RX ADMIN — IPRATROPIUM BROMIDE AND ALBUTEROL SULFATE 3 ML: 2.5; .5 SOLUTION RESPIRATORY (INHALATION) at 16:10

## 2018-01-01 RX ADMIN — DEXTROSE MONOHYDRATE 50 ML: 25 INJECTION, SOLUTION INTRAVENOUS at 04:40

## 2018-01-01 RX ADMIN — DEXTROSE AND SODIUM CHLORIDE 100 ML/HR: 5; 900 INJECTION, SOLUTION INTRAVENOUS at 07:43

## 2018-01-01 RX ADMIN — MINERAL SUPPLEMENT IRON 300 MG / 5 ML STRENGTH LIQUID 100 PER BOX UNFLAVORED 300 MG: at 09:08

## 2018-01-01 RX ADMIN — HYDROCODONE BITARTRATE AND ACETAMINOPHEN 1 TABLET: 5; 325 TABLET ORAL at 09:52

## 2018-01-01 RX ADMIN — MORPHINE SULFATE 15 MG: 100 SOLUTION ORAL at 16:55

## 2018-01-01 RX ADMIN — SUCRALFATE 1 G: 1 SUSPENSION ORAL at 08:50

## 2018-01-01 RX ADMIN — GLYCOPYRROLATE 0.4 MG: 0.2 INJECTION, SOLUTION INTRAMUSCULAR; INTRAVENOUS at 17:36

## 2018-01-01 RX ADMIN — LORAZEPAM 1 MG: 2 INJECTION INTRAMUSCULAR; INTRAVENOUS at 21:46

## 2018-01-01 RX ADMIN — HALOPERIDOL LACTATE 1 MG: 5 INJECTION, SOLUTION INTRAMUSCULAR at 01:31

## 2018-01-01 RX ADMIN — SUCRALFATE 1 G: 1 SUSPENSION ORAL at 20:52

## 2018-01-01 RX ADMIN — MORPHINE SULFATE 4 MG: 4 INJECTION, SOLUTION INTRAMUSCULAR; INTRAVENOUS at 04:52

## 2018-01-01 RX ADMIN — MORPHINE SULFATE 4 MG: 4 INJECTION, SOLUTION INTRAMUSCULAR; INTRAVENOUS at 20:39

## 2018-01-01 RX ADMIN — HYDROCODONE BITARTRATE AND ACETAMINOPHEN 1 TABLET: 5; 325 TABLET ORAL at 14:06

## 2018-01-01 RX ADMIN — HYDROMORPHONE HYDROCHLORIDE 0.5 MG: 1 INJECTION, SOLUTION INTRAMUSCULAR; INTRAVENOUS; SUBCUTANEOUS at 04:32

## 2018-01-01 RX ADMIN — SODIUM CHLORIDE 50 ML/HR: 9 INJECTION, SOLUTION INTRAVENOUS at 12:42

## 2018-01-01 RX ADMIN — NYSTATIN 1 APPLICATION: 100000 POWDER TOPICAL at 09:59

## 2018-01-01 RX ADMIN — HYDROMORPHONE HYDROCHLORIDE 0.5 MG: 1 INJECTION, SOLUTION INTRAMUSCULAR; INTRAVENOUS; SUBCUTANEOUS at 00:20

## 2018-01-01 RX ADMIN — MORPHINE SULFATE 15 MG: 100 SOLUTION ORAL at 09:58

## 2018-01-01 RX ADMIN — MUPIROCIN 1 APPLICATION: 20 OINTMENT TOPICAL at 08:44

## 2018-01-01 RX ADMIN — LORAZEPAM 1 MG: 2 INJECTION INTRAMUSCULAR; INTRAVENOUS at 09:23

## 2018-01-01 RX ADMIN — HUMAN INSULIN 10 UNITS: 100 INJECTION, SOLUTION SUBCUTANEOUS at 04:39

## 2018-01-01 RX ADMIN — DIATRIZOATE MEGLUMINE AND DIATRIZOATE SODIUM 30 ML: 600; 100 SOLUTION ORAL; RECTAL at 06:10

## 2018-01-01 RX ADMIN — NYSTATIN 1 APPLICATION: 100000 POWDER TOPICAL at 09:58

## 2018-01-01 RX ADMIN — MORPHINE SULFATE 4 MG: 4 INJECTION, SOLUTION INTRAMUSCULAR; INTRAVENOUS at 00:26

## 2018-01-01 RX ADMIN — MORPHINE SULFATE 4 MG: 4 INJECTION, SOLUTION INTRAMUSCULAR; INTRAVENOUS at 12:34

## 2018-01-01 RX ADMIN — MORPHINE SULFATE 4 MG: 4 INJECTION, SOLUTION INTRAMUSCULAR; INTRAVENOUS at 11:34

## 2018-01-01 RX ADMIN — MORPHINE SULFATE 4 MG: 4 INJECTION, SOLUTION INTRAMUSCULAR; INTRAVENOUS at 17:05

## 2018-01-01 RX ADMIN — MORPHINE SULFATE 2 MG: 4 INJECTION INTRAVENOUS at 17:18

## 2018-01-01 RX ADMIN — MORPHINE SULFATE 4 MG: 4 INJECTION, SOLUTION INTRAMUSCULAR; INTRAVENOUS at 05:29

## 2018-01-01 RX ADMIN — GLYCOPYRROLATE 0.4 MG: 0.2 INJECTION, SOLUTION INTRAMUSCULAR; INTRAVENOUS at 11:34

## 2018-01-01 RX ADMIN — SUCRALFATE 1 G: 1 SUSPENSION ORAL at 18:49

## 2018-01-01 RX ADMIN — LORAZEPAM 1 MG: 2 INJECTION INTRAMUSCULAR; INTRAVENOUS at 16:57

## 2018-01-01 RX ADMIN — DEXTROSE AND SODIUM CHLORIDE 75 ML/HR: 5; 450 INJECTION, SOLUTION INTRAVENOUS at 13:11

## 2018-01-01 RX ADMIN — OXYCODONE HYDROCHLORIDE AND ACETAMINOPHEN 1 TABLET: 5; 325 TABLET ORAL at 13:10

## 2018-01-01 RX ADMIN — HALOPERIDOL LACTATE 1 MG: 5 INJECTION, SOLUTION INTRAMUSCULAR at 13:56

## 2018-01-01 RX ADMIN — MORPHINE SULFATE 15 MG: 15 TABLET ORAL at 14:56

## 2018-01-01 RX ADMIN — GLYCOPYRROLATE 0.4 MG: 0.2 INJECTION, SOLUTION INTRAMUSCULAR; INTRAVENOUS at 09:10

## 2018-01-01 RX ADMIN — MORPHINE SULFATE 4 MG: 4 INJECTION, SOLUTION INTRAMUSCULAR; INTRAVENOUS at 20:34

## 2018-01-01 RX ADMIN — LORAZEPAM 1 MG: 2 INJECTION INTRAMUSCULAR; INTRAVENOUS at 22:50

## 2018-01-01 RX ADMIN — MORPHINE SULFATE 4 MG: 4 INJECTION INTRAVENOUS at 22:51

## 2018-01-01 RX ADMIN — POTASSIUM CHLORIDE 40 MEQ: 1.5 POWDER, FOR SOLUTION ORAL at 13:13

## 2018-01-01 RX ADMIN — GLYCOPYRROLATE 0.4 MG: 0.2 INJECTION, SOLUTION INTRAMUSCULAR; INTRAVENOUS at 12:34

## 2018-01-01 RX ADMIN — HYDROCODONE BITARTRATE AND ACETAMINOPHEN 1 TABLET: 5; 325 TABLET ORAL at 00:05

## 2018-01-01 RX ADMIN — HYDROCODONE BITARTRATE AND ACETAMINOPHEN 1 TABLET: 5; 325 TABLET ORAL at 13:21

## 2018-01-01 RX ADMIN — LORAZEPAM 1 MG: 2 INJECTION INTRAMUSCULAR; INTRAVENOUS at 15:14

## 2018-01-01 RX ADMIN — MINERAL SUPPLEMENT IRON 300 MG / 5 ML STRENGTH LIQUID 100 PER BOX UNFLAVORED 300 MG: at 10:03

## 2018-01-01 RX ADMIN — IPRATROPIUM BROMIDE AND ALBUTEROL SULFATE 3 ML: 2.5; .5 SOLUTION RESPIRATORY (INHALATION) at 15:47

## 2018-01-01 RX ADMIN — MORPHINE SULFATE 4 MG: 4 INJECTION INTRAVENOUS at 04:43

## 2018-01-01 RX ADMIN — ACETAMINOPHEN 650 MG: 325 TABLET ORAL at 13:46

## 2018-01-01 RX ADMIN — MORPHINE SULFATE 2 MG: 4 INJECTION INTRAVENOUS at 20:20

## 2018-01-01 RX ADMIN — MORPHINE SULFATE 4 MG: 4 INJECTION, SOLUTION INTRAMUSCULAR; INTRAVENOUS at 09:52

## 2018-01-01 RX ADMIN — MORPHINE SULFATE 4 MG: 4 INJECTION, SOLUTION INTRAMUSCULAR; INTRAVENOUS at 04:44

## 2018-01-01 RX ADMIN — POTASSIUM & SODIUM PHOSPHATES POWDER PACK 280-160-250 MG 2 PACKET: 280-160-250 PACK at 17:45

## 2018-01-01 RX ADMIN — MORPHINE SULFATE 4 MG: 4 INJECTION INTRAVENOUS at 05:54

## 2018-01-01 RX ADMIN — ACETAMINOPHEN 640 MG: 325 SOLUTION ORAL at 06:23

## 2018-01-01 RX ADMIN — MORPHINE SULFATE 2 MG: 4 INJECTION INTRAVENOUS at 13:35

## 2018-01-01 RX ADMIN — ACETAMINOPHEN 640 MG: 160 SOLUTION ORAL at 20:22

## 2018-01-01 RX ADMIN — CALCIUM GLUCONATE 1 G: 98 INJECTION, SOLUTION INTRAVENOUS at 04:39

## 2018-01-01 RX ADMIN — SUCRALFATE 1 G: 1 SUSPENSION ORAL at 13:55

## 2018-01-01 RX ADMIN — HYDROCODONE BITARTRATE AND ACETAMINOPHEN 1 TABLET: 5; 325 TABLET ORAL at 01:40

## 2018-01-01 RX ADMIN — MUPIROCIN 1 APPLICATION: 20 OINTMENT TOPICAL at 21:41

## 2018-01-01 RX ADMIN — DOCUSATE SODIUM -SENNOSIDES 1 TABLET: 50; 8.6 TABLET, COATED ORAL at 09:57

## 2018-01-01 RX ADMIN — PANTOPRAZOLE SODIUM 40 MG: 40 INJECTION, POWDER, FOR SOLUTION INTRAVENOUS at 13:40

## 2018-01-01 RX ADMIN — NYSTATIN 1 APPLICATION: 100000 POWDER TOPICAL at 00:37

## 2018-01-01 RX ADMIN — HALOPERIDOL LACTATE 1 MG: 5 INJECTION, SOLUTION INTRAMUSCULAR at 23:15

## 2018-01-01 RX ADMIN — IPRATROPIUM BROMIDE AND ALBUTEROL SULFATE 3 ML: 2.5; .5 SOLUTION RESPIRATORY (INHALATION) at 15:23

## 2018-01-01 RX ADMIN — GLYCOPYRROLATE 0.4 MG: 0.2 INJECTION, SOLUTION INTRAMUSCULAR; INTRAVENOUS at 17:16

## 2018-01-01 RX ADMIN — DEXTROSE AND SODIUM CHLORIDE 100 ML/HR: 5; 900 INJECTION, SOLUTION INTRAVENOUS at 02:42

## 2018-01-01 RX ADMIN — MORPHINE SULFATE 15 MG: 100 SOLUTION ORAL at 23:58

## 2018-01-01 RX ADMIN — LORAZEPAM 1 MG: 1 TABLET ORAL at 17:40

## 2018-01-01 RX ADMIN — HYDROCODONE BITARTRATE AND ACETAMINOPHEN 1 TABLET: 5; 325 TABLET ORAL at 21:26

## 2018-01-01 RX ADMIN — MORPHINE SULFATE 4 MG: 4 INJECTION, SOLUTION INTRAMUSCULAR; INTRAVENOUS at 08:43

## 2018-01-01 RX ADMIN — CEFEPIME HYDROCHLORIDE 2 G: 2 INJECTION, POWDER, FOR SOLUTION INTRAVENOUS at 05:03

## 2018-01-01 RX ADMIN — MORPHINE SULFATE 4 MG: 4 INJECTION, SOLUTION INTRAMUSCULAR; INTRAVENOUS at 05:06

## 2018-01-01 RX ADMIN — GLYCOPYRROLATE 0.4 MG: 0.2 INJECTION, SOLUTION INTRAMUSCULAR; INTRAVENOUS at 21:46

## 2018-01-01 RX ADMIN — MORPHINE SULFATE 4 MG: 4 INJECTION, SOLUTION INTRAMUSCULAR; INTRAVENOUS at 17:21

## 2018-01-01 RX ADMIN — MORPHINE SULFATE 4 MG: 4 INJECTION INTRAVENOUS at 09:39

## 2018-01-01 RX ADMIN — MORPHINE SULFATE 15 MG: 15 TABLET ORAL at 03:20

## 2018-01-01 RX ADMIN — LORAZEPAM 1 MG: 2 INJECTION INTRAMUSCULAR; INTRAVENOUS at 17:21

## 2018-01-01 RX ADMIN — GABAPENTIN 100 MG: 100 CAPSULE ORAL at 21:03

## 2018-01-01 RX ADMIN — SUCRALFATE 1 G: 1 SUSPENSION ORAL at 17:04

## 2018-01-01 RX ADMIN — MORPHINE SULFATE 15 MG: 100 SOLUTION ORAL at 03:44

## 2018-01-01 RX ADMIN — IPRATROPIUM BROMIDE AND ALBUTEROL SULFATE 3 ML: 2.5; .5 SOLUTION RESPIRATORY (INHALATION) at 19:59

## 2018-01-01 RX ADMIN — DEXTROSE MONOHYDRATE 100 ML/HR: 50 INJECTION, SOLUTION INTRAVENOUS at 16:54

## 2018-01-01 RX ADMIN — LORAZEPAM 1 MG: 2 INJECTION INTRAMUSCULAR; INTRAVENOUS at 04:43

## 2018-01-01 RX ADMIN — LANSOPRAZOLE 30 MG: KIT at 19:12

## 2018-01-01 RX ADMIN — SCOPALAMINE 1 PATCH: 1 PATCH, EXTENDED RELEASE TRANSDERMAL at 17:04

## 2018-01-01 RX ADMIN — ACETAMINOPHEN 650 MG: 325 TABLET, FILM COATED ORAL at 00:39

## 2018-01-01 RX ADMIN — IPRATROPIUM BROMIDE AND ALBUTEROL SULFATE 3 ML: 2.5; .5 SOLUTION RESPIRATORY (INHALATION) at 06:37

## 2018-01-01 RX ADMIN — SUCRALFATE 1 G: 1 SUSPENSION ORAL at 09:11

## 2018-01-01 RX ADMIN — IPRATROPIUM BROMIDE AND ALBUTEROL SULFATE 3 ML: 2.5; .5 SOLUTION RESPIRATORY (INHALATION) at 08:45

## 2018-01-01 RX ADMIN — NYSTATIN 1 APPLICATION: 100000 POWDER TOPICAL at 21:42

## 2018-01-01 RX ADMIN — GLYCOPYRROLATE 0.4 MG: 0.2 INJECTION, SOLUTION INTRAMUSCULAR; INTRAVENOUS at 05:03

## 2018-01-01 RX ADMIN — MORPHINE SULFATE 4 MG: 4 INJECTION, SOLUTION INTRAMUSCULAR; INTRAVENOUS at 01:01

## 2018-01-01 RX ADMIN — IPRATROPIUM BROMIDE AND ALBUTEROL SULFATE 3 ML: 2.5; .5 SOLUTION RESPIRATORY (INHALATION) at 08:29

## 2018-01-01 RX ADMIN — MORPHINE SULFATE 4 MG: 4 INJECTION, SOLUTION INTRAMUSCULAR; INTRAVENOUS at 05:03

## 2018-01-01 RX ADMIN — LEVOFLOXACIN 500 MG: 500 TABLET, FILM COATED ORAL at 20:52

## 2018-01-01 RX ADMIN — SUCRALFATE 1 G: 1 SUSPENSION ORAL at 13:40

## 2018-01-01 RX ADMIN — IPRATROPIUM BROMIDE AND ALBUTEROL SULFATE 3 ML: 2.5; .5 SOLUTION RESPIRATORY (INHALATION) at 12:41

## 2018-01-01 RX ADMIN — HYDROCODONE BITARTRATE AND ACETAMINOPHEN 1 TABLET: 5; 325 TABLET ORAL at 17:40

## 2018-01-01 RX ADMIN — MORPHINE SULFATE 4 MG: 4 INJECTION, SOLUTION INTRAMUSCULAR; INTRAVENOUS at 05:17

## 2018-01-01 RX ADMIN — POTASSIUM CHLORIDE 40 MEQ: 750 CAPSULE, EXTENDED RELEASE ORAL at 17:29

## 2018-01-01 RX ADMIN — GLYCOPYRROLATE 0.4 MG: 0.2 INJECTION, SOLUTION INTRAMUSCULAR; INTRAVENOUS at 20:30

## 2018-01-01 RX ADMIN — CEFEPIME HYDROCHLORIDE 2 G: 2 INJECTION, POWDER, FOR SOLUTION INTRAVENOUS at 04:35

## 2018-01-01 RX ADMIN — IPRATROPIUM BROMIDE AND ALBUTEROL SULFATE 3 ML: 2.5; .5 SOLUTION RESPIRATORY (INHALATION) at 07:14

## 2018-01-01 RX ADMIN — PANTOPRAZOLE SODIUM 40 MG: 40 INJECTION, POWDER, FOR SOLUTION INTRAVENOUS at 13:15

## 2018-01-01 RX ADMIN — SODIUM CHLORIDE 500 ML: 9 INJECTION, SOLUTION INTRAVENOUS at 12:18

## 2018-01-01 RX ADMIN — MORPHINE SULFATE 4 MG: 4 INJECTION, SOLUTION INTRAMUSCULAR; INTRAVENOUS at 09:19

## 2018-01-01 RX ADMIN — MORPHINE SULFATE 4 MG: 4 INJECTION, SOLUTION INTRAMUSCULAR; INTRAVENOUS at 00:30

## 2018-01-01 RX ADMIN — MUPIROCIN 1 APPLICATION: 20 OINTMENT TOPICAL at 21:04

## 2018-01-01 RX ADMIN — MORPHINE SULFATE 15 MG: 100 SOLUTION ORAL at 08:01

## 2018-01-01 RX ADMIN — SUCRALFATE 1 G: 1 SUSPENSION ORAL at 19:11

## 2018-01-01 RX ADMIN — MORPHINE SULFATE 4 MG: 4 INJECTION, SOLUTION INTRAMUSCULAR; INTRAVENOUS at 15:14

## 2018-01-01 RX ADMIN — GLYCOPYRROLATE 0.4 MG: 0.2 INJECTION, SOLUTION INTRAMUSCULAR; INTRAVENOUS at 09:23

## 2018-01-01 RX ADMIN — MORPHINE SULFATE 4 MG: 4 INJECTION, SOLUTION INTRAMUSCULAR; INTRAVENOUS at 09:24

## 2018-01-01 RX ADMIN — GLYCOPYRROLATE 0.4 MG: 0.2 INJECTION, SOLUTION INTRAMUSCULAR; INTRAVENOUS at 17:19

## 2018-01-01 RX ADMIN — LORAZEPAM 1 MG: 2 INJECTION INTRAMUSCULAR; INTRAVENOUS at 11:55

## 2018-01-01 RX ADMIN — LANSOPRAZOLE 30 MG: KIT at 20:52

## 2018-01-01 RX ADMIN — NYSTATIN 1 APPLICATION: 100000 POWDER TOPICAL at 08:43

## 2018-01-01 RX ADMIN — SUCRALFATE 1 G: 1 SUSPENSION ORAL at 17:40

## 2018-01-01 RX ADMIN — SODIUM CHLORIDE 8 MG/HR: 900 INJECTION INTRAVENOUS at 10:07

## 2018-01-01 RX ADMIN — LORAZEPAM 0.5 MG: 2 INJECTION INTRAMUSCULAR; INTRAVENOUS at 09:34

## 2018-01-01 RX ADMIN — MORPHINE SULFATE 4 MG: 4 INJECTION, SOLUTION INTRAMUSCULAR; INTRAVENOUS at 17:17

## 2018-01-01 RX ADMIN — ONDANSETRON 4 MG: 2 INJECTION INTRAMUSCULAR; INTRAVENOUS at 00:33

## 2018-01-01 RX ADMIN — MORPHINE SULFATE 4 MG: 4 INJECTION, SOLUTION INTRAMUSCULAR; INTRAVENOUS at 21:26

## 2018-01-01 RX ADMIN — MORPHINE SULFATE 4 MG: 4 INJECTION, SOLUTION INTRAMUSCULAR; INTRAVENOUS at 00:52

## 2018-01-01 RX ADMIN — MORPHINE SULFATE 15 MG: 15 TABLET ORAL at 08:43

## 2018-01-01 RX ADMIN — MINERAL SUPPLEMENT IRON 300 MG / 5 ML STRENGTH LIQUID 100 PER BOX UNFLAVORED 300 MG: at 09:35

## 2018-01-01 RX ADMIN — LORAZEPAM 0.5 MG: 0.5 TABLET ORAL at 21:21

## 2018-01-01 RX ADMIN — GLYCOPYRROLATE 0.4 MG: 0.2 INJECTION, SOLUTION INTRAMUSCULAR; INTRAVENOUS at 16:40

## 2018-01-01 RX ADMIN — CEFEPIME HYDROCHLORIDE 2 G: 2 INJECTION, POWDER, FOR SOLUTION INTRAVENOUS at 05:00

## 2018-01-01 RX ADMIN — LORAZEPAM 1 MG: 2 INJECTION INTRAMUSCULAR; INTRAVENOUS at 13:10

## 2018-01-01 RX ADMIN — IPRATROPIUM BROMIDE AND ALBUTEROL SULFATE 3 ML: 2.5; .5 SOLUTION RESPIRATORY (INHALATION) at 21:10

## 2018-01-01 RX ADMIN — MORPHINE SULFATE 4 MG: 4 INJECTION INTRAVENOUS at 13:14

## 2018-01-01 RX ADMIN — MORPHINE SULFATE 4 MG: 4 INJECTION INTRAVENOUS at 08:26

## 2018-01-01 RX ADMIN — SUCRALFATE 1 G: 1 SUSPENSION ORAL at 20:28

## 2018-01-01 RX ADMIN — PANTOPRAZOLE SODIUM 40 MG: 40 INJECTION, POWDER, FOR SOLUTION INTRAVENOUS at 13:55

## 2018-01-01 RX ADMIN — LEVOFLOXACIN 500 MG: 500 TABLET, FILM COATED ORAL at 21:36

## 2018-01-01 RX ADMIN — MORPHINE SULFATE 4 MG: 4 INJECTION, SOLUTION INTRAMUSCULAR; INTRAVENOUS at 04:39

## 2018-01-01 RX ADMIN — POTASSIUM CHLORIDE 40 MEQ: 1.5 POWDER, FOR SOLUTION ORAL at 13:55

## 2018-01-01 RX ADMIN — SODIUM CHLORIDE 100 ML/HR: 9 INJECTION, SOLUTION INTRAVENOUS at 19:07

## 2018-01-01 RX ADMIN — MORPHINE SULFATE 4 MG: 4 INJECTION, SOLUTION INTRAMUSCULAR; INTRAVENOUS at 21:52

## 2018-01-01 RX ADMIN — GLYCOPYRROLATE 0.4 MG: 0.2 INJECTION, SOLUTION INTRAMUSCULAR; INTRAVENOUS at 04:30

## 2018-01-01 RX ADMIN — MORPHINE SULFATE 4 MG: 4 INJECTION, SOLUTION INTRAMUSCULAR; INTRAVENOUS at 17:03

## 2018-01-01 RX ADMIN — MORPHINE SULFATE 4 MG: 4 INJECTION INTRAVENOUS at 18:21

## 2018-01-01 RX ADMIN — IPRATROPIUM BROMIDE AND ALBUTEROL SULFATE 3 ML: 2.5; .5 SOLUTION RESPIRATORY (INHALATION) at 19:17

## 2018-01-01 RX ADMIN — HYDROMORPHONE HYDROCHLORIDE 0.5 MG: 1 INJECTION, SOLUTION INTRAMUSCULAR; INTRAVENOUS; SUBCUTANEOUS at 08:45

## 2018-01-01 RX ADMIN — DEXTROSE AND SODIUM CHLORIDE 75 ML/HR: 5; 450 INJECTION, SOLUTION INTRAVENOUS at 06:47

## 2018-01-01 RX ADMIN — SUCRALFATE 1 G: 1 SUSPENSION ORAL at 09:32

## 2018-01-01 RX ADMIN — GLYCOPYRROLATE 0.2 MG: 0.2 INJECTION, SOLUTION INTRAMUSCULAR; INTRAVENOUS at 17:05

## 2018-01-01 RX ADMIN — HYDROCODONE BITARTRATE AND ACETAMINOPHEN 1 TABLET: 5; 325 TABLET ORAL at 09:12

## 2018-01-01 RX ADMIN — HYDROCODONE BITARTRATE AND ACETAMINOPHEN 1 TABLET: 5; 325 TABLET ORAL at 18:06

## 2018-01-01 RX ADMIN — MINERAL SUPPLEMENT IRON 300 MG / 5 ML STRENGTH LIQUID 100 PER BOX UNFLAVORED 300 MG: at 09:11

## 2018-01-01 RX ADMIN — MORPHINE SULFATE 15 MG: 100 SOLUTION ORAL at 04:21

## 2018-01-01 RX ADMIN — MORPHINE SULFATE 4 MG: 4 INJECTION, SOLUTION INTRAMUSCULAR; INTRAVENOUS at 01:19

## 2018-01-01 RX ADMIN — CEFEPIME HYDROCHLORIDE 2 G: 2 INJECTION, POWDER, FOR SOLUTION INTRAVENOUS at 04:31

## 2018-01-01 RX ADMIN — MINERAL SUPPLEMENT IRON 300 MG / 5 ML STRENGTH LIQUID 100 PER BOX UNFLAVORED 300 MG: at 08:45

## 2018-01-01 RX ADMIN — SCOPALAMINE 1 PATCH: 1 PATCH, EXTENDED RELEASE TRANSDERMAL at 13:44

## 2018-01-01 RX ADMIN — GABAPENTIN 100 MG: 100 CAPSULE ORAL at 09:58

## 2018-01-01 RX ADMIN — MORPHINE SULFATE 4 MG: 4 INJECTION, SOLUTION INTRAMUSCULAR; INTRAVENOUS at 16:48

## 2018-01-01 RX ADMIN — HALOPERIDOL LACTATE 1 MG: 5 INJECTION, SOLUTION INTRAMUSCULAR at 05:37

## 2018-01-01 RX ADMIN — LORAZEPAM 0.5 MG: 2 INJECTION INTRAMUSCULAR; INTRAVENOUS at 17:20

## 2018-01-01 RX ADMIN — LORAZEPAM 0.5 MG: 2 INJECTION INTRAMUSCULAR; INTRAVENOUS at 13:08

## 2018-01-01 RX ADMIN — PANTOPRAZOLE SODIUM 40 MG: 40 INJECTION, POWDER, FOR SOLUTION INTRAVENOUS at 06:34

## 2018-01-01 RX ADMIN — DEXTROSE AND SODIUM CHLORIDE 100 ML/HR: 5; 900 INJECTION, SOLUTION INTRAVENOUS at 16:26

## 2018-01-01 RX ADMIN — GLYCOPYRROLATE 0.2 MG: 0.2 INJECTION, SOLUTION INTRAMUSCULAR; INTRAVENOUS at 13:44

## 2018-01-01 RX ADMIN — SODIUM CHLORIDE 100 ML/HR: 9 INJECTION, SOLUTION INTRAVENOUS at 22:12

## 2018-01-01 RX ADMIN — LANSOPRAZOLE 30 MG: KIT at 09:07

## 2018-01-01 RX ADMIN — SODIUM BICARBONATE 50 MEQ: 84 INJECTION, SOLUTION INTRAVENOUS at 04:39

## 2018-01-01 RX ADMIN — PANTOPRAZOLE SODIUM 40 MG: 40 INJECTION, POWDER, FOR SOLUTION INTRAVENOUS at 05:02

## 2018-01-01 RX ADMIN — MUPIROCIN 1 APPLICATION: 20 OINTMENT TOPICAL at 09:58

## 2018-01-01 RX ADMIN — LORAZEPAM 1 MG: 2 INJECTION INTRAMUSCULAR; INTRAVENOUS at 05:04

## 2018-01-01 RX ADMIN — SUCRALFATE 1 G: 1 SUSPENSION ORAL at 18:06

## 2018-01-01 RX ADMIN — MORPHINE SULFATE 4 MG: 4 INJECTION INTRAVENOUS at 12:48

## 2018-01-01 RX ADMIN — GLYCOPYRROLATE 0.2 MG: 0.2 INJECTION, SOLUTION INTRAMUSCULAR; INTRAVENOUS at 04:51

## 2018-01-01 RX ADMIN — GLYCOPYRROLATE 0.4 MG: 0.2 INJECTION, SOLUTION INTRAMUSCULAR; INTRAVENOUS at 12:48

## 2018-01-01 RX ADMIN — LORAZEPAM 1 MG: 2 INJECTION INTRAMUSCULAR; INTRAVENOUS at 21:26

## 2018-01-01 RX ADMIN — MORPHINE SULFATE 4 MG: 4 INJECTION, SOLUTION INTRAMUSCULAR; INTRAVENOUS at 18:10

## 2018-01-01 RX ADMIN — IPRATROPIUM BROMIDE AND ALBUTEROL SULFATE 3 ML: 2.5; .5 SOLUTION RESPIRATORY (INHALATION) at 08:17

## 2018-01-01 RX ADMIN — MORPHINE SULFATE 4 MG: 4 INJECTION, SOLUTION INTRAMUSCULAR; INTRAVENOUS at 09:10

## 2018-01-01 RX ADMIN — SUCRALFATE 1 G: 1 SUSPENSION ORAL at 09:34

## 2018-01-01 RX ADMIN — GLYCOPYRROLATE 0.4 MG: 0.2 INJECTION, SOLUTION INTRAMUSCULAR; INTRAVENOUS at 21:25

## 2018-01-01 RX ADMIN — LORAZEPAM 1 MG: 2 INJECTION INTRAMUSCULAR; INTRAVENOUS at 05:54

## 2018-01-01 RX ADMIN — POTASSIUM CHLORIDE 40 MEQ: 1.5 POWDER, FOR SOLUTION ORAL at 09:08

## 2018-01-01 RX ADMIN — SODIUM CHLORIDE 8 MG/HR: 900 INJECTION INTRAVENOUS at 15:57

## 2018-01-01 RX ADMIN — POTASSIUM CHLORIDE 40 MEQ: 750 CAPSULE, EXTENDED RELEASE ORAL at 15:49

## 2018-01-01 RX ADMIN — GLYCOPYRROLATE 0.4 MG: 0.2 INJECTION, SOLUTION INTRAMUSCULAR; INTRAVENOUS at 04:52

## 2018-01-01 RX ADMIN — IPRATROPIUM BROMIDE AND ALBUTEROL SULFATE 3 ML: 2.5; .5 SOLUTION RESPIRATORY (INHALATION) at 11:37

## 2018-01-01 RX ADMIN — NYSTATIN: 100000 POWDER TOPICAL at 09:34

## 2018-01-01 RX ADMIN — VANCOMYCIN HYDROCHLORIDE 1000 MG: 1 INJECTION, SOLUTION INTRAVENOUS at 16:05

## 2018-01-01 RX ADMIN — LORAZEPAM 1 MG: 2 INJECTION INTRAMUSCULAR; INTRAVENOUS at 09:29

## 2018-01-01 RX ADMIN — GLYCOPYRROLATE 0.4 MG: 0.2 INJECTION, SOLUTION INTRAMUSCULAR; INTRAVENOUS at 09:19

## 2018-01-01 RX ADMIN — MORPHINE SULFATE 15 MG: 15 TABLET ORAL at 22:53

## 2018-01-01 RX ADMIN — VANCOMYCIN HYDROCHLORIDE 1000 MG: 1 INJECTION, SOLUTION INTRAVENOUS at 05:49

## 2018-01-01 RX ADMIN — PANTOPRAZOLE SODIUM 40 MG: 40 INJECTION, POWDER, FOR SOLUTION INTRAVENOUS at 02:58

## 2018-01-01 RX ADMIN — HYDROMORPHONE HYDROCHLORIDE 0.5 MG: 1 INJECTION, SOLUTION INTRAMUSCULAR; INTRAVENOUS; SUBCUTANEOUS at 16:51

## 2018-01-01 RX ADMIN — IPRATROPIUM BROMIDE AND ALBUTEROL SULFATE 3 ML: 2.5; .5 SOLUTION RESPIRATORY (INHALATION) at 10:31

## 2018-01-01 RX ADMIN — SODIUM CHLORIDE 100 ML/HR: 9 INJECTION, SOLUTION INTRAVENOUS at 09:00

## 2018-01-01 RX ADMIN — IPRATROPIUM BROMIDE AND ALBUTEROL SULFATE 3 ML: 2.5; .5 SOLUTION RESPIRATORY (INHALATION) at 16:23

## 2018-01-01 RX ADMIN — IPRATROPIUM BROMIDE AND ALBUTEROL SULFATE 3 ML: 2.5; .5 SOLUTION RESPIRATORY (INHALATION) at 14:26

## 2018-01-01 RX ADMIN — IPRATROPIUM BROMIDE AND ALBUTEROL SULFATE 3 ML: 2.5; .5 SOLUTION RESPIRATORY (INHALATION) at 07:33

## 2018-01-01 RX ADMIN — IPRATROPIUM BROMIDE AND ALBUTEROL SULFATE 3 ML: 2.5; .5 SOLUTION RESPIRATORY (INHALATION) at 12:17

## 2018-01-01 RX ADMIN — IPRATROPIUM BROMIDE AND ALBUTEROL SULFATE 3 ML: 2.5; .5 SOLUTION RESPIRATORY (INHALATION) at 10:36

## 2018-01-01 RX ADMIN — DOCUSATE SODIUM -SENNOSIDES 1 TABLET: 50; 8.6 TABLET, COATED ORAL at 08:43

## 2018-01-01 RX ADMIN — SUCRALFATE 1 G: 1 SUSPENSION ORAL at 13:21

## 2018-01-01 RX ADMIN — LORAZEPAM 1 MG: 2 INJECTION INTRAMUSCULAR; INTRAVENOUS at 13:15

## 2018-01-01 RX ADMIN — SODIUM CHLORIDE 8 MG/HR: 900 INJECTION INTRAVENOUS at 03:44

## 2018-01-01 RX ADMIN — LANSOPRAZOLE 30 MG: KIT at 17:29

## 2018-01-01 RX ADMIN — MORPHINE SULFATE 4 MG: 4 INJECTION INTRAVENOUS at 13:08

## 2018-01-01 RX ADMIN — LORAZEPAM 0.5 MG: 2 INJECTION INTRAMUSCULAR; INTRAVENOUS at 10:57

## 2018-01-01 RX ADMIN — MORPHINE SULFATE 15 MG: 100 SOLUTION ORAL at 07:10

## 2018-01-01 RX ADMIN — PROPOFOL 135 MG: 10 INJECTION, EMULSION INTRAVENOUS at 13:19

## 2018-01-01 RX ADMIN — LORAZEPAM 0.5 MG: 2 INJECTION INTRAMUSCULAR; INTRAVENOUS at 20:20

## 2018-01-01 RX ADMIN — LORAZEPAM 0.5 MG: 2 INJECTION INTRAMUSCULAR; INTRAVENOUS at 08:45

## 2018-01-01 RX ADMIN — SUCRALFATE 1 G: 1 SUSPENSION ORAL at 13:12

## 2018-01-01 RX ADMIN — HYDROCODONE BITARTRATE AND ACETAMINOPHEN 1 TABLET: 5; 325 TABLET ORAL at 13:16

## 2018-01-01 RX ADMIN — PROPOFOL 100 MCG/KG/MIN: 10 INJECTION, EMULSION INTRAVENOUS at 13:42

## 2018-01-01 RX ADMIN — LANSOPRAZOLE 30 MG: KIT at 17:55

## 2018-01-01 RX ADMIN — LORAZEPAM 1 MG: 1 TABLET ORAL at 03:44

## 2018-01-01 RX ADMIN — SODIUM CHLORIDE 8 MG/HR: 900 INJECTION INTRAVENOUS at 12:52

## 2018-01-01 RX ADMIN — SODIUM CHLORIDE 8 MG/HR: 900 INJECTION INTRAVENOUS at 00:37

## 2018-01-01 RX ADMIN — IPRATROPIUM BROMIDE AND ALBUTEROL SULFATE 3 ML: 2.5; .5 SOLUTION RESPIRATORY (INHALATION) at 07:41

## 2018-01-01 RX ADMIN — SODIUM CHLORIDE 8 MG/HR: 900 INJECTION INTRAVENOUS at 23:06

## 2018-01-01 RX ADMIN — POTASSIUM CHLORIDE 40 MEQ: 750 CAPSULE, EXTENDED RELEASE ORAL at 11:56

## 2018-01-01 RX ADMIN — GLYCOPYRROLATE 0.4 MG: 0.2 INJECTION, SOLUTION INTRAMUSCULAR; INTRAVENOUS at 08:49

## 2018-01-01 RX ADMIN — LORAZEPAM 1 MG: 2 INJECTION INTRAMUSCULAR; INTRAVENOUS at 12:09

## 2018-01-01 RX ADMIN — MUPIROCIN 1 APPLICATION: 20 OINTMENT TOPICAL at 09:59

## 2018-01-01 RX ADMIN — MORPHINE SULFATE 4 MG: 4 INJECTION, SOLUTION INTRAMUSCULAR; INTRAVENOUS at 11:48

## 2018-01-01 RX ADMIN — SUCRALFATE 1 G: 1 SUSPENSION ORAL at 21:40

## 2018-01-01 RX ADMIN — SODIUM CHLORIDE 8 MG/HR: 900 INJECTION INTRAVENOUS at 09:04

## 2018-01-01 RX ADMIN — DOCUSATE SODIUM -SENNOSIDES 1 TABLET: 50; 8.6 TABLET, COATED ORAL at 09:58

## 2018-01-01 RX ADMIN — Medication 400 MG: at 13:12

## 2018-01-01 RX ADMIN — IPRATROPIUM BROMIDE AND ALBUTEROL SULFATE 3 ML: 2.5; .5 SOLUTION RESPIRATORY (INHALATION) at 16:15

## 2018-01-01 RX ADMIN — MORPHINE SULFATE 4 MG: 4 INJECTION, SOLUTION INTRAMUSCULAR; INTRAVENOUS at 21:46

## 2018-01-01 RX ADMIN — MORPHINE SULFATE 4 MG: 4 INJECTION, SOLUTION INTRAMUSCULAR; INTRAVENOUS at 20:31

## 2018-01-01 RX ADMIN — MORPHINE SULFATE 15 MG: 100 SOLUTION ORAL at 21:03

## 2018-01-01 RX ADMIN — SUCRALFATE 1 G: 1 SUSPENSION ORAL at 08:45

## 2018-01-01 RX ADMIN — MORPHINE SULFATE 4 MG: 4 INJECTION, SOLUTION INTRAMUSCULAR; INTRAVENOUS at 10:57

## 2018-01-01 RX ADMIN — PANTOPRAZOLE SODIUM 40 MG: 40 INJECTION, POWDER, FOR SOLUTION INTRAVENOUS at 11:23

## 2018-01-01 RX ADMIN — GLYCOPYRROLATE 0.4 MG: 0.2 INJECTION, SOLUTION INTRAMUSCULAR; INTRAVENOUS at 13:09

## 2018-01-01 RX ADMIN — SODIUM CHLORIDE 8 MG/HR: 900 INJECTION INTRAVENOUS at 22:38

## 2018-01-01 RX ADMIN — IPRATROPIUM BROMIDE AND ALBUTEROL SULFATE 3 ML: 2.5; .5 SOLUTION RESPIRATORY (INHALATION) at 20:13

## 2018-01-01 RX ADMIN — SODIUM CHLORIDE 8 MG/HR: 900 INJECTION INTRAVENOUS at 18:10

## 2018-01-01 RX ADMIN — SODIUM CHLORIDE 100 ML/HR: 9 INJECTION, SOLUTION INTRAVENOUS at 05:50

## 2018-01-01 RX ADMIN — PANTOPRAZOLE SODIUM 40 MG: 40 INJECTION, POWDER, FOR SOLUTION INTRAVENOUS at 04:31

## 2018-01-01 RX ADMIN — SUCRALFATE 1 G: 1 SUSPENSION ORAL at 07:53

## 2018-01-01 RX ADMIN — IPRATROPIUM BROMIDE AND ALBUTEROL SULFATE 3 ML: 2.5; .5 SOLUTION RESPIRATORY (INHALATION) at 09:17

## 2018-01-01 RX ADMIN — SUCRALFATE 1 G: 1 SUSPENSION ORAL at 09:02

## 2018-01-01 RX ADMIN — GLYCOPYRROLATE 0.4 MG: 0.2 INJECTION, SOLUTION INTRAMUSCULAR; INTRAVENOUS at 17:02

## 2018-01-01 RX ADMIN — SUCRALFATE 1 G: 1 SUSPENSION ORAL at 21:20

## 2018-01-01 RX ADMIN — GLYCOPYRROLATE 0.2 MG: 0.2 INJECTION, SOLUTION INTRAMUSCULAR; INTRAVENOUS at 13:10

## 2018-01-01 RX ADMIN — MORPHINE SULFATE 4 MG: 4 INJECTION INTRAVENOUS at 07:59

## 2018-01-01 RX ADMIN — MORPHINE SULFATE 4 MG: 4 INJECTION, SOLUTION INTRAMUSCULAR; INTRAVENOUS at 09:30

## 2018-01-01 RX ADMIN — SUCRALFATE 1 G: 1 SUSPENSION ORAL at 21:34

## 2018-01-01 RX ADMIN — SUCRALFATE 1 G: 1 SUSPENSION ORAL at 18:30

## 2018-01-01 RX ADMIN — SUCRALFATE 1 G: 1 SUSPENSION ORAL at 13:15

## 2018-01-01 RX ADMIN — GLYCOPYRROLATE 0.4 MG: 0.2 INJECTION, SOLUTION INTRAMUSCULAR; INTRAVENOUS at 20:34

## 2018-01-01 RX ADMIN — HYDROCODONE BITARTRATE AND ACETAMINOPHEN 1 TABLET: 5; 325 TABLET ORAL at 13:55

## 2018-01-01 RX ADMIN — GLYCOPYRROLATE 0.4 MG: 0.2 INJECTION, SOLUTION INTRAMUSCULAR; INTRAVENOUS at 05:17

## 2018-01-01 RX ADMIN — IPRATROPIUM BROMIDE AND ALBUTEROL SULFATE 3 ML: 2.5; .5 SOLUTION RESPIRATORY (INHALATION) at 15:43

## 2018-01-01 RX ADMIN — GLYCOPYRROLATE 0.4 MG: 0.2 INJECTION, SOLUTION INTRAMUSCULAR; INTRAVENOUS at 00:52

## 2018-01-01 RX ADMIN — IPRATROPIUM BROMIDE AND ALBUTEROL SULFATE 3 ML: 2.5; .5 SOLUTION RESPIRATORY (INHALATION) at 19:55

## 2018-01-01 RX ADMIN — MINERAL SUPPLEMENT IRON 300 MG / 5 ML STRENGTH LIQUID 100 PER BOX UNFLAVORED 300 MG: at 09:32

## 2018-01-01 RX ADMIN — SUCRALFATE 1 G: 1 SUSPENSION ORAL at 21:21

## 2018-01-01 RX ADMIN — PANTOPRAZOLE SODIUM 40 MG: 40 INJECTION, POWDER, FOR SOLUTION INTRAVENOUS at 10:08

## 2018-01-01 RX ADMIN — PANTOPRAZOLE SODIUM 40 MG: 40 INJECTION, POWDER, FOR SOLUTION INTRAVENOUS at 17:04

## 2018-01-01 RX ADMIN — MORPHINE SULFATE 4 MG: 4 INJECTION INTRAVENOUS at 09:34

## 2018-01-01 RX ADMIN — GLYCOPYRROLATE 0.4 MG: 0.2 INJECTION, SOLUTION INTRAMUSCULAR; INTRAVENOUS at 20:39

## 2018-01-01 RX ADMIN — MORPHINE SULFATE 4 MG: 4 INJECTION INTRAVENOUS at 00:43

## 2018-01-01 RX ADMIN — LORAZEPAM 1 MG: 2 INJECTION INTRAMUSCULAR; INTRAVENOUS at 09:21

## 2018-01-01 RX ADMIN — LORAZEPAM 1 MG: 1 TABLET ORAL at 23:31

## 2018-01-01 RX ADMIN — MORPHINE SULFATE 4 MG: 4 INJECTION INTRAVENOUS at 17:36

## 2018-01-01 RX ADMIN — MORPHINE SULFATE 4 MG: 4 INJECTION INTRAVENOUS at 20:52

## 2018-01-01 RX ADMIN — MORPHINE SULFATE 15 MG: 100 SOLUTION ORAL at 21:41

## 2018-01-01 RX ADMIN — SUCRALFATE 1 G: 1 SUSPENSION ORAL at 17:55

## 2018-01-01 RX ADMIN — PANTOPRAZOLE SODIUM 80 MG: 40 INJECTION, POWDER, FOR SOLUTION INTRAVENOUS at 12:22

## 2018-01-01 RX ADMIN — MORPHINE SULFATE 4 MG: 4 INJECTION, SOLUTION INTRAMUSCULAR; INTRAVENOUS at 16:40

## 2018-01-01 RX ADMIN — LORAZEPAM 1 MG: 2 INJECTION INTRAMUSCULAR; INTRAVENOUS at 12:48

## 2018-01-01 RX ADMIN — HYDROMORPHONE HYDROCHLORIDE 0.5 MG: 1 INJECTION, SOLUTION INTRAMUSCULAR; INTRAVENOUS; SUBCUTANEOUS at 21:33

## 2018-01-01 RX ADMIN — CEFTRIAXONE SODIUM 1 G: 1 INJECTION, SOLUTION INTRAVENOUS at 13:23

## 2018-01-01 RX ADMIN — MORPHINE SULFATE 4 MG: 4 INJECTION, SOLUTION INTRAMUSCULAR; INTRAVENOUS at 09:13

## 2018-01-01 RX ADMIN — GLYCOPYRROLATE 0.4 MG: 0.2 INJECTION, SOLUTION INTRAMUSCULAR; INTRAVENOUS at 08:43

## 2018-01-01 RX ADMIN — ACETAMINOPHEN 640 MG: 325 SOLUTION ORAL at 15:58

## 2018-01-01 RX ADMIN — LORAZEPAM 1 MG: 2 INJECTION INTRAMUSCULAR; INTRAVENOUS at 05:17

## 2018-01-01 RX ADMIN — CEFEPIME HYDROCHLORIDE 2 G: 2 INJECTION, POWDER, FOR SOLUTION INTRAVENOUS at 16:42

## 2018-01-01 RX ADMIN — LORAZEPAM 0.5 MG: 2 INJECTION INTRAMUSCULAR; INTRAVENOUS at 13:35

## 2018-01-01 RX ADMIN — NYSTATIN 1 APPLICATION: 100000 POWDER TOPICAL at 21:03

## 2018-01-01 RX ADMIN — LORAZEPAM 1 MG: 2 INJECTION INTRAMUSCULAR; INTRAVENOUS at 08:26

## 2018-01-01 RX ADMIN — MORPHINE SULFATE 4 MG: 4 INJECTION INTRAVENOUS at 16:57

## 2018-01-01 RX ADMIN — LORAZEPAM 1 MG: 2 INJECTION INTRAMUSCULAR; INTRAVENOUS at 08:48

## 2018-01-01 RX ADMIN — SODIUM POLYSTYRENE SULFONATE 30 G: 15 SUSPENSION ORAL; RECTAL at 04:45

## 2018-01-01 RX ADMIN — MORPHINE SULFATE 4 MG: 4 INJECTION, SOLUTION INTRAMUSCULAR; INTRAVENOUS at 04:30

## 2018-01-01 RX ADMIN — MORPHINE SULFATE 4 MG: 4 INJECTION, SOLUTION INTRAMUSCULAR; INTRAVENOUS at 08:49

## 2018-01-01 RX ADMIN — SUCRALFATE 1 G: 1 SUSPENSION ORAL at 21:37

## 2018-01-01 RX ADMIN — IPRATROPIUM BROMIDE AND ALBUTEROL SULFATE 3 ML: 2.5; .5 SOLUTION RESPIRATORY (INHALATION) at 16:42

## 2018-01-01 RX ADMIN — PANTOPRAZOLE SODIUM 40 MG: 40 INJECTION, POWDER, FOR SOLUTION INTRAVENOUS at 19:07

## 2018-01-01 RX ADMIN — SODIUM CHLORIDE 100 ML/HR: 4.5 INJECTION, SOLUTION INTRAVENOUS at 20:27

## 2018-02-01 NOTE — ED PROVIDER NOTES
EMERGENCY DEPARTMENT ENCOUNTER    CHIEF COMPLAINT  Chief Complaint: g-tube malfunction  History given by: patient, nursing home   History limited by: N/A  Room Number: 37/37  PMD: Leonor Echevarria MD      HPI:  Pt is a 83 y.o. female who has hx of dysphagia due to which she has g-tube in place. Per nursing home, pt presents with clogged and leaking g-tube in central abdomen that started yesterday. Nursing home staff were unable to flush the g-tube. Pt denies nausea, vomiting, fevers, and abd pain. Past Medical History of dysphagia, COPD, and atrial fibrillation.     Duration: started yesterday  Timing: constant  Location: g-tube in central abdomen  Radiation: none  Quality: clogged and leaking  Intensity/Severity: moderate  Progression: unchanged   Associated Symptoms: clogged and leaking g-tube  Aggravating Factors: none  Alleviating Factors: none  Previous Episodes: none mentioned  Treatment before arrival: Nursing home staff states that they were unable to flush g-tube.     PAST MEDICAL HISTORY  Active Ambulatory Problems     Diagnosis Date Noted   • Atrial fibrillation 08/08/2017   • COPD (chronic obstructive pulmonary disease) 08/08/2017   • Calculus of left kidney 08/08/2017   • Suprapubic catheter 08/11/2017   • Decubitus ulcer of coccygeal region, stage 4 (present on admission) 08/11/2017   • History of right above knee amputation 08/11/2017   • Acute UTI 12/02/2017   • Colostomy malfunction 12/02/2017   • Anemia 12/02/2017   • Abnormal urine 12/03/2017     Resolved Ambulatory Problems     Diagnosis Date Noted   • UTI (urinary tract infection) 08/08/2017   • Healthcare associated bacterial pneumonia - possibly due to gram-negative organism 08/11/2017   • Hypokalemia 08/11/2017     Past Medical History:   Diagnosis Date   • Anemia    • Atrial fibrillation    • Colitis    • COPD (chronic obstructive pulmonary disease)    • Dysphagia    • Hernia    • Hypokalemia    • Pneumonia        PAST SURGICAL  HISTORY  Past Surgical History:   Procedure Laterality Date   • ABDOMINAL SURGERY     • AMPUTATION      right leg   • COLOSTOMY     • EXPLORATORY LAPAROTOMY N/A 12/3/2017    Procedure: disimpaction of ostomy;  Surgeon: Neris Mcgee MD;  Location: [facility];  Service:        FAMILY HISTORY  History reviewed. No pertinent family history.    SOCIAL HISTORY  Social History     Social History   • Marital status:      Spouse name: N/A   • Number of children: N/A   • Years of education: N/A     Occupational History   • Not on file.     Social History Main Topics   • Smoking status: Former Smoker   • Smokeless tobacco: Never Used   • Alcohol use No   • Drug use: No   • Sexual activity: Defer     Other Topics Concern   • Not on file     Social History Narrative   • No narrative on file         ALLERGIES  Penicillins    REVIEW OF SYSTEMS  Review of Systems   Constitutional: Negative for chills and fever.   HENT: Negative for sore throat.    Respiratory: Negative for cough.    Gastrointestinal: Negative for abdominal pain, nausea and vomiting.        Clogged and leaking g-tube in central abdomen   Genitourinary: Negative for flank pain.   Musculoskeletal: Negative for back pain.   Psychiatric/Behavioral: The patient is not nervous/anxious.        PHYSICAL EXAM  ED Triage Vitals   Temp Heart Rate Resp BP SpO2   02/01/18 0927 02/01/18 0927 02/01/18 0927 02/01/18 0927 02/01/18 0927   97.5 °F (36.4 °C) 88 18 110/62 98 % WNL     Physical Exam   Constitutional: She is oriented to person, place, and time. No distress.   HENT:   Head: Normocephalic.   Mouth/Throat: Mucous membranes are normal.   Eyes: No scleral icterus.   Neck: Normal range of motion.   Cardiovascular: Normal rate, regular rhythm and normal heart sounds.    Pulmonary/Chest: Effort normal and breath sounds normal. No respiratory distress.   Abdominal: Soft. There is no tenderness. There is no rebound and no guarding.   Mild drainage from g-tube in  central abdomen; site around g-tube is clean, dry, and not erythematous; colostomy in left abdomen containing green stool; barger catheter in place   Musculoskeletal:   Right AKA   Neurological: She is alert and oriented to person, place, and time.   Skin: Skin is warm and dry.   Psychiatric: Mood and affect normal.   Nursing note and vitals reviewed.          PROCEDURES  Feeding Tube Replacement  Date/Time: 2/1/2018 12:40 PM  Performed by: JENI MADRIGAL  Authorized by: SHAHAB RENTERIA     Consent:     Consent obtained:  Verbal    Consent given by:  Patient    Risks discussed:  Pain  Pre-procedure details:     Old tube type:  Gastrostomy    Old tube size:  18 Fr  Sedation:     Sedation type: none.  Anesthesia (see MAR for exact dosages):     Anesthesia method:  None  Procedure details:     Patient position:  Supine    Procedure type:  Replacement    Tube type:  Gastrostomy    Tube size:  18 Fr    Bulb inflation volume:  20    Bulb inflation fluid:  Normal saline  Post-procedure details:     Placement/position confirmation:  Insufflation of air    Placement difficulty:  None    Bleeding:  None    Patient tolerance of procedure:  Tolerated well, no immediate complications          PROGRESS AND CONSULTS  12:03 PM- RN was able to flush g-tube without resistance but noticed that the g-tube was leaking. Will replace g-tube with new 18Fr g-tube.   12:28 PM- Reviewed pt's history and workup with Dr. Renteria.  At bedside evaluation, they agree with the plan of care.  12:40 PM- Replaced g-tube without difficulty or resistance. See procedure note for further details.   12:53 PM- Pt requests pain medication for chronic pain. Ordered percocet.   12:55 PM- Rechecked pt. She is resting comfortably and is in no acute distress. Reviewed implications of results, diagnosis, meds, responsibility to follow up, warning signs and symptoms of possible worsening, potential complications and reasons to return to ER with patient.   "Discussed all results and noted any abnormalities with patient.  Discussed absolute need to recheck abnormalities and condition with PMD. Advised pt to resume use of g-tube as previously instructed.   Discussed plan for discharge, as there is no emergent indication for admission.  Pt is agreeable and understands need for follow up and repeat testing.  Pt is aware that discharge does not mean that nothing is wrong but it indicates no emergency is present.  Pt is discharged with instructions to follow up with primary care doctor to have their blood pressure rechecked.       DIAGNOSIS  Final diagnoses:   Attention to G-tube (replacement)       FOLLOW UP   Leonor Echevarria MD  [address] [address]  [phone]    Call in 1 day          MEDICATIONS GIVEN IN ER  Medications   oxyCODONE-acetaminophen (PERCOCET) 5-325 MG per tablet 1 tablet (not administered)       /65  Pulse 88  Temp 97.5 °F (36.4 °C) (Oral)   Resp 18  Ht 157.5 cm (62\")  Wt 52.4 kg (115 lb 8 oz)  SpO2 96%  BMI 21.13 kg/m2      I personally reviewed the past medical history, past surgical history, social history, family history, current medications and allergies as they appear in this chart.  The scribe's note accurately reflects the work and decisions made by me.     Documentation assistance provided by angel Montero for TONO Ashley on 2/1/2018 at 1:00 PM. Information recorded by the scribe was done at my direction and has been verified and validated by me.     Iliana Montero  02/01/18 1318       SOHAIL Sheppard  02/01/18 1513    "

## 2018-02-01 NOTE — ED NOTES
Per nursing home, patient has pressure ulcer to sacral area.  Patient comes in today per nursing home for clogged g-tube.  Per nursing home, unable to flush.     Thierry Gomez RN  02/01/18 0995

## 2018-02-01 NOTE — ED NOTES
Pt moved to ER triage bay until ambulance transport is available.      Mansi Prado RN  02/01/18 1874

## 2018-02-01 NOTE — ED PROVIDER NOTES
Pt presents to the ED today with a leaking G-tube, which was replaced by ARIANNA Cruz. On physical exam, pt has no abdominal tenderness but has a back sore. No signs of infection present. Pt has a R AKA amputation. Otherwise, no complications. I agree with the plan to discharge the pt back to the NH.    I supervised care provided by the midlevel provider.    We have discussed this patient's history, physical exam, and treatment plan.   I have reviewed the note and personally saw and examined the patient and agree with the plan of care.    Documentation assistance provided by angel Paz for Dr. Renteria.  Information recorded by the angel was done at my direction and has been verified and validated by me.       Bob Paz  02/01/18 5880       Kory Renteria MD  02/01/18 3140

## 2018-02-01 NOTE — DISCHARGE INSTRUCTIONS
Resume use of G-tube  Follow up with pmd in 3-5 days for recheck  Return to er for fever, chills, vomiting, abdominal pain, or any new or worsening symptoms

## 2018-04-14 NOTE — ED NOTES
Assisted Dr Garcia with g-tube replacement - 18fr g-tube with 20ml balloon inserted w/out difficulty.  Attempted to flush tube - fluid infused, but unable to draw out any fluid.  Dr Garcia notified.  KUB ordered.     Krystina Merritt RN  04/14/18 0601

## 2018-04-14 NOTE — ED TRIAGE NOTES
Pt here via EMS from nursing facility.  Pt accidentally pulled out Gtube.  Pt denies pain.  A&O x4 as stated per EMS

## 2018-04-14 NOTE — ED PROVIDER NOTES
EMERGENCY DEPARTMENT ENCOUNTER    CHIEF COMPLAINT  Chief Complaint: gastrotomy tube replacement  History given by: patient  History limited by: none  Room Number: 05/05  PMD: Leonor Echevarria MD      HPI:  Pt is a 83 y.o. female who presents complaining that her g- tube had fallen out. It appears as if the balloon popped on her tube causing it to fall out. She denies any other complains    Duration:  pta  Onset: sudden  Timing: constant  Location: abdomen  Radiation: n/a  Quality: 'fallen out'  Intensity/Severity: n/a  Progression: unchanged  Associated Symptoms: denied  Aggravating Factors: n/a  Alleviating Factors: n/a  Previous Episodes: pt has had to have previous g-tube replacements  Treatment before arrival: none    PAST MEDICAL HISTORY  Active Ambulatory Problems     Diagnosis Date Noted   • Atrial fibrillation 08/08/2017   • COPD (chronic obstructive pulmonary disease) 08/08/2017   • Calculus of left kidney 08/08/2017   • Suprapubic catheter 08/11/2017   • Decubitus ulcer of coccygeal region, stage 4 (present on admission) 08/11/2017   • History of right above knee amputation 08/11/2017   • Acute UTI 12/02/2017   • Colostomy malfunction 12/02/2017   • Anemia 12/02/2017   • Abnormal urine 12/03/2017     Resolved Ambulatory Problems     Diagnosis Date Noted   • UTI (urinary tract infection) 08/08/2017   • Healthcare associated bacterial pneumonia - possibly due to gram-negative organism 08/11/2017   • Hypokalemia 08/11/2017     Past Medical History:   Diagnosis Date   • Anemia    • Atrial fibrillation    • Colitis    • COPD (chronic obstructive pulmonary disease)    • Dysphagia    • Hernia    • Hypokalemia    • Pneumonia        PAST SURGICAL HISTORY  Past Surgical History:   Procedure Laterality Date   • ABDOMINAL SURGERY     • AMPUTATION      right leg   • COLOSTOMY     • EXPLORATORY LAPAROTOMY N/A 12/3/2017    Procedure: disimpaction of ostomy;  Surgeon: Neris Mcgee MD;  Location: [facility]  OR;  Service:        FAMILY HISTORY  History reviewed. No pertinent family history.    SOCIAL HISTORY  Social History     Social History   • Marital status:      Spouse name: N/A   • Number of children: N/A   • Years of education: N/A     Occupational History   • Not on file.     Social History Main Topics   • Smoking status: Former Smoker   • Smokeless tobacco: Never Used   • Alcohol use No   • Drug use: No   • Sexual activity: Defer     Other Topics Concern   • Not on file     Social History Narrative   • No narrative on file       ALLERGIES  Penicillins    REVIEW OF SYSTEMS  Review of Systems   Constitutional: Negative for fever.   Respiratory: Negative for shortness of breath.    Cardiovascular: Negative for chest pain.   Gastrointestinal:        G-tube replacement       PHYSICAL EXAM  ED Triage Vitals   Temp Heart Rate Resp BP SpO2   04/14/18 0342 04/14/18 0343 04/14/18 0344 04/14/18 0343 04/14/18 0343   99.2 °F (37.3 °C) 86 20 113/63 97 %      Temp src Heart Rate Source Patient Position BP Location FiO2 (%)   04/14/18 0342 04/14/18 0343 -- -- --   Tympanic Monitor          Physical Exam   Constitutional: No distress.   HENT:   Head: Normocephalic and atraumatic.   Cardiovascular: Regular rhythm.    Pulmonary/Chest: No respiratory distress.   Abdominal: There is no tenderness.   Pt's g-tube stoma is clean and dry w/o erythema and g-tube placed.   Musculoskeletal: She exhibits no tenderness.   right AKA   Skin: No rash noted.   Nursing note and vitals reviewed.    RADIOLOGY  XR Abdomen KUB   Final Result       As described.       This report was finalized on 4/14/2018 6:26 AM by Dr. Roderick Geiger MD.              PROCEDURES  Feeding Tube Replacement  Date/Time: 4/14/2018 5:35 AM  Performed by: JENNIFER IBRAHIM  Authorized by: JENNIFER IBRAHIM     Consent:     Consent obtained:  Verbal    Consent given by:  Patient  Pre-procedure details:     Old tube type:  Gastrostomy    Old tube size:  18  Fr  Anesthesia (see MAR for exact dosages):     Anesthesia method:  None  Procedure details:     Patient position:  Recumbent    Procedure type:  Replacement    Tube type:  Gastrostomy    Tube size:  18 Fr    Bulb inflation fluid:  Normal saline  Post-procedure details:     Placement difficulty:  None    Bleeding:  None    Patient tolerance of procedure:  Tolerated well, no immediate complications          PROGRESS AND CONSULTS  ED Course   0575  Discussed the plan to d/c home after the replacement of the g-tube. Pt understands and agrees with the plan, all questions answered.    MEDICAL DECISION MAKING  Results were reviewed/discussed with the patient and they were also made aware of online access. Pt also made aware that some labs, such as cultures, will not be resulted during ER visit and follow up with PMD is necessary.     MDM       DIAGNOSIS  Final diagnoses:   Gastrojejunostomy tube dislodgement       DISPOSITION  DISCHARGE    Patient discharged in stable condition.    Reviewed implications of results, diagnosis, meds, responsibility to follow up, warning signs and symptoms of possible worsening, potential complications and reasons to return to ER.    Patient/Family voiced understanding of above instructions.    Discussed plan for discharge, as there is no emergent indication for admission. Patient referred to primary care provider for BP management due to today's BP. Pt/family is agreeable and understands need for follow up and repeat testing.  Pt is aware that discharge does not mean that nothing is wrong but it indicates no emergency is present that requires admission and they must continue care with follow-up as given below or physician of their choice.     FOLLOW-UP  Leonor Echevarria MD  [address] [id number] [phone]    Schedule an appointment as soon as possible for a visit            Medication List      No changes were made to your prescriptions during this  visit.         Latest Documented Vital Signs:  As of 6:44 AM  BP- 113/63 HR- 86 Temp- 99.2 °F (37.3 °C) (Tympanic) O2 sat- 97%    --  Documentation assistance provided by angel Littlejohn for Dr. Garcia.  Information recorded by the scribe was done at my direction and has been verified and validated by me.     Odalis Littlejohn  04/14/18 0644       Darryl Garcia MD  04/15/18 0644

## 2018-06-21 PROBLEM — I48.0 PAF (PAROXYSMAL ATRIAL FIBRILLATION) (HCC): Status: ACTIVE | Noted: 2017-08-08

## 2018-06-21 PROBLEM — K92.2 GASTROINTESTINAL HEMORRHAGE: Status: ACTIVE | Noted: 2018-01-01

## 2018-06-21 PROBLEM — R13.10 DYSPHAGIA: Status: ACTIVE | Noted: 2018-01-01

## 2018-06-21 NOTE — ED PROVIDER NOTES
EMERGENCY DEPARTMENT ENCOUNTER    CHIEF COMPLAINT  Chief Complaint: Anemia  History given by: Pt  History limited by: Pt is a poor historian  Room Number: 26/26  PMD: Leonor Echevarria MD      HPI:  Pt is a 83 y.o. female who presents complaining of anemia. The pt was sent to Dr. Herr's office for a work up and was found to have a HGB of 7. Pt has been losing blood in her stools.         Duration:  Unable to determine because pt is a poor historian.  Onset: Unable to determine because pt is a poor historian.  Timing: Unable to determine because pt is a poor historian.  Location: Unable to determine because pt is a poor historian.  Quality: Unable to determine because pt is a poor historian.  Progression: Unable to determine because pt is a poor historian.  Associated Symptoms: Unable to determine because pt is a poor historian.  Aggravating Factors: Unable to determine because pt is a poor historian.  Alleviating Factors: Unable to determine because pt is a poor historian.  Previous Episodes: Pt has suffered in the past from anemia.  Treatment before arrival: Unable to determine because pt is a poor historian.    PAST MEDICAL HISTORY  Active Ambulatory Problems     Diagnosis Date Noted   • Atrial fibrillation 08/08/2017   • COPD (chronic obstructive pulmonary disease) 08/08/2017   • Calculus of left kidney 08/08/2017   • Suprapubic catheter 08/11/2017   • Decubitus ulcer of coccygeal region, stage 4 (present on admission) 08/11/2017   • History of right above knee amputation 08/11/2017   • Acute UTI 12/02/2017   • Colostomy malfunction 12/02/2017   • Anemia 12/02/2017   • Abnormal urine 12/03/2017     Resolved Ambulatory Problems     Diagnosis Date Noted   • UTI (urinary tract infection) 08/08/2017   • Healthcare associated bacterial pneumonia - possibly due to gram-negative organism 08/11/2017   • Hypokalemia 08/11/2017     Past Medical History:   Diagnosis Date   • Anemia    • Atrial fibrillation    •  Colitis    • COPD (chronic obstructive pulmonary disease)    • Dysphagia    • Hernia    • Hypokalemia    • Pneumonia        PAST SURGICAL HISTORY  Past Surgical History:   Procedure Laterality Date   • ABDOMINAL SURGERY     • AMPUTATION      right leg   • COLOSTOMY     • EXPLORATORY LAPAROTOMY N/A 12/3/2017    Procedure: disimpaction of ostomy;  Surgeon: Neris Mcgee MD;  Location: [facility] OR;  Service:        FAMILY HISTORY  History reviewed. No pertinent family history.    SOCIAL HISTORY  Social History     Social History   • Marital status:      Spouse name: N/A   • Number of children: N/A   • Years of education: N/A     Occupational History   • Not on file.     Social History Main Topics   • Smoking status: Former Smoker   • Smokeless tobacco: Never Used   • Alcohol use No   • Drug use: No   • Sexual activity: Defer     Other Topics Concern   • Not on file     Social History Narrative   • No narrative on file       ALLERGIES  Penicillins    REVIEW OF SYSTEMS  Review of Systems   Reason unable to perform ROS: Pt is a poor historian.       PHYSICAL EXAM  ED Triage Vitals [06/21/18 1120]   Temp Heart Rate Resp BP SpO2   97.5 °F (36.4 °C) 84 18 102/58 97 %      Temp src Heart Rate Source Patient Position BP Location FiO2 (%)   Tympanic -- -- -- --       Physical Exam   Constitutional: She is oriented to person, place, and time. No distress.   HENT:   Head: Normocephalic and atraumatic.   Mouth/Throat: Mucous membranes are dry.   Eyes: EOM are normal. Pupils are equal, round, and reactive to light.   The pt has pale conjunctiva.   Neck: Normal range of motion. Neck supple.   Cardiovascular: Normal rate, regular rhythm and normal heart sounds.    Pulmonary/Chest: Effort normal. No respiratory distress. She has rhonchi in the right lower field and the left lower field.   Abdominal: Soft. Bowel sounds are hypoactive. There is no tenderness. There is no rebound and no guarding.   Pt has a G tube in  stomach and a colostomy bag on her abdomen.   Genitourinary:   Genitourinary Comments: I performed a rectal exam with a female chaperone present. The pt has a rectal prolapse that I was unable to reduce.   Musculoskeletal: Normal range of motion. She exhibits no edema.   Pt has a right AKA.   Neurological: She is alert and oriented to person, place, and time. She has normal sensation and normal strength.   Skin: Skin is warm and dry. No rash noted.   Psychiatric: Mood and affect normal.   Nursing note and vitals reviewed.     LAB RESULTS  Lab Results (last 24 hours)     Procedure Component Value Units Date/Time    CBC & Differential [[id number]] Collected:  06/21/18 1143    Specimen:  Blood Updated:  06/21/18 1200    Narrative:       The following orders were created for panel order CBC & Differential.  Procedure                               Abnormality         Status                     ---------                               -----------         ------                     CBC Auto Differential[[id number]]        Abnormal            Final result                 Please view results for these tests on the individual orders.    Comprehensive Metabolic Panel [[id number]]  (Abnormal) Collected:  06/21/18 1143    Specimen:  Blood Updated:  06/21/18 1222     Glucose 83 mg/dL      BUN 39 (H) mg/dL      Creatinine 0.73 mg/dL      Sodium 136 mmol/L      Potassium 4.7 mmol/L      Chloride 101 mmol/L      CO2 23.5 mmol/L      Calcium 8.3 (L) mg/dL      Total Protein 7.3 g/dL      Albumin 2.80 (L) g/dL      ALT (SGPT) 15 U/L      AST (SGOT) 19 U/L      Alkaline Phosphatase 133 (H) U/L      Total Bilirubin <0.2 mg/dL      eGFR Non African Amer 76 mL/min/1.73      Globulin 4.5 gm/dL      A/G Ratio 0.6 g/dL      BUN/Creatinine Ratio 53.4 (H)     Anion Gap 11.5 mmol/L     Narrative:       The MDRD GFR formula is only valid for adults with stable renal function between ages 18 and 70.    CBC Auto Differential [[id number]]  (Abnormal)  Collected:  06/21/18 1143    Specimen:  Blood Updated:  06/21/18 1159     WBC 7.14 10*3/mm3      RBC 3.20 (L) 10*6/mm3      Hemoglobin 8.1 (L) g/dL      Hematocrit 28.7 (L) %      MCV 89.7 fL      MCH 25.3 (L) pg      MCHC 28.2 (L) g/dL      RDW 15.9 (H) %      RDW-SD 52.4 fl      MPV 9.8 fL      Platelets 349 10*3/mm3      Neutrophil % 54.5 %      Lymphocyte % 32.8 %      Monocyte % 10.2 %      Eosinophil % 1.8 %      Basophil % 0.4 %      Immature Grans % 0.3 %      Neutrophils, Absolute 3.89 10*3/mm3      Lymphocytes, Absolute 2.34 10*3/mm3      Monocytes, Absolute 0.73 10*3/mm3      Eosinophils, Absolute 0.13 10*3/mm3      Basophils, Absolute 0.03 10*3/mm3      Immature Grans, Absolute 0.02 10*3/mm3     Troponin [[id number]]  (Normal) Collected:  06/21/18 1143    Specimen:  Blood Updated:  06/21/18 1245     Troponin T <0.010 ng/mL     Narrative:       Troponin T Reference Ranges:  Less than 0.03 ng/mL:    Negative for AMI  0.03 to 0.09 ng/mL:      Indeterminant for AMI  Greater than 0.09 ng/mL: Positive for AMI    Urinalysis With / Microscopic If Indicated (No Culture) - Urine, Catheter [[id number]]  (Abnormal) Collected:  06/21/18 1159    Specimen:  Urine from Urine, Catheter Updated:  06/21/18 1220     Color, UA Yellow     Appearance, UA Cloudy (A)     pH, UA 7.5     Specific Gravity, UA 1.015     Glucose, UA Negative     Ketones, UA Negative     Bilirubin, UA Negative     Blood, UA Moderate (2+) (A)     Protein, UA 30 mg/dL (1+) (A)     Leuk Esterase, UA Large (3+) (A)     Nitrite, UA Positive (A)     Urobilinogen, UA 0.2 E.U./dL    Urinalysis, Microscopic Only - Urine, Clean Catch [[id number]]  (Abnormal) Collected:  06/21/18 1159    Specimen:  Urine from Urine, Catheter Updated:  06/21/18 1220     RBC, UA 6-12 (A) /HPF      WBC, UA Too Numerous to Count (A) /HPF      Bacteria, UA 3+ (A) /HPF      Squamous Epithelial Cells, UA 0-2 /HPF      Hyaline Casts, UA None Seen /LPF      Triple Phosphate Crystals, UA  Moderate/2+ /HPF      Amorphous Crystals, UA Large/3+ /HPF      Methodology Automated Microscopy    Protime-INR [[id number]]  (Normal) Collected:  06/21/18 1217    Specimen:  Blood Updated:  06/21/18 1248     Protime 13.2 Seconds      INR 1.02          I ordered the above labs and reviewed the results    RADIOLOGY  XR Chest 1 View   Final Result   FINDINGS: There is again demonstrated a hiatal hernia. Cardiac size  upper limits of normal to mildly enlarged, stable. No pulmonary edema  identified there is however minimal blunting of the left costophrenic  angle suggestive of trace pleural effusion or pleural thickening. No  gross consolidation has developed. There are monitoring leads  superimposing the chest in the remainder is unremarkable.     This report was finalized on 6/21/2018 12:45 PM by Dr. Kendrick Angeles M.D.        I ordered the above noted radiological studies. Interpreted by radiologist.  Reviewed by me in PACS.       PROCEDURES  Procedures  EKG    EKG time: 1207  Rhythm/Rate: normal sinus rate of 82  No Acute Ischemia  Non-Specific ST-T changes    Unchanged compared to prior on August 2017    Interpreted Contemporaneously by me.  Independently viewed by me      PROGRESS AND CONSULTS  ED Course as of Jun 21 1435   Thu Jun 21, 2018   1141 Pt is an 83 yof with a chief complaint of abnormal lab and abdominal pain sent by Dr Herr. She is able to provide limited hx only to say that the right side of her abdomen is hurting for some time and she has been coughing for several years. Paperwork sent from the nursing home where she resides shows Hgb ranging from 7-8.   EXAM: Pt awake and alert. No distress. Oriented x 3. Lungs diminshed with scattered rhonchi and fine crackles in bases. Abdomen is TTP diffusely. Colostomy to LLQ with dark tarry appearing stool staining the outside of the bag. G tube to mid upper abd.   [JS]      ED Course User Index  [JS] Almaz Paz, APRN   1141  Ordered labs and chest  XR for further evaluation    1151  Performed a rectal exam with a female chaperone present which showed a rectal prolapse that I was unable to reduce.    1159  Ordered IV fluids and more labs and EKG for further evaluation.    1203  Ordered Protonix     1248  Ordered Rocephin for infection.    1402  Ordered a call from [facility]    1433  Discussed case with Dr Kwan [facility]  Reviewed history, exam, results and treatments.  Discussed concerns and plan of care. Dr Kwan accepts pt to be admitted to telemetry.      MEDICAL DECISION MAKING  Results were reviewed/discussed with the patient and they were also made aware of online access. Pt also made aware that some labs, such as cultures, will not be resulted during ER visit and follow up with PMD is necessary.     MDM  Number of Diagnoses or Management Options  Acute UTI:   Anemia, unspecified type:   Gastrointestinal hemorrhage, unspecified gastrointestinal hemorrhage type:   Rectal prolapse:      Amount and/or Complexity of Data Reviewed  Clinical lab tests: ordered and reviewed (HGB 8.1, UA: RBC 6-12, WBC too numerous to count, INR 1.02)  Tests in the radiology section of CPT®: ordered and reviewed (Chest XR -  No pulmonary edema identified there is however minimal blunting of the left costophrenic angle suggestive of trace pleural effusion or pleural thickening. No gross consolidation has developed. There are monitoring leads superimposing the chest in the remainder is unremarkable.  )  Tests in the medicine section of CPT®: ordered and reviewed (Refer to the procedure section of the note for EKG results  )  Decide to obtain previous medical records or to obtain history from someone other than the patient: yes (Reviewed from [facility]  )  Review and summarize past medical records: yes (Pt was sent from nursing home to  today and was sent to hospital due to hemoglobin of 7. The pt was transfused on May 16 for a HGB of 8.2 and again on 6/8 for a HGB of 6.9. Pt had a HGB of 10.8 on  6/11 and 8.1 on 6/18. Pt has been heme positive for 3 stools.  Pt was admitted December 2017 for a UTI and at that time had a new colostomy placed by Dr. Mcgee.  )  Discuss the patient with other providers: yes (Dr. Kwan, [facility])           DIAGNOSIS  Final diagnoses:   Acute UTI   Gastrointestinal hemorrhage, unspecified gastrointestinal hemorrhage type   Rectal prolapse   Anemia, unspecified type       DISPOSITION  ADMISSION    Discussed treatment plan and reason for admission with pt/family and admitting physician.  Pt/family voiced understanding of the plan for admission for further testing/treatment as needed.         Latest Documented Vital Signs:  As of 2:35 PM  BP- 103/50 HR- 82 Temp- 97.5 °F (36.4 °C) (Tympanic) O2 sat- 94%    --  Documentation assistance provided by angel Aguila for Dr. Kim.  Information recorded by the scribe was done at my direction and has been verified and validated by me.       Ronnell Garcia  06/21/18 6775       Govind Kim MD  06/21/18 2382

## 2018-06-21 NOTE — ED NOTES
Pt sent by EMS from Dr. Herr's office for low hemoglobin (7) and r/o GI bleed.      Mansi Prado RN  06/21/18 0813

## 2018-06-21 NOTE — PROGRESS NOTES
Clinical Pharmacy Services: Medication History    Simona Brooks is a 83 y.o. female presenting to [facility] for   Chief Complaint   Patient presents with   • Abnormal Lab   • Abdominal Pain       She  has a past medical history of Anemia; Atrial fibrillation; Colitis; COPD (chronic obstructive pulmonary disease); Dysphagia; Hernia; Hypokalemia; and Pneumonia.    Allergies as of 06/21/2018 - Reviewed 06/21/2018   Allergen Reaction Noted   • Penicillins  08/08/2017       Medication information was obtained from: Nursing home  Pharmacy and Phone Number:     Prior to Admission Medications     Prescriptions Last Dose Informant Patient Reported? Taking?    acetaminophen (TYLENOL) 160 MG/5ML solution  Nursing Home Yes Yes    Take 640 mg by mouth Every 6 (Six) Hours As Needed for Mild Pain (1-3).    albuterol (PROVENTIL) (2.5 MG/3ML) 0.083% nebulizer solution  Nursing Home Yes Yes    Take 2.5 mg by nebulization Every 4 (Four) Hours As Needed for Wheezing.    calcium carbonate (TUMS) 500 MG chewable tablet  Nursing Home Yes Yes    1 tablet by Per G Tube route Every 6 (Six) Hours As Needed for Indigestion or Heartburn.    castor oil-balsam peru (VENELEX) ointment  Nursing Home Yes Yes    Apply 1 g topically 2 (Two) Times a Day. Apply to the buttocks    custom compounded cream  Nursing Home Yes Yes    Apply 1 application topically 2 (Two) Times a Day. Bactracin/Nystatin/Triamcinolone/Hydrophor 1:1:1:2 Apply to buttocks BID and PRN    ferrous sulfate 300 (60 FE) MG/5ML syrup  Nursing Home Yes Yes    300 mg by Per G Tube route Daily.    furosemide (LASIX) 20 MG tablet  Nursing Home Yes Yes    20 mg by Per G Tube route Daily.    gabapentin (NEURONTIN) 100 MG capsule  Nursing Home Yes Yes    Take 100 mg by mouth Daily.    Menthol-Zinc Oxide (REMEDY CALAZIME) 0.2-20 % paste  Nursing Home Yes Yes    Apply 1 application topically 2 (Two) Times a Day. And PRN for saad care    morphine 100 MG/5ML solution concentrated  solution  Nursing Home Yes Yes    Take 15 mg by mouth Every 4 (Four) Hours.    Multiple Vitamin (TAB-A-EBONY) tablet  Nursing Home Yes Yes    1 tablet by Per G Tube route Daily.    mupirocin (BACTROBAN) 2 % ointment  Nursing Home Yes Yes    Apply 1 application topically 2 (Two) Times a Day. Wash G-Tube site and apply every shift     nystatin (nystatin) 147937 UNIT/GM powder  Nursing Home Yes Yes    Apply 1 application topically 2 (Two) Times a Day. Clean G-Tube site with NS and apply     omeprazole (priLOSEC) 20 MG capsule  Nursing Home Yes Yes    20 mg by Per G Tube route Daily.    ondansetron (ZOFRAN) 4 MG tablet  Nursing Home Yes Yes    4 mg by Per G Tube route Every 6 (Six) Hours As Needed for Nausea or Vomiting.    phenol (CHLORASEPTIC) 1.4 % liquid liquid  Nursing Home Yes Yes    Apply 1 spray to the mouth or throat 4 (Four) Times a Day As Needed.    polyethylene glycol (MIRALAX) packet  Nursing Home Yes Yes    Take 17 g by mouth Daily As Needed.    potassium chloride (KAYCIEL) 20 MEQ/15ML (10%) solution  Nursing Home Yes Yes    20 mEq by Per G Tube route Daily.    sennosides-docusate sodium (SENOKOT-S) 8.6-50 MG tablet  Nursing Home Yes Yes    1 tablet by Per G Tube route Daily.    sulfamethoxazole-trimethoprim (BACTRIM DS,SEPTRA DS) 800-160 MG per tablet  Nursing Home Yes Yes    1 tablet by Per G Tube route 2 (Two) Times a Day.            Medication notes: Added per facility records:  Remedy Paste, Venelex, Bactrim, Chloraseptic, Compound cream    This medication list is complete to the best of my knowledge as of 6/21/2018    Please call if questions.    Sera Rocha, Medication History Technician  6/21/2018 3:45 PM

## 2018-06-21 NOTE — H&P
Name: Simona Brooks ADMIT: 2018   : 1934  PCP: Leonor Echevarria MD    MRN: [id number] LOS: 0 days   AGE/SEX: 83 y.o. female  ROOM: [facility]/     Chief Complaint   Patient presents with   • Abnormal Lab   • Abdominal Pain        History of Present Illness  82 yo female who has a history of anemia who was sent to see Dr. Herr by the nursing home where she resides.  In reviewing the labs in the system here, in early 2017 her hemoglobin was 11.3.  She was hospitalized here with pneumonia at that time.  When she was discharged her hemoglobin was down to 9.1.  In early December, she was admitted with a possible malfunction of her colostomy.  On admission her hemoglobin was 8.8 and dropped as low as 7.8 that was 8.6 without discharge.  The patient was sent to Dr. Herr to be evaluated for the anemia and she was referred to the ER from his office.  According to the ER record & care everywhere, she was transfused 2 units PRCs on May 16 for hemoglobin of 8.2 and 2 more again on  for hemoglobin of 6.9.  On  her hemoglobin was 10.8 and on  it was 8.1.  She had 3 stool Hemoccults done that came back positive.  She was also transfused 2 units of blood on 2018.  She denies feeling lightheaded or weak. Her only complaint is pain in the remaining right leg.      Past Medical History:   Diagnosis Date   • Anemia    • Atrial fibrillation    • Colitis    • COPD (chronic obstructive pulmonary disease)    • Dysphagia    • Hernia    • Hypokalemia    • Pneumonia      Past Surgical History:   Procedure Laterality Date   • ABDOMINAL SURGERY     • AMPUTATION      right leg   • COLOSTOMY     • EXPLORATORY LAPAROTOMY N/A 12/3/2017    Procedure: disimpaction of ostomy;  Surgeon: Neris Mcgee MD;  Location: [facility] OR;  Service:        Allergies:  Penicillins    Prescriptions Prior to Admission   Medication Sig Dispense Refill Last Dose   • acetaminophen (TYLENOL) 160 MG/5ML solution Take  640 mg by mouth Every 6 (Six) Hours As Needed for Mild Pain (1-3).      • albuterol (PROVENTIL) (2.5 MG/3ML) 0.083% nebulizer solution Take 2.5 mg by nebulization Every 4 (Four) Hours As Needed for Wheezing.      • calcium carbonate (TUMS) 500 MG chewable tablet 1 tablet by Per G Tube route Every 6 (Six) Hours As Needed for Indigestion or Heartburn.      • castor oil-balsam peru (VENELEX) ointment Apply 1 g topically 2 (Two) Times a Day. Apply to the buttocks      • custom compounded cream Apply 1 application topically 2 (Two) Times a Day. Bactracin/Nystatin/Triamcinolone/Hydrophor 1:1:1:2 Apply to buttocks BID and PRN      • ferrous sulfate 300 (60 FE) MG/5ML syrup 300 mg by Per G Tube route Daily.      • furosemide (LASIX) 20 MG tablet 20 mg by Per G Tube route Daily.      • gabapentin (NEURONTIN) 100 MG capsule Take 100 mg by mouth Daily.      • Menthol-Zinc Oxide (REMEDY CALAZIME) 0.2-20 % paste Apply 1 application topically 2 (Two) Times a Day. And PRN for saad care      • morphine 100 MG/5ML solution concentrated solution Take 15 mg by mouth Every 4 (Four) Hours.      • Multiple Vitamin (TAB-A-EBONY) tablet 1 tablet by Per G Tube route Daily.      • mupirocin (BACTROBAN) 2 % ointment Apply 1 application topically 2 (Two) Times a Day. Wash G-Tube site and apply every shift       • nystatin (nystatin) 466910 UNIT/GM powder Apply 1 application topically 2 (Two) Times a Day. Clean G-Tube site with NS and apply       • omeprazole (priLOSEC) 20 MG capsule 20 mg by Per G Tube route Daily.      • ondansetron (ZOFRAN) 4 MG tablet 4 mg by Per G Tube route Every 6 (Six) Hours As Needed for Nausea or Vomiting.      • phenol (CHLORASEPTIC) 1.4 % liquid liquid Apply 1 spray to the mouth or throat 4 (Four) Times a Day As Needed.      • polyethylene glycol (MIRALAX) packet Take 17 g by mouth Daily As Needed.      • potassium chloride (KAYCIEL) 20 MEQ/15ML (10%) solution 20 mEq by Per G Tube route Daily.      •  sennosides-docusate sodium (SENOKOT-S) 8.6-50 MG tablet 1 tablet by Per G Tube route Daily.      • sulfamethoxazole-trimethoprim (BACTRIM DS,SEPTRA DS) 800-160 MG per tablet 1 tablet by Per G Tube route 2 (Two) Times a Day.          Social History   Substance Use Topics   • Smoking status: Former Smoker   • Smokeless tobacco: Never Used   • Alcohol use No       History reviewed. No pertinent family history.    Review of Systems   Constitutional:  Appetite good. No recent fevers or chills.  HEENT: No vision changes. No rhinorrhea, sore throat.   Respiratory: negative for shortness of breath.  No cough    Cardiovascular: No chest pain or palpitations.  No problems with edema  Gastrointestinal: has occasional heartburn relieved by TUMS. Colostomy present with no problems that she is aware of.   Endocrine: no diabetes   Genitourinary: neurogenic bladder with suprapubic catheter  Musculoskeletal: pain right leg  Skin: sacral decub present on admission   Neurological: No headaches. She has chronic pain the the right leg.   Hematological:  Does bruise/bleed easily.   Psychiatric/Behavioral:  The patient is not nervous/anxious. Poor historian      Objective    Vital Signs  Temp:  [97.5 °F (36.4 °C)-98.1 °F (36.7 °C)] 98.1 °F (36.7 °C)  Heart Rate:  [75-84] 75  Resp:  [18] 18  BP: (102-127)/(50-79) 127/79  SpO2:  [94 %-100 %] 96 %  on   ;   Device (Oxygen Therapy): room air  Body mass index is 19.02 kg/m².    Physical Exam   Obese female in NAD  PERRL, EOMI, sclera nonicteric. Oropharynx benign, tongue midline, palate elevates symmetrically. Neck supple without adenopathy or thyromegaly. No JVD.  Lungs clear with equal breath sounds bilaterally. No wheezes, rales or rhonchi. Breathing nonlabored  Heart RRR  Abdomen soft, nontender, bowel sounds present throughout. Colostomy present in RLQ. Suprapubic cath also present.  Extremities no edema on left. Previous right AKA but moving stump  Skin warm & dry  Neuro no gross motor  deficits, alert & oriented. Speech fluent.       Results Review:   I reviewed the patient's new clinical results.    Lab Results (last 24 hours)     Procedure Component Value Units Date/Time    CBC & Differential [[id number]] Collected:  06/21/18 1143    Specimen:  Blood Updated:  06/21/18 1200    Narrative:       The following orders were created for panel order CBC & Differential.  Procedure                               Abnormality         Status                     ---------                               -----------         ------                     CBC Auto Differential[[id number]]        Abnormal            Final result                 Please view results for these tests on the individual orders.    Comprehensive Metabolic Panel [[id number]]  (Abnormal) Collected:  06/21/18 1143    Specimen:  Blood Updated:  06/21/18 1222     Glucose 83 mg/dL      BUN 39 (H) mg/dL      Creatinine 0.73 mg/dL      Sodium 136 mmol/L      Potassium 4.7 mmol/L      Chloride 101 mmol/L      CO2 23.5 mmol/L      Calcium 8.3 (L) mg/dL      Total Protein 7.3 g/dL      Albumin 2.80 (L) g/dL      ALT (SGPT) 15 U/L      AST (SGOT) 19 U/L      Alkaline Phosphatase 133 (H) U/L      Total Bilirubin <0.2 mg/dL      eGFR Non African Amer 76 mL/min/1.73      Globulin 4.5 gm/dL      A/G Ratio 0.6 g/dL      BUN/Creatinine Ratio 53.4 (H)     Anion Gap 11.5 mmol/L     Narrative:       The MDRD GFR formula is only valid for adults with stable renal function between ages 18 and 70.    CBC Auto Differential [[id number]]  (Abnormal) Collected:  06/21/18 1143    Specimen:  Blood Updated:  06/21/18 1159     WBC 7.14 10*3/mm3      RBC 3.20 (L) 10*6/mm3      Hemoglobin 8.1 (L) g/dL      Hematocrit 28.7 (L) %      MCV 89.7 fL      MCH 25.3 (L) pg      MCHC 28.2 (L) g/dL      RDW 15.9 (H) %      RDW-SD 52.4 fl      MPV 9.8 fL      Platelets 349 10*3/mm3      Neutrophil % 54.5 %      Lymphocyte % 32.8 %      Monocyte % 10.2 %      Eosinophil % 1.8 %       Basophil % 0.4 %      Immature Grans % 0.3 %      Neutrophils, Absolute 3.89 10*3/mm3      Lymphocytes, Absolute 2.34 10*3/mm3      Monocytes, Absolute 0.73 10*3/mm3      Eosinophils, Absolute 0.13 10*3/mm3      Basophils, Absolute 0.03 10*3/mm3      Immature Grans, Absolute 0.02 10*3/mm3     Troponin [[id number]]  (Normal) Collected:  06/21/18 1143    Specimen:  Blood Updated:  06/21/18 1245     Troponin T <0.010 ng/mL     Narrative:       Troponin T Reference Ranges:  Less than 0.03 ng/mL:    Negative for AMI  0.03 to 0.09 ng/mL:      Indeterminant for AMI  Greater than 0.09 ng/mL: Positive for AMI    Urinalysis With / Microscopic If Indicated (No Culture) - Urine, Catheter [[id number]]  (Abnormal) Collected:  06/21/18 1159    Specimen:  Urine from Urine, Catheter Updated:  06/21/18 1220     Color, UA Yellow     Appearance, UA Cloudy (A)     pH, UA 7.5     Specific Gravity, UA 1.015     Glucose, UA Negative     Ketones, UA Negative     Bilirubin, UA Negative     Blood, UA Moderate (2+) (A)     Protein, UA 30 mg/dL (1+) (A)     Leuk Esterase, UA Large (3+) (A)     Nitrite, UA Positive (A)     Urobilinogen, UA 0.2 E.U./dL    Urinalysis, Microscopic Only - Urine, Clean Catch [[id number]]  (Abnormal) Collected:  06/21/18 1159    Specimen:  Urine from Urine, Catheter Updated:  06/21/18 1220     RBC, UA 6-12 (A) /HPF      WBC, UA Too Numerous to Count (A) /HPF      Bacteria, UA 3+ (A) /HPF      Squamous Epithelial Cells, UA 0-2 /HPF      Hyaline Casts, UA None Seen /LPF      Triple Phosphate Crystals, UA Moderate/2+ /HPF      Amorphous Crystals, UA Large/3+ /HPF      Methodology Automated Microscopy    Protime-INR [[id number]]  (Normal) Collected:  06/21/18 1217    Specimen:  Blood Updated:  06/21/18 1248     Protime 13.2 Seconds      INR 1.02            Results from last 7 days  Lab Units 06/21/18  1143   WBC 10*3/mm3 7.14   HEMOGLOBIN g/dL 8.1*   PLATELETS 10*3/mm3 349       Results from last 7 days  Lab Units  06/21/18  1143   SODIUM mmol/L 136   POTASSIUM mmol/L 4.7   CHLORIDE mmol/L 101   CO2 mmol/L 23.5   BUN mg/dL 39*   CREATININE mg/dL 0.73   GLUCOSE mg/dL 83   ALBUMIN g/dL 2.80*   BILIRUBIN mg/dL <0.2   ALK PHOS U/L 133*   AST (SGOT) U/L 19   ALT (SGPT) U/L 15   Estimated Creatinine Clearance: 39.7 mL/min (by C-G formula based on SCr of 0.73 mg/dL).    Results from last 7 days  Lab Units 06/21/18  1217 06/21/18  1143   INR  1.02  --    TROPONIN T ng/mL  --  <0.010         Invalid input(s): LDLCALC    Results from last 7 days  Lab Units 06/21/18  1159   NITRITE UA  Positive*   WBC UA /HPF Too Numerous to Count*   BACTERIA UA /HPF 3+*   SQUAM EPITHEL UA /HPF 0-2       XR Chest 1 View   Final Result        Assessment/Plan   Assessment:      Active Hospital Problems (** Indicates Principal Problem)    Diagnosis Date Noted   • Gastrointestinal hemorrhage [K92.2] 06/21/2018   • Anemia [D64.9] 12/02/2017   • Suprapubic catheter [Z93.59] 08/11/2017   • History of right above knee amputation [Z89.611] 08/11/2017   • COPD (chronic obstructive pulmonary disease) [J44.9] 08/08/2017   • Atrial fibrillation [I48.91] 08/08/2017      Resolved Hospital Problems    Diagnosis Date Noted Date Resolved   No resolved problems to display.       Plan:     She has been placed on a protonix drip. She is on po Fe so her stool is dark but she has had heme + stools. GI has been consulted to see her. GI endoscopy will be done because it appears that this has never been done. I suspect this is something that has been bleeding off & on given the pattern of her anemia.  She has PAF but is not on anticoagulation because of the blood loss. Home meds have been reconciled.   Further management will proceed from there.    Pt was seen on 6/21/2018      Daina Kwan MD  [facility]  06/21/18  5:01 PM

## 2018-06-21 NOTE — NURSING NOTE
06/21/18 1840   Wound 06/21/18 1838 Right ischial tuberosity pressure injury   Date first assessed/Time first assessed: 06/21/18 1838   Present On Admission : yes;picture taken  Side: Right  Location: ischial tuberosity  Type: pressure injury  Stage, Pressure Injury: unstageable   Dressing Appearance open to air   Base moist;slough   Yellow (%), Wound Tissue Color 100   Periwound intact;moist   Periwound Temperature warm   Periwound Skin Turgor soft   Wound Length (cm) 1.5 cm   Wound Width (cm) 2.5 cm   Wound Depth (cm) 0.2 cm   Drainage Characteristics/Odor serous   Drainage Amount scant   Care, Wound cleansed with;sterile normal saline;barrier applied;other (see comments)  (Z guard)   Dressing Care, Wound open to air   Periwound Care, Wound dry periwound area maintained   Wound 06/21/18 1840 coccyx pressure injury   Date first assessed/Time first assessed: 06/21/18 1840   Present On Admission : yes;picture taken  Location: coccyx  Type: pressure injury  Stage, Pressure Injury: Stage 2   Dressing Appearance open to air   Base moist;clean;pink   Red (%), Wound Tissue Color 100   Periwound intact;dry   Periwound Temperature warm   Periwound Skin Turgor soft   Wound Length (cm) 0.5 cm   Wound Width (cm) 1 cm   Wound Depth (cm) 0.1 cm   Tunneling [Depth (cm)/Location] 0   Undermining [Depth (cm)/Location] 0   Drainage Characteristics/Odor serosanguineous   Drainage Amount scant   Care, Wound cleansed with;sterile normal saline   Dressing Care, Wound dressing applied;low-adherent;silicone;other (see comments)  (Optifoam)   Periwound Care, Wound barrier ointment applied;dry periwound area maintained   Colostomy LUQ   Placement Date/Time: 12/07/17 1737   Inserted by: Dr. Mcgee  Colostomy Type: Loop  Location: LUQ   Stomal Appliance 2 piece;Dry;Leaking;Changed;Drainable   Stoma Appearance irregular;moist;red;flush with skin   Peristomal Assessment Denuded;Blanchable erythema   Accessories/Skin Care cleansed with  water;skin sealant   Stoma Function flatus;stool   Stool Color black   Treatment Bag change     CWON consult received.  Patient with old colostomy and PI's to coccyx and ischium.  Pouch appliance changed as outside of current pouch dirty with stool.  Peristomal skin mildly denuded with small amount of hypergranulation tissue at mucocutaneous junction. Skin prepped with Sureprep wipe. Could benefit from stoma powder if peristomal skin still denuded on next change. Stoma is red, moist and viable and lays at skin level. Needs convexity or barrier ring for next change.  Patient also with 2 pressure injuries that were present on admission.  There is a stage 2 to coccyx and an unstageable PI to right ischium.  Stage 2 with moist, pink base and unstageable PI base is covered with adherent yellow slough.  There are no outward s/s of infection and no foul odor noted.  If we can maintain a dressing to ischial wound, recommendation is Therahoney covered with Optifoam dressing, changing daily. Recommend adding pump to Accumax and diligent repositioning. To stage 2, recommendation is Optifoam dsg, changing every other day.  Please see orders and above for wound measurement documentation.  Discussed with PEEWEE Hinds.  Thank you for consult and please don't hesitate to call with any questions.

## 2018-06-22 PROBLEM — K25.0 ACUTE GASTRIC ULCER WITH HEMORRHAGE: Status: ACTIVE | Noted: 2018-01-01

## 2018-06-22 PROBLEM — K92.1 GASTROINTESTINAL HEMORRHAGE WITH MELENA: Status: ACTIVE | Noted: 2018-01-01

## 2018-06-22 NOTE — PLAN OF CARE
Problem: Patient Care Overview  Goal: Plan of Care Review  Outcome: Ongoing (interventions implemented as appropriate)   06/21/18 2104   Coping/Psychosocial   Plan of Care Reviewed With patient   Plan of Care Review   Progress no change   OTHER   Outcome Summary new admission. pictures of wounds documented. wound RN saw and orders in progress. sleeping between care but has c/o neuropathy pain       Problem: Fall Risk (Adult)  Goal: Identify Related Risk Factors and Signs and Symptoms  Outcome: Ongoing (interventions implemented as appropriate)    Goal: Absence of Fall  Outcome: Ongoing (interventions implemented as appropriate)      Problem: Skin Injury Risk (Adult)  Goal: Identify Related Risk Factors and Signs and Symptoms  Outcome: Ongoing (interventions implemented as appropriate)    Goal: Skin Health and Integrity  Outcome: Ongoing (interventions implemented as appropriate)

## 2018-06-22 NOTE — PROGRESS NOTES
Discharge Planning Assessment  [facility]     Patient Name: Simona Brooks  MRN: [id number]  Today's Date: 6/22/2018    Admit Date: 6/21/2018          Discharge Needs Assessment     Row Name 06/22/18 1338       Living Environment    Lives With facility resident    Current Living Arrangements extended care facility    Provides Primary Care For no one    Family Caregiver if Needed child(wm), adult;other (see comments)    Able to Return to Prior Arrangements yes    Living Arrangement Comments Return to [facility]       Transition Planning    Patient/Family Anticipates Transition to long term care facility    Patient/Family Anticipated Services at Transition none       Discharge Needs Assessment    Readmission Within the Last 30 Days no previous admission in last 30 days    Equipment Currently Used at Home none    Anticipated Changes Related to Illness none    Equipment Needed After Discharge none            Discharge Plan     Row Name 06/22/18 6679       Plan    Plan Return to [facility]    Plan Comments Spoke to step-son POA via telephone  He states he plans for pt to return to [facility] / [facility] notified for bed status.  He is aware iof IMM letter  Copy at bedside  He requests that info be passed on to facility He has two dying family members he is taking care of but will follow up with [facility] at [place]          Destination     No service coordination in this encounter.      Durable Medical Equipment     No service coordination in this encounter.      Dialysis/Infusion     No service coordination in this encounter.      Home Medical Care     No service coordination in this encounter.      Social Care     No service coordination in this encounter.                Demographic Summary    No documentation.           Functional Status     Row Name 06/22/18 1338       Mental Status Summary    Recent Changes in Mental Status/Cognitive Functioning unable to assess             Psychosocial    No documentation.           Abuse/Neglect    No documentation.           Legal    No documentation.           Substance Abuse    No documentation.           Patient Forms    No documentation.         Digna Mccullough RN

## 2018-06-22 NOTE — CONSULTS
Adult Nutrition  Assessment/PES    Patient Name:  Simona Brooks  YOB: 1934  MRN: [id number]  Admit Date:  6/21/2018    Assessment Date:  6/22/2018    Consult received. Nutrition assessment completed. NPO for EGD today.  Spoke with NH, patient has G-tube with nocturnal TF and is allowed po intake throughout the day-pureed meats, mechanical soft and thin liquids. Spoke with RN regarding TF and diet.  Recommend, Jevity 1.5 @ 80 cc/hr x 12 hours, fluid flushes 50 cc q hour.  Will follow closely.          Reason for Assessment     Row Name 06/22/18 1150          Reason for Assessment    Reason For Assessment identified at risk by screening criteria;TF/PN     Diagnosis   Primary Problem:  Gastrointestinal hemorrhage with melena      Identified At Risk by Screening Criteria MST SCORE 2+               Anthropometrics     Row Name 06/22/18 1150 06/22/18 0545       Anthropometrics    Weight  -- 53.9 kg (118 lb 12.8 oz)       Body Mass Index (BMI)    BMI Assessment BMI 18.5-24.9: normal  --            Labs/Tests/Procedures/Meds     Row Name 06/22/18 1150          Labs/Procedures/Meds    Lab Results Reviewed reviewed, pertinent        Diagnostic Tests/Procedures    Diagnostic Test/Procedure Reviewed reviewed, pertinent        Medications    Pertinent Medications Reviewed reviewed, pertinent     Pertinent Medications Comments D5% with NaCl, PPI             Physical Findings     Row Name 06/22/18 1150          Physical Findings    Overall Physical Appearance --   B=13     Tubes gastrostomy tube     Skin poor skin integrity/turgor;pressure injury;non-healing wound(s)   stage 2 coccyx, ischial tuberosity unstageable             Estimated/Assessed Needs     Row Name 06/22/18 115          Calculation Measurements    Weight Used For Calculations 53.9 kg (118 lb 13.3 oz)        Estimated/Assessed Needs    Additional Documentation KCAL/KG (Group);Protein Requirements (Group);Fluid Requirements (Group)        KCAL/KG     14 Kcal/Kg (kcal) 754.6     15 Kcal/Kg (kcal) 808.5     18 Kcal/Kg (kcal) 970.2     20 Kcal/Kg (kcal) 1078     25 Kcal/Kg (kcal) 1347.5     30 Kcal/Kg (kcal) 1617     35 Kcal/Kg (kcal) 1886.5     40 Kcal/Kg (kcal) 2156     45 Kcal/Kg (kcal) 2425.5     50 Kcal/Kg (kcal) 2695     kcal/kg (Specify) --   6356-2061        [place]-St[place] Jeor Equation    RMR ([place]-St. Jeor Equation) 947.25        Protein Requirements    Est Protein Requirement Amount (gms/kg) 1.0 gm protein     Estimated Protein Requirements (gms/day) 53.9        Fluid Requirements    Estimated Fluid Requirements (mL/day) 1400     RDA Method (mL) 1400     Saw-Saida Method (over 20 kg) 2578             Nutrition Prescription Ordered     Row Name 06/22/18 1151          Nutrition Prescription PO    Current PO Diet NPO             Evaluation of Received Nutrient/Fluid Intake     Row Name 06/22/18 1151          Calculation Measurements    Weight Used For Calculations 53.9 kg (118 lb 13.3 oz)             Evaluation of Prescribed Nutrient/Fluid Intake     Row Name 06/22/18 1151          Calculation Measurements    Weight Used For Calculations 53.9 kg (118 lb 13.3 oz)           Problem/Interventions:        Problem 1     Row Name 06/22/18 1151          Nutrition Diagnoses Problem 1    Problem 1 Inadequate Nutrient Intake     Etiology (related to) MNT for Treatment/Condition     Signs/Symptoms (evidenced by) NPO                     Intervention Goal     Row Name 06/22/18 1151          Intervention Goal    General Maintain nutrition;Nutrition support treatment     PO Initiate feeding     TF/PN Inititiate TF/PN     Weight Maintain weight             Nutrition Intervention     Row Name 06/22/18 1152          Nutrition Intervention    RD/Tech Action Care plan reviewd;Follow Tx progress             Nutrition Prescription     Row Name 06/22/18 1152          Nutrition Prescription EN    Enteral Prescription Enteral begin/change     Enteral Route Gastrostomy      Product Jevity 1.5 skyler     TF Delivery Method Cyclic     Cyclic TF Goal Volume (mL) 80 mL     Cyclic TF Cycle Over (hrs)  12     Water flush (mL)  50 mL     Water Flush Frequency Per hour     New EN Prescription Ordered? No, recommended             Education/Evaluation     Row Name 06/22/18 4315          Education    Education Will Instruct as appropriate        Monitor/Evaluation    Monitor Per protocol     Education Follow-up Reinforce PRN         Electronically signed by:  Jeri Montemayor RD  06/22/18 11:52 AM

## 2018-06-22 NOTE — PROGRESS NOTES
"    Name: Simona Brooks ADMIT: 2018   : 1934  PCP: Leonor Echevarria MD    MRN: [id number] LOS: 1 days   AGE/SEX: 83 y.o. female  ROOM: [facility]   Subjective     EGD today  History limited from the patient due to her mental status  Denies complaints except for right leg phantom pain on motion chest pain, shortness breath, nausea, vomiting and states she does not eat because she does not want to eat that \"slopp\" at the nursing home    Objective   Vital Signs  Temp:  [97.4 °F (36.3 °C)-98.4 °F (36.9 °C)] 98.1 °F (36.7 °C)  Heart Rate:  [69-84] 72  Resp:  [16-20] 16  BP: ()/(40-82) 119/82  SpO2:  [96 %-100 %] 100 %  on  Flow (L/min):  [1-3] 1;   Device (Oxygen Therapy): nasal cannula  Body mass index is 21.73 kg/m².    Physical Exam   Constitutional: No distress.   HENT:   Head: Normocephalic and atraumatic.   Neck: No JVD present.   Cardiovascular: Normal rate and regular rhythm.  Exam reveals no friction rub.    No murmur heard.  Abdominal: Soft. Bowel sounds are normal. She exhibits no distension. There is no tenderness.   Colostomy with melanotic stool  G-tube placement without erythema or skin breakdown   Musculoskeletal: Normal range of motion. She exhibits deformity (Right above-the-knee amputation). She exhibits no edema or tenderness.   Neurological: She is alert.   Oriented to person and place   Skin: Skin is warm and dry. She is not diaphoretic. No erythema. There is pallor.   Psychiatric: She has a normal mood and affect. Her behavior is normal.   Vitals reviewed.      Results Review:       I reviewed the patient's new clinical results.    Results from last 7 days  Lab Units 18  0657 18  1143   WBC 10*3/mm3 5.25 7.14   HEMOGLOBIN g/dL 6.5* 8.1*   PLATELETS 10*3/mm3 272 349     Results from last 7 days  Lab Units 18  0511 18  1143   SODIUM mmol/L 136 136   POTASSIUM mmol/L 3.7 4.7   CHLORIDE mmol/L 105 101   CO2 mmol/L 20.6* 23.5   BUN mg/dL 25* 39*   CREATININE " mg/dL 0.64 0.73   GLUCOSE mg/dL 68 83   Estimated Creatinine Clearance: 45.3 mL/min (by C-G formula based on SCr of 0.64 mg/dL).  Results from last 7 days  Lab Units 06/22/18  0511 06/21/18  1143   CALCIUM mg/dL 7.6* 8.3*   ALBUMIN g/dL  --  2.80*         gabapentin 100 mg Oral Daily   mupirocin 1 application Topical Q12H   nystatin 1 application Topical Q12H   sennosides-docusate sodium 1 tablet Per G Tube Daily       dextrose 5 % and sodium chloride 0.9 % 100 mL/hr Last Rate: 100 mL/hr (06/22/18 0743)   lactated ringers 30 mL/hr Last Rate: Stopped (06/22/18 1436)   pantoprazole 8 mg/hr Last Rate: 8 mg/hr (06/22/18 1557)   NPO Diet      Assessment/Plan      Active Hospital Problems (** Indicates Principal Problem)    Diagnosis Date Noted   • **Gastrointestinal hemorrhage with melena [K92.1] 06/21/2018   • Acute gastric ulcer with hemorrhage [K25.0] 06/22/2018   • Gastrointestinal hemorrhage [K92.2] 06/21/2018   • Dysphagia [R13.10] 06/21/2018   • Anemia [D64.9] 12/02/2017   • Suprapubic catheter [Z93.59] 08/11/2017   • History of right above knee amputation [Z89.611] 08/11/2017   • COPD (chronic obstructive pulmonary disease) [J44.9] 08/08/2017   • PAF (paroxysmal atrial fibrillation) [I48.0] 08/08/2017      Resolved Hospital Problems    Diagnosis Date Noted Date Resolved   No resolved problems to display.       · Acute upper GI bleed secondary to gastric ulcers: Gastrostomy tube balloon loosened during EGD to help ulcer healing. Continue PPI, hemoglobin remained dropped to 6.5 today requiring 2 units of packed red blood cells, patient hemodynamically stable  · Normally gets tube feeding but on hold secondary to above.  Hypoglycemic this morning so started dextrose with IV fluids  · Dysphagia: G-tube placed as above  · History of right above-the-knee amputation: Unclear cause at this time  · Colostomy: Also unsure why she has this in history is limited from the patient  · Resident of [facility],  [facility]  · Need to confirm her CODE STATUS with her power of     Disposition back to [facility], to be determined    Ken Goodwin MD  [facility]  06/22/18  4:11 PM

## 2018-06-22 NOTE — PLAN OF CARE
Problem: Patient Care Overview  Goal: Plan of Care Review  Outcome: Ongoing (interventions implemented as appropriate)   06/21/18 2104 06/22/18 0532   Coping/Psychosocial   Plan of Care Reviewed With patient --    Plan of Care Review   Progress --  no change   OTHER   Outcome Summary --  Dark, liquidy stools persist from colostomy bag. Wound care orders to pressure injuries on coccyx and ischial tuberosity. C/o phantom nerve pain in R leg, genearlized back and and abdominal pain. Medicating with pain meds as ordered with some relief per pt. GI consulted to see pt. Scopes likely. Continue to monitor.      Goal: Individualization and Mutuality  Outcome: Ongoing (interventions implemented as appropriate)      Problem: Fall Risk (Adult)  Goal: Identify Related Risk Factors and Signs and Symptoms  Outcome: Outcome(s) achieved Date Met: 06/22/18 06/21/18 2104   Fall Risk (Adult)   Related Risk Factors (Fall Risk) age-related changes;bladder function altered;neuro disease/injury   Signs and Symptoms (Fall Risk) presence of risk factors     Goal: Absence of Fall  Outcome: Ongoing (interventions implemented as appropriate)   06/22/18 0532   Fall Risk (Adult)   Absence of Fall achieves outcome  (SAFETY MAINTAINED-BED ALARM ON)       Problem: Skin Injury Risk (Adult)  Goal: Identify Related Risk Factors and Signs and Symptoms  Outcome: Outcome(s) achieved Date Met: 06/22/18 06/21/18 2104   Skin Injury Risk (Adult)   Related Risk Factors (Skin Injury Risk) advanced age;critical care admission;moisture;tissue perfusion altered;mobility impaired;medication;infection;edema     Goal: Skin Health and Integrity  Outcome: Ongoing (interventions implemented as appropriate)   06/22/18 0532   Skin Injury Risk (Adult)   Skin Health and Integrity making progress toward outcome  (WOUND CARE AS ORDERED)

## 2018-06-22 NOTE — CONSULTS
[facility]  Initial Inpatient Consult Note    Referring Provider: Dr Kwan/LUAN    Reason for Consultation: Anemia, GI bleed    Subjective     History of present illness:  82yo W with PMH as below and notable for s/p colostomy, PEG, R AKA, suprapubic catheter admitted for anemia.  She has dementia and is unable to give any history.  She does not know why she is here.  Apparently she was to be seen by Dr. Herr in the office yesterday but was sent to the emergency room for evaluation of anemia.  Review of her chart shows labs from her nursing home with a hemoglobin of 8.2 on May 28, down to 6.9 on June 4.  She  had a transfusion 6/7 at [facility] and she was up to 10.1 on June 11.  However on the 18th she was down to 8.3.  This morning her Hgb is 6.5 marking a moderately severe anemia.  She does have a PEG tube and an ostomy, she is unable to tell me why she has these things.  Ostomy has dark liquid stool in it.  Her stool has been tested and was heme positive.  Additionally was tests at the nursing home showed heme positive stool.  The patient does deny abdominal pain.  She reports pain in her right AKA site.  She is unable to tell me how much she eats.  It sounds like her son makes decisions for her.  She denies weakness fatigue and other signs of overt bleeding.    She is on oral iron and omeprazole as an outpatient.  She is also on stool softeners and laxatives.    She has had hospitalizations in the past for pneumonia, exploratory laparoscopy, and revision of her ostomy.    Past Medical History:  Past Medical History:   Diagnosis Date   • Anemia    • Atrial fibrillation    • Colitis    • COPD (chronic obstructive pulmonary disease)    • Dysphagia    • Hernia    • Hypokalemia    • Pneumonia        Past Surgical History:  Past Surgical History:   Procedure Laterality Date   • ABDOMINAL SURGERY     • AMPUTATION      right leg   • COLOSTOMY     • EXPLORATORY LAPAROTOMY N/A 12/3/2017    Procedure:  disimpaction of ostomy;  Surgeon: Neris Mcgee MD;  Location: [facility] OR;  Service:         Social History:   Social History   Substance Use Topics   • Smoking status: Former Smoker   • Smokeless tobacco: Never Used   • Alcohol use No        Family History:  History reviewed. No pertinent family history.    Home Meds:  Prescriptions Prior to Admission   Medication Sig Dispense Refill Last Dose   • acetaminophen (TYLENOL) 160 MG/5ML solution Take 640 mg by mouth Every 6 (Six) Hours As Needed for Mild Pain (1-3).      • albuterol (PROVENTIL) (2.5 MG/3ML) 0.083% nebulizer solution Take 2.5 mg by nebulization Every 4 (Four) Hours As Needed for Wheezing.      • calcium carbonate (TUMS) 500 MG chewable tablet 1 tablet by Per G Tube route Every 6 (Six) Hours As Needed for Indigestion or Heartburn.      • castor oil-balsam peru (VENELEX) ointment Apply 1 g topically 2 (Two) Times a Day. Apply to the buttocks      • custom compounded cream Apply 1 application topically 2 (Two) Times a Day. Bactracin/Nystatin/Triamcinolone/Hydrophor 1:1:1:2 Apply to buttocks BID and PRN      • ferrous sulfate 300 (60 FE) MG/5ML syrup 300 mg by Per G Tube route Daily.      • furosemide (LASIX) 20 MG tablet 20 mg by Per G Tube route Daily.      • gabapentin (NEURONTIN) 100 MG capsule Take 100 mg by mouth Daily.      • Menthol-Zinc Oxide (REMEDY CALAZIME) 0.2-20 % paste Apply 1 application topically 2 (Two) Times a Day. And PRN for saad care      • morphine 100 MG/5ML solution concentrated solution Take 15 mg by mouth Every 4 (Four) Hours.      • Multiple Vitamin (TAB-A-EBONY) tablet 1 tablet by Per G Tube route Daily.      • mupirocin (BACTROBAN) 2 % ointment Apply 1 application topically 2 (Two) Times a Day. Wash G-Tube site and apply every shift       • nystatin (nystatin) 131159 UNIT/GM powder Apply 1 application topically 2 (Two) Times a Day. Clean G-Tube site with NS and apply       • omeprazole (priLOSEC) 20 MG capsule 20 mg by  Per G Tube route Daily.      • ondansetron (ZOFRAN) 4 MG tablet 4 mg by Per G Tube route Every 6 (Six) Hours As Needed for Nausea or Vomiting.      • phenol (CHLORASEPTIC) 1.4 % liquid liquid Apply 1 spray to the mouth or throat 4 (Four) Times a Day As Needed.      • polyethylene glycol (MIRALAX) packet Take 17 g by mouth Daily As Needed.      • potassium chloride (KAYCIEL) 20 MEQ/15ML (10%) solution 20 mEq by Per G Tube route Daily.      • sennosides-docusate sodium (SENOKOT-S) 8.6-50 MG tablet 1 tablet by Per G Tube route Daily.      • sulfamethoxazole-trimethoprim (BACTRIM DS,SEPTRA DS) 800-160 MG per tablet 1 tablet by Per G Tube route 2 (Two) Times a Day.          Current Meds:     gabapentin 100 mg Oral Daily   mupirocin 1 application Topical Q12H   nystatin 1 application Topical Q12H   sennosides-docusate sodium 1 tablet Per G Tube Daily       Allergies:  Allergies   Allergen Reactions   • Penicillins      Tolerated meropenem in 08/2017.       Review of Systems  All systems were reviewed and negative except for:  Musculoskeletal: positive for  pain at R stump     Objective     Vital Signs  Temp:  [97.4 °F (36.3 °C)-98.4 °F (36.9 °C)] 97.4 °F (36.3 °C)  Heart Rate:  [70-84] 70  Resp:  [18-20] 20  BP: ()/(48-79) 98/48    Physical Exam:  Constitutional:    Alert, cooperative, in no acute distress, appears stated age, debilitated   Eyes:            Lids and lashes normal, conjunctivae and sclerae normal, no   icterus   Ears, nose, mouth and throat:   Normal appearance of external ears and nose, no oral lesions, no thrush, oral mucosa tacky   Respiratory:     Clear to auscultation, respirations regular, even and                   unlabored    Cardiovascular:    Regular rhythm and normal rate, normal S1 and S2, no            murmur, no gallop, palpable distal pulses, no lower extremity edema of LLE   Gastrointestinal:    Soft, non-distended, non-tender to palpation, no guarding, no rebound tenderness, normal  bowel sounds, no palpable masses or organomegaly, PEG in place, ostomy with melenic stool  Rectal exam: deferred   Musculoskeletal:   Normal station, + atrophy of UEs, + tenderness to palpation of R AKA stump, normal digits and nails   Skin:   palecolor, no bleeding, bruising, rashes or lesions   Lymphatics:   No palpable cervical or supraclavicular adenopathy   Psychiatric:  Judgement and insight: limited   Orientation to person, place and time: to self   Mood and affect: pleaseant       Results Review:   I reviewed the patient's new clinical results.      Results from last 7 days  Lab Units 06/22/18  0657 06/21/18  1143   WBC 10*3/mm3 5.25 7.14   HEMOGLOBIN g/dL 6.5* 8.1*   HEMATOCRIT % 24.2* 28.7*   PLATELETS 10*3/mm3 272 349         Results from last 7 days  Lab Units 06/22/18  0511 06/21/18  1143   SODIUM mmol/L 136 136   POTASSIUM mmol/L 3.7 4.7   CHLORIDE mmol/L 105 101   CO2 mmol/L 20.6* 23.5   BUN mg/dL 25* 39*   CREATININE mg/dL 0.64 0.73   CALCIUM mg/dL 7.6* 8.3*   BILIRUBIN mg/dL  --  <0.2   ALK PHOS U/L  --  133*   ALT (SGPT) U/L  --  15   AST (SGOT) U/L  --  19   GLUCOSE mg/dL 68 83         Results from last 7 days  Lab Units 06/21/18  1217   INR  1.02         Lab Results  Lab Value Date/Time   LIPASE 20 08/09/2017 0605   LIPASE 20 08/08/2017 1251       Radiology:  Imaging Results (last 72 hours)     Procedure Component Value Units Date/Time    XR Chest 1 View [[id number]] Collected:  06/21/18 1244     Updated:  06/21/18 1248    Narrative:       XR CHEST 1 VW-     Clinical: Cough, rhonchi and rales     COMPARISON 8/8/2017 chest radiograph and CT chest 8/8/2017     FINDINGS: There is again demonstrated a hiatal hernia. Cardiac size  upper limits of normal to mildly enlarged, stable. No pulmonary edema  identified there is however minimal blunting of the left costophrenic  angle suggestive of trace pleural effusion or pleural thickening. No  gross consolidation has developed. There are monitoring  leads  superimposing the chest in the remainder is unremarkable.     This report was finalized on 6/21/2018 12:45 PM by Dr. Kendrick Angeles M.D.             Assessment/Plan     Active Problems:    PAF (paroxysmal atrial fibrillation)    COPD (chronic obstructive pulmonary disease)    Suprapubic catheter    History of right above knee amputation    Anemia    Gastrointestinal hemorrhage    Dysphagia      Impression  1. Blood loss anemia: Moderately severe and persistent declines in hemoglobin over the past 6 weeks requiring transfusions.  Evidence of melena, heme positive stool on exam.  She is hemodynamically stable    2. Heme positive stool: Melenic stool in her ostomy.?  Upper GI bleed    3. Dementia: nursing home resident    4. S/p colostomy: Unclear why she has ostomy--thought to be possibly for divergent for decubitus ulcer    5. S/p PEG: Apparently she does take some by mouth    Plan  -check iron studies before blood transfusion  -EGD if permission can be obtained from her son and once transfusions have started  -continue ppi gtt      I discussed the patients findings and my recommendations with patient and nursing staff    Angie Khan MD  [facility]      Dictated utilizing Dragon dictation

## 2018-06-22 NOTE — ANESTHESIA POSTPROCEDURE EVALUATION
Patient: Simona Brooks    Procedure Summary     Date:  06/22/18 Room / Location:   [facility]    Anesthesia Start:  1336 Anesthesia Stop:  1414    Procedure:  ESOPHAGOGASTRODUODENOSCOPY with biopsies (N/A Esophagus) Diagnosis:       Gastrointestinal hemorrhage with melena      (Gastrointestinal hemorrhage with melena [K92.1])    Surgeon:  Chinedu Wood MD Provider:  Jona Mar MD    Anesthesia Type:  MAC ASA Status:  3          Anesthesia Type: MAC  Last vitals  BP   (!) 87/61 (06/22/18 1410)   Temp   36.6 °C (97.8 °F) (06/22/18 1341)   Pulse   69 (06/22/18 1410)   Resp   16 (06/22/18 1410)     SpO2   100 % (06/22/18 1410)     Post Anesthesia Care and Evaluation    Patient location during evaluation: PHASE II  Anesthetic complications: No anesthetic complications

## 2018-06-22 NOTE — ANESTHESIA PREPROCEDURE EVALUATION
Anesthesia Evaluation   Airway   Mallampati: II  TM distance: >3 FB  Neck ROM: full  no difficulty expected  Dental    (+) lower dentures and upper dentures    Pulmonary - normal exam   (+) COPD mild,   Cardiovascular - normal exam    ECG reviewed  (+) dysrhythmias (sinus rythem today) Atrial Fib,   (-) angina    Neuro/Psych  GI/Hepatic/Renal/Endo      Musculoskeletal   Abdominal    Substance History     OB/GYN          Other   (+) blood dyscrasia (anemic Hb 7.9)                 Anesthesia Plan    ASA 3     MAC   (Pt completed first of two units PRBC's)  Anesthetic plan and risks discussed with patient.

## 2018-06-22 NOTE — PLAN OF CARE
Problem: Patient Care Overview  Goal: Plan of Care Review  Outcome: Ongoing (interventions implemented as appropriate)   06/22/18 1504 06/22/18 1710   Coping/Psychosocial   Plan of Care Reviewed With patient --    Plan of Care Review   Progress --  improving   OTHER   Outcome Summary --  VSS; patient often complains of phantom leg pain; AKA rt; EGD today; patient to start on tube feeds; 2 units of blood; pt. to start a mech. soft diet ( the same as from the nursing home)       Problem: Fall Risk (Adult)  Goal: Absence of Fall  Outcome: Ongoing (interventions implemented as appropriate)      Problem: Skin Injury Risk (Adult)  Goal: Skin Health and Integrity  Outcome: Ongoing (interventions implemented as appropriate)

## 2018-06-22 NOTE — SIGNIFICANT NOTE
06/22/18 0855   Rehab Time/Intention   Evaluation Not Performed ([facility] facility reports pt is bedbound, refuses to get OOB, transfers with a lift if she does get out of bed. Pt is not appropriate for skilled PT. Therefore, PT will sign off.)   Rehab Treatment   Discipline physical therapist

## 2018-06-23 ENCOUNTER — INPATIENT HOSPITAL (OUTPATIENT)
Dept: URBAN - METROPOLITAN AREA HOSPITAL 113 | Facility: HOSPITAL | Age: 83
End: 2018-06-23
Payer: MEDICARE

## 2018-06-23 DIAGNOSIS — K25.9 GASTRIC ULCER, UNSPECIFIED AS ACUTE OR CHRONIC, WITHOUT HEMO: ICD-10-CM

## 2018-06-23 DIAGNOSIS — K92.2 GASTROINTESTINAL HEMORRHAGE, UNSPECIFIED: ICD-10-CM

## 2018-06-23 PROCEDURE — 99222 1ST HOSP IP/OBS MODERATE 55: CPT | Performed by: INTERNAL MEDICINE

## 2018-06-23 NOTE — PROGRESS NOTES
Name: Simona Brooks ADMIT: 2018   : 1934  PCP: Leonor Echevarria MD    MRN: [id number] LOS: 2 days   AGE/SEX: 83 y.o. female  ROOM: [facility]   Subjective     History limited from the patient due to her mental status  Denies complaints except for right leg phantom pain on motion chest pain, shortness breath, nausea, vomiting and states she does not eat because she does not want to eat the food at the nursing home    Objective   Vital Signs  Temp:  [97.6 °F (36.4 °C)-98.3 °F (36.8 °C)] 97.9 °F (36.6 °C)  Heart Rate:  [69-81] 79  Resp:  [16-18] 18  BP: ()/(43-83) 131/63  SpO2:  [93 %-100 %] 93 %  on  Flow (L/min):  [1-3] 1;   Device (Oxygen Therapy): room air  Body mass index is 21.88 kg/m².    Physical Exam   Constitutional: No distress.   HENT:   Head: Normocephalic and atraumatic.   Neck: No JVD present.   Cardiovascular: Normal rate and regular rhythm.  Exam reveals no friction rub.    No murmur heard.  Pulmonary/Chest: Effort normal. No respiratory distress. She has no wheezes. She has rales (diffuse).   Abdominal: Soft. Bowel sounds are normal. She exhibits no distension. There is no tenderness.   Colostomy with brown stool  G-tube placement without erythema or skin breakdown   Musculoskeletal: Normal range of motion. She exhibits deformity (Right above-the-knee amputation). She exhibits no edema or tenderness.   Neurological: She is alert.   Oriented to person and place   Skin: Skin is warm and dry. She is not diaphoretic. No erythema. There is pallor.   Psychiatric: She has a normal mood and affect. Her behavior is normal.   Vitals reviewed.      Results Review:       I reviewed the patient's new clinical results.    Results from last 7 days  Lab Units 18  0408 18  2243 18  0657 18  1143   WBC 10*3/mm3 5.74  --  5.25 7.14   HEMOGLOBIN g/dL 9.9* 9.8* 6.5* 8.1*   PLATELETS 10*3/mm3 255  --  272 349       Results from last 7 days  Lab Units 18  0515  06/22/18  0511 06/21/18  1143   SODIUM mmol/L 140 136 136   POTASSIUM mmol/L 3.5 3.7 4.7   CHLORIDE mmol/L 110* 105 101   CO2 mmol/L 16.2* 20.6* 23.5   BUN mg/dL 12 25* 39*   CREATININE mg/dL 0.60 0.64 0.73   GLUCOSE mg/dL 108* 68 83   Estimated Creatinine Clearance: 45.7 mL/min (by C-G formula based on SCr of 0.6 mg/dL).    Results from last 7 days  Lab Units 06/23/18  0408 06/22/18  0511 06/21/18  1143   CALCIUM mg/dL 7.6* 7.6* 8.3*   ALBUMIN g/dL  --   --  2.80*         gabapentin 100 mg Oral Daily   mupirocin 1 application Topical Q12H   nystatin 1 application Topical Q12H   sennosides-docusate sodium 1 tablet Per G Tube Daily       pantoprazole 8 mg/hr Last Rate: 8 mg/hr (06/23/18 1007)   Diet Soft Texture; Ground; Thin; Cardiac      Assessment/Plan      Active Hospital Problems (** Indicates Principal Problem)    Diagnosis Date Noted   • **Gastrointestinal hemorrhage with melena [K92.1] 06/21/2018   • Acute gastric ulcer with hemorrhage [K25.0] 06/22/2018   • Gastrointestinal hemorrhage [K92.2] 06/21/2018   • Dysphagia [R13.10] 06/21/2018   • Anemia [D64.9] 12/02/2017   • Suprapubic catheter [Z93.59] 08/11/2017   • History of right above knee amputation [Z89.611] 08/11/2017   • COPD (chronic obstructive pulmonary disease) [J44.9] 08/08/2017   • PAF (paroxysmal atrial fibrillation) [I48.0] 08/08/2017      Resolved Hospital Problems    Diagnosis Date Noted Date Resolved   No resolved problems to display.       · Acute upper GI bleed secondary to gastric ulcers: Gastrostomy tube balloon loosened during EGD to help ulcer healing. Continue PPI but change to bid, hemoglobin dropped to 6.5 today requiring 2 units of packed red blood cells 6/22 but now stable, patient hemodynamically stable  · Cont tube feeding but on hold secondary to above.  Hypoglycemic this morning so started dextrose with IV fluids  · Stop IV fluids, NAGMA likely from this  · Dysphagia: G-tube placed as above  · History of right above-the-knee  amputation: Unclear cause at this time  · Colostomy: Also unsure why she has this in history is limited from the patient, presumably from sacral decub  · Suprapubic cath: ask urology to see to make sure it's working properly since more urine out of urethra than suprapubic cath  · Resident of long-term care facility, [facility]  · Need to confirm her CODE STATUS with her power of     Disposition back to [facility], tomorrow if ok with everyone    Ken Goodwin MD  [facility]  06/23/18  12:43 PM

## 2018-06-23 NOTE — PLAN OF CARE
Problem: Patient Care Overview  Goal: Plan of Care Review  Outcome: Ongoing (interventions implemented as appropriate)   06/23/18 0619   Coping/Psychosocial   Plan of Care Reviewed With patient   Plan of Care Review   Progress improving   OTHER   Outcome Summary Pt c/o leg and back pain minimally relieved by Morphine. recieved TF throughout the night with no abd pain, nausea, or vomitting. No signs of bleeding. Will continue to monitor       Problem: Fall Risk (Adult)  Goal: Absence of Fall  Outcome: Ongoing (interventions implemented as appropriate)   06/23/18 0619   Fall Risk (Adult)   Absence of Fall making progress toward outcome       Problem: Skin Injury Risk (Adult)  Goal: Skin Health and Integrity  Outcome: Ongoing (interventions implemented as appropriate)   06/23/18 0619   Skin Injury Risk (Adult)   Skin Health and Integrity making progress toward outcome

## 2018-06-23 NOTE — CONSULTS
Consults    Patient Care Team:  Leonor Echevarria MD as PCP - General (Family Medicine)    Chief complaint: gi bleed    Subjective     History of Present Illness  No further issues,  No black or bloody stools.   Review of Systems   Constitutional: Positive for fatigue.   Gastrointestinal: Positive for abdominal pain.        Past Medical History:   Diagnosis Date   • Anemia    • Atrial fibrillation    • Colitis    • COPD (chronic obstructive pulmonary disease)    • Dysphagia    • Hernia    • Hypokalemia    • Pneumonia    ,   Past Surgical History:   Procedure Laterality Date   • ABDOMINAL SURGERY     • AMPUTATION      right leg   • COLOSTOMY     • ENDOSCOPY N/A 6/22/2018    Procedure: ESOPHAGOGASTRODUODENOSCOPY with biopsies;  Surgeon: Chinedu Wood MD;  Location: [facility] ENDOSCOPY;  Service: Gastroenterology   • EXPLORATORY LAPAROTOMY N/A 12/3/2017    Procedure: disimpaction of ostomy;  Surgeon: Neris Mcgee MD;  Location: [facility] MAIN OR;  Service:    , History reviewed. No pertinent family history.,   Social History   Substance Use Topics   • Smoking status: Former Smoker   • Smokeless tobacco: Never Used   • Alcohol use No   ,   No prescriptions prior to admission.   , Scheduled Meds:  , Continuous Infusions:    No current facility-administered medications for this encounter. , PRN Meds:   and Allergies:  Penicillins    Objective      Vital Signs  Temp:  [97.6 °F (36.4 °C)-98.3 °F (36.8 °C)] 97.7 °F (36.5 °C)  Heart Rate:  [74-79] 74  Resp:  [18] 18  BP: (124-141)/(50-64) 128/50    Physical Exam   Constitutional: She appears well-developed and well-nourished.   HENT:   Mouth/Throat: Oropharynx is clear and moist.   Eyes: Conjunctivae are normal.   Neck: Neck supple.   Cardiovascular: Normal rate.    Pulmonary/Chest: Effort normal.   Abdominal: Soft.   g tube ok, ostomy ok    Skin: Skin is warm and dry.       Results Review:    I reviewed the patient's new clinical results.        Assessment/Plan      Principal Problem:    Gastrointestinal hemorrhage with melena  Active Problems:    PAF (paroxysmal atrial fibrillation)    COPD (chronic obstructive pulmonary disease)    Suprapubic catheter    History of right above knee amputation    Anemia    Gastrointestinal hemorrhage    Dysphagia    Acute gastric ulcer with hemorrhage      Assessment:  (Gi bleed secondary to h pylori negative ulcers.).     Plan:   (Change to po protonix  Stable from GI viewpoint.  Will see prn. ).       I discussed the patients findings and my recommendations with patient and nursing staff    Orestes Rodrigez MD  06/23/18  6:00 PM

## 2018-06-24 NOTE — PLAN OF CARE
Problem: Patient Care Overview  Goal: Plan of Care Review  Outcome: Ongoing (interventions implemented as appropriate)   06/23/18 1933   Coping/Psychosocial   Plan of Care Reviewed With patient   Plan of Care Review   Progress no change   OTHER   Outcome Summary pt c/o leg, back and coccyx pain- weepy at times, relief with oral morphine, vss, safety maintained, yellowish urine looking drainage noted from rectal area- urine in supra pubic catheter bag yellow and cloudy- urology consulted, IVfluids discontinued, IV protonix continued, stool appears dark brown, minimal amount in colostomy bag, safety ,maintained, continue to monitor

## 2018-06-24 NOTE — DISCHARGE PLACEMENT REQUEST
"Simona Kelsey  (83 y.o. Female)     Date of Birth Social Security Number Address Home Phone MRN    1934  [id number]  [facility] [phone] [id number]    Christianity Marital Status          Non-Mormon        Admission Date Admission Type Admitting Provider Attending Provider Department, Room/Bed    6/21/18 Emergency Daina Kwan MD [place], Tony Bolivar MD [address], [facility]/1    Discharge Date Discharge Disposition Discharge Destination         Skilled Nursing Facility ([place] - External)              Attending Provider:  Tony Saini MD    Allergies:  Penicillins    Isolation:  None   Infection:  None   Code Status:  CPR    Ht:  157.5 cm (62\")   Wt:  55.3 kg (122 lb)    Admission Cmt:  None   Principal Problem:  Gastrointestinal hemorrhage with melena [K92.1] More...                 Active Insurance as of 6/21/2018     Primary Coverage     Payor Plan Insurance Group Employer/Plan Group    HUMANA MEDICARE REPLACEMENT HUMANA MEDICARE REPL [id number]     Payor Plan Address Payor Plan Phone Number Effective From Effective To    [address] [address] [phone] 1/1/2018     [facility] [id number]       Subscriber Name Subscriber Birth Date Member ID       SIMONA KELSEY 1934 [id number]           Secondary Coverage     Payor Plan Insurance Group Employer/Plan Group    [place]      Payor Plan Address Payor Plan Phone Number Effective From Effective To    [address] [phone] 2/1/2018     [facility] [id number]       Subscriber Name Subscriber Birth Date Member ID       SIMONA KELSEY 1934 [id number]                 Emergency Contacts      (Rel.) Home Phone Work Phone Mobile Phone    Jeyson Kelsey (Poa) (Son) [phone] -- [phone]    Meliton Kelsey (Son) -- -- [phone]    JudeRashard (Brother) [phone] -- [phone]          "

## 2018-06-24 NOTE — CONSULTS
FIRST UROLOGY CONSULT      Patient Identification:  NAME:  Simona Brooks  Age:  83 y.o.   Sex:  female   :  1934   MRN:  [id number]       Chief complaint: Neurogenic Bladder    History of present illness:  Patient was admitted through the ED for pneumonia from Dr. Herr's office.  She was found to have anemia and was transferred 2 units of blood.  Patient has a suprapubic catheter for her neurogenic bladder.  She denies any bladder pain and cathter is draining clear yellow urine.Cystogram 17- no evidence of ureteral obstruction by catheter and suprapubic catheter was in proper position      Past medical history:  Past Medical History:   Diagnosis Date   • Anemia    • Atrial fibrillation    • Colitis    • COPD (chronic obstructive pulmonary disease)    • Dysphagia    • Hernia    • Hypokalemia    • Pneumonia        Past surgical history:  Past Surgical History:   Procedure Laterality Date   • ABDOMINAL SURGERY     • AMPUTATION      right leg   • COLOSTOMY     • EXPLORATORY LAPAROTOMY N/A 12/3/2017    Procedure: disimpaction of ostomy;  Surgeon: Neris Mcgee MD;  Location: [facility];  Service:        Allergies:  Penicillins    Home medications:  Prescriptions Prior to Admission   Medication Sig Dispense Refill Last Dose   • acetaminophen (TYLENOL) 160 MG/5ML solution Take 640 mg by mouth Every 6 (Six) Hours As Needed for Mild Pain (1-3).      • albuterol (PROVENTIL) (2.5 MG/3ML) 0.083% nebulizer solution Take 2.5 mg by nebulization Every 4 (Four) Hours As Needed for Wheezing.      • calcium carbonate (TUMS) 500 MG chewable tablet 1 tablet by Per G Tube route Every 6 (Six) Hours As Needed for Indigestion or Heartburn.      • castor oil-balsam peru (VENELEX) ointment Apply 1 g topically 2 (Two) Times a Day. Apply to the buttocks      • custom compounded cream Apply 1 application topically 2 (Two) Times a Day. Bactracin/Nystatin/Triamcinolone/Hydrophor 1:1:1:2 Apply to buttocks BID and PRN       • ferrous sulfate 300 (60 FE) MG/5ML syrup 300 mg by Per G Tube route Daily.      • furosemide (LASIX) 20 MG tablet 20 mg by Per G Tube route Daily.      • gabapentin (NEURONTIN) 100 MG capsule Take 100 mg by mouth Daily.      • Menthol-Zinc Oxide (REMEDY CALAZIME) 0.2-20 % paste Apply 1 application topically 2 (Two) Times a Day. And PRN for saad care      • morphine 100 MG/5ML solution concentrated solution Take 15 mg by mouth Every 4 (Four) Hours.      • Multiple Vitamin (TAB-A-EBONY) tablet 1 tablet by Per G Tube route Daily.      • mupirocin (BACTROBAN) 2 % ointment Apply 1 application topically 2 (Two) Times a Day. Wash G-Tube site and apply every shift       • nystatin (nystatin) 201889 UNIT/GM powder Apply 1 application topically 2 (Two) Times a Day. Clean G-Tube site with NS and apply       • omeprazole (priLOSEC) 20 MG capsule 20 mg by Per G Tube route Daily.      • ondansetron (ZOFRAN) 4 MG tablet 4 mg by Per G Tube route Every 6 (Six) Hours As Needed for Nausea or Vomiting.      • phenol (CHLORASEPTIC) 1.4 % liquid liquid Apply 1 spray to the mouth or throat 4 (Four) Times a Day As Needed.      • polyethylene glycol (MIRALAX) packet Take 17 g by mouth Daily As Needed.      • potassium chloride (KAYCIEL) 20 MEQ/15ML (10%) solution 20 mEq by Per G Tube route Daily.      • sennosides-docusate sodium (SENOKOT-S) 8.6-50 MG tablet 1 tablet by Per G Tube route Daily.      • sulfamethoxazole-trimethoprim (BACTRIM DS,SEPTRA DS) 800-160 MG per tablet 1 tablet by Per G Tube route 2 (Two) Times a Day.          Hospital medications:    gabapentin 100 mg Oral Daily   mupirocin 1 application Topical Q12H   nystatin 1 application Topical Q12H   sennosides-docusate sodium 1 tablet Per G Tube Daily       pantoprazole 8 mg/hr Last Rate: 8 mg/hr (06/24/18 0037)     •  acetaminophen  •  albuterol  •  bisacodyl  •  Morphine  •  ondansetron **OR** ondansetron ODT **OR** ondansetron  •  phenol  •  Insert peripheral IV  **AND** sodium chloride    Family history:  History reviewed. No pertinent family history.    Social history:  Social History   Substance Use Topics   • Smoking status: Former Smoker   • Smokeless tobacco: Never Used   • Alcohol use No       Review of systems:    Negative 12-system ROS except for the following:  C/o right leg pain only, no chest pain, no n/v      Objective:  TMax 24 hours:   Temp (24hrs), Av.8 °F (36.6 °C), Min:97.6 °F (36.4 °C), Max:98.2 °F (36.8 °C)      Vitals Ranges:   Temp:  [97.6 °F (36.4 °C)-98.2 °F (36.8 °C)] 97.7 °F (36.5 °C)  Heart Rate:  [74-89] 89  Resp:  [18] 18  BP: ()/(50-88) 126/61    Intake/Output Last 3 shifts:  I/O last 3 completed shifts:  In:  [P.O.:150; I.V.:934; Blood:300; Other:280; NG/GT:331]  Out: 1100 [Urine:1100]     Physical Exam:       General Appearance:    Alert, cooperative, in no acute distress   Head:    Normocephalic, without obvious abnormality, atraumatic   Eyes:          PERRL, conjunctivae and corneas clear   Ears:    Normal external inspection   Throat:   No oral lesions, oral mucosa moist   Neck:   Supple, no LAD, trachea midline   Back:     No CVA tenderness   Lungs:     Respirations unlabored, symmetric excursion    Heart:    RRR, intact peripheral pulses   Abdomen:     Soft, NDNT, no masses, no guarding   :    No pelvic examination performed   Extremities:   No edema, no deformity   Skin:   No bleeding, bruising or rashes   Neuro/Psych:   Orientation intact, mood/affect pleasant, no focal findings       Results review:   I reviewed the patient's new clinical results.    Data review:  Lab Results (last 24 hours)     Procedure Component Value Units Date/Time    Basic Metabolic Panel [[id number]]  (Abnormal) Collected:  18    Specimen:  Blood Updated:  18     Glucose 124 (H) mg/dL      BUN 13 mg/dL      Creatinine 0.59 mg/dL      Sodium 137 mmol/L      Potassium 3.9 mmol/L      Chloride 106 mmol/L      CO2 18.4 (L) mmol/L       Calcium 7.7 (L) mg/dL      eGFR Non African Amer 97 mL/min/1.73      BUN/Creatinine Ratio 22.0     Anion Gap 12.6 mmol/L     Narrative:       The MDRD GFR formula is only valid for adults with stable renal function between ages 18 and 70.    CBC Auto Differential [[id number]]  (Abnormal) Collected:  06/24/18 0515    Specimen:  Blood Updated:  06/24/18 0620     WBC 7.30 10*3/mm3      RBC 4.13 10*6/mm3      Hemoglobin 11.3 (L) g/dL      Hematocrit 35.7 %      MCV 86.4 fL      MCH 27.4 pg      MCHC 31.7 (L) g/dL      RDW 16.5 (H) %      RDW-SD 52.5 fl      MPV 10.0 fL      Platelets 301 10*3/mm3      Neutrophil % 50.0 %      Lymphocyte % 37.7 %      Monocyte % 9.3 %      Eosinophil % 2.7 %      Basophil % 0.3 %      Immature Grans % 0.4 %      Neutrophils, Absolute 3.65 10*3/mm3      Lymphocytes, Absolute 2.75 10*3/mm3      Monocytes, Absolute 0.68 10*3/mm3      Eosinophils, Absolute 0.20 10*3/mm3      Basophils, Absolute 0.02 10*3/mm3      Immature Grans, Absolute 0.03 10*3/mm3     Urease For H Pylori - Tissue, Stomach [[id number]]  (Normal) Collected:  06/22/18 1359    Specimen:  Tissue from Stomach Updated:  06/23/18 1631     Urease Negative    Tissue Pathology Exam [[id number]] Collected:  06/22/18 1357    Specimen:  Tissue from Small Intestine, Duodenum; Tissue from Stomach Updated:  06/23/18 1145     Case Report --     Surgical Pathology Report                         Case: [id number]                                  Authorizing Provider:  Chinedu Wood MD            Collected:           06/22/2018 01:57 PM          Ordering Location:     [facility]  Received:            06/22/2018 03:10 PM                                 ENDO SUITES                                                                  Pathologist:           Meliton Qureshi MD                                                          Specimens:   1) - Small Intestine, Duodenum, Duodenal Biopsies                                           "         2) - Stomach, Random Gastric Biopsies                                                       Final Diagnosis --     1: DUODENUM, BIOPSY:   FRAGMENTS OF DUODENAL MUCOSA WITH PRESERVED VILLOUS ARCHITECTURE AND NO          SIGNIFICANT PATHOLOGIC CHANGE.   NO HISTOLOGIC EVIDENCE OF CELIAC SPRUE.    2: STOMACH, RANDOM BIOPSY:   FRAGMENTS OF GASTRIC MUCOSA WITH MILD CHRONIC GASTRITIS.   NO HELICOBACTER PYLORI ORGANISMS SEEN ON SPECIAL STAIN (DIFF-QUIK).    TDJ/carleen     CPT CODES:  1: 27641  2: 44543, 99299       Gross Description --     1.  Received in formalin labeled \"small intestine, duodenal biopsies\" is a 0.7 x 0.7 x 0.1 cm aggregate of tan pieces of tissue.      2.  Received in formalin labeled \"stomach, random gastric biopsies\" is is a 0.6 x 0.5 x 0.1 cm aggregate of tan pieces of tissue.  The specimens are submitted all in block 2A.  CC/USO/TDJ/brb       Microscopic Description --     Performed, incorporated in diagnosis.              Imaging:  Imaging Results (last 24 hours)     ** No results found for the last 24 hours. **             Assessment:     Principal Problem:    Gastrointestinal hemorrhage with melena  Active Problems:    PAF (paroxysmal atrial fibrillation)    COPD (chronic obstructive pulmonary disease)    Suprapubic catheter    History of right above knee amputation    Anemia    Gastrointestinal hemorrhage    Dysphagia    Acute gastric ulcer with hemorrhage    NGB    Plan:     Hardy is draining, stable from  stand point    SOHAIL Barriga  06/24/18  8:36 AM          "

## 2018-06-24 NOTE — DISCHARGE SUMMARY
Date of Admission: 6/21/2018  Date of Discharge:  6/24/2018  Primary Care Physician: Leonor Echevarria MD     Discharge Diagnosis:  Principal Problem:    Gastrointestinal hemorrhage with melena  Active Problems:    PAF (paroxysmal atrial fibrillation)    COPD (chronic obstructive pulmonary disease)    Suprapubic catheter    History of right above knee amputation    Anemia    Gastrointestinal hemorrhage    Dysphagia    Acute gastric ulcer with hemorrhage      DETAILS OF HOSPITAL STAY     Pertinent Test Results and Procedures Performed    CXR:  There is again demonstrated a hiatal hernia. Cardiac size  upper limits of normal to mildly enlarged, stable. No pulmonary edema  identified there is however minimal blunting of the left costophrenic  angle suggestive of trace pleural effusion or pleural thickening. No  gross consolidation has developed. There are monitoring leads  superimposing the chest in the remainder is unremarkable.    Hospital Course    This is an 83-year-old female nursing home resident who has moderate dementia, dysphagia requiring G-tube, diverting colostomy, and suprapubic catheter along with sacral decubitus ulcer who presented to the emergency room with upper gastrointestinal bleeding.  She underwent EGD on 6/22/18 that showed multiple nonbleeding ulcers secondary to her gastrostomy tube.  The bulb was loosened during the EGD.  The patient remained and a Protonix drip during her hospitalization and will be transitioned to oral Protonix twice daily.  She has had no further signs of bleeding.  She did have acute blood loss anemia requiring 2 units of packed red blood cells and hemoglobin is now stable and currently 11.3.  She was seen by urology who felt that her suprapubic catheter was functioning appropriately.  At this point she is medically stable and will return back to her nursing home today.      Physical Exam at Discharge:  General: No acute distress, pleasantly confused  HEENT: EOMI,  PERRL  Cardiovascular: +s1 and s2, RRR  Lungs: No rhonchi or wheezing  Abdomen: soft, nontender, colostomy, G-tube and suprapubic catheter in place    Consults:   Consults     Date and Time Order Name Status Description    6/23/2018 1237 Inpatient Urology Consult Completed     6/21/2018 1720 Inpatient Gastroenterology Consult      6/21/2018 1402 [facility] (on-call MD unless specified) Completed             Condition on Discharge: Stable    Discharge Disposition  Skilled Nursing Facility ([place] - External)    Discharge Medications     Discharge Medications      New Medications      Instructions Start Date   pantoprazole 40 MG EC tablet  Commonly known as:  PROTONIX   40 mg, Oral, 2 Times Daily Before Meals         Continue These Medications      Instructions Start Date   acetaminophen 160 MG/5ML solution  Commonly known as:  TYLENOL   640 mg, Oral, Every 6 Hours PRN      albuterol (2.5 MG/3ML) 0.083% nebulizer solution  Commonly known as:  PROVENTIL   2.5 mg, Nebulization, Every 4 Hours PRN      calcium carbonate 500 MG chewable tablet  Commonly known as:  TUMS   1 tablet, Per G Tube, Every 6 Hours PRN      castor oil-balsam peru ointment   1 g, Topical, 2 Times Daily, Apply to the buttocks      CUSTOM CREAM BUILDER   1 application, Topical, 2 Times Daily, Bactracin/Nystatin/Triamcinolone/Hydrophor 1:1:1:2 Apply to buttocks BID and PRN      ferrous sulfate 300 (60 Fe) MG/5ML syrup   300 mg, Per G Tube, Daily      furosemide 20 MG tablet  Commonly known as:  LASIX   20 mg, Per G Tube, Daily      gabapentin 100 MG capsule  Commonly known as:  NEURONTIN   100 mg, Oral, Daily      morphine 100 MG/5ML solution concentrated solution   15 mg, Oral, Every 4 Hours      mupirocin 2 % ointment  Commonly known as:  BACTROBAN   1 application, Topical, 2 Times Daily, Wash G-Tube site and apply every shift      nystatin 698801 UNIT/GM powder  Generic drug:  nystatin   1 application, Topical, 2 Times Daily, Clean G-Tube site with NS and  apply      ondansetron 4 MG tablet  Commonly known as:  ZOFRAN   4 mg, Per G Tube, Every 6 Hours PRN      phenol 1.4 % liquid liquid  Commonly known as:  CHLORASEPTIC   1 spray, Mouth/Throat, 4 Times Daily PRN      polyethylene glycol packet  Commonly known as:  MIRALAX   17 g, Oral, Daily PRN      potassium chloride 20 MEQ/15ML (10%) solution  Commonly known as:  KAYCIEL   20 mEq, Per G Tube, Daily      REMEDY CALAZIME 0.2-20 % paste  Generic drug:  Menthol-Zinc Oxide   1 application, Topical, 2 Times Daily, And PRN for saad care      sennosides-docusate sodium 8.6-50 MG tablet  Commonly known as:  SENOKOT-S   1 tablet, Per G Tube, Daily      TAB-A-EBONY tablet   1 tablet, Per G Tube, Daily         Stop These Medications    omeprazole 20 MG capsule  Commonly known as:  priLOSEC     sulfamethoxazole-trimethoprim 800-160 MG per tablet  Commonly known as:  BACTRIM DS,SEPTRA DS            Discharge Diet:   Diet Instructions     Diet: Soft Texture; Thin Liquids, No Restrictions; Ground       Discharge Diet:  Soft Texture    Fluid Consistency:  Thin Liquids, No Restrictions    Soft Options:  Ground    Diet: Tube Feeding; Bolus; Jevity 1.5 (High Willem High Pro With Fiber); Tube Feeding Type: Cyclic / Intermittent; Feeding Start Time: 6:00 PM; Feeding End Time: 6:00 AM; Cyclic Feeding Start Rate (mL/hr): 50; To Goal Rate of (mL/hr): 80       Discharge Diet:  Tube Feeding    Feeding Type:  Bolus    Formula, Amount & Frequency:  Jevity 1.5 (High Willem High Pro With Fiber); Tube Feeding Type: Cyclic / Intermittent; Feeding Start Time: 6:00 PM; Feeding End Time: 6:00 AM; Cyclic Feeding Start Rate (mL/hr): 50; To Goal Rate of (mL/hr): 80          Activity at Discharge:   Activity Instructions     Activity as Tolerated             Follow-up Appointments  No future appointments.  Additional Instructions for the Follow-ups that You Need to Schedule     Discharge Follow-up with PCP    As directed      Currently Documented PCP:  Leonor  Kan Echevarria MD  PCP Phone Number:  [phone]    Follow Up Details:  3-5 days with Dr. Echevarria                 I have examined and discussed discharge planning with the patient today.     Tony Saini MD  06/24/18  10:13 AM    Time: Discharge less than 30 min

## 2018-06-24 NOTE — PROGRESS NOTES
Continued Stay Note  [facility]     Patient Name: Simona Brooks  MRN: [id number]  Today's Date: 6/24/2018    Admit Date: 6/21/2018          Discharge Plan     Row Name 06/24/18 1135       Plan    Plan Return to [facility] at 3:30 PM    Plan Comments Received a call from shaun Nichole RN advising Pt being d/c today.  Pt discharging today back to [facility].  CCP called [facility] [phone] at 11:24 AM and Pt transport via ambulance is scheduled for 3:30 PM.  CCP attempted to call Pt son Jeyson Brooks ([phone]) at 11:33 AM and left him a voicemail advising him Pt is being d/c back to [facility] today.  SUNNY FERNANDEZ RN/CONOR               Discharge Codes    No documentation.       Expected Discharge Date and Time     Expected Discharge Date Expected Discharge Time    Jun 24, 2018             Josee Fernandez RN

## 2018-06-24 NOTE — PLAN OF CARE
Problem: Patient Care Overview  Goal: Plan of Care Review  Outcome: Ongoing (interventions implemented as appropriate)   06/24/18 0450   Coping/Psychosocial   Plan of Care Reviewed With patient   Plan of Care Review   Progress no change   OTHER   Outcome Summary Pt still c/o pain in the coccyx, back, and leg. Administered PO morphine with little to no relief. Continues to have drainage from anal prolapse. Contiues IV protonix. Waffle boot applied to LLE. Will continue to monitor.       Problem: Fall Risk (Adult)  Goal: Absence of Fall  Outcome: Ongoing (interventions implemented as appropriate)   06/24/18 0450   Fall Risk (Adult)   Absence of Fall making progress toward outcome       Problem: Skin Injury Risk (Adult)  Goal: Skin Health and Integrity  Outcome: Ongoing (interventions implemented as appropriate)   06/24/18 0450   Skin Injury Risk (Adult)   Skin Health and Integrity making progress toward outcome

## 2018-06-27 NOTE — PROGRESS NOTES
Case Management Discharge Note    Final Note: [facility]    Destination - Selection Complete     Service Request Status Selected Specialties Address Phone Number Fax Number    [facility] Selected Skilled Nursing Facility [address] [facility] [address] [phone]      Durable Medical Equipment     No service has been selected for the patient.      Dialysis/Infusion     No service has been selected for the patient.      Home Medical Care     No service has been selected for the patient.      Social Care     No service has been selected for the patient.        Other: Other    Final Discharge Disposition Code: 03 - skilled nursing facility (SNF)

## 2018-10-01 PROBLEM — E87.5 HYPERKALEMIA: Status: ACTIVE | Noted: 2018-01-01

## 2018-10-01 PROBLEM — N17.9 AKI (ACUTE KIDNEY INJURY) (HCC): Status: ACTIVE | Noted: 2018-01-01

## 2018-10-01 PROBLEM — J90 PLEURAL EFFUSION: Status: ACTIVE | Noted: 2018-01-01

## 2018-10-01 PROBLEM — A41.9 SEPSIS (HCC): Status: ACTIVE | Noted: 2018-01-01

## 2018-10-01 PROBLEM — J18.9 PNA (PNEUMONIA): Status: ACTIVE | Noted: 2018-01-01

## 2018-10-01 NOTE — NURSING NOTE
RN spoke with son Meliton Brooks and Sondra/LARRY Brooks. LARRY would like Meliton to make medical decisions for his mom,and Jeyson will consent to what Slim wants. Meliton wants Full code for now. He is agreeable to EGD.

## 2018-10-01 NOTE — ED PROVIDER NOTES
EMERGENCY DEPARTMENT ENCOUNTER    Room Number:  23/23  Time seen: 3:27 AM  PCP: Leonor Echevarria MD  Historian: nursing home, patient, past medical records  History Limited By: dementia, confusion      HPI:  Chief Complaint: dyspnea   Context: History predominantly obtained from nursing home and past medical records. Simona Brooks is a 84 y.o. female nursing home resident who has moderate dementia, colostomy, and dysphagia requiring g-tube. However, it is unknown how well pt mentates at baseline. Per nursing home, pt is always on 2L to 3L supplemental O2 due to hx of COPD and chronic respiratory failure. Today, at about 0100, nursing home staff noticed that pt's chronic dyspnea had worsened and that her O2 sat was 82% (unsure if this was on room air). After nursing home staff increased pt's O2 by 2L, her O2 sat increased to 92%. Nursing home reports that pt is a full code. In ED, pt c/o dyspnea and denies chest pain, abd pain, and cough. Pt does not appear to be on any blood thinners per her nursing home medication list. Pt's hx is otherwise limited by dementia and confusion.     Location: respiratory  Radiation: none  Quality: shortness of breath  Intensity/Severity: moderate  Duration: noticed by nursing home staff today at about 0100  Onset quality: unknown- nursing home staff noticed sx today at about 0100  Timing: constant  Progression: worse compared to baseline  Aggravating Factors: unknown  Alleviating Factors: increasing supplemental O2 by 2L  Previous Episodes: Per nursing home, pt is always on 2L to 3L supplemental O2 due to hx of COPD and chronic respiratory failure.  Treatment before arrival: After nursing home staff increased pt's O2 by 2L, her O2 sat increased from 82% to 92%.  Associated Symptoms: unknown         PAST MEDICAL HISTORY  Active Ambulatory Problems     Diagnosis Date Noted   • PAF (paroxysmal atrial fibrillation) (CMS/[facility]) 08/08/2017   • COPD (chronic obstructive pulmonary  disease) (CMS/HCC) 08/08/2017   • Calculus of left kidney 08/08/2017   • Suprapubic catheter (CMS/HCC) 08/11/2017   • Decubitus ulcer of coccygeal region, stage 4 (present on admission) 08/11/2017   • History of right above knee amputation (CMS/[facility]) 08/11/2017   • Acute UTI 12/02/2017   • Colostomy malfunction (CMS/HCC) 12/02/2017   • Anemia 12/02/2017   • Abnormal urine 12/03/2017   • Gastrointestinal hemorrhage 06/21/2018   • Dysphagia 06/21/2018   • Gastrointestinal hemorrhage with melena 06/21/2018   • Acute gastric ulcer with hemorrhage 06/22/2018     Resolved Ambulatory Problems     Diagnosis Date Noted   • UTI (urinary tract infection) 08/08/2017   • Healthcare associated bacterial pneumonia - possibly due to gram-negative organism 08/11/2017   • Hypokalemia 08/11/2017     Past Medical History:   Diagnosis Date   • Anemia    • Atrial fibrillation (CMS/HCC)    • Colitis    • COPD (chronic obstructive pulmonary disease) (CMS/HCC)    • Dysphagia    • Hernia    • Hypokalemia    • Pneumonia          PAST SURGICAL HISTORY  Past Surgical History:   Procedure Laterality Date   • ABDOMINAL SURGERY     • AMPUTATION      right leg   • COLOSTOMY     • ENDOSCOPY N/A 6/22/2018    Procedure: ESOPHAGOGASTRODUODENOSCOPY with biopsies;  Surgeon: Chinedu Wood MD;  Location: [facility] ENDOSCOPY;  Service: Gastroenterology   • EXPLORATORY LAPAROTOMY N/A 12/3/2017    Procedure: disimpaction of ostomy;  Surgeon: Neris Mcgee MD;  Location: [facility] OR;  Service:          FAMILY HISTORY  History reviewed. No pertinent family history.      SOCIAL HISTORY  Social History     Social History   • Marital status:      Spouse name: N/A   • Number of children: N/A   • Years of education: N/A     Occupational History   • Not on file.     Social History Main Topics   • Smoking status: Former Smoker   • Smokeless tobacco: Never Used   • Alcohol use No   • Drug use: No   • Sexual activity: Defer     Other Topics Concern   • Not on  file     Social History Narrative   • No narrative on file         ALLERGIES  Augmentin [amoxicillin-pot clavulanate] and Penicillins      REVIEW OF SYSTEMS  Review of Systems   Unable to perform ROS: Dementia (confusion)   Respiratory: Positive for shortness of breath (chronic, worse). Negative for cough (per pt).    Cardiovascular: Negative for chest pain (per pt).   Gastrointestinal: Negative for abdominal pain (per pt).            PHYSICAL EXAM  ED Triage Vitals   Temp Heart Rate Resp BP SpO2   10/01/18 0139 10/01/18 0139 10/01/18 0139 10/01/18 0139 10/01/18 0139   99.6 °F (37.6 °C) 82 16 132/74 97 % WNL      Temp src Heart Rate Source Patient Position BP Location FiO2 (%)   10/01/18 0139 10/01/18 0323 -- -- --   Tympanic (P) Monitor          Physical Exam   Constitutional: She appears distressed (moderate).   Exam limited by dementia    Chronically ill appearing   HENT:   Head: Normocephalic and atraumatic.   Mouth/Throat: Mucous membranes are dry.   Eyes: EOM are normal.   Neck: Normal range of motion. Neck supple.   Cardiovascular: Normal rate and regular rhythm.    Murmur heard.   Systolic murmur is present   Pulmonary/Chest: She is in respiratory distress (moderate). She has wheezes (expiratory). She has rhonchi.   Abdominal: Soft. There is no tenderness. There is no rebound and no guarding.   Colostomy to left abdomen, g-tube in mid abdomen    Genitourinary:   Genitourinary Comments: Heme positive dark loose stool in colostomy    Musculoskeletal:   Right AKA   Neurological: She is alert. She is disoriented (oriented to self, disoriented to place and time).   Appears pleasantly confused, nonfocal neuro exam   Skin: Skin is warm and dry. There is pallor.   Nursing note and vitals reviewed.          LAB RESULTS  Recent Results (from the past 24 hour(s))   Comprehensive Metabolic Panel    Collection Time: 10/01/18  2:22 AM   Result Value Ref Range    Glucose 202 (H) 65 - 99 mg/dL     (H) 8 - 23 mg/dL     Creatinine 2.00 (H) 0.57 - 1.00 mg/dL    Sodium 136 136 - 145 mmol/L    Potassium 6.3 (C) 3.5 - 5.2 mmol/L    Chloride 102 98 - 107 mmol/L    CO2 23.2 22.0 - 29.0 mmol/L    Calcium 7.8 (L) 8.6 - 10.5 mg/dL    Total Protein 7.3 6.0 - 8.5 g/dL    Albumin 2.60 (L) 3.50 - 5.20 g/dL    ALT (SGPT) 16 1 - 33 U/L    AST (SGOT) 16 1 - 32 U/L    Alkaline Phosphatase 242 (H) 39 - 117 U/L    Total Bilirubin 0.2 0.1 - 1.2 mg/dL    eGFR Non African Amer 24 (L) >60 mL/min/1.73    Globulin 4.7 gm/dL    A/G Ratio 0.6 g/dL    BUN/Creatinine Ratio 54.5 (H) 7.0 - 25.0    Anion Gap 10.8 mmol/L   BNP    Collection Time: 10/01/18  2:22 AM   Result Value Ref Range    proBNP 4,471.0 (H) 0.0-1,800.0 pg/mL   Troponin    Collection Time: 10/01/18  2:22 AM   Result Value Ref Range    Troponin T 0.102 (C) 0.000 - 0.030 ng/mL   CBC Auto Differential    Collection Time: 10/01/18  2:22 AM   Result Value Ref Range    WBC 16.18 (H) 4.50 - 10.70 10*3/mm3    RBC 3.18 (L) 3.90 - 5.20 10*6/mm3    Hemoglobin 8.6 (L) 11.9 - 15.5 g/dL    Hematocrit 29.3 (L) 35.6 - 45.5 %    MCV 92.1 80.5 - 98.2 fL    MCH 27.0 26.9 - 32.0 pg    MCHC 29.4 (L) 32.4 - 36.3 g/dL    RDW 18.6 (H) 11.7 - 13.0 %    RDW-SD 63.3 (H) 37.0 - 54.0 fl    MPV 10.8 6.0 - 12.0 fL    Platelets 211 140 - 500 10*3/mm3    Neutrophil % 82.3 (H) 42.7 - 76.0 %    Lymphocyte % 11.7 (L) 19.6 - 45.3 %    Monocyte % 5.8 5.0 - 12.0 %    Eosinophil % 0.1 (L) 0.3 - 6.2 %    Basophil % 0.1 0.0 - 1.5 %    Immature Grans % 0.7 (H) 0.0 - 0.5 %    Neutrophils, Absolute 13.30 (H) 1.90 - 8.10 10*3/mm3    Lymphocytes, Absolute 1.90 0.90 - 4.80 10*3/mm3    Monocytes, Absolute 0.94 0.20 - 1.20 10*3/mm3    Eosinophils, Absolute 0.02 0.00 - 0.70 10*3/mm3    Basophils, Absolute 0.02 0.00 - 0.20 10*3/mm3    Immature Grans, Absolute 0.12 (H) 0.00 - 0.03 10*3/mm3   Procalcitonin    Collection Time: 10/01/18  2:22 AM   Result Value Ref Range    Procalcitonin 0.48 (H) 0.10 - 0.25 ng/mL   Lactic Acid, Plasma    Collection  Time: 10/01/18  2:49 AM   Result Value Ref Range    Lactate 1.1 0.5 - 2.0 mmol/L   Blood Gas, Arterial    Collection Time: 10/01/18  5:02 AM   Result Value Ref Range    Site Arterial: right radial     Zain's Test Positive     pH, Arterial 7.382 7.350 - 7.450 pH units    pCO2, Arterial 45.0 35.0 - 45.0 mm Hg    pO2, Arterial 67.5 (L) 80.0 - 100.0 mm Hg    HCO3, Arterial 26.8 22.0 - 28.0 mmol/L    Base Excess, Arterial 1.2 0.0 - 2.0 mmol/L    O2 Saturation Calculated 92.7 92.0 - 99.0 %    Barometric Pressure for Blood Gas 757.2 mmHg    Modality Cannula     Flow Rate 3 lpm    Rate 20 Breaths/minute       Ordered the above labs and reviewed the results.        RADIOLOGY  XR Chest 1 View (Final result)   Result time 10/01/18 02:25:51 (independently viewed by me, interpreted by radiologist)    Final result by Kolton Olivia MD (10/01/18 02:25:51)                Impression:    Kyphotic positioning with dense right-sided opacities felt  to reflect pneumonia and pleural fluid        This report was finalized on 10/1/2018 2:25 AM by Kolton Olivia M.D.               Narrative:    PORTABLE CHEST X-RAY     CLINICAL HISTORY: soa     COMPARISON: 6/21/2018.     FINDINGS: Portable AP view of the chest was obtained with overlying  monitor leads in place. The patient is very kyphotic. There is  underlying emphysema and extensive fibrotic change. There has been  development of a large dense opacity in the right lung base and  perihilar region likely combination of pneumonia and pleural fluid. The  enlarged cardiac silhouette obscures the left lung base. The visualized  left lung parenchyma is clear. Vascularity appears normal.                        Ordered the above noted radiological studies. Reviewed by me in PACS.            PROCEDURES  Critical Care  Performed by: SUE WILLARD III  Authorized by: SHAHAB ROSEN     Critical care provider statement:     Critical care time (minutes):  35    Critical care time was  exclusive of:  Separately billable procedures and treating other patients    Critical care was necessary to treat or prevent imminent or life-threatening deterioration of the following conditions:  Cardiac failure, respiratory failure, renal failure and circulatory failure    Critical care was time spent personally by me on the following activities:  Discussions with consultants, evaluation of patient's response to treatment, examination of patient, obtaining history from patient or surrogate, re-evaluation of patient's condition, pulse oximetry, ordering and review of radiographic studies, ordering and review of laboratory studies, ordering and performing treatments and interventions and review of old charts              EKG:           EKG time: 0238  Rhythm/Rate: sinus rhythm, rate= 88  P waves and VA: normal VA  QRS, axis: LAFB, LAD   ST and T waves: nonspecific T wave changes in inferior leads, no acute ST elevation or depression  Unremarkable QT    Interpreted Contemporaneously by me, independently viewed  No acute change compared to prior 6/2018          PROGRESS AND CONSULTS     0330- CXR, EKG, blood work, troponin, BNP, procalcitonin, blood cultures, and lactic acid have already been ordered for further evaluation.    0332- Potassium is elevated at 6.3. Ordered albuterol nebulizer, kayexalate, calcium gluconate, sodium bicarb, 10 units insulin, and D50W for hyperkalemia. Ordered cefepime and vancomycin for pneumonia.     0347- Ordered CT head and ABGs for further evaluation.     0350- On 9/22/18, troponin was <0.012. On 9/25/18, BUN was 22 and creatinine was 0.8. Today, BUN is elevated at 109, creatinine is elevated at 2, and troponin is elevated at 0.102. We are unsure if the troponin is elevated due to PARMINDER or NSTEMI.     0355- Radiology could not obtain CT head due to pt's uncooperativeness. Furthermore, pt has moderate baseline dementia and pt has nonfocal neuro exam. Will cancel CT head.     0410-  Rechecked pt. She appears restless and agitated.     0417- Discussed case with Dr Renteria who agrees with the plan of care.     0420- Stool in colostomy is dark, loose, and heme positive. On 9/24/18, hgb was 9.6. Today, hgb has decreased to 8.6. Ordered type and screen.     0425- ED workup is concerning for right pleural effusion, HAP, hyperkalemia, PARMINDER, GI bleed, acute on chronic anemia, and elevated troponin. Sent call out to [facility]. Admission decision time= 0425    0432- Discussed case with Dr Ruiz ([facility] hospitalist)  Reviewed history, exam, results and treatments.  Discussed concerns and plan of care. Dr Ruiz accepts pt to be admitted to telemetry.    0500- Dr Ruiz is at pt's bedside for evaluation.     0526- Added UA to orders for further evaluation. It is pending at the time of admission.     0546- Rechecked pt. She is now resting more comfortably and appears less agitated. BP is 105/65. HR is 89. O2 sat is 96% on 3L O2 nasal cannula.       MEDICAL DECISION MAKING      MDM  Number of Diagnoses or Management Options  Acute on chronic anemia:   PARMINDER (acute kidney injury) (CMS/[facility]):   Elevated troponin:   Gastrointestinal hemorrhage, unspecified gastrointestinal hemorrhage type:   HAP (hospital-acquired pneumonia):   Hyperkalemia:   Pleural effusion:      Amount and/or Complexity of Data Reviewed  Clinical lab tests: reviewed and ordered (BUN= 109, creatinine= 2, potassium= 6.3, lactic acid= 1.1, BNP= 4471, troponin= 0.102, WBC= 16.18, hgb= 8.6, procalcitonin= 0.48, ABGs, GFR= 24)  Tests in the radiology section of CPT®: ordered and reviewed (CXR- dense right-sided opacities felt to reflect pneumonia and pleural fluid   )  Tests in the medicine section of CPT®: ordered and reviewed (EKG)  Decide to obtain previous medical records or to obtain history from someone other than the patient: yes  Review and summarize past medical records: yes (On 9/25/18, BUN was 22, creatinine was 0.8, and potassium was 3.3. On 9/24/18,  WBC count was 7.85, and hgb was 9.6. On 9/22/18, troponin was <0.012. On 9/15/18, GFR was 33, creatinine was 1.5, and troponin was 0.249.     Pt was admitted at [facility] from 8/31/18 to 9/6/18 for NSTEMI, sepsis with metabolic encephalopathy, UTI, HAP, and PARMINDER. At the time, cardiology evaluated pt and suspected that her elevated troponin was all demand ischemia secondary to the infectious process. Pt was readmitted at [facility] from 9/15/18 to 9/21/18 for anemia and UTI. She was discharged on cefepime. Pt was admitted a 3rd time at [facility] from 9/22/18 to 9/27/18 for acute on chronic respiratory failure. )  Independent visualization of images, tracings, or specimens: yes    Patient Progress  Patient progress: other (comment) (Condition= serious )             DIAGNOSIS  Final diagnoses:   Pleural effusion   Hyperkalemia   PARMINDER (acute kidney injury) (CMS/[facility])   Gastrointestinal hemorrhage, unspecified gastrointestinal hemorrhage type   HAP (hospital-acquired pneumonia)   Elevated troponin   Acute on chronic anemia         DISPOSITION  Admitted- telemtetry        Latest Documented Vital Signs:  As of 5:06 AM  BP- 132/74 HR- 78 Temp- 99.6 °F (37.6 °C) (Tympanic) O2 sat- 97%      --  Documentation assistance provided by angel Montero for RK Albert.  Information recorded by the scribe was done at my direction and has been verified and validated by me.               Iliana Montero  10/01/18 0601       Osmel Albert III, PA  10/01/18 0634

## 2018-10-01 NOTE — CONSULTS
[facility]                                                                                             Nephrology Initial Consult Note    Patient Identification:  Name: Simona Brooks MRN: [id number]  Age: 84 y.o. : 1934  Sex: female  Date:10/1/2018    Requesting Physician: As per consult order.  Reason for Consultation: Acute renal failure/chronic kidney disease  Information from:patient/ family/ chart      History of Present Illness: This is a 84 y.o. year old female  chronically ill with multiple medical problems including dementia, anemia, COPD, dysphagia with decubitus ulcer status post colostomy and history of suprapubic catheter placement.  It's not clear from the records why a suprapubic catheter was place.  Patient is a poor historian related to her dementia so most of the history is per chart review.  Her BUN and creatinine were significantly elevated on admission, potassium was also 6.3 but that has improved, she was started on IV fluids.  I do not see that she was on any ace inhibitors, diuretics, NSAIDs prior to admission she is receiving IV contrast and no hypotension in the ER.  She's had had pneumonia and was started on antibiotics in the emergency department, currently full code and no family members are immediately available.  Also mention of some dark stools in her ostomy.      The following medical history and medications personally reviewed by me:    Problem List:   Patient Active Problem List    Diagnosis   • Pleural effusion [J90]   • Hyperkalemia [E87.5]   • PARMINDER (acute kidney injury) (CMS/HCC) [N17.9]   • Sepsis (CMS/[facility]) [A41.9]   • PNA (pneumonia) [J18.9]   • Acute gastric ulcer with hemorrhage [K25.0]   • Gastrointestinal hemorrhage [K92.2]   • Dysphagia [R13.10]   • Gastrointestinal hemorrhage with melena [K92.1]     Overview Note:     Added automatically from request for  surgery [id number]     • Abnormal urine [R82.90]   • Acute UTI [N39.0]   • Colostomy malfunction (CMS/[facility]) [K94.03]   • Anemia [D64.9]   • Suprapubic catheter (CMS/[facility]) [Z93.59]   • Decubitus ulcer of coccygeal region, stage 4 (present on admission) [L89.154]   • History of right above knee amputation (CMS/[facility]) [Z89.611]   • PAF (paroxysmal atrial fibrillation) (CMS/[facility]) [I48.0]   • COPD (chronic obstructive pulmonary disease) (CMS/[facility]) [J44.9]   • Calculus of left kidney [N20.0]       Past Medical History:  Past Medical History:   Diagnosis Date   • Anemia    • Atrial fibrillation (CMS/[facility])    • Chronic respiratory failure (CMS/[facility])    • Colitis    • COPD (chronic obstructive pulmonary disease) (CMS/[facility])    • Dysphagia    • Hernia    • Hypokalemia    • Iron deficiency anemia    • Peripheral vascular disease (CMS/[facility])    • Pneumonia        Past Surgical History:  Past Surgical History:   Procedure Laterality Date   • ABDOMINAL SURGERY     • AMPUTATION      right leg   • COLOSTOMY     • ENDOSCOPY N/A 6/22/2018    Procedure: ESOPHAGOGASTRODUODENOSCOPY with biopsies;  Surgeon: Chinedu Wood MD;  Location: [facility] ENDOSCOPY;  Service: Gastroenterology   • EXPLORATORY LAPAROTOMY N/A 12/3/2017    Procedure: disimpaction of ostomy;  Surgeon: Neris Mcgee MD;  Location: [facility] MAIN OR;  Service:         Home Meds:   Prescriptions Prior to Admission   Medication Sig Dispense Refill Last Dose   • acetaminophen (TYLENOL) 160 MG/5ML solution 640 mg by Per G Tube route Every 6 (Six) Hours As Needed for Mild Pain .      • albuterol (PROVENTIL) (2.5 MG/3ML) 0.083% nebulizer solution Take 2.5 mg by nebulization Every 4 (Four) Hours As Needed for Wheezing.      • ALPRAZolam (XANAX) 1 MG tablet Take 1 mg by mouth At Night As Needed for Anxiety.      • calcium carbonate (TUMS) 500 MG chewable tablet 1 tablet by Per G Tube route Every 6 (Six) Hours As Needed for Indigestion or Heartburn.      • carvedilol (COREG) 3.125 MG tablet 3.125  mg by Per G Tube route 2 (Two) Times a Day With Meals.      • cholecalciferol (VITAMIN D3) 56331 units capsule 50,000 Units by Per G Tube route Every 7 (Seven) Days.      • collagenase 250 UNIT/GM ointment Apply 1 application topically to the appropriate area as directed Daily. Apply to sacral wound      • docusate sodium (COLACE) 150 MG/15ML liquid 100 mg by Per G Tube route 2 (Two) Times a Day.      • ferrous sulfate 300 (60 FE) MG/5ML syrup 300 mg by Per G Tube route Daily.      • folic acid (FOLVITE) 1 MG tablet 1 mg by Per G Tube route Daily.      • furosemide (LASIX) 20 MG tablet 20 mg by Per G Tube route Daily.      • gabapentin (NEURONTIN) 100 MG capsule Take 1 capsule by mouth Daily. (Patient taking differently: 100 mg by Per G Tube route Daily.) 3 capsule 0    • morphine 100 MG/5ML solution concentrated solution Take 0.75 mL by mouth Every 4 (Four) Hours. (Patient taking differently: Take 20 mg by mouth Every 6 (Six) Hours.) 15 mL 0    • Multiple Vitamin (TAB-A-EBONY) tablet 1 tablet by Per G Tube route Daily.      • Neomycin-Bacitracin-Polymyxin (HCA TRIPLE ANTIBIOTIC OINTMENT EX) Apply 1 dose topically 2 (Two) Times a Day. Apply to buttocks cheek topically to open skin abrasion      • ondansetron (ZOFRAN) 4 MG tablet 4 mg by Per G Tube route Every 4 (Four) Hours As Needed for Nausea or Vomiting.      • oxybutynin (DITROPAN) 5 MG/5ML syrup 5 mg by Per G Tube route Daily.      • pantoprazole (PROTONIX) 40 MG EC tablet Take 1 tablet by mouth 2 (Two) Times a Day Before Meals. (Patient taking differently: Take 40 mg by mouth Daily.)      • phenol (CHLORASEPTIC) 1.4 % liquid liquid Apply 1 spray to the mouth or throat 4 (Four) Times a Day As Needed.      • polyethylene glycol (MIRALAX) packet Take 17 g by mouth Daily As Needed.      • potassium chloride (KAYCIEL) 20 MEQ/15ML (10%) solution 20 mEq by Per G Tube route Daily.      • mupirocin (BACTROBAN) 2 % ointment Apply 1 application topically 2 (Two) Times a  Day. Wash G-Tube site and apply every shift       • nystatin (nystatin) 461329 UNIT/GM powder Apply 1 application topically 2 (Two) Times a Day. Clean G-Tube site with NS and apply           Current Meds:   Current Facility-Administered Medications   Medication Dose Route Frequency Provider Last Rate Last Dose   • acetaminophen (TYLENOL) tablet 650 mg  650 mg Oral Q4H PRN Rich Ruiz MD       • [START ON 10/2/2018] cefepime (MAXIPIME) 2 g/100 mL 0.9% NS (mbp)  2 g Intravenous Q24H Rich Ruiz MD       • ipratropium-albuterol (DUO-NEB) nebulizer solution 3 mL  3 mL Nebulization 4x Daily - RT Rich Ruiz MD       • ipratropium-albuterol (DUO-NEB) nebulizer solution 3 mL  3 mL Nebulization Q4H PRN Rich Ruiz MD       • ondansetron (ZOFRAN) tablet 4 mg  4 mg Oral Q6H PRN Rich Ruiz MD        Or   • ondansetron ODT (ZOFRAN-ODT) disintegrating tablet 4 mg  4 mg Oral Q6H PRN Rich Ruiz MD        Or   • ondansetron (ZOFRAN) injection 4 mg  4 mg Intravenous Q6H PRN Rich Ruiz MD       • pantoprazole (PROTONIX) injection 40 mg  40 mg Intravenous BID AC Rich Ruiz MD       • Pharmacy Consult - Pharmacy to dose   Does not apply Continuous ROGELION Rich Ruiz MD       • Pharmacy to dose vancomycin   Does not apply Continuous PRN Rich Ruiz MD       • sodium chloride 0.9 % flush 1-10 mL  1-10 mL Intravenous PRN Rich Ruiz MD       • sodium chloride 0.9 % flush 10 mL  10 mL Intravenous PRN Darryl Garcia MD       • sodium chloride 0.9 % infusion  100 mL/hr Intravenous Continuous Rich Ruiz  mL/hr at 10/01/18 0900 100 mL/hr at 10/01/18 0900   • Vancomycin Pharmacy Intermittent Dosing   Does not apply Daily Rich Ruiz MD           Allergies:  Allergies   Allergen Reactions   • Augmentin [Amoxicillin-Pot Clavulanate] Unknown (See Comments)     Unknown     • Penicillins Unknown (See Comments)     Tolerated meropenem in 08/2017.       Social History:   Social History     Social  "History   • Marital status:      Social History Main Topics   • Smoking status: Former Smoker   • Smokeless tobacco: Never Used   • Alcohol use No   • Drug use: No   • Sexual activity: Defer     Other Topics Concern   • Not on file        Family History:  History reviewed. No pertinent family history.     Review of Systems: as per HPI, in addition:    Could not adequately assess due to her history of dementia    Physical Exam:  Vitals:   Temp (24hrs), Av.5 °F (36.9 °C), Min:97.3 °F (36.3 °C), Max:99.6 °F (37.6 °C)    /77 (BP Location: Right arm, Patient Position: Lying)   Pulse 107   Temp 97.3 °F (36.3 °C) (Oral)   Resp 16   Ht 167.6 cm (66\")   Wt 51.3 kg (113 lb)   SpO2 93%   BMI 18.24 kg/m²   Intake/Output:     Intake/Output Summary (Last 24 hours) at 10/01/18 0951  Last data filed at 10/01/18 0900   Gross per 24 hour   Intake             1100 ml   Output                0 ml   Net             1100 ml        Wt Readings from Last 1 Encounters:   10/01/18 0621 51.3 kg (113 lb)   10/01/18 0149 52.5 kg (115 lb 12.8 oz)       Exam:    General Appearance:  Awake, alert, oriented x1, not acutely ill-appearing    Head and Face:  Normocephalic, atraumatic, mucus membranes moist, oropharynx clear   Eyes:  No icterus, pupils equal round and reactive to light, extraocular movements intact    ENMT: Moist mucosa, tongue symmetric    Neck: Supple  no jugular venous distention  no thyromegaly   Pulmonary:  Respiratory effort: Normal  Auscultation of lungs: Clear bilaterally  No wheezes  No rhonchi  Good air movement, good expansion   Chest wall:  No tenderness or deformity   Cardiovascular:  Auscultation of the heart: Normal rhythm, no murmurs  No edema of extremities    Abdomen:  Abdomen: soft, non-tender, normal bowel sounds all four quadrants, no masses   Liver and spleen: no hepatosplenomegaly   Musculoskeletal: Digits and nails: normal  Normal range of motion  No joint swelling or gross deformities  "   Skin: Skin inspection: color normal, no visible rashes or lesions  Skin palpation: texture, turgor normal, no palpable lesions   Lymphatic:  no cervical lymphadenopathy    Psychiatric: Judgement and insight: normal  Orientation to person place and time: normal  Mood and affect: normal       DATA:  Radiology and Labs:  The following labs independently reviewed by me  Old records independently reviewed showing no prior renal failure, baseline creatinine 0.6  The following radiologic studies independently viewed by me, findings chest x-ray showing new left-sided opacity reflecting pneumonia  Interval notes, chart personally reviewed by me.   New problems include pneumonia and acute renal failure  Discussed with pt herself  Platelet count 211    Risk/ complexity of medical care/ medical decision making  High given the severity of her renal failure which could potentially require dialysis if it is not improved depending on goals of care with the family          Labs:   Recent Results (from the past 24 hour(s))   Comprehensive Metabolic Panel    Collection Time: 10/01/18  2:22 AM   Result Value Ref Range    Glucose 202 (H) 65 - 99 mg/dL     (H) 8 - 23 mg/dL    Creatinine 2.00 (H) 0.57 - 1.00 mg/dL    Sodium 136 136 - 145 mmol/L    Potassium 6.3 (C) 3.5 - 5.2 mmol/L    Chloride 102 98 - 107 mmol/L    CO2 23.2 22.0 - 29.0 mmol/L    Calcium 7.8 (L) 8.6 - 10.5 mg/dL    Total Protein 7.3 6.0 - 8.5 g/dL    Albumin 2.60 (L) 3.50 - 5.20 g/dL    ALT (SGPT) 16 1 - 33 U/L    AST (SGOT) 16 1 - 32 U/L    Alkaline Phosphatase 242 (H) 39 - 117 U/L    Total Bilirubin 0.2 0.1 - 1.2 mg/dL    eGFR Non African Amer 24 (L) >60 mL/min/1.73    Globulin 4.7 gm/dL    A/G Ratio 0.6 g/dL    BUN/Creatinine Ratio 54.5 (H) 7.0 - 25.0    Anion Gap 10.8 mmol/L   BNP    Collection Time: 10/01/18  2:22 AM   Result Value Ref Range    proBNP 4,471.0 (H) 0.0-1,800.0 pg/mL   Troponin    Collection Time: 10/01/18  2:22 AM   Result Value Ref Range     Troponin T 0.102 (C) 0.000 - 0.030 ng/mL   CBC Auto Differential    Collection Time: 10/01/18  2:22 AM   Result Value Ref Range    WBC 16.18 (H) 4.50 - 10.70 10*3/mm3    RBC 3.18 (L) 3.90 - 5.20 10*6/mm3    Hemoglobin 8.6 (L) 11.9 - 15.5 g/dL    Hematocrit 29.3 (L) 35.6 - 45.5 %    MCV 92.1 80.5 - 98.2 fL    MCH 27.0 26.9 - 32.0 pg    MCHC 29.4 (L) 32.4 - 36.3 g/dL    RDW 18.6 (H) 11.7 - 13.0 %    RDW-SD 63.3 (H) 37.0 - 54.0 fl    MPV 10.8 6.0 - 12.0 fL    Platelets 211 140 - 500 10*3/mm3    Neutrophil % 82.3 (H) 42.7 - 76.0 %    Lymphocyte % 11.7 (L) 19.6 - 45.3 %    Monocyte % 5.8 5.0 - 12.0 %    Eosinophil % 0.1 (L) 0.3 - 6.2 %    Basophil % 0.1 0.0 - 1.5 %    Immature Grans % 0.7 (H) 0.0 - 0.5 %    Neutrophils, Absolute 13.30 (H) 1.90 - 8.10 10*3/mm3    Lymphocytes, Absolute 1.90 0.90 - 4.80 10*3/mm3    Monocytes, Absolute 0.94 0.20 - 1.20 10*3/mm3    Eosinophils, Absolute 0.02 0.00 - 0.70 10*3/mm3    Basophils, Absolute 0.02 0.00 - 0.20 10*3/mm3    Immature Grans, Absolute 0.12 (H) 0.00 - 0.03 10*3/mm3   Procalcitonin    Collection Time: 10/01/18  2:22 AM   Result Value Ref Range    Procalcitonin 0.48 (H) 0.10 - 0.25 ng/mL   Lactic Acid, Plasma    Collection Time: 10/01/18  2:49 AM   Result Value Ref Range    Lactate 1.1 0.5 - 2.0 mmol/L   Blood Gas, Arterial    Collection Time: 10/01/18  5:02 AM   Result Value Ref Range    Site Arterial: right radial     Zain's Test Positive     pH, Arterial 7.382 7.350 - 7.450 pH units    pCO2, Arterial 45.0 35.0 - 45.0 mm Hg    pO2, Arterial 67.5 (L) 80.0 - 100.0 mm Hg    HCO3, Arterial 26.8 22.0 - 28.0 mmol/L    Base Excess, Arterial 1.2 0.0 - 2.0 mmol/L    O2 Saturation Calculated 92.7 92.0 - 99.0 %    Barometric Pressure for Blood Gas 757.2 mmHg    Modality Cannula     Flow Rate 3 lpm    Rate 20 Breaths/minute   Urinalysis With Microscopic If Indicated (No Culture) - Urine, Clean Catch    Collection Time: 10/01/18  5:28 AM   Result Value Ref Range    Color, UA Yellow  Yellow, Straw    Appearance, UA Turbid (A) Clear    pH, UA 6.5 5.0 - 8.0    Specific Gravity, UA 1.016 1.005 - 1.030    Glucose,  mg/dL (Trace) (A) Negative    Ketones, UA Negative Negative    Bilirubin, UA Negative Negative    Blood, UA Large (3+) (A) Negative    Protein,  mg/dL (2+) (A) Negative    Leuk Esterase, UA Large (3+) (A) Negative    Nitrite, UA Positive (A) Negative    Urobilinogen, UA 0.2 E.U./dL 0.2 - 1.0 E.U./dL   Urinalysis, Microscopic Only - Urine, Clean Catch    Collection Time: 10/01/18  5:28 AM   Result Value Ref Range    RBC, UA Unable to determine due to loaded field (A) None Seen, 0-2 /HPF    WBC, UA Too Numerous to Count (A) None Seen, 0-2 /HPF    Bacteria, UA Unable to determine due to loaded field (A) None Seen /HPF    Squamous Epithelial Cells, UA Unable to determine due to loaded field (A) None Seen, 0-2 /HPF    Hyaline Casts, UA Unable to determine due to loaded field None Seen /LPF    Methodology Manual Light Microscopy    Type & Screen    Collection Time: 10/01/18  7:08 AM   Result Value Ref Range    ABO Type O     RH type Positive     Antibody Screen Negative     T&S Expiration Date 10/4/2018 11:59:59 PM    Basic Metabolic Panel    Collection Time: 10/01/18  7:08 AM   Result Value Ref Range    Glucose 178 (H) 65 - 99 mg/dL     (H) 8 - 23 mg/dL    Creatinine 2.13 (H) 0.57 - 1.00 mg/dL    Sodium 142 136 - 145 mmol/L    Potassium 5.0 3.5 - 5.2 mmol/L    Chloride 104 98 - 107 mmol/L    CO2 24.3 22.0 - 29.0 mmol/L    Calcium 8.6 8.6 - 10.5 mg/dL    eGFR Non African Amer 22 (L) >60 mL/min/1.73    BUN/Creatinine Ratio 48.4 (H) 7.0 - 25.0    Anion Gap 13.7 mmol/L   TSH    Collection Time: 10/01/18  7:08 AM   Result Value Ref Range    TSH 0.715 0.270 - 4.200 mIU/mL   Troponin    Collection Time: 10/01/18  7:08 AM   Result Value Ref Range    Troponin T 0.099 (C) 0.000 - 0.030 ng/mL   CBC Auto Differential    Collection Time: 10/01/18  7:08 AM   Result Value Ref Range    WBC  17.79 (H) 4.50 - 10.70 10*3/mm3    RBC 3.08 (L) 3.90 - 5.20 10*6/mm3    Hemoglobin 8.4 (L) 11.9 - 15.5 g/dL    Hematocrit 28.1 (L) 35.6 - 45.5 %    MCV 91.2 80.5 - 98.2 fL    MCH 27.3 26.9 - 32.0 pg    MCHC 29.9 (L) 32.4 - 36.3 g/dL    RDW 18.7 (H) 11.7 - 13.0 %    RDW-SD 62.4 (H) 37.0 - 54.0 fl    MPV 10.7 6.0 - 12.0 fL    Platelets 211 140 - 500 10*3/mm3    Neutrophil % 81.5 (H) 42.7 - 76.0 %    Lymphocyte % 12.8 (L) 19.6 - 45.3 %    Monocyte % 5.5 5.0 - 12.0 %    Eosinophil % 0.1 (L) 0.3 - 6.2 %    Basophil % 0.1 0.0 - 1.5 %    Immature Grans % 0.7 (H) 0.0 - 0.5 %    Neutrophils, Absolute 14.50 (H) 1.90 - 8.10 10*3/mm3    Lymphocytes, Absolute 2.28 0.90 - 4.80 10*3/mm3    Monocytes, Absolute 0.98 0.20 - 1.20 10*3/mm3    Eosinophils, Absolute 0.01 0.00 - 0.70 10*3/mm3    Basophils, Absolute 0.02 0.00 - 0.20 10*3/mm3    Immature Grans, Absolute 0.13 (H) 0.00 - 0.03 10*3/mm3       Radiology:  Imaging Results (last 24 hours)     Procedure Component Value Units Date/Time    XR Chest 1 View [[id number]] Collected:  10/01/18 0224     Updated:  10/01/18 0228    Narrative:       PORTABLE CHEST X-RAY     CLINICAL HISTORY: soa     COMPARISON: 6/21/2018.     FINDINGS: Portable AP view of the chest was obtained with overlying  monitor leads in place. The patient is very kyphotic. There is  underlying emphysema and extensive fibrotic change. There has been  development of a large dense opacity in the right lung base and  perihilar region likely combination of pneumonia and pleural fluid. The  enlarged cardiac silhouette obscures the left lung base. The visualized  left lung parenchyma is clear. Vascularity appears normal.             Impression:       Kyphotic positioning with dense right-sided opacities felt  to reflect pneumonia and pleural fluid        This report was finalized on 10/1/2018 2:25 AM by Kolton Olivia M.D.                  ASSESSMENT:   Problem List:   New acute renal failure, in part prerenal  New pneumonia  with pleural effusion  COPD  Paroxysmal atrial fibrillation  Decubitus ulcer status post diverting colostomy  Chronic suprapubic catheter  Hyperkalemia, improved  Worsening anemia  Possible GI bleed  Azotemia  ? UTI      PLAN:   Continue IV fluids, await culture results  IV antibiotics, dosed for GFR around 20  Potassium Improved, continue to monitor  Check urine studies  Discussion of goals of care with family when they are available  Prognosis poor      Continue to monitor electrolytes and volume closely, avoid IV contrast and nephrotoxic medications   I appreciate the opportunity to participate in this patient's care.  Please call with any questions or concerns.       Simone Zuleta M.D  [facility]  Office phone number: [phone]  Answering service phone number: [phone]        10/1/2018        Dictation via Dragon dictation software

## 2018-10-01 NOTE — CONSULTS
[facility]  Initial Inpatient Consult Note    Referring Provider: [facility]    Reason for Consultation: GI bleed    Subjective     History of present illness:    84 y.o. female, no new or service from a recent hospitalization in June, with past medical history notable for s/p colostomy, PEG, R AKA, suprapubic catheter admitted for worsening confusion and lung congestion.  She was noted to have dark melanotic stool in her ostomy bag.  Her potassium was elevated and she was treated for this.  She was also noted to have a urinary tract infection and has been started on antibiotics.  She was just recently discharged from [facility] after a stay for infection.  Her hemoglobin on that admission was noted to be 5.8.    She presented in June of this year with moderately severe anemia that was persistent and continued to decline despite outpatient transfusion.  EGD was performed during that hospitalization by Dr. Wood. EGD 6/22/18 for melena notable for superficial nonbleeding ulceration behind her PEG tube - PEG was loosened after these findings    Patient is currently demented and can provide no further history.  She denies any abdominal pain.  She has grossly bloody stool in her bag approximately 150 cc.  Nursing notes that her bag was just changed.  She is currently resting comfortably on 6 L of oxygen.    Past Medical History:  Past Medical History:   Diagnosis Date   • Anemia    • Atrial fibrillation (CMS/HCC)    • Chronic respiratory failure (CMS/HCC)    • Colitis    • COPD (chronic obstructive pulmonary disease) (CMS/HCC)    • Dysphagia    • Hernia    • Hypokalemia    • Iron deficiency anemia    • Peripheral vascular disease (CMS/HCC)    • Pneumonia      Past Surgical History:  Past Surgical History:   Procedure Laterality Date   • ABDOMINAL SURGERY     • AMPUTATION      right leg   • COLOSTOMY     • ENDOSCOPY N/A 6/22/2018    Procedure: ESOPHAGOGASTRODUODENOSCOPY with biopsies;  Surgeon:  Chinedu Wood MD;  Location: [facility] ENDOSCOPY;  Service: Gastroenterology   • EXPLORATORY LAPAROTOMY N/A 12/3/2017    Procedure: disimpaction of ostomy;  Surgeon: Neris Mcgee MD;  Location: [facility] MAIN OR;  Service:       Social History:   Social History   Substance Use Topics   • Smoking status: Former Smoker   • Smokeless tobacco: Never Used   • Alcohol use No      Family History:  History reviewed. No pertinent family history.    Home Meds:  Prescriptions Prior to Admission   Medication Sig Dispense Refill Last Dose   • acetaminophen (TYLENOL) 160 MG/5ML solution 640 mg by Per G Tube route Every 6 (Six) Hours As Needed for Mild Pain .      • albuterol (PROVENTIL) (2.5 MG/3ML) 0.083% nebulizer solution Take 2.5 mg by nebulization Every 4 (Four) Hours As Needed for Wheezing.      • ALPRAZolam (XANAX) 1 MG tablet Take 1 mg by mouth At Night As Needed for Anxiety.      • calcium carbonate (TUMS) 500 MG chewable tablet 1 tablet by Per G Tube route Every 6 (Six) Hours As Needed for Indigestion or Heartburn.      • carvedilol (COREG) 3.125 MG tablet 3.125 mg by Per G Tube route 2 (Two) Times a Day With Meals.      • cholecalciferol (VITAMIN D3) 57455 units capsule 50,000 Units by Per G Tube route Every 7 (Seven) Days.      • collagenase 250 UNIT/GM ointment Apply 1 application topically to the appropriate area as directed Daily. Apply to sacral wound      • docusate sodium (COLACE) 150 MG/15ML liquid 100 mg by Per G Tube route 2 (Two) Times a Day.      • ferrous sulfate 300 (60 FE) MG/5ML syrup 300 mg by Per G Tube route Daily.      • folic acid (FOLVITE) 1 MG tablet 1 mg by Per G Tube route Daily.      • furosemide (LASIX) 20 MG tablet 20 mg by Per G Tube route Daily.      • gabapentin (NEURONTIN) 100 MG capsule Take 1 capsule by mouth Daily. (Patient taking differently: 100 mg by Per G Tube route Daily.) 3 capsule 0    • morphine 100 MG/5ML solution concentrated solution Take 0.75 mL by mouth Every 4 (Four)  Hours. (Patient taking differently: Take 20 mg by mouth Every 6 (Six) Hours.) 15 mL 0    • Multiple Vitamin (TAB-A-EBONY) tablet 1 tablet by Per G Tube route Daily.      • Neomycin-Bacitracin-Polymyxin (HCA TRIPLE ANTIBIOTIC OINTMENT EX) Apply 1 dose topically 2 (Two) Times a Day. Apply to buttocks cheek topically to open skin abrasion      • ondansetron (ZOFRAN) 4 MG tablet 4 mg by Per G Tube route Every 4 (Four) Hours As Needed for Nausea or Vomiting.      • oxybutynin (DITROPAN) 5 MG/5ML syrup 5 mg by Per G Tube route Daily.      • pantoprazole (PROTONIX) 40 MG EC tablet Take 1 tablet by mouth 2 (Two) Times a Day Before Meals. (Patient taking differently: Take 40 mg by mouth Daily.)      • phenol (CHLORASEPTIC) 1.4 % liquid liquid Apply 1 spray to the mouth or throat 4 (Four) Times a Day As Needed.      • polyethylene glycol (MIRALAX) packet Take 17 g by mouth Daily As Needed.      • potassium chloride (KAYCIEL) 20 MEQ/15ML (10%) solution 20 mEq by Per G Tube route Daily.      • mupirocin (BACTROBAN) 2 % ointment Apply 1 application topically 2 (Two) Times a Day. Wash G-Tube site and apply every shift       • nystatin (nystatin) 981823 UNIT/GM powder Apply 1 application topically 2 (Two) Times a Day. Clean G-Tube site with NS and apply         Current Meds:     [START ON 10/2/2018] cefepime 2 g Intravenous Q24H   ipratropium-albuterol 3 mL Nebulization 4x Daily - RT   pantoprazole 40 mg Intravenous BID AC   Vancomycin Pharmacy Intermittent Dosing  Does not apply Daily     Allergies:  Allergies   Allergen Reactions   • Augmentin [Amoxicillin-Pot Clavulanate] Unknown (See Comments)     Unknown     • Penicillins Unknown (See Comments)     Tolerated meropenem in 08/2017.     Review of Systems  Review of systems could not be obtained due to   patient confusion.     Objective     Vital Signs  Temp:  [97.3 °F (36.3 °C)-99.6 °F (37.6 °C)] 97.3 °F (36.3 °C)  Heart Rate:  [] 107  Resp:  [16] 16  BP: (103-132)/(97-23)  103/77  Physical Exam:  General Appearance:    Alert, cooperative, in no acute distress   Head:    Normocephalic, without obvious abnormality, atraumatic   Eyes:            Lids and lashes normal, conjunctivae and sclerae normal, no   icterus   Throat:   No oral lesions, no thrush, oral mucosa dry        Lungs:     Clear to auscultation,respirations regular, even and                   unlabored    Heart:    Regular rhythm and normal rate, normal S1 and S2, no            Murmur    Chest Wall:    No abnormalities observed   Abdomen:     Colostomy with dark red blood, liquid stool-abdomen is soft and nontender    Rectal:     Deferred   Extremities:   no edema    Skin:   No bleeding, bruising or rash        Psychiatric:  Judgement and insight: pleasantly demented      Results Review:   I reviewed the patient's new clinical results.      Results from last 7 days  Lab Units 10/01/18  0708 10/01/18  0222   WBC 10*3/mm3 17.79* 16.18*   HEMOGLOBIN g/dL 8.4* 8.6*   HEMATOCRIT % 28.1* 29.3*   PLATELETS 10*3/mm3 211 211       Results from last 7 days  Lab Units 10/01/18  0708 10/01/18  0222   SODIUM mmol/L 142 136   POTASSIUM mmol/L 5.0 6.3*   CHLORIDE mmol/L 104 102   CO2 mmol/L 24.3 23.2   BUN mg/dL 103* 109*   CREATININE mg/dL 2.13* 2.00*   CALCIUM mg/dL 8.6 7.8*   BILIRUBIN mg/dL  --  0.2   ALK PHOS U/L  --  242*   ALT (SGPT) U/L  --  16   AST (SGOT) U/L  --  16   GLUCOSE mg/dL 178* 202*           Lab Results  Lab Value Date/Time   LIPASE 20 08/09/2017 0605   LIPASE 20 08/08/2017 1251       Radiology:  XR Chest 1 View   Final Result   Kyphotic positioning with dense right-sided opacities felt   to reflect pneumonia and pleural fluid           This report was finalized on 10/1/2018 2:25 AM by Kolton Olivia M.D.              Assessment/Plan   Patient Active Problem List   Diagnosis   • PAF (paroxysmal atrial fibrillation) (CMS/HCC)   • COPD (chronic obstructive pulmonary disease) (CMS/HCC)   • Calculus of left kidney   •  Suprapubic catheter (CMS/[facility])   • Decubitus ulcer of coccygeal region, stage 4 (present on admission)   • History of right above knee amputation (CMS/[facility])   • Acute UTI   • Colostomy malfunction (CMS/[facility])   • Anemia   • Abnormal urine   • Gastrointestinal hemorrhage   • Dysphagia   • Gastrointestinal hemorrhage with melena   • Acute gastric ulcer with hemorrhage   • Pleural effusion   • Hyperkalemia   • PARMINDER (acute kidney injury) (CMS/[facility])   • Sepsis (CMS/[facility])   • PNA (pneumonia)     Assessment-  1.  GI bleed-history of gastric ulcer associated with her gastrostomy tube in June  2.  Acute blood loss anemia-she's been recurrently anemic for the past 3-4 months requiring multiple transfusions.  3.  Elevated troponin  4.  Acute kidney injury  5.  Pneumonia  6.  UTI  7.  Dementia    Plan-  - I have left a message with her son and LARRY Brooks to discuss goals of care  - If he wishes to proceed would recommend repeating her EGD given her known ulcers in the appearance of the blood in her colostomy bag.  We'll need clearance from cardiology given her elevated troponin.  This may just be demand ischemia from her anemia as well as decreased clearance from elevated troponin but will get their opinion.  - She has multiple medical issues at this point combined with numerous chronic issues-her prognosis is poor  - Continue twice a day PPI-monitor hemoglobin and hematocrit every 6 hours-transfuse as needed      I discussed the patients findings and my recommendations with patient and nursing staff.    Octavia Gordon MD

## 2018-10-01 NOTE — ED NOTES
Pt has colostomy to left upper abdomen with maroon colored stool inside.  Pt aggressive at times, cries out during any procedure, frequently removes monitoring equipment. Pt redirected numerous times.  Hardy cath bag leaks, so new bad attached to suprapubic cath, and urine same sent for analysis. Assisted RT to get ABG, pt got agitated during procedure, but was able to be redirected.  These events took place over the course of 3362-1029.      Krystina Merritt, RN  10/01/18 0536

## 2018-10-01 NOTE — PROGRESS NOTES
Discharge Planning Assessment  [facility]     Patient Name: Simona Brooks  MRN: [id number]  Today's Date: 10/1/2018    Admit Date: 10/1/2018          Discharge Needs Assessment     Row Name 10/01/18 1548       Living Environment    Lives With facility resident    Current Living Arrangements residential facility    Potentially Unsafe Housing Conditions other (see comments)    Primary Care Provided by other (see comments)    Family Caregiver if Needed other (see comments)    Able to Return to Prior Arrangements yes       Resource/Environmental Concerns    Resource/Environmental Concerns none       Transition Planning    Patient/Family Anticipates Transition to long term care facility    Patient/Family Anticipated Services at Transition none       Discharge Needs Assessment    Concerns to be Addressed no discharge needs identified    Equipment Needed After Discharge none            Discharge Plan     Row Name 10/01/18 1548       Plan    Plan LT bed at [facility].    Plan Comments IMM  10/01    CCP spoke to patient at bedside .   Pt's emergency contact  is billy Coelho [phone].   CCP unable to reach POA via telephone.    CCP role explained and discharge planning discussed.  Face sheet verified.    IMM  noted.    Her PCP is Dr Echevarria.  She lives at [facility] in a LT bed  Facility contacted for copy of Living Will and bed status.  Plan is to return to facility at [place]          Destination     Service Request Status Selected Specialties Address Phone Number Fax Number    [facility] Pending - Request Sent N/A [address] [address] [phone]        Digna Mccullough RN 10/1/2018 1111    Message magdiel                 Durable Medical Equipment     No service coordination in this encounter.      Dialysis/Infusion     No service coordination in this encounter.      Home Medical Care     No service coordination in this encounter.      Social  Care     No service coordination in this encounter.                Demographic Summary    No documentation.           Functional Status    No documentation.           Psychosocial    No documentation.           Abuse/Neglect    No documentation.           Legal    No documentation.           Substance Abuse    No documentation.           Patient Forms    No documentation.         Digna Mccullough RN

## 2018-10-01 NOTE — H&P
HISTORY AND PHYSICAL   [facility]        Patient Identification:  Name: Simona Brooks  Age: 84 y.o.  Sex: female  :  1934  MRN: [id number]                     Primary Care Physician: Leonor Echevarria MD    Chief Complaint:  Weakness and congestion    History of Present Illness:        The patient is an 84-year-old female from the nursing home with history of dementia, anemia, A. fib, colitis, COPD, dysphagia, history of decubitus ulcers with colostomy and suprapubic catheter who was sent in from the nursing home with worsening lung congestion and confusion.  The patient's only oriented to her name and could not give much information.  She's had history of pneumonia and has dysphagia and is on PEG tube feedings.  The patient was evaluated in the ER and found to have significant pneumonia and was started on broad-spectrum IV antibiotics for hospital-acquired pneumonia.  According to records from the nursing facility the patient is full code and there is no family members present to give any further information.  The patient was also noted to have some dark melanotic stool in her ostomy and was probably having some GI bleeding again.  The patient also had elevated potassium and was treated for hyperkalemia.  Her urine was very cloudy and I suspect she probably has urinary tract infection as well.  The patient was given some IV fluids and IV antibiotics and admitted for further evaluation.    Past Medical History:  Past Medical History:   Diagnosis Date   • Anemia    • Atrial fibrillation (CMS/HCC)    • Colitis    • COPD (chronic obstructive pulmonary disease) (CMS/HCC)    • Dysphagia    • Hernia    • Hypokalemia    • Pneumonia      Past Surgical History:  Past Surgical History:   Procedure Laterality Date   • ABDOMINAL SURGERY     • AMPUTATION      right leg   • COLOSTOMY     • ENDOSCOPY N/A 2018    Procedure: ESOPHAGOGASTRODUODENOSCOPY with biopsies;  Surgeon: Chinedu Wood MD;   Location: [facility] ENDOSCOPY;  Service: Gastroenterology   • EXPLORATORY LAPAROTOMY N/A 12/3/2017    Procedure: disimpaction of ostomy;  Surgeon: Neris Mcgee MD;  Location: [facility] MAIN OR;  Service:       Home Meds:    (Not in a hospital admission)  Current meds    Current Facility-Administered Medications:   •  acetaminophen (TYLENOL) tablet 650 mg, 650 mg, Oral, Q4H PRN, Rich Riuz MD  •  [START ON 10/2/2018] cefepime (MAXIPIME) 2 g/100 mL 0.9% NS (mbp), 2 g, Intravenous, Q24H, Rich Ruiz MD  •  ondansetron (ZOFRAN) tablet 4 mg, 4 mg, Oral, Q6H PRN **OR** ondansetron ODT (ZOFRAN-ODT) disintegrating tablet 4 mg, 4 mg, Oral, Q6H PRN **OR** ondansetron (ZOFRAN) injection 4 mg, 4 mg, Intravenous, Q6H PRN, Rich Ruiz MD  •  pantoprazole (PROTONIX) injection 40 mg, 40 mg, Intravenous, BID AC, Rich Ruiz MD  •  Pharmacy Consult - Pharmacy to dose, , Does not apply, Continuous PRN, Rich Ruiz MD  •  Pharmacy to dose vancomycin, , Does not apply, Continuous PRN, Rich Ruiz MD  •  sodium chloride 0.9 % flush 1-10 mL, 1-10 mL, Intravenous, PRN, Rich Ruiz MD  •  Insert peripheral IV, , , Once **AND** sodium chloride 0.9 % flush 10 mL, 10 mL, Intravenous, PRN, Darryl Garcia MD  •  sodium chloride 0.9 % infusion, 100 mL/hr, Intravenous, Continuous, Rich Ruiz MD  •  Vancomycin Pharmacy Intermittent Dosing, , Does not apply, Daily, Rich Ruiz MD    Current Outpatient Prescriptions:   •  acetaminophen (TYLENOL) 160 MG/5ML solution, Take 640 mg by mouth Every 6 (Six) Hours As Needed for Mild Pain (1-3)., Disp: , Rfl:   •  albuterol (PROVENTIL) (2.5 MG/3ML) 0.083% nebulizer solution, Take 2.5 mg by nebulization Every 4 (Four) Hours As Needed for Wheezing., Disp: , Rfl:   •  calcium carbonate (TUMS) 500 MG chewable tablet, 1 tablet by Per G Tube route Every 6 (Six) Hours As Needed for Indigestion or Heartburn., Disp: , Rfl:   •  castor oil-balsam peru (VENELEX) ointment, Apply 1 g  topically 2 (Two) Times a Day. Apply to the buttocks, Disp: , Rfl:   •  custom compounded cream, Apply 1 application topically 2 (Two) Times a Day. Bactracin/Nystatin/Triamcinolone/Hydrophor 1:1:1:2 Apply to buttocks BID and PRN, Disp: , Rfl:   •  ferrous sulfate 300 (60 FE) MG/5ML syrup, 300 mg by Per G Tube route Daily., Disp: , Rfl:   •  furosemide (LASIX) 20 MG tablet, 20 mg by Per G Tube route Daily., Disp: , Rfl:   •  gabapentin (NEURONTIN) 100 MG capsule, Take 1 capsule by mouth Daily., Disp: 3 capsule, Rfl: 0  •  Menthol-Zinc Oxide (REMEDY CALAZIME) 0.2-20 % paste, Apply 1 application topically 2 (Two) Times a Day. And PRN for saad care, Disp: , Rfl:   •  morphine 100 MG/5ML solution concentrated solution, Take 0.75 mL by mouth Every 4 (Four) Hours., Disp: 15 mL, Rfl: 0  •  Multiple Vitamin (TAB-A-EBONY) tablet, 1 tablet by Per G Tube route Daily., Disp: , Rfl:   •  mupirocin (BACTROBAN) 2 % ointment, Apply 1 application topically 2 (Two) Times a Day. Wash G-Tube site and apply every shift , Disp: , Rfl:   •  nystatin (nystatin) 182997 UNIT/GM powder, Apply 1 application topically 2 (Two) Times a Day. Clean G-Tube site with NS and apply , Disp: , Rfl:   •  ondansetron (ZOFRAN) 4 MG tablet, 4 mg by Per G Tube route Every 6 (Six) Hours As Needed for Nausea or Vomiting., Disp: , Rfl:   •  pantoprazole (PROTONIX) 40 MG EC tablet, Take 1 tablet by mouth 2 (Two) Times a Day Before Meals., Disp: , Rfl:   •  phenol (CHLORASEPTIC) 1.4 % liquid liquid, Apply 1 spray to the mouth or throat 4 (Four) Times a Day As Needed., Disp: , Rfl:   •  polyethylene glycol (MIRALAX) packet, Take 17 g by mouth Daily As Needed., Disp: , Rfl:   •  potassium chloride (KAYCIEL) 20 MEQ/15ML (10%) solution, 20 mEq by Per G Tube route Daily., Disp: , Rfl:   •  sennosides-docusate sodium (SENOKOT-S) 8.6-50 MG tablet, 1 tablet by Per G Tube route Daily., Disp: , Rfl:   Allergies:  Allergies   Allergen Reactions   • Augmentin [Amoxicillin-Pot  "Clavulanate] Unknown (See Comments)     Unknown     • Penicillins Unknown (See Comments)     Tolerated meropenem in 2017.     Immunizations:    There is no immunization history on file for this patient.  Social History:   Social History     Social History Narrative   • No narrative on file     Social History     Social History   • Marital status:      Spouse name: N/A   • Number of children: N/A   • Years of education: N/A     Occupational History   • Not on file.     Social History Main Topics   • Smoking status: Former Smoker   • Smokeless tobacco: Never Used   • Alcohol use No   • Drug use: No   • Sexual activity: Defer     Other Topics Concern   • Not on file     Social History Narrative   • No narrative on file       Family History:  History reviewed. No pertinent family history.     Review of Systems  See history of present illness and past medical history.  Patient denies headache, dizziness, syncope, falls, trauma, change in vision, change in hearing, change in taste, changes in weight, changes in appetite, focal weakness, numbness, or paresthesia.  Patient denies chest pain, palpitations, dyspnea, orthopnea, PND, cough, sinus pressure, rhinorrhea, epistaxis, hemoptysis, nausea, vomiting, hematemesis, diarrhea, constipation.  Denies cold or heat intolerance, polydipsia, polyuria, polyphagia. Denies hematuria, pyuria, dysuria, hesitancy, frequency or urgency.    Remainder of ROS is negative.    Objective:  tMax 24 hrs: Temp (24hrs), Av.6 °F (37.6 °C), Min:99.6 °F (37.6 °C), Max:99.6 °F (37.6 °C)    Vitals Ranges:   Temp:  [99.6 °F (37.6 °C)] 99.6 °F (37.6 °C)  Heart Rate:  [78-92] 90  Resp:  [16] 16  BP: (103-132)/(48-77) 105/65      Exam:  /65   Pulse 90   Temp 99.6 °F (37.6 °C) (Tympanic)   Resp 16   Ht 167.6 cm (66\")   Wt 52.5 kg (115 lb 12.8 oz)   SpO2 96%   BMI 18.69 kg/m²     General Appearance:    confused, no distress, appears stated age   Head:    Normocephalic, without " obvious abnormality, atraumatic   Eyes:    PERRL, conjunctiva/corneas clear, EOM's intact, both eyes   Ears:    Normal external ear canals, both ears   Nose:   Nares normal, septum midline, mucosa normal, no drainage    or sinus tenderness   Throat:   Lips, mucosa, and tongue normal   Neck:   Supple, symmetrical, trachea midline, no adenopathy;     thyroid:  no enlargement/tenderness/nodules; no carotid    bruit or JVD   Back:     Symmetric, no curvature, ROM normal, no CVA tenderness   Lungs:     rhonchi bilaterally, respirations unlabored   Chest Wall:    No tenderness or deformity    Heart:    irregular rate and rhythm, S1 and S2 normal, no murmur, rub   or gallop   Abdomen:     Soft, non-tender, bowel sounds active all four quadrants,     no masses, no hepatomegaly, no splenomegaly   Extremities:   Extremities normal, right AKA, no cyanosis or edema   Pulses:   2+ and symmetric all extremities   Skin:   Skin color, texture, turgor normal, no rashes or lesions   Lymph nodes:   Cervical, supraclavicular, and axillary nodes normal   Neurologic:   Demented and weak      .    Data Review:  Lab Results (last 72 hours)     Procedure Component Value Units Date/Time    Lactic Acid, Plasma [[id number]]  (Normal) Collected:  10/01/18 0249    Specimen:  Blood Updated:  10/01/18 0319     Lactate 1.1 mmol/L     Comprehensive Metabolic Panel [[id number]]  (Abnormal) Collected:  10/01/18 0222    Specimen:  Blood Updated:  10/01/18 0309     Glucose 202 (H) mg/dL       (H) mg/dL      Creatinine 2.00 (H) mg/dL      Sodium 136 mmol/L      Potassium 6.3 (C) mmol/L      Chloride 102 mmol/L      CO2 23.2 mmol/L      Calcium 7.8 (L) mg/dL      Total Protein 7.3 g/dL      Albumin 2.60 (L) g/dL      ALT (SGPT) 16 U/L      AST (SGOT) 16 U/L      Alkaline Phosphatase 242 (H) U/L      Total Bilirubin 0.2 mg/dL      eGFR Non African Amer 24 (L) mL/min/1.73      Globulin 4.7 gm/dL      A/G Ratio 0.6 g/dL      BUN/Creatinine Ratio 54.5  "(H)     Anion Gap 10.8 mmol/L     Narrative:       The MDRD GFR formula is only valid for adults with stable renal function between ages 18 and 70.    Troponin [[id number]]  (Abnormal) Collected:  10/01/18 0222    Specimen:  Blood Updated:  10/01/18 0309     Troponin T 0.102 (C) ng/mL     Narrative:       Troponin T Reference Ranges:  Less than 0.03 ng/mL:    Negative for AMI  0.03 to 0.09 ng/mL:      Indeterminant for AMI  Greater than 0.09 ng/mL: Positive for AMI    BNP [[id number]]  (Abnormal) Collected:  10/01/18 0222    Specimen:  Blood Updated:  10/01/18 0302     proBNP 4,471.0 (H) pg/mL     Narrative:       Among patients with dyspnea, NT-proBNP is highly sensitive for the detection of acute congestive heart failure. In addition NT-proBNP of <300 pg/ml effectively rules out acute congestive heart failure with 99% negative predictive value.    Procalcitonin [[id number]]  (Abnormal) Collected:  10/01/18 0222    Specimen:  Blood Updated:  10/01/18 0302     Procalcitonin 0.48 (H) ng/mL     Narrative:       As a Marker for Sepsis (Non-Neonates):   1. <0.5 ng/mL represents a low risk of severe sepsis and/or septic shock.  1. >2 ng/mL represents a high risk of severe sepsis and/or septic shock.    As a Marker for Lower Respiratory Tract Infections that require antibiotic therapy:  PCT on Admission     Antibiotic Therapy             6-12 Hrs later  > 0.5                Strongly Recommended            >0.25 - <0.5         Recommended  0.1 - 0.25           Discouraged                   Remeasure/reassess PCT  <0.1                 Strongly Discouraged          Remeasure/reassess PCT      As 28 day mortality risk marker: \"Change in Procalcitonin Result\" (> 80 % or <=80 %) if Day 0 (or Day 1) and Day 4 values are available. Refer to http://[email or web address]-pct-[email or web address]/   Change in PCT <=80 %   A decrease of PCT levels below or equal to 80 % defines a positive change in PCT test result representing a higher risk for 28-day " all-cause mortality of patients diagnosed with severe sepsis or septic shock.  Change in PCT > 80 %   A decrease of PCT levels of more than 80 % defines a negative change in PCT result representing a lower risk for 28-day all-cause mortality of patients diagnosed with severe sepsis or septic shock.                Blood Culture - Blood, [[id number]] Collected:  10/01/18 0252    Specimen:  Blood from Arm, Right Updated:  10/01/18 0257    Blood Culture - Blood, [[id number]] Collected:  10/01/18 0249    Specimen:  Blood from Arm, Left Updated:  10/01/18 0257    CBC & Differential [[id number]] Collected:  10/01/18 0222    Specimen:  Blood Updated:  10/01/18 0236    Narrative:       The following orders were created for panel order CBC & Differential.  Procedure                               Abnormality         Status                     ---------                               -----------         ------                     CBC Auto Differential[[id number]]        Abnormal            Final result                 Please view results for these tests on the individual orders.    CBC Auto Differential [[id number]]  (Abnormal) Collected:  10/01/18 0222    Specimen:  Blood Updated:  10/01/18 0236     WBC 16.18 (H) 10*3/mm3      RBC 3.18 (L) 10*6/mm3      Hemoglobin 8.6 (L) g/dL      Hematocrit 29.3 (L) %      MCV 92.1 fL      MCH 27.0 pg      MCHC 29.4 (L) g/dL      RDW 18.6 (H) %      RDW-SD 63.3 (H) fl      MPV 10.8 fL      Platelets 211 10*3/mm3      Neutrophil % 82.3 (H) %      Lymphocyte % 11.7 (L) %      Monocyte % 5.8 %      Eosinophil % 0.1 (L) %      Basophil % 0.1 %      Immature Grans % 0.7 (H) %      Neutrophils, Absolute 13.30 (H) 10*3/mm3      Lymphocytes, Absolute 1.90 10*3/mm3      Monocytes, Absolute 0.94 10*3/mm3      Eosinophils, Absolute 0.02 10*3/mm3      Basophils, Absolute 0.02 10*3/mm3      Immature Grans, Absolute 0.12 (H) 10*3/mm3                    Imaging Results (all)     Procedure Component Value Units  Date/Time    XR Chest 1 View [[id number]] Collected:  10/01/18 0224     Updated:  10/01/18 0228    Narrative:       PORTABLE CHEST X-RAY     CLINICAL HISTORY: soa     COMPARISON: 6/21/2018.     FINDINGS: Portable AP view of the chest was obtained with overlying  monitor leads in place. The patient is very kyphotic. There is  underlying emphysema and extensive fibrotic change. There has been  development of a large dense opacity in the right lung base and  perihilar region likely combination of pneumonia and pleural fluid. The  enlarged cardiac silhouette obscures the left lung base. The visualized  left lung parenchyma is clear. Vascularity appears normal.             Impression:       Kyphotic positioning with dense right-sided opacities felt  to reflect pneumonia and pleural fluid        This report was finalized on 10/1/2018 2:25 AM by Kolton Olivia M.D.           Patient Active Problem List   Diagnosis Code   • PAF (paroxysmal atrial fibrillation) (CMS/HCC) I48.0   • COPD (chronic obstructive pulmonary disease) (CMS/HCC) J44.9   • Calculus of left kidney N20.0   • Suprapubic catheter (CMS/HCC) Z93.59   • Decubitus ulcer of coccygeal region, stage 4 (present on admission) L89.154   • History of right above knee amputation (CMS/HCC) Z89.611   • Acute UTI N39.0   • Colostomy malfunction (CMS/HCC) K94.03   • Anemia D64.9   • Abnormal urine R82.90   • Gastrointestinal hemorrhage K92.2   • Dysphagia R13.10   • Gastrointestinal hemorrhage with melena K92.1   • Acute gastric ulcer with hemorrhage K25.0   • Pleural effusion J90   • Hyperkalemia E87.5   • PARMINDER (acute kidney injury) (CMS/[facility]) N17.9   • Sepsis (CMS/[facility]) A41.9   • PNA (pneumonia) J18.9       Assessment:  Active Hospital Problems    Diagnosis Date Noted   • **Pleural effusion [J90] 10/01/2018   • Hyperkalemia [E87.5] 10/01/2018   • PARMINDER (acute kidney injury) (CMS/[facility]) [N17.9] 10/01/2018   • Sepsis (CMS/HCC) [A41.9] 10/01/2018   • PNA (pneumonia) [J18.9]  10/01/2018   • Gastrointestinal hemorrhage [K92.2] 06/21/2018   • Dysphagia [R13.10] 06/21/2018   • Gastrointestinal hemorrhage with melena [K92.1] 06/21/2018   • Acute UTI [N39.0] 12/02/2017   • Colostomy malfunction (CMS/HCC) [K94.03] 12/02/2017   • Anemia [D64.9] 12/02/2017   • Suprapubic catheter (CMS/HCC) [Z93.59] 08/11/2017   • Decubitus ulcer of coccygeal region, stage 4 (present on admission) [L89.154] 08/11/2017   • History of right above knee amputation (CMS/HCC) [Z89.611] 08/11/2017   • PAF (paroxysmal atrial fibrillation) (CMS/HCC) [I48.0] 08/08/2017   • COPD (chronic obstructive pulmonary disease) (CMS/HCC) [J44.9] 08/08/2017      Resolved Hospital Problems    Diagnosis Date Noted Date Resolved   No resolved problems to display.       Plan:  The patient's admitted to the hospital and will continue with broad-spectrum IV antibiotics for healthcare acquired pneumonia and UTI.  Cultures have been done and are pending.  We'll ask for pulmonary consult.  We'll also consult nephrology.  We'll give some IV Protonix and ask for GI consult.  Her outlook is somewhat guarded and need to find some family members and discuss outlook for patient and probably start talking about palliative care and comfort measures.    Rich Ruiz MD  10/1/2018  6:07 AM

## 2018-10-01 NOTE — PROGRESS NOTES
Name: Simona Brooks ADMIT: 10/1/2018   : 1934  PCP: Leonor Echevarria MD    MRN: [id number] LOS: 0 days   AGE/SEX: 84 y.o. female  ROOM: [facility]   Subjective       Objective   Vital Signs  Temp:  [97.3 °F (36.3 °C)-99.6 °F (37.6 °C)] 97.6 °F (36.4 °C)  Heart Rate:  [] 91  Resp:  [12-20] 12  BP: ()/(47-77) 102/63  SpO2:  [93 %-100 %] 95 %  on  Flow (L/min):  [3-4] 3;   Device (Oxygen Therapy): nasal cannula  Body mass index is 18.24 kg/m².    Physical Exam    Results Review:       I reviewed the patient's new clinical results.    Results from last 7 days  Lab Units 10/01/18  1148 10/01/18  0708 10/01/18  0222   WBC 10*3/mm3  --  17.79* 16.18*   HEMOGLOBIN g/dL 7.4* 8.4* 8.6*   PLATELETS 10*3/mm3  --  211 211     Results from last 7 days  Lab Units 10/01/18  0708 10/01/18  0222   SODIUM mmol/L 142 136   POTASSIUM mmol/L 5.0 6.3*   CHLORIDE mmol/L 104 102   CO2 mmol/L 24.3 23.2   BUN mg/dL 103* 109*   CREATININE mg/dL 2.13* 2.00*   GLUCOSE mg/dL 178* 202*   Estimated Creatinine Clearance: 15.9 mL/min (A) (by C-G formula based on SCr of 2.13 mg/dL (H)).  Results from last 7 days  Lab Units 10/01/18  0708 10/01/18  0222   CALCIUM mg/dL 8.6 7.8*   ALBUMIN g/dL  --  2.60*         [START ON 10/2/2018] cefepime 2 g Intravenous Q24H   ipratropium-albuterol 3 mL Nebulization 4x Daily - RT   pantoprazole 40 mg Intravenous BID AC   Vancomycin Pharmacy Intermittent Dosing  Does not apply Daily       Pharmacy Consult - Pharmacy to dose     Pharmacy to dose vancomycin     sodium chloride 100 mL/hr Last Rate: 100 mL/hr (10/01/18 0900)   NPO Diet NPO Except: Ice chips      Assessment/Plan      Active Hospital Problems    Diagnosis Date Noted   • **Pleural effusion [J90] 10/01/2018   • Hyperkalemia [E87.5] 10/01/2018   • PARMINDER (acute kidney injury) (CMS/[facility]) [N17.9] 10/01/2018   • Sepsis (CMS/[facility]) [A41.9] 10/01/2018   • PNA (pneumonia) [J18.9] 10/01/2018   • PNA (pneumonia) [J18.9] 10/01/2018   •  Gastrointestinal hemorrhage [K92.2] 06/21/2018   • Dysphagia [R13.10] 06/21/2018   • Gastrointestinal hemorrhage with melena [K92.1] 06/21/2018   • Acute UTI [N39.0] 12/02/2017   • Colostomy malfunction (CMS/[facility]) [K94.03] 12/02/2017   • Anemia [D64.9] 12/02/2017   • Suprapubic catheter (CMS/[facility]) [Z93.59] 08/11/2017   • Decubitus ulcer of coccygeal region, stage 4 (present on admission) [L89.154] 08/11/2017   • History of right above knee amputation (CMS/[facility]) [Z89.611] 08/11/2017   • PAF (paroxysmal atrial fibrillation) (CMS/[facility]) [I48.0] 08/08/2017   • COPD (chronic obstructive pulmonary disease) (CMS/[facility]) [J44.9] 08/08/2017      Resolved Hospital Problems    Diagnosis Date Noted Date Resolved   No resolved problems to display.         · On IV PPI  · Blood transfusion, monitor H/H  · IVFs, monitor renal fx  · Broad spectrum abx, follow cultures  · Wound care for ulcers  · Hold off on TFs due to GIB  · Poor prognosis.     D/W RN        Eulogio Carrillo MD  [facility]  10/01/18  5:23 PM

## 2018-10-01 NOTE — CONSULTS
Patient Name: Simona Brooks  :1934  84 y.o.    Date of Admission: 10/1/2018  Encounter Provider: lAmaz Oliver MD  Date of Encounter Visit: 10/01/18  Place of Service: [facility] CARDIOLOGY  Referring Provider: Rich Ruiz MD  Patient Care Team:  Leonor Echevarria MD as PCP - General (Family Medicine)      Chief complaint: Dyspnea    Consulted for: Elevated troponin, clearance for EGD today    History of Present Illness: Ms. Brooks is an 85 y/o nursing home patient with a history of paroxysmal atrial fibrillation, COPD, GI bleed, moderate dementia, right above knee amputation, colostomy, dysphagia requiring g-tube. She presented in  of this year with moderately severe anemia that was persistent and continued to decline despite outpatient transfusion.  EGD was performed during that hospitalization by Dr. Wood. EGD 18 for melena notable for superficial nonbleeding ulceration behind her PEG tube - PEG was loosened after these findings. She had a recent admission to [place] due to an infection and her hemoglobin on that admission was 5.8.     She presented to the ED early this morning after nursing home staff found her with an O2 sat of 82%. She normally wears 2-3L and it is unknown if she was on oxygen at that time. In the ED she c/o dyspnea but denied chest pain. She was noted to have dark melanotic stool in her ostomy. Blood pressure on arrival was 132/74 and heart rate 82. EKG showed sinus rhythm, rate 88, nonspecific T wave changes in inferior leads, no acute ST elevation or depression. Labs showed BUN/creat 109/2, potassium 6.3, proBNP 4,471, troponin 0.102, wbc 16.18, hgb/hct 8.6/29.3. CXR showed kyphotic postioning with dense right-sided opacities felt to reflect pneumonia and pleural fluid. Unable to perform CT of head due to patient being uncooperative. She was admitted for further evaluation.    We have been consulted for elevated troponin and cardiac  clearance for an EGD today if POA wishes to proceed. She is in sinus rhythm with rates in the 90's. Last reported blood pressure was 103/77.     Unfortunately, she is confused and really can give me no history.  She denies chest pain or breathing problems at this time.      Past Medical History:   Diagnosis Date   • Anemia    • Atrial fibrillation (CMS/HCC)    • Chronic respiratory failure (CMS/HCC)    • Colitis    • COPD (chronic obstructive pulmonary disease) (CMS/HCC)    • Dysphagia    • Hernia    • Hypokalemia    • Iron deficiency anemia    • Peripheral vascular disease (CMS/HCC)    • Pneumonia        Past Surgical History:   Procedure Laterality Date   • ABDOMINAL SURGERY     • AMPUTATION      right leg   • COLOSTOMY     • ENDOSCOPY N/A 6/22/2018    Procedure: ESOPHAGOGASTRODUODENOSCOPY with biopsies;  Surgeon: Chinedu Wood MD;  Location: [facility] ENDOSCOPY;  Service: Gastroenterology   • EXPLORATORY LAPAROTOMY N/A 12/3/2017    Procedure: disimpaction of ostomy;  Surgeon: Neris Mcgee MD;  Location: [facility] MAIN OR;  Service:          Prior to Admission medications    Medication Sig Start Date End Date Taking? Authorizing Provider   acetaminophen (TYLENOL) 160 MG/5ML solution 640 mg by Per G Tube route Every 6 (Six) Hours As Needed for Mild Pain .   Yes Jaime Thompson MD   albuterol (PROVENTIL) (2.5 MG/3ML) 0.083% nebulizer solution Take 2.5 mg by nebulization Every 4 (Four) Hours As Needed for Wheezing.   Yes Jaime Thompson MD   ALPRAZolam (XANAX) 1 MG tablet Take 1 mg by mouth At Night As Needed for Anxiety.   Yes Jaime Thompson MD   calcium carbonate (TUMS) 500 MG chewable tablet 1 tablet by Per G Tube route Every 6 (Six) Hours As Needed for Indigestion or Heartburn.   Yes Jaime Thompson MD   carvedilol (COREG) 3.125 MG tablet 3.125 mg by Per G Tube route 2 (Two) Times a Day With Meals.   Yes Jaime Thompson MD   cholecalciferol (VITAMIN D3) 32698 units capsule 50,000  Units by Per G Tube route Every 7 (Seven) Days. 10/1/18  Yes Jaime Thompson MD   collagenase 250 UNIT/GM ointment Apply 1 application topically to the appropriate area as directed Daily. Apply to sacral wound   Yes Jaime Thompson MD   docusate sodium (COLACE) 150 MG/15ML liquid 100 mg by Per G Tube route 2 (Two) Times a Day.   Yes Jaime Thompson MD   ferrous sulfate 300 (60 FE) MG/5ML syrup 300 mg by Per G Tube route Daily.   Yes Jaime Thompson MD   folic acid (FOLVITE) 1 MG tablet 1 mg by Per G Tube route Daily.   Yes Jaime Thompson MD   furosemide (LASIX) 20 MG tablet 20 mg by Per G Tube route Daily.   Yes Jaime Thompson MD   gabapentin (NEURONTIN) 100 MG capsule Take 1 capsule by mouth Daily.  Patient taking differently: 100 mg by Per G Tube route Daily. 6/24/18  Yes Tony Saini MD   morphine 100 MG/5ML solution concentrated solution Take 0.75 mL by mouth Every 4 (Four) Hours.  Patient taking differently: Take 20 mg by mouth Every 6 (Six) Hours. 6/24/18  Yes Tony Saini MD   Multiple Vitamin (TAB-A-EBONY) tablet 1 tablet by Per G Tube route Daily.   Yes Jaime Thompson MD   Neomycin-Bacitracin-Polymyxin (HCA TRIPLE ANTIBIOTIC OINTMENT EX) Apply 1 dose topically 2 (Two) Times a Day. Apply to buttocks cheek topically to open skin abrasion   Yes Jaime Thompson MD   ondansetron (ZOFRAN) 4 MG tablet 4 mg by Per G Tube route Every 4 (Four) Hours As Needed for Nausea or Vomiting.   Yes Jaime Thompson MD   oxybutynin (DITROPAN) 5 MG/5ML syrup 5 mg by Per G Tube route Daily.   Yes Jaime Thompson MD   pantoprazole (PROTONIX) 40 MG EC tablet Take 1 tablet by mouth 2 (Two) Times a Day Before Meals.  Patient taking differently: Take 40 mg by mouth Daily. 6/24/18  Yes Tony Saini MD   phenol (CHLORASEPTIC) 1.4 % liquid liquid Apply 1 spray to the mouth or throat 4 (Four) Times a Day As Needed.   Yes Donna  MD Jaime   polyethylene glycol (MIRALAX) packet Take 17 g by mouth Daily As Needed.   Yes Jaime Thompson MD   potassium chloride (KAYCIEL) 20 MEQ/15ML (10%) solution 20 mEq by Per G Tube route Daily.   Yes Jaime Thompson MD   mupirocin (BACTROBAN) 2 % ointment Apply 1 application topically 2 (Two) Times a Day. Wash G-Tube site and apply every shift     Jaime Thompson MD   nystatin (nystatin) 410716 UNIT/GM powder Apply 1 application topically 2 (Two) Times a Day. Clean G-Tube site with NS and apply     Jaime Thompson MD   castor oil-balsam peru (VENELEX) ointment Apply 1 g topically 2 (Two) Times a Day. Apply to the buttocks  10/1/18  Jaime Thompson MD   custom compounded cream Apply 1 application topically 2 (Two) Times a Day. Bactracin/Nystatin/Triamcinolone/Hydrophor 1:1:1:2 Apply to buttocks BID and PRN  10/1/18  Jaime Thompson MD   Menthol-Zinc Oxide (REMEDY CALAZIME) 0.2-20 % paste Apply 1 application topically 2 (Two) Times a Day. And PRN for saad care  10/1/18  Jaime Thompson MD   sennosides-docusate sodium (SENOKOT-S) 8.6-50 MG tablet 1 tablet by Per G Tube route Daily.  10/1/18  Jaime Thompson MD       Allergies   Allergen Reactions   • Augmentin [Amoxicillin-Pot Clavulanate] Unknown (See Comments)     Tolerated ceftriaxone 1g IV x1 dose during 12/2017 and 06/2018 admissions.  Tolerated cefepime during 10/2018 admission.     • Penicillins Unknown (See Comments)     Tolerated ceftriaxone 1g IV x1 dose during 12/2017 and 06/2018 admissions.  Tolerated cefepime during 10/2018 admission.         Social History     Social History   • Marital status:      Social History Main Topics   • Smoking status: Former Smoker   • Smokeless tobacco: Never Used   • Alcohol use No   • Drug use: No   • Sexual activity: Defer     Other Topics Concern   • Not on file       History reviewed. No pertinent family history.    REVIEW OF SYSTEMS:   All other systems  reviewed and negative.        Objective:     Vitals:    10/01/18 1344 10/01/18 1413 10/01/18 1525 10/01/18 1547   BP: 106/50 99/47 102/63    BP Location: Right arm      Patient Position: Lying      Pulse: 94 94 98 91   Resp: 20 20 20 12   Temp: 97.8 °F (36.6 °C) 97.9 °F (36.6 °C) 97.6 °F (36.4 °C)    TempSrc: Oral Oral     SpO2: 100%  95%    Weight:       Height:         Body mass index is 18.24 kg/m².    Intake/Output Summary (Last 24 hours) at 10/01/18 1708  Last data filed at 10/01/18 1400   Gross per 24 hour   Intake             1100 ml   Output              900 ml   Net              200 ml     Constitutional: She is alert and in no distress but not oriented   HENT:   Head: Normocephalic and atraumatic. Head is without contusion.    ears: Poor hearing.   Nose: No nasal deformity. No epistaxis.   Eyes: Lids are normal. Right eye exhibits no exudate. Left eye exhibits no exudate.  Neck: No JVD present. Carotid bruit is not present. No tracheal deviation present. No thyroid mass and no thyromegaly present.   Cardiovascular: Regular with frequent extrasystoles   Pulmonary/Chest: Effort normal and breath sounds normal.   Abdominal: Soft. Normal appearance and bowel sounds are normal. There is no tenderness.   Musculoskeletal: No marked joint deformities   Skin: Skin is warm.  No rashes noted on the exposed skin.    Psychiatric: Confused but pleasant   Vitals reviewed    Lab Review:       Results from last 7 days  Lab Units 10/01/18  0708 10/01/18  0222   SODIUM mmol/L 142 136   POTASSIUM mmol/L 5.0 6.3*   CHLORIDE mmol/L 104 102   CO2 mmol/L 24.3 23.2   BUN mg/dL 103* 109*   CREATININE mg/dL 2.13* 2.00*   CALCIUM mg/dL 8.6 7.8*   BILIRUBIN mg/dL  --  0.2   ALK PHOS U/L  --  242*   ALT (SGPT) U/L  --  16   AST (SGOT) U/L  --  16   GLUCOSE mg/dL 178* 202*       Results from last 7 days  Lab Units 10/01/18  0708 10/01/18  0222   TROPONIN T ng/mL 0.099* 0.102*       Results from last 7 days  Lab Units 10/01/18  1146  10/01/18  0708   WBC 10*3/mm3  --  17.79*   HEMOGLOBIN g/dL 7.4* 8.4*   HEMATOCRIT % 25.1* 28.1*   PLATELETS 10*3/mm3  --  211                     EKG      Baseline      I personally viewed and interpreted the patient's EKG/Telemetry data.        Assessment and Plan:       1.  Elevated troponin in the setting of acute kidney injury.  She was also quite anemic.  There were no EKG changes.  Troponin is trending down.  She had an echocardiogram at [facility] on September 1, 2018 which showed normal left ventricular systolic function with an ejection fraction of 61%, grade 1 diastolic dysfunction, with some valvular heart disease I do not recommend an ischemic evaluation at this time.  Should she need to have an EGD, I recommend proceeding.  2.  History of systemic hypertension.  Currently controlled.  3.  COPD.  Former smoker.  4.  Diastolic dysfunction  5.  Moderate tricuspid regurgitation with a right ventricular systolic pressure of 59 mmHg.  6.  Moderate pulmonic regurgitation  7.  Paroxysmal Atrial fibrillation.  Currently in sinus rhythm with frequent premature atrial contractions.  8.  GI bleed with anemia  9.  Aspiration and PEG tube  10.  Healthcare associated pneumonia  11.  Parapneumonic pleural effusion  12.  Urinary tract infection  13.  Acute kidney injury  14.  Decubitus ulcer  15.  Dementia    Almaz Oliver MD  10/01/18  5:08 PM

## 2018-10-01 NOTE — CONSULTS
Adult Nutrition  Assessment/PES    Patient Name:  Simona Brooks  YOB: 1934  MRN: [id number]  Admit Date:  10/1/2018    Assessment Date:  10/1/2018    Nutrition assessment triggered by nursing consult re: TF and low BMI (18.2).  Patient currently NPO. Will hold TF due to GI bleed.   TF recommendations: Osmolite 1.5 goal rate @ 55 cc/hr, fluid flushes 40 cc q hour.  RD to monitor/follow.          Reason for Assessment     Row Name 10/01/18 1459          Reason for Assessment    Reason For Assessment identified at risk by screening criteria;nurse/nurse practitioner consult;TF/PN     Diagnosis   Primary Problem:  Pleural effusion     Identified At Risk by Screening Criteria Low BMI-18.2               Anthropometrics     Row Name 10/01/18 1459          Body Mass Index (BMI)    BMI Assessment BMI 17-18.4: protein-energy malnutrition grade I             Labs/Tests/Procedures/Meds     Row Name 10/01/18 1500          Labs/Procedures/Meds    Lab Results Reviewed reviewed, pertinent        Diagnostic Tests/Procedures    Diagnostic Test/Procedure Reviewed reviewed, pertinent        Medications    Pertinent Medications Reviewed reviewed, pertinent     Pertinent Medications Comments PPI, NaCl             Physical Findings     Row Name 10/01/18 1500          Physical Findings    Overall Physical Appearance --   B=10     Tubes gastrostomy tube     Skin poor skin integrity/turgor;pressure injury;non-healing wound(s)   ischial tuberosity unstageable; coccyx stage 2             Estimated/Assessed Needs     Row Name 10/01/18 1500          Calculation Measurements    Weight Used For Calculations 51.3 kg (113 lb 1.5 oz)        Estimated/Assessed Needs    Additional Documentation KCAL/KG (Group);Protein Requirements (Group);Fluid Requirements (Group)        KCAL/KG                                                                kcal/kg (Specify) --   4806-4211        [place]-St. Jeor Equation    RMR ([facility] or  Equation) 979.75        Protein Requirements    Est Protein Requirement Amount (gms/kg) 1.4 gm protein     Estimated Protein Requirements (gms/day) 71.82        Fluid Requirements    Estimated Fluid Requirements (mL/day) 1300     RDA Method (mL) 1300     Saw-Saida Method (over 20 kg) 2526             Nutrition Prescription Ordered     Row Name 10/01/18 1501          Nutrition Prescription PO    Current PO Diet NPO             Evaluation of Received Nutrient/Fluid Intake     Row Name 10/01/18 1500          Calculation Measurements    Weight Used For Calculations 51.3 kg (113 lb 1.5 oz)             Evaluation of Prescribed Nutrient/Fluid Intake     Row Name 10/01/18 1500          Calculation Measurements    Weight Used For Calculations 51.3 kg (113 lb 1.5 oz)           Problem/Interventions:        Problem 1     Row Name 10/01/18 1501          Nutrition Diagnoses Problem 1    Problem 1 Needs Alternate Route     Etiology (related to) MNT for Treatment/Condition     Signs/Symptoms (evidenced by) NPO                     Intervention Goal     Row Name 10/01/18 1501          Intervention Goal    General Maintain nutrition;Meet nutritional needs for age/condition     TF/PN Inititiate TF/PN     Weight Appropriate weight gain             Nutrition Intervention     Row Name 10/01/18 1501          Nutrition Intervention    RD/Tech Action Follow Tx progress;Care plan reviewd             Nutrition Prescription     Row Name 10/01/18 1501          Nutrition Prescription EN    Enteral Prescription Enteral begin/change;Enteral to supply     Enteral Route Gastrostomy     Product Osmolite 1.5 skyler     TF Delivery Method Continuous     Continuous TF Goal Rate (mL/hr) 55 mL/hr     Water flush (mL)  40 mL     Water Flush Frequency Per hour     New EN Prescription Ordered? No, recommended        EN to Supply    Kcal/Day 1980 Kcal/Day     Protein (gm/day) 82 gm/day     TF Free H2O (mL) 1005 mL     Total Free H2O (mL/day) 960 mL/day              Education/Evaluation     Row Name 10/01/18 1504          Education    Education Will Instruct as appropriate        Monitor/Evaluation    Monitor Per protocol     Education Follow-up Reinforce PRN         Electronically signed by:  Jeri Montemayor RD  10/01/18 3:04 PM

## 2018-10-01 NOTE — NURSING NOTE
At [facility], patient was on Enteral Feedings, one time a day for Dysphagia.     Nutren 1.5 @ 80 ml/hr with 60 ml/hr water flush while infusing with total of 900 ml.    Nutritionist Jeri Montemayor is aware.    Feedings held now due to GI Bleed.

## 2018-10-01 NOTE — THERAPY EVALUATION
Acute Care - Speech Language Pathology   Swallow Initial Evaluation [facility]     Patient Name: Simona Brooks  : 1934  MRN: [id number]  Today's Date: 10/1/2018               Admit Date: 10/1/2018    Visit Dx:     ICD-10-CM ICD-9-CM   1. Pleural effusion J90 511.9   2. Hyperkalemia E87.5 276.7   3. PARMINDER (acute kidney injury) (CMS/HCC) N17.9 584.9   4. Gastrointestinal hemorrhage, unspecified gastrointestinal hemorrhage type K92.2 578.9   5. HAP (hospital-acquired pneumonia) J18.9 486   6. Elevated troponin R74.8 790.6   7. Acute on chronic anemia D64.9 285.9     Patient Active Problem List   Diagnosis   • PAF (paroxysmal atrial fibrillation) (CMS/HCC)   • COPD (chronic obstructive pulmonary disease) (CMS/HCC)   • Calculus of left kidney   • Suprapubic catheter (CMS/[facility])   • Decubitus ulcer of coccygeal region, stage 4 (present on admission)   • History of right above knee amputation (CMS/HCC)   • Acute UTI   • Colostomy malfunction (CMS/HCC)   • Anemia   • Abnormal urine   • Gastrointestinal hemorrhage   • Dysphagia   • Gastrointestinal hemorrhage with melena   • Acute gastric ulcer with hemorrhage   • Pleural effusion   • Hyperkalemia   • PARMINDER (acute kidney injury) (CMS/HCC)   • Sepsis (CMS/HCC)   • PNA (pneumonia)   • PNA (pneumonia)     Past Medical History:   Diagnosis Date   • Anemia    • Atrial fibrillation (CMS/HCC)    • Chronic respiratory failure (CMS/HCC)    • Colitis    • COPD (chronic obstructive pulmonary disease) (CMS/[facility])    • Dysphagia    • Hernia    • Hypokalemia    • Iron deficiency anemia    • Peripheral vascular disease (CMS/HCC)    • Pneumonia      Past Surgical History:   Procedure Laterality Date   • ABDOMINAL SURGERY     • AMPUTATION      right leg   • COLOSTOMY     • ENDOSCOPY N/A 2018    Procedure: ESOPHAGOGASTRODUODENOSCOPY with biopsies;  Surgeon: Chinedu Wood MD;  Location: [facility] ENDOSCOPY;  Service: Gastroenterology   • EXPLORATORY LAPAROTOMY N/A 12/3/2017    Procedure:  disimpaction of ostomy;  Surgeon: Neris Mcgee MD;  Location: [facility] MAIN OR;  Service:           SWALLOW EVALUATION (last 72 hours)      SLP Adult Swallow Evaluation     Row Name 10/01/18 1400          Document Type evaluation  -OC    Subjective Information no complaints  -OC    Patient Observations alert;cooperative;agree to therapy  -OC    Patient Effort good  -OC    Symptoms Noted During/After Treatment none  -OC          Patient Profile Reviewed yes  -OC    Pertinent History Of Current Problem Pt admitted with pleural effusion, per RN pt + for flu. Pt with baseline TFs secondary to poor intake.   -OC    Current Method of Nutrition NPO  -OC    Precautions/Limitations, Vision WFL  -OC    Precautions/Limitations, Hearing WFL  -OC    Prior Level of Function-Swallowing no diet consistency restrictions  -OC    Plans/Goals Discussed with patient  -OC    Barriers to Rehab previous functional deficit  -OC    Patient's Goals for Discharge patient did not state  -OC          Additional Documentation Pain Scale: Numbers Pre/Post-Treatment (Group)  -OC          Pain Scale: Numbers, Pretreatment 0/10 - no pain  -OC    Pain Scale: Numbers, Post-Treatment 0/10 - no pain  -OC          Dentition Assessment edentulous, does not have dentures;other (see comments)   reports she doesn't eat without her dentures  -OC    Secretion Management WNL/WFL  -OC    Mucosal Quality moist, healthy  -OC    Volitional Swallow WFL  -OC    Volitional Cough WFL  -OC          Oral Motor General Assessment WFL  -OC          Oral Prep Phase WFL  -OC    Oral Transit WFL  -OC    Oral Residue WFL  -OC    Pharyngeal Phase suspected pharyngeal impairment  -OC    Esophageal Phase suspected esophageal impairment  -OC    Clinical Swallow Evaluation Summary Bedside swallow eval completed. Pt agreeable to limited trials. Pt with throat clearing with ice chips and coughing with thin liquids. No overt s/s aspiration with nectar thick and puree.   -OC           SLP Swallowing Diagnosis oral dysfunction;pharyngeal dysfunction  -OC    Functional Impact risk of aspiration/pneumonia  -OC    Rehab Potential/Prognosis, Swallowing good, to achieve stated therapy goals  -OC    Swallow Criteria for Skilled Therapeutic Interventions Met demonstrates skilled criteria  -OC          Therapy Frequency (Swallow) PRN  -OC    Predicted Duration Therapy Intervention (Days) until discharge  -OC    SLP Diet Recommendation puree;nectar thick liquids;long term alternate methods of nutrition/hydration;other (see comments)   baseline TFs  -OC    Recommended Diagnostics reassess via clinical swallow evaluation  -OC    Recommended Precautions and Strategies upright posture during/after eating;small bites of food and sips of liquid  -OC    SLP Rec. for Method of Medication Administration meds via alternate route;meds whole;meds crushed;with pudding or applesauce  -OC    Monitor for Signs of Aspiration yes  -OC    Anticipated Dischage Disposition other (see comments)   ST not anticipated at next level of care.   -OC          Oral Nutrition/Hydration Goal Selection (SLP) oral nutrition/hydration, SLP goal 1  -OC          Oral Nutrition/Hydration Goal 1, SLP Pt will tolerate least restrictive diet with no overt s/s aspiration.   -OC    Time Frame (Oral Nutrition/Hydration Goal 1, SLP) by discharge  -OC      User Key  (r) = Recorded By, (t) = Taken By, (c) = Cosigned By    Initials Name Effective Dates    OC Jenise, HILARIO Jay,CCC-SLP 06/08/18 -         EDUCATION  The patient has been educated in the following areas:   Dysphagia (Swallowing Impairment).    SLP Recommendation and Plan  SLP Swallowing Diagnosis: oral dysfunction, pharyngeal dysfunction  SLP Diet Recommendation: puree, nectar thick liquids, long term alternate methods of nutrition/hydration, other (see comments) (baseline TFs)  Recommended Precautions and Strategies: upright posture during/after eating, small bites of food and sips of  liquid     Monitor for Signs of Aspiration: yes  Recommended Diagnostics: reassess via clinical swallow evaluation  Swallow Criteria for Skilled Therapeutic Interventions Met: demonstrates skilled criteria  Anticipated Dischage Disposition: other (see comments) (ST not anticipated at next level of care. )  Rehab Potential/Prognosis, Swallowing: good, to achieve stated therapy goals  Therapy Frequency (Swallow): PRN  Predicted Duration Therapy Intervention (Days): until discharge       Plan of Care Reviewed With: patient  Plan of Care Review  Plan of Care Reviewed With: patient  Outcome Summary: Bedside swallow eval completed. Pt agreeable to limited trials. Coughing and throat clearing with ice chips and thin liquids. No overt s/s aspiration with nectar thick liquids and puree. Recommend puree and nectar thick, meds alternate route. Pt with baseline tubefeeds secondary to poor intake per hx. ST to follow for re-eval.          SLP GOALS     Row Name 10/01/18 1400             Oral Nutrition/Hydration Goal 1 (SLP)    Oral Nutrition/Hydration Goal 1, SLP Pt will tolerate least restrictive diet with no overt s/s aspiration.   -OC      Time Frame (Oral Nutrition/Hydration Goal 1, SLP) by discharge  -OC        User Key  (r) = Recorded By, (t) = Taken By, (c) = Cosigned By    Initials Name Provider Type    Misty Aguiar MA,ABILIO-SLP Speech and Language Pathologist             SLP Outcome Measures (last 72 hours)      SLP Outcome Measures     Row Name 10/01/18 1400             SLP Outcome Measures    Outcome Measure Used? Adult NOMS  -OC         Adult FCM Scores    FCM Chosen Swallowing  -OC      Swallowing FCM Score 4  -OC        User Key  (r) = Recorded By, (t) = Taken By, (c) = Cosigned By    Initials Name Effective Dates    Misty Aguiar MA, CCC-SLP 06/08/18 -            Time Calculation:         Time Calculation- SLP     Row Name 10/01/18 1510             Time Calculation- SLP    SLP Start Time 1400  -OC      SLP  Received On 10/01/18  -OC        User Key  (r) = Recorded By, (t) = Taken By, (c) = Cosigned By    Initials Name Provider Type    Misty Aguiar MA,CCC-SLP Speech and Language Pathologist          Therapy Charges for Today     Code Description Service Date Service Provider Modifiers Qty    [id number]  ST EVAL ORAL PHARYNG SWALLOW 3 10/1/2018 Misty Burden MA,CCC-SLP GN 1               Misty Burden MA,ABILIO-SLP  10/1/2018

## 2018-10-01 NOTE — ED PROVIDER NOTES
Subjective   History of Present Illness    Review of Systems    Past Medical History:   Diagnosis Date   • Anemia    • Atrial fibrillation (CMS/HCC)    • Colitis    • COPD (chronic obstructive pulmonary disease) (CMS/HCC)    • Dysphagia    • Hernia    • Hypokalemia    • Pneumonia        Allergies   Allergen Reactions   • Augmentin [Amoxicillin-Pot Clavulanate] Unknown (See Comments)     Unknown     • Penicillins Unknown (See Comments)     Tolerated meropenem in 08/2017.       Past Surgical History:   Procedure Laterality Date   • ABDOMINAL SURGERY     • AMPUTATION      right leg   • COLOSTOMY     • ENDOSCOPY N/A 6/22/2018    Procedure: ESOPHAGOGASTRODUODENOSCOPY with biopsies;  Surgeon: Chinedu Wood MD;  Location: [facility] ENDOSCOPY;  Service: Gastroenterology   • EXPLORATORY LAPAROTOMY N/A 12/3/2017    Procedure: disimpaction of ostomy;  Surgeon: Neris Mcgee MD;  Location: [facility] MAIN OR;  Service:        History reviewed. No pertinent family history.    Social History     Social History   • Marital status:      Social History Main Topics   • Smoking status: Former Smoker   • Smokeless tobacco: Never Used   • Alcohol use No   • Drug use: No   • Sexual activity: Defer     Other Topics Concern   • Not on file           Objective   Physical Exam    ECG 12 Lead    Date/Time: 10/1/2018 5:07 AM  Performed by: SUE WILLARD III  Authorized by: JENNIFER IBRAHIM                    ED Course                  MDM      Final diagnoses:   None

## 2018-10-01 NOTE — ED PROVIDER NOTES
MD ATTESTATION NOTE  Pt reports to ED from nursing home. Patient is demented and unable to provide a history.   Patient is a full code.    On exam: Pt is oriented to self. 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 Mucous membranes are dry. There is a feeding tub in place and cholostomy bag with maroon colored stool. Bruising on the abdomen likely from injections.     The OLIMPIA and I have discussed this patient's history, physical exam, and treatment plan.  Labs show acute renal failure with hyperkalemia.  Significant infiltrate/effusion in right lung.    I have reviewed the documentation and personally had a face to face interaction with the patient. I affirm the documentation and agree with the treatment and plan.  The attached note describes my personal findings.    Documentation assistance provided by angel Navarro for Dr. Renteria.  Information recorded by the scribe was done at my direction and has been verified and validated by me.     Genet Navarro  10/01/18 0428       Kory Renteria MD  10/01/18 0649

## 2018-10-01 NOTE — ED NOTES
Spoke with MARCUS Bone at NH, pt is full code.  Informed her of pt admission to hospital     Krystina Merritt, RN  10/01/18 0410

## 2018-10-01 NOTE — PLAN OF CARE
Problem: Patient Care Overview  Goal: Plan of Care Review  Outcome: Ongoing (interventions implemented as appropriate)   10/01/18 1400   Coping/Psychosocial   Plan of Care Reviewed With patient   OTHER   Outcome Summary Bedside swallow eval completed. Pt agreeable to limited trials. Coughing and throat clearing with ice chips and thin liquids. No overt s/s aspiration with nectar thick liquids and puree. Recommend puree and nectar thick, meds alternate route. Pt with baseline tubefeeds secondary to poor intake per hx. ST to follow for re-eval.

## 2018-10-01 NOTE — DISCHARGE PLACEMENT REQUEST
"Simona Kelsey  (84 y.o. Female)     Date of Birth Social Security Number Address Home Phone MRN    [id number]  [id number]  [facility] [address] [phone] [id number]    Worship Marital Status          Non-Anabaptist        Admission Date Admission Type Admitting Provider Attending Provider Department, Room/Bed    10/1/18 Emergency Rich Ruiz MD Jackson, Alan David, MD [address], [address]    Discharge Date Discharge Disposition Discharge Destination                       Attending Provider:  Eulogio Carrillo MD    Allergies:  Augmentin [Amoxicillin-pot Clavulanate], Penicillins    Isolation:  None   Infection:  None   Code Status:  CPR    Ht:  167.6 cm (66\")   Wt:  51.3 kg (113 lb)    Admission Cmt:  None   Principal Problem:  Pleural effusion [J90]                 Active Insurance as of 10/1/2018     Primary Coverage     Payor Plan Insurance Group Employer/Plan Group    HUMANA MEDICARE REPLACEMENT HUMANA MEDICARE REPL [id number]     Payor Plan Address Payor Plan Phone Number Effective From Effective To    [address] [address] [phone] 1/1/2018     [facility] [id number]       Subscriber Name Subscriber Birth Date Member ID       SIMONA KELSEY 1934 [id number]           Secondary Coverage     Payor Plan Insurance Group Employer/Plan Group    [place]      Payor Plan Address Payor Plan Phone Number Effective From Effective To    [address] [phone] 2/1/2018     [facility] [id number]       Subscriber Name Subscriber Birth Date Member ID       SIMONA KELSEY 1934 [id number]                 Emergency Contacts      (Rel.) Home Phone Work Phone Mobile Phone    Jeyson Kelsey (Poa) (Son) [phone] -- [phone]    ToddMeliton garza (Son) [phone] -- [phone]    Rashard Roque (Brother) [phone] -- [phone]              "

## 2018-10-01 NOTE — PROGRESS NOTES
"Pharmacokinetic Consult - Vancomycin and Cefepime Dosing (Initial Note)    Simona Brooks is a 84 y.o. female 167.6 cm (66\") 52.5 kg (115 lb 12.8 oz)   Pharmacy consulted to dose vancomycin and cefepime for PNA/Sepsis.  Pharmacy dosing vancomycin and cefepime per Dr. Ruiz's request.   Goal vancomycin trough: 15 - 20 mcg/mL     Anti-Infectives     Ordered     Dose/Rate Route Frequency Start Stop    10/01/18 0512  Pharmacy to dose vancomycin     Ordering Provider:  Rich Ruiz MD     Does not apply Continuous PRN 10/01/18 0511 10/11/18 0510    10/01/18 0323  cefepime (MAXIPIME) 2 g/100 mL 0.9% NS (mbp)     Ordering Provider:  Kory Renteria MD    2 g  200 mL/hr over 30 Minutes Intravenous Once 10/01/18 0325      10/01/18 0323  vancomycin (VANCOCIN) in iso-osmotic dextrose IVPB 1 g (premix) 200 mL     Ordering Provider:  Kory Renteria MD    20 mg/kg × 52.5 kg Intravenous Once 10/01/18 0325             Relevant clinical data and objective history reviewed:  -Pt PMH significant for COPD and anemia. The patient has been recently admitted to [facility] for several different conditions.     Lab Results   Component Value Date    CREATININE 2.00 (H) 10/01/2018     Estimated Creatinine Clearance: 17.4 mL/min (A) (by C-G formula based on SCr of 2 mg/dL (H)).    Lab Results   Component Value Date    WBC 16.18 (H) 10/01/2018     Temp Readings from Last 1 Encounters:   10/01/18 99.6 °F (37.6 °C) (Tympanic)       Baseline culture/source/susceptibility:   10/1 Blood Cx (2/2): In Process  10/1 WBC: 16.18  10/1 Procal: 0.48    Radiology  10/1 Chest XR: \"Kyphotic positioning with dense right-sided opacities felt to reflect pneumonia and pleural fluid\"     Lab Results   Component Value Date    VANCOTROUGH 12.40 09/21/2014    VANCOTROUGH <1.70 (L) 05/03/2014     Assessment/Plan  Given the patient's current unstable renal function, weight, currently on 3L NC, and clinical condition will start vancomycin intermittent dosing.  Vancomycin " random level due on 10/2 @0500 (~24 hour level). Recommend getting a level sooner if the patient's urine output increases. In regards to cefepime, due to the patient's CrCl <30 mL/min, will start cefepime 2 g IV Q24H. Recommend adjusting as the patient's renal function improves.  Will monitor serum creatinine and vancomycin levels and adjust as needed. Recommend adequate hydration to prevent any further nephrotoxicities. Thank you Dr. Ruiz for this consult.     Shravan Dempsey, PharmD  10/01/18 5:14 AM

## 2018-10-01 NOTE — CONSULTS
Patient Identification:  Simona Brooks  84 y.o.  female  1934  [id number]          LOS 0    Requesting physician: Dr Ruiz    Reason for Consultation:  PNA    History of Present Illness:   84-year-old female requested to see for right lower lobe pneumonia.  Patient resides at nursing home and is a  history of dementia, COPD, dysphagia or chronic atrial fibrillation and sent from nursing home secondary to worsening shortness of breath with chest congestion and increased confusion beyond patient's chronic baseline dementia.  History is per predominantly chart reviewed due to patient's confusion.  She's had a history of pneumonia with aspiration in the past.  Status post PEG tube for feedings.  Started on broad-spectrum antibiotics on admission to the hospital for healthcare associated organisms given patient lives at the nursing home.  Has had some bloody stool per her ostomy with signs of GI bleed and GI plans to scope.  Patient has complaints of mild cough which is mostly nonproductive and shortness of breath worse with exertion.  Unable to get any other meaningful history from patient herself due to confusion.      Past Medical History:  Past Medical History:   Diagnosis Date   • Anemia    • Atrial fibrillation (CMS/HCC)    • Chronic respiratory failure (CMS/HCC)    • Colitis    • COPD (chronic obstructive pulmonary disease) (CMS/HCC)    • Dysphagia    • Hernia    • Hypokalemia    • Iron deficiency anemia    • Peripheral vascular disease (CMS/HCC)    • Pneumonia        Past Surgical History:  Past Surgical History:   Procedure Laterality Date   • ABDOMINAL SURGERY     • AMPUTATION      right leg   • COLOSTOMY     • ENDOSCOPY N/A 6/22/2018    Procedure: ESOPHAGOGASTRODUODENOSCOPY with biopsies;  Surgeon: Chinedu Wood MD;  Location: [facility] ENDOSCOPY;  Service: Gastroenterology   • EXPLORATORY LAPAROTOMY N/A 12/3/2017    Procedure: disimpaction of ostomy;  Surgeon: Neris Mcgee MD;  Location: [facility]  MAIN OR;  Service:         Home Meds:  Prescriptions Prior to Admission   Medication Sig Dispense Refill Last Dose   • acetaminophen (TYLENOL) 160 MG/5ML solution 640 mg by Per G Tube route Every 6 (Six) Hours As Needed for Mild Pain .      • albuterol (PROVENTIL) (2.5 MG/3ML) 0.083% nebulizer solution Take 2.5 mg by nebulization Every 4 (Four) Hours As Needed for Wheezing.      • ALPRAZolam (XANAX) 1 MG tablet Take 1 mg by mouth At Night As Needed for Anxiety.      • calcium carbonate (TUMS) 500 MG chewable tablet 1 tablet by Per G Tube route Every 6 (Six) Hours As Needed for Indigestion or Heartburn.      • carvedilol (COREG) 3.125 MG tablet 3.125 mg by Per G Tube route 2 (Two) Times a Day With Meals.      • cholecalciferol (VITAMIN D3) 37866 units capsule 50,000 Units by Per G Tube route Every 7 (Seven) Days.      • collagenase 250 UNIT/GM ointment Apply 1 application topically to the appropriate area as directed Daily. Apply to sacral wound      • docusate sodium (COLACE) 150 MG/15ML liquid 100 mg by Per G Tube route 2 (Two) Times a Day.      • ferrous sulfate 300 (60 FE) MG/5ML syrup 300 mg by Per G Tube route Daily.      • folic acid (FOLVITE) 1 MG tablet 1 mg by Per G Tube route Daily.      • furosemide (LASIX) 20 MG tablet 20 mg by Per G Tube route Daily.      • gabapentin (NEURONTIN) 100 MG capsule Take 1 capsule by mouth Daily. (Patient taking differently: 100 mg by Per G Tube route Daily.) 3 capsule 0    • morphine 100 MG/5ML solution concentrated solution Take 0.75 mL by mouth Every 4 (Four) Hours. (Patient taking differently: Take 20 mg by mouth Every 6 (Six) Hours.) 15 mL 0    • Multiple Vitamin (TAB-A-EBONY) tablet 1 tablet by Per G Tube route Daily.      • Neomycin-Bacitracin-Polymyxin (HCA TRIPLE ANTIBIOTIC OINTMENT EX) Apply 1 dose topically 2 (Two) Times a Day. Apply to buttocks cheek topically to open skin abrasion      • ondansetron (ZOFRAN) 4 MG tablet 4 mg by Per G Tube route Every 4 (Four)  Hours As Needed for Nausea or Vomiting.      • oxybutynin (DITROPAN) 5 MG/5ML syrup 5 mg by Per G Tube route Daily.      • pantoprazole (PROTONIX) 40 MG EC tablet Take 1 tablet by mouth 2 (Two) Times a Day Before Meals. (Patient taking differently: Take 40 mg by mouth Daily.)      • phenol (CHLORASEPTIC) 1.4 % liquid liquid Apply 1 spray to the mouth or throat 4 (Four) Times a Day As Needed.      • polyethylene glycol (MIRALAX) packet Take 17 g by mouth Daily As Needed.      • potassium chloride (KAYCIEL) 20 MEQ/15ML (10%) solution 20 mEq by Per G Tube route Daily.      • mupirocin (BACTROBAN) 2 % ointment Apply 1 application topically 2 (Two) Times a Day. Wash G-Tube site and apply every shift       • nystatin (nystatin) 609193 UNIT/GM powder Apply 1 application topically 2 (Two) Times a Day. Clean G-Tube site with NS and apply             Allergies:  Allergies   Allergen Reactions   • Augmentin [Amoxicillin-Pot Clavulanate] Unknown (See Comments)     Tolerated ceftriaxone 1g IV x1 dose during 12/2017 and 06/2018 admissions.  Tolerated cefepime during 10/2018 admission.     • Penicillins Unknown (See Comments)     Tolerated ceftriaxone 1g IV x1 dose during 12/2017 and 06/2018 admissions.  Tolerated cefepime during 10/2018 admission.         Social History:   Social History     Social History   • Marital status:      Spouse name: N/A   • Number of children: N/A   • Years of education: N/A     Occupational History   • Not on file.     Social History Main Topics   • Smoking status: Former Smoker   • Smokeless tobacco: Never Used   • Alcohol use No   • Drug use: No   • Sexual activity: Defer     Other Topics Concern   • Not on file     Social History Narrative   • No narrative on file       Family History:  History reviewed. No pertinent family history.    Review of Systems:  Denies fevers or chills  Denies nausea or vomiting  No new vision or hearing changes  Pot of shortness of breath with cough  Positive  "bloody stool output  No musculoskeletal complaints  No heat or cold intolerance  No skin rashes  Positive confusion  12 system review of systems performed and all else negative    Objective:    PHYSICAL EXAM:    /77 (BP Location: Right arm, Patient Position: Lying)   Pulse 90   Temp 97.3 °F (36.3 °C) (Oral)   Resp 20   Ht 167.6 cm (66\")   Wt 51.3 kg (113 lb)   SpO2 100%   BMI 18.24 kg/m²  Body mass index is 18.24 kg/m². 100% 51.3 kg (113 lb)    GENERAL APPEARANCE:   · Confused  · No acute distress   EYES:    · PERRL                                                                           · Conjunctiva normal  · Sclera non-icteric.  HENT:   · Atraumatic, normocephalic  · External ears and nose normal  · Moist mucous membranes and no ulcers  NECK:  · Thyroid not enlarged  · Trachea midline   RESPIRATORY:    · Nonlabored breathing   · Coarse bilateral breath sounds  · No rales. No wheezing  · No dullness  CARDIOVASCULAR:    · Irregularly irregular  · No murmur  · Lower extremity edema: none    GI:   · Bowel sounds normal  · Abdomen soft , non-distended, non-tender  · No abdominal masses.    MUSCULOSKELETAL:  · Normal movement of extremities  · No tenderness, no deformities  · No clubbing or cyanosis   Skin:    · No visible rashes  · No palpable nodules  PSYCHIATRIC:  · Confused  NEUROLOGIC:      Lab Review:      Lab Results   Component Value Date    WBC 17.79 (H) 10/01/2018    HGB 7.4 (L) 10/01/2018    HCT 25.1 (L) 10/01/2018    MCV 91.2 10/01/2018     10/01/2018            Lab Results   Component Value Date    CREATININE 2.13 (H) 10/01/2018     (H) 10/01/2018     10/01/2018    K 5.0 10/01/2018     10/01/2018    CO2 24.3 10/01/2018        Lab Results   Component Value Date    TROPONINT 0.099 (C) 10/01/2018                Imaging reviewed  chest X-ray viewed shows right lower infiltrate and pleural effusion       Assessment:  Healthcare associated pneumonia  Pleural effusion: " Parapneumonic  Sepsis  GI bleed  Dysphagia  Acute UTI  Paroxysmal atrial fibrillation  COPD    Recommendations:  Agree with broad-spectrum antibiotics and narrow based on culture results.  Send sputum for culture, check MRSA screen and legionella antigen.  Bronchodilators to help with clearance.  Oxygen as needs to maintain sats greater than 89%.  GI plans for scope with signs of bleeding.  Repeat chest x-ray for follow-up of effusion.  If worsens will require thoracentesis.      Thank you for allowing me to participate in the care of this patient.  I will continue to follow along with you.      Philip Lou MD  [facility], [facility]  Pulmonary and Critical Care Medicine    10/1/2018  1:30 PM

## 2018-10-02 PROBLEM — D50.9 IRON DEFICIENCY ANEMIA: Status: ACTIVE | Noted: 2018-01-01

## 2018-10-02 NOTE — ANESTHESIA POSTPROCEDURE EVALUATION
"Patient: Simona Brooks    Procedure Summary     Date:  10/02/18 Room / Location:   [facility]    Anesthesia Start:  1310 Anesthesia Stop:  1323    Procedure:  ESOPHAGOGASTRODUODENOSCOPY (N/A Esophagus) Diagnosis:       Multiple gastric ulcers      Gastritis      Hiatal hernia      (Gastrointestinal hemorrhage with melena [K92.1])      (Iron deficiency anemia, unspecified iron deficiency anemia type [D50.9])    Surgeon:  Arnulfo Villagomez MD Provider:  Nolvia Granados MD    Anesthesia Type:  MAC ASA Status:  3          Anesthesia Type: MAC  Last vitals  BP   (!) 86/49 (10/02/18 1324)   Temp   36.1 °C (97 °F) (10/02/18 1233)   Pulse   80 (10/02/18 1324)   Resp   16 (10/02/18 1324)     SpO2   98 % (10/02/18 1324)     Post Anesthesia Care and Evaluation    Patient location during evaluation: PACU  Patient participation: complete - patient participated  Level of consciousness: awake and sleepy but conscious  Pain score: 0  Pain management: adequate  Airway patency: patent  Anesthetic complications: No anesthetic complications    Cardiovascular status: acceptable  Respiratory status: acceptable  Hydration status: acceptable    Comments: Blood pressure (!) 86/49, pulse 80, temperature 36.1 °C (97 °F), temperature source Oral, resp. rate 16, height 167.6 cm (66\"), weight 51.3 kg (113 lb), SpO2 98 %.    No anesthesia care post op    "

## 2018-10-02 NOTE — PROGRESS NOTES
[facility] INPATIENT PROGRESS NOTE         [address]    10/2/2018      PATIENT IDENTIFICATION:  Name: Simona Brooks ADMIT: 10/1/2018   : 1934  PCP: Leonor Echevarria MD    MRN: [id number] LOS: 1 days   AGE/SEX: 84 y.o. female  ROOM: [facility]                     LOS 1    Reason for visit: f/u PNA with sepsis and effusion      SUBJECTIVE:    Not requiring any supplemental oxygen. Diminished breath sounds. Denies chest pain or productive cough.    Objective   OBJECTIVE:    Vital Sign Min/Max for last 24 hours  Temp  Min: 96.1 °F (35.6 °C)  Max: 98 °F (36.7 °C)   BP  Min: 99/47  Max: 113/49   Pulse  Min: 81  Max: 98   Resp  Min: 12  Max: 20   SpO2  Min: 93 %  Max: 100 %   No Data Recorded   No Data Recorded                         Body mass index is 18.24 kg/m².    Intake/Output Summary (Last 24 hours) at 10/02/18 1033  Last data filed at 10/02/18 0516   Gross per 24 hour   Intake             1595 ml   Output              600 ml   Net              995 ml         Exam:  GEN:  No distress, appears stated age  EYES:   PERRL, anicteric sclera  ENT:    External ears/nose normal, OP clear  NECK:  No adenopathy, midline trachea  LUNGS: Normal chest on inspection, palpation and coarse bilateral breath sounds on auscultation  CV:  Normal S1S2, without murmur  ABD:  Non tender, non distended, no hepatosplenomegaly, +BS  EXT:  No edema, cyanosis or clubbing    Scheduled meds:    cefepime 2 g Intravenous Q24H   ferrous sulfate 300 mg Per G Tube Daily   ipratropium-albuterol 3 mL Nebulization 4x Daily - RT   pantoprazole 40 mg Intravenous BID AC   vancomycin 20 mg/kg Intravenous Once   Vancomycin Pharmacy Intermittent Dosing  Does not apply Daily     IV meds:                        Pharmacy Consult - Pharmacy to dose     Pharmacy to dose vancomycin     sodium chloride 100 mL/hr Last Rate: 100 mL/hr (10/01/18 2212)     Data Review:    Results from last 7 days  Lab Units 10/02/18  9532 10/01/18  5516  10/01/18  0222   SODIUM mmol/L 149* 142 136   POTASSIUM mmol/L 4.5 5.0 6.3*   CHLORIDE mmol/L 114* 104 102   CO2 mmol/L 22.0 24.3 23.2   BUN mg/dL 73* 103* 109*   CREATININE mg/dL 1.55* 2.13* 2.00*   GLUCOSE mg/dL 112* 178* 202*   CALCIUM mg/dL 8.1* 8.6 7.8*         Estimated Creatinine Clearance: 21.9 mL/min (A) (by C-G formula based on SCr of 1.55 mg/dL (H)).    Results from last 7 days  Lab Units 10/02/18  0753 10/02/18  0332 10/02/18  0011 10/01/18  2142 10/01/18  1148 10/01/18  0708 10/01/18  0222   WBC 10*3/mm3  --  14.16*  --   --   --  17.79* 16.18*   HEMOGLOBIN g/dL 9.0* 8.9* 8.8* 8.5* 7.4* 8.4* 8.6*   PLATELETS 10*3/mm3  --  165  --   --   --  211 211           Results from last 7 days  Lab Units 10/01/18  0222   ALT (SGPT) U/L 16   AST (SGOT) U/L 16       Results from last 7 days  Lab Units 10/01/18  0502   PH, ARTERIAL pH units 7.382   PO2 ART mm Hg 67.5*   PCO2, ARTERIAL mm Hg 45.0   HCO3 ART mmol/L 26.8       Results from last 7 days  Lab Units 10/01/18  0249 10/01/18  0222   PROCALCITONIN ng/mL  --  0.48*   LACTATE mmol/L 1.1  --          Chest x-ray 10/1 reviewed shows dense right opacity and pleural effusion    Microbiology reviewed: Urine culture gram-negative bacilli.  Respiratory viral panel positive rhinovirus/enterovirus.        )Assessment   ASSESSMENT:      Active Hospital Problems    Diagnosis Date Noted   • **Pleural effusion [J90] 10/01/2018   • Hyperkalemia [E87.5] 10/01/2018   • PARMINDER (acute kidney injury) (CMS/[facility]) [N17.9] 10/01/2018   • Sepsis (CMS/[facility]) [A41.9] 10/01/2018   • PNA (pneumonia) [J18.9] 10/01/2018   • PNA (pneumonia) [J18.9] 10/01/2018   • Iron deficiency anemia [D50.9] 10/01/2018   • Gastrointestinal hemorrhage [K92.2] 06/21/2018   • Dysphagia [R13.10] 06/21/2018   • Gastrointestinal hemorrhage with melena [K92.1] 06/21/2018   • Acute UTI [N39.0] 12/02/2017   • Colostomy malfunction (CMS/HCC) [K94.03] 12/02/2017   • Anemia [D64.9] 12/02/2017   • Suprapubic catheter  (CMS/[facility]) [Z93.59] 08/11/2017   • Decubitus ulcer of coccygeal region, stage 4 (present on admission) [L89.154] 08/11/2017   • History of right above knee amputation (CMS/HCC) [Z89.611] 08/11/2017   • PAF (paroxysmal atrial fibrillation) (CMS/[facility]) [I48.0] 08/08/2017   • COPD (chronic obstructive pulmonary disease) (CMS/HCC) [J44.9] 08/08/2017      Resolved Hospital Problems    Diagnosis Date Noted Date Resolved   No resolved problems to display.     Healthcare associated pneumonia  Pleural effusion: Parapneumonic  Sepsis  GI bleed  Dysphagia  Acute UTI  Paroxysmal atrial fibrillation  COPD      PLAN:  Encourage pulmonary toilet.  Continue antibiotics.  Check MRSA screen.  Bronchodilators for COPD symptoms and to help with clearance.  Repeat chest x-ray in the morning for follow-up of pneumonia and effusion.    Philip Lou MD  Pulmonary and Critical Care Medicine  [facility], [facility]  10/2/2018    10:33 AM

## 2018-10-02 NOTE — PROGRESS NOTES
Subjective no c/o no sob   Chief complaint:         Objective:      Vital Signs  Temp:  [96.1 °F (35.6 °C)-98 °F (36.7 °C)] 96.2 °F (35.7 °C)  Heart Rate:  [71-89] 86  Resp:  [16-20] 18  BP: ()/(49-69) 130/51        Intake/Output Summary (Last 24 hours) at 10/02/18 1707  Last data filed at 10/02/18 1150   Gross per 24 hour   Intake             1595 ml   Output              400 ml   Net             1195 ml           Physical Exam    Constitutional : well developed ,No acute distress  ENMT lips pink oral mucosa moist   Eyes sclerae white PERRL  Respiratory: Clear to auscultation , No crackles no wheezing respirations are even and un-labored  GI: Soft, Non tender, BS active in all 4 quadrants  CVS: S1 S2 regular, no rub murmur or gallops  Skin warm dry no rashes no petechiae  Neuro confused  Ext: no LE edema, Peripheral pulses intact   Heme no cervical  lymphadenopathy no petechiae         Results Review:      Lab Results   Component Value Date    GLUCOSE 112 (H) 10/02/2018    CALCIUM 8.1 (L) 10/02/2018     (H) 10/02/2018    K 4.5 10/02/2018    CO2 22.0 10/02/2018     (H) 10/02/2018    BUN 73 (H) 10/02/2018    CREATININE 1.55 (H) 10/02/2018    EGFRIFNONA 32 (L) 10/02/2018    BCR 47.1 (H) 10/02/2018    ANIONGAP 13.0 10/02/2018       Lab Results   Component Value Date    WBC 14.16 (H) 10/02/2018    HGB 9.0 (L) 10/02/2018    HCT 30.5 (L) 10/02/2018    MCV 91.8 10/02/2018     10/02/2018       Pertinent Imaging studies were reviewed      Medication Review:       Current Facility-Administered Medications:   •  acetaminophen (TYLENOL) 160 MG/5ML solution 640 mg, 640 mg, Oral, Q6H PRN, Rich Ruiz MD  •  acetaminophen (TYLENOL) tablet 650 mg, 650 mg, Oral, Q4H PRN, Rich Ruiz MD  •  albuterol (PROVENTIL) nebulizer solution 0.083% 2.5 mg/3mL, 2.5 mg, Nebulization, Q4H PRN, Rich Ruiz MD  •  cefepime (MAXIPIME) 2 g/100 mL 0.9% NS (mbp), 2 g, Intravenous, Q24H, Rich Ruiz MD, 2 g at  10/02/18 0516  •  ferrous sulfate 300 (60 Fe) MG/5ML syrup 300 mg, 300 mg, Per G Tube, Daily, Rich Ruiz MD, 300 mg at 10/02/18 1003  •  ipratropium-albuterol (DUO-NEB) nebulizer solution 3 mL, 3 mL, Nebulization, 4x Daily - RT, Rich Ruiz MD, 3 mL at 10/02/18 1543  •  ipratropium-albuterol (DUO-NEB) nebulizer solution 3 mL, 3 mL, Nebulization, Q4H PRN, Rich Ruiz MD  •  ondansetron (ZOFRAN) tablet 4 mg, 4 mg, Oral, Q6H PRN **OR** ondansetron ODT (ZOFRAN-ODT) disintegrating tablet 4 mg, 4 mg, Oral, Q6H PRN **OR** ondansetron (ZOFRAN) injection 4 mg, 4 mg, Intravenous, Q6H PRN, Rich Ruiz MD  •  ondansetron (ZOFRAN) tablet 4 mg, 4 mg, Per G Tube, Q6H PRN, Rich Ruiz MD  •  pantoprazole (PROTONIX) injection 40 mg, 40 mg, Intravenous, BID AC, Rich Ruiz MD, 40 mg at 10/02/18 1704  •  Pharmacy Consult - Pharmacy to dose, , Does not apply, Continuous Sara VEE Lyle E, MD  •  Pharmacy to dose vancomycin, , Does not apply, Continuous PRN, Rich Ruiz MD  •  sodium chloride 0.9 % flush 1-10 mL, 1-10 mL, Intravenous, PRN, Rich Ruiz MD  •  Insert peripheral IV, , , Once **AND** sodium chloride 0.9 % flush 10 mL, 10 mL, Intravenous, PRN, Darryl Garcia MD  •  sodium chloride 0.9 % infusion, 100 mL/hr, Intravenous, Continuous, Rich Ruiz MD, Last Rate: 100 mL/hr at 10/01/18 2212, 100 mL/hr at 10/01/18 2212  •  sodium chloride 0.9 % infusion, 50 mL/hr, Intravenous, Continuous, Arnulfo Villagomez MD, Last Rate: 50 mL/hr at 10/02/18 1604, 50 mL/hr at 10/02/18 1604  •  sucralfate (CARAFATE) 1 GM/10ML suspension 1 g, 1 g, Per G Tube, 4x Daily, Arnulfo Villagomez MD, 1 g at 10/02/18 1704  •  vancomycin (VANCOCIN) in iso-osmotic dextrose IVPB 1 g (premix) 200 mL, 20 mg/kg, Intravenous, Once, Rich Ruiz MD, 1,000 mg at 10/02/18 1605  •  Vancomycin Pharmacy Intermittent Dosing, , Does not apply, Daily, Rich Ruiz MD    Pharmacy Consult - Pharmacy to dose     Pharmacy to dose  vancomycin     sodium chloride 100 mL/hr Last Rate: 100 mL/hr (10/01/18 5159)   sodium chloride 50 mL/hr Last Rate: 50 mL/hr (10/02/18 1603)       Assesment    PARMINDER prerenal  Hypernatremia  GNR UTI  HCAP  GI bleed  Plan:  Borderline uop noted  Scr 1.55 better   Change ivf to 1/2 NS till tomorrow then reevaluate  bp ok monitor  Check a renal US  abx per primary      Yared De Los Santos MD  10/02/18  5:07 PM  Tel: [phone]  Fax: [phone]

## 2018-10-02 NOTE — NURSING NOTE
"   10/02/18 1100   Wound 06/21/18 1838 Right ischial tuberosity pressure injury   Date first assessed/Time first assessed: 06/21/18 1838   Present On Admission : yes;picture taken  Side: Right  Location: ischial tuberosity  Type: pressure injury  Stage, Pressure Injury: unstageable   Dressing Appearance open to air   Periwound intact   Wound Length (cm) 0.7 cm   Wound Width (cm) 0.7 cm   Drainage Characteristics/Odor serous   Drainage Amount small   Care, Wound cleansed with;sterile normal saline   Dressing Care, Wound border dressing   Wound 06/21/18 1840 coccyx pressure injury   Date first assessed/Time first assessed: 06/21/18 1840   Present On Admission : yes;picture taken  Location: coccyx  Type: pressure injury  Stage, Pressure Injury: Stage 2   Dressing Appearance intact   Wound Length (cm) 4 cm   Wound Width (cm) 1.5 cm   Wound Depth (cm) 0 cm   Drainage Characteristics/Odor serous   Drainage Amount scant   Care, Wound cleansed with;sterile normal saline;barrier applied   Colostomy LUQ   Placement Date/Time: 12/07/17 1738   Inserted by: Dr. Mcgee  Colostomy Type: Loop  Location: LUQ   Stomal Appliance 2 piece;Closed end;Changed;Other (Comment)  (changed to [place] 2 3/4\" 2 piece)   Stoma Appearance red;moist   Peristomal Assessment Intact   Accessories/Skin Care cleansed with water   Stoma Function stool  (very small amt in bag)   Stool Color brown, dark   CWOCN consult for wound assessment of coccyx and ischium.  Left coccyx area with denuded skin and area of DTI.  Area immediately beside prolapsed rectum with mucosal moisture contributing to skin breakdown.  Z-guard applied to this area d/t rectal mucosa moisture.  Area to right of coccyx with 2 small areas of equal size stage 2 -0.7x 0.7 (both areas).  Area also with scar tissue noted likely d/t prior skin breakdown.  Area cleaned and optifoam dressing applied.  Colostomy pouch changed during visit with T[place] 2 piece 2 3/4\" without complication.  " Patient tolerated all procedures well with her only complaint of wanting water and more ice currently held for test.

## 2018-10-02 NOTE — PLAN OF CARE
Problem: Patient Care Overview  Goal: Plan of Care Review  Outcome: Ongoing (interventions implemented as appropriate)   10/02/18 0302   Coping/Psychosocial   Plan of Care Reviewed With patient   Plan of Care Review   Progress no change   OTHER   Outcome Summary Pt continues on IV abx. NPO except ice chips for GI bleed. Possible EGD. Continue to monitor H&H Q8h. Q2 turn. Wound care consulted. Safety maintained. Continue to monitor.       Problem: Fall Risk (Adult)  Goal: Absence of Fall  Outcome: Ongoing (interventions implemented as appropriate)      Problem: Infection, Risk/Actual (Adult)  Goal: Infection Prevention/Resolution  Outcome: Ongoing (interventions implemented as appropriate)      Problem: Pneumonia (Adult)  Goal: Signs and Symptoms of Listed Potential Problems Will be Absent, Minimized or Managed (Pneumonia)  Outcome: Ongoing (interventions implemented as appropriate)      Problem: Skin Injury Risk (Adult)  Goal: Skin Health and Integrity  Outcome: Ongoing (interventions implemented as appropriate)

## 2018-10-02 NOTE — PROGRESS NOTES
Name: Simona Brooks ADMIT: 10/1/2018   : 1934  PCP: Leonor Echevarria MD    MRN: [id number] LOS: 1 days   AGE/SEX: 84 y.o. female  ROOM: [facility]   Subjective   Cc- GI Bleeding    On IV PPI  S/p blood transfusion yesterday    S/p EGD today  No further bleeding  Still is confused  On IVFs  Worked with ST    EGD 10/2/18  - Z-line regular, 32 cm from the incisors.  - Normal esophagus.  - Small hiatal hernia.  - Gastritis.  - Non-bleeding gastric ulcers with no stigmata of bleeding.  - Normal duodenal bulb and second portion of the duodenum.  - No specimens collected.  Impression:  - Continue present medications.  - Discuss with family and primary M.D. regarding potential relocation of her G-tube versus medical management  with follow-up.    ROS  Not obtainable due to patient condition    Objective   Vital Signs  Temp:  [96.1 °F (35.6 °C)-98 °F (36.7 °C)] 96.2 °F (35.7 °C)  Heart Rate:  [71-89] 86  Resp:  [16-20] 18  BP: ()/(49-69) 130/51  SpO2:  [93 %-100 %] 97 %  on  Flow (L/min):  [1.5-3] 1.5;   Device (Oxygen Therapy): nasal cannula  Body mass index is 18.24 kg/m².    Physical Exam    Acutely and chronically ill  Confused  Supple, no jvd  No resp distress, decreased bs at bases  Soft, nt  +ostomy  +PEG  +subrapubic cath  No edema or cyanosis  Pale    Results Review:       I reviewed the patient's new clinical results.    Results from last 7 days  Lab Units 10/02/18  0753 10/02/18  0332 10/02/18  0011 10/01/18  2142  10/01/18  0708 10/01/18  0222   WBC 10*3/mm3  --  14.16*  --   --   --  17.79* 16.18*   HEMOGLOBIN g/dL 9.0* 8.9* 8.8* 8.5*  < > 8.4* 8.6*   PLATELETS 10*3/mm3  --  165  --   --   --  211 211   < > = values in this interval not displayed.    Results from last 7 days  Lab Units 10/02/18  0332 10/01/18  0708 10/01/18  0222   SODIUM mmol/L 149* 142 136   POTASSIUM mmol/L 4.5 5.0 6.3*   CHLORIDE mmol/L 114* 104 102   CO2 mmol/L 22.0 24.3 23.2   BUN mg/dL 73* 103* 109*   CREATININE mg/dL  1.55* 2.13* 2.00*   GLUCOSE mg/dL 112* 178* 202*   Estimated Creatinine Clearance: 21.9 mL/min (A) (by C-G formula based on SCr of 1.55 mg/dL (H)).    Results from last 7 days  Lab Units 10/02/18  0332 10/01/18  0708 10/01/18  0222   CALCIUM mg/dL 8.1* 8.6 7.8*   ALBUMIN g/dL 2.20*  --  2.60*   PHOSPHORUS mg/dL 3.7  --   --          cefepime 2 g Intravenous Q24H   ferrous sulfate 300 mg Per G Tube Daily   ipratropium-albuterol 3 mL Nebulization 4x Daily - RT   pantoprazole 40 mg Intravenous BID AC   sucralfate 1 g Per G Tube 4x Daily   vancomycin 20 mg/kg Intravenous Once   Vancomycin Pharmacy Intermittent Dosing  Does not apply Daily       Pharmacy Consult - Pharmacy to dose     Pharmacy to dose vancomycin     sodium chloride 100 mL/hr Last Rate: 100 mL/hr (10/01/18 2212)   sodium chloride 50 mL/hr Last Rate: 50 mL/hr (10/02/18 1242)   NPO Diet NPO Except: Ice chips      Assessment/Plan      Active Hospital Problems    Diagnosis Date Noted   • **Gastrointestinal hemorrhage with melena [K92.1] 06/21/2018   • Pleural effusion [J90] 10/01/2018   • Hyperkalemia [E87.5] 10/01/2018   • PARMINDER (acute kidney injury) (CMS/[facility]) [N17.9] 10/01/2018   • Sepsis (CMS/[facility]) [A41.9] 10/01/2018   • PNA (pneumonia) [J18.9] 10/01/2018   • PNA (pneumonia) [J18.9] 10/01/2018   • Iron deficiency anemia [D50.9] 10/01/2018   • Gastrointestinal hemorrhage [K92.2] 06/21/2018   • Dysphagia [R13.10] 06/21/2018   • Acute UTI [N39.0] 12/02/2017   • Colostomy malfunction (CMS/[facility]) [K94.03] 12/02/2017   • Anemia [D64.9] 12/02/2017   • Suprapubic catheter (CMS/[facility]) [Z93.59] 08/11/2017   • Decubitus ulcer of coccygeal region, stage 4 (present on admission) [L89.154] 08/11/2017   • History of right above knee amputation (CMS/[facility]) [Z89.611] 08/11/2017   • PAF (paroxysmal atrial fibrillation) (CMS/[facility]) [I48.0] 08/08/2017   • COPD (chronic obstructive pulmonary disease) (CMS/[facility]) [J44.9] 08/08/2017      Resolved Hospital Problems    Diagnosis Date Noted  Date Resolved   No resolved problems to display.         · On IV PPI. Non bleeding PUD found but near vicinity of PEG  · S/p Blood transfusion, monitor H/H  · IVFs, monitor renal fx  · Broad spectrum abx, follow cultures. Unclear how much of symptoms are viral vs bacterial with +rhinovirus. Will see what Pulmonary thinks as far as deescilation of abx.  · Wound care for ulcers  · Restart TFs and modified diet  · Poor prognosis going forward long term though she seems more stable at the present.    Thanks to multiple specilaists    D/W RN  Reviewed previous records  Greater than 40 minutes spent with greater than 50% counseling and coordinating care.         Eulogio Carrillo MD  [facility]  10/02/18  3:49 PM

## 2018-10-02 NOTE — PLAN OF CARE
Problem: Patient Care Overview  Goal: Plan of Care Review  Outcome: Ongoing (interventions implemented as appropriate)   10/02/18 1449 10/02/18 1632   Coping/Psychosocial   Plan of Care Reviewed With patient --    Plan of Care Review   Progress --  no change   OTHER   Outcome Summary --  VSS; no complaints of pain or discomfort; patient had and EGD today; wound RN seen patient; continue to monitor        Problem: Fall Risk (Adult)  Goal: Absence of Fall  Outcome: Ongoing (interventions implemented as appropriate)      Problem: Infection, Risk/Actual (Adult)  Goal: Infection Prevention/Resolution  Outcome: Ongoing (interventions implemented as appropriate)      Problem: Pneumonia (Adult)  Goal: Signs and Symptoms of Listed Potential Problems Will be Absent, Minimized or Managed (Pneumonia)  Outcome: Ongoing (interventions implemented as appropriate)      Problem: Skin Injury Risk (Adult)  Goal: Skin Health and Integrity  Outcome: Ongoing (interventions implemented as appropriate)

## 2018-10-02 NOTE — PROGRESS NOTES
[facility]  Inpatient Progress Note    Reason for Follow Up:  GI bleed    Subjective     Interval History: Discussed with RN, patient demented and unable to provide much accurate history.  RN recounts some old heme but no fresh bleeding.  Reviewed cardiology note, cleared for endoscopic evaluation.  Power of  has given consent.  We'll schedule EGD to further assess bleeding.       Current Facility-Administered Medications:   •  acetaminophen (TYLENOL) 160 MG/5ML solution 640 mg, 640 mg, Oral, Q6H PRN, Rich Ruiz MD  •  acetaminophen (TYLENOL) tablet 650 mg, 650 mg, Oral, Q4H PRN, Rich Ruiz MD  •  albuterol (PROVENTIL) nebulizer solution 0.083% 2.5 mg/3mL, 2.5 mg, Nebulization, Q4H PRN, Rich Ruiz MD  •  cefepime (MAXIPIME) 2 g/100 mL 0.9% NS (mbp), 2 g, Intravenous, Q24H, Rich Ruiz MD, 2 g at 10/02/18 0516  •  ferrous sulfate 300 (60 Fe) MG/5ML syrup 300 mg, 300 mg, Per G Tube, Daily, Rich Ruiz MD  •  ipratropium-albuterol (DUO-NEB) nebulizer solution 3 mL, 3 mL, Nebulization, 4x Daily - RT, Rich Ruiz MD, 3 mL at 10/02/18 0829  •  ipratropium-albuterol (DUO-NEB) nebulizer solution 3 mL, 3 mL, Nebulization, Q4H PRN, Rich Ruiz MD  •  ondansetron (ZOFRAN) tablet 4 mg, 4 mg, Oral, Q6H PRN **OR** ondansetron ODT (ZOFRAN-ODT) disintegrating tablet 4 mg, 4 mg, Oral, Q6H PRN **OR** ondansetron (ZOFRAN) injection 4 mg, 4 mg, Intravenous, Q6H PRN, Rich Ruiz MD  •  ondansetron (ZOFRAN) tablet 4 mg, 4 mg, Per G Tube, Q6H PRN, Rich Ruiz MD  •  pantoprazole (PROTONIX) injection 40 mg, 40 mg, Intravenous, BID AC, Rich Ruiz MD, 40 mg at 10/02/18 0634  •  Pharmacy Consult - Pharmacy to dose, , Does not apply, Continuous PRSara CEVALLOS Lyle E, MD  •  Pharmacy to dose vancomycin, , Does not apply, Continuous PRSara CEVALLOS Lyle E, MD  •  sodium chloride 0.9 % flush 1-10 mL, 1-10 mL, Intravenous, PRN, Rich Ruiz MD  •  Insert peripheral IV, , , Once **AND**  sodium chloride 0.9 % flush 10 mL, 10 mL, Intravenous, PRN, Darryl Garcia MD  •  sodium chloride 0.9 % infusion, 100 mL/hr, Intravenous, Continuous, Rich Ruiz MD, Last Rate: 100 mL/hr at 10/01/18 2212, 100 mL/hr at 10/01/18 2212  •  vancomycin (VANCOCIN) in iso-osmotic dextrose IVPB 1 g (premix) 200 mL, 20 mg/kg, Intravenous, Once, Rich Ruiz MD  •  Vancomycin Pharmacy Intermittent Dosing, , Does not apply, Daily, Rich Ruiz MD  Review of Systems:    Review of systems could not be obtained due to  patient confusion.    Objective     Vital Signs  Temp:  [96.1 °F (35.6 °C)-98 °F (36.7 °C)] 96.1 °F (35.6 °C)  Heart Rate:  [81-98] 88  Resp:  [12-20] 18  BP: ()/(47-63) 113/49  Body mass index is 18.24 kg/m².    Intake/Output Summary (Last 24 hours) at 10/02/18 0932  Last data filed at 10/02/18 0516   Gross per 24 hour   Intake             1595 ml   Output              600 ml   Net              995 ml     No intake/output data recorded.     Physical Exam:   General: patient awake, alert and cooperative   Eyes: Normal lids and lashes, no scleral icterus   Neck: supple, normal ROM   Skin: warm and dry, not jaundiced   Cardiovascular: regular rhythm and rate, no murmurs auscultated   Pulm: clear to auscultation bilaterally, regular and unlabored   Abdomen: soft, nontender, nondistended; normal bowel sounds   Rectal: deferred   Extremities: no rash or edema   Psychiatric: Normal mood and behavior; memory intact     Results Review:     I reviewed the patient's new clinical results.      Results from last 7 days  Lab Units 10/02/18  0753 10/02/18  0332 10/02/18  0011  10/01/18  0708 10/01/18  0222   WBC 10*3/mm3  --  14.16*  --   --  17.79* 16.18*   HEMOGLOBIN g/dL 9.0* 8.9* 8.8*  < > 8.4* 8.6*   HEMATOCRIT % 30.5* 30.4* 29.1*  < > 28.1* 29.3*   PLATELETS 10*3/mm3  --  165  --   --  211 211   < > = values in this interval not displayed.    Results from last 7 days  Lab Units 10/02/18  0332  10/01/18  0708 10/01/18  0222   SODIUM mmol/L 149* 142 136   POTASSIUM mmol/L 4.5 5.0 6.3*   CHLORIDE mmol/L 114* 104 102   CO2 mmol/L 22.0 24.3 23.2   BUN mg/dL 73* 103* 109*   CREATININE mg/dL 1.55* 2.13* 2.00*   CALCIUM mg/dL 8.1* 8.6 7.8*   BILIRUBIN mg/dL  --   --  0.2   ALK PHOS U/L  --   --  242*   ALT (SGPT) U/L  --   --  16   AST (SGOT) U/L  --   --  16   GLUCOSE mg/dL 112* 178* 202*           Lab Results  Lab Value Date/Time   LIPASE 20 08/09/2017 0605   LIPASE 20 08/08/2017 1251       Radiology:  XR Chest 1 View   Final Result   Kyphotic positioning with dense right-sided opacities felt   to reflect pneumonia and pleural fluid           This report was finalized on 10/1/2018 2:25 AM by Kolton Olivia M.D.              Assessment/Plan     Patient Active Problem List   Diagnosis   • PAF (paroxysmal atrial fibrillation) (CMS/[facility])   • COPD (chronic obstructive pulmonary disease) (CMS/[facility])   • Calculus of left kidney   • Suprapubic catheter (CMS/[facility])   • Decubitus ulcer of coccygeal region, stage 4 (present on admission)   • History of right above knee amputation (CMS/[facility])   • Acute UTI   • Colostomy malfunction (CMS/[facility])   • Anemia   • Abnormal urine   • Gastrointestinal hemorrhage   • Dysphagia   • Gastrointestinal hemorrhage with melena   • Acute gastric ulcer with hemorrhage   • Pleural effusion   • Hyperkalemia   • PARMINDER (acute kidney injury) (CMS/[facility])   • Sepsis (CMS/[facility])   • PNA (pneumonia)   • PNA (pneumonia)     Impression  #1 GI bleeding: History of ulcer at the gastrostomy tube site when studied 3 months past  #2 acute blood loss anemia  #3 elevated troponin: Cleared by cardiology  #4 pneumonia  #5 UTI  #6 dementia      Recommendation  Schedule EGD to further assess  I discussed the patients findings and my recommendations with patient and nursing staff.    Arnulfo Villagomez MD

## 2018-10-02 NOTE — ANESTHESIA PREPROCEDURE EVALUATION
Anesthesia Evaluation     Patient summary reviewed and Nursing notes reviewed   NPO Solid Status: > 8 hours  NPO Liquid Status: > 8 hours           Airway   Mallampati: II  TM distance: <3 FB  Possible difficult intubation  Dental    (+) edentulous    Pulmonary - normal exam   (+) pneumonia stable , a smoker Former, COPD moderate,   Cardiovascular     ECG reviewed  Patient on routine beta blocker and Beta blocker not taken-may be given intraoperatively  Rhythm: irregular    (+) dysrhythmias PAC, PVD,       Neuro/Psych  (+) dementia,     GI/Hepatic/Renal/Endo    (+)  GI bleeding,     Musculoskeletal     Abdominal    Substance History      OB/GYN          Other                      Anesthesia Plan    ASA 3     MAC

## 2018-10-02 NOTE — PROGRESS NOTES
"Pharmacokinetic Consult -Cefepime and Vancomycin Dosing (Follow-up Note)    Simona Brooks is on day #2 pharmacy to dose cefepime/ vancomycin for pna/sepsis.  Pharmacy dosing vancomycin per Dr Squires request.   Goal trough: 15-20 mg/L   Duration: 10 days    Relevant clinical data and objective history reviewed:  84 y.o. female 167.6 cm (66\") 51.3 kg (113 lb)    Past Medical History:   Diagnosis Date   • Anemia    • Atrial fibrillation (CMS/HCC)    • Chronic respiratory failure (CMS/HCC)    • Colitis    • COPD (chronic obstructive pulmonary disease) (CMS/HCC)    • Dysphagia    • Hernia    • Hypokalemia    • Iron deficiency anemia    • Peripheral vascular disease (CMS/HCC)    • Pneumonia      Creatinine   Date Value Ref Range Status   10/02/2018 1.55 (H) 0.57 - 1.00 mg/dL Final   10/01/2018 2.13 (H) 0.57 - 1.00 mg/dL Final   10/01/2018 2.00 (H) 0.57 - 1.00 mg/dL Final     BUN   Date Value Ref Range Status   10/02/2018 73 (H) 8 - 23 mg/dL Final     Estimated Creatinine Clearance: 21.9 mL/min (A) (by C-G formula based on SCr of 1.55 mg/dL (H)).    Lab Results   Component Value Date    WBC 14.16 (H) 10/02/2018     Temp Readings from Last 3 Encounters:   10/02/18 96.1 °F (35.6 °C) (Oral)   06/24/18 97.7 °F (36.5 °C) (Oral)   04/14/18 99.2 °F (37.3 °C) (Tympanic)      Baseline culture/source/susceptibility:   10/1 Blood Cx (2/2): NGTD  10/1 WBC: 16.18  10/2 WBC: 14.16  10/1 Procal: 0.48  10/1 rvp : human rhinovirus    Radiology  10/1 Chest XR: \"Kyphotic positioning with dense right-sided opacities felt to reflect pneumonia and pleural fluid\"     Anti-Infectives     Ordered     Dose/Rate Route Frequency Start Stop    10/02/18 0907  vancomycin (VANCOCIN) in iso-osmotic dextrose IVPB 1 g (premix) 200 mL     Ordering Provider:  Rich Ruiz MD    20 mg/kg × 52.5 kg  over 100 Minutes Intravenous Once 10/02/18 1100      10/01/18 0530  cefepime (MAXIPIME) 2 g/100 mL 0.9% NS (mbp)     Ordering Provider:  Rich Ruiz MD    2 " g  over 4 Hours Intravenous Every 24 Hours 10/02/18 0500 10/11/18 0459    10/01/18 0530  Vancomycin Pharmacy Intermittent Dosing     Ordering Provider:  Rich Ruiz MD     Does not apply Daily 10/01/18 0900 10/11/18 0859    10/01/18 0512  Pharmacy to dose vancomycin     Ordering Provider:  Rich Ruiz MD     Does not apply Continuous PRN 10/01/18 0511 10/11/18 0510    10/01/18 0323  cefepime (MAXIPIME) 2 g/100 mL 0.9% NS (mbp)     Ordering Provider:  Kory Renteria MD    2 g  200 mL/hr over 30 Minutes Intravenous Once 10/01/18 0325 10/01/18 0530    10/01/18 0323  vancomycin (VANCOCIN) in iso-osmotic dextrose IVPB 1 g (premix) 200 mL     Ordering Provider:  Kory Renteria MD    20 mg/kg × 52.5 kg Intravenous Once 10/01/18 0325 10/01/18 0549         Lab Results   Component Value Date    VANCOTROUGH 12.40 09/21/2014     Lab Results   Component Value Date    VANCORANDOM 17.10 10/02/2018     VANCOMYCIN DOSING SCHEDULE ( INCLUDING LEVELS)  10/01   0549   Vancomycin 1 gram (19.5mg/kg) iv x 1 dose  10/02   0332   Vancomycin random = 17.1 mcg/ml ( 22 hours)  10/02   1100   Vancomycin 1 gram iv x 1 dose  10/03   0600   Vancomycin random ordered    Cefepime Dosing for crcl <30ml/min  Cefepime 2 gram iv q 24 hours    Assessment/Plan  1. See above for dosing schedule   2. Renal function panel ordered daily  3. Encourage hydration to prevent toxic accumulation   4. Monitor for s/sx of toxicity including incr in SCr and decr in UOP  5. Pharmacy will continue to follow and adjust accordingly    Marie Paulino [facility]

## 2018-10-02 NOTE — SIGNIFICANT NOTE
10/02/18 1149   Rehab Time/Intention   Evaluation Not Performed other (see comments)  (Pt NPO for possible GI bleed and EGD.  )   Rehab Treatment   Discipline speech language pathologist

## 2018-10-02 NOTE — NURSING NOTE
Pt uncooperative with lab. Lab was able to get what was needed but it resulted in a hematoma on LUE. No c/o pain. No edema. Continue to monitor.

## 2018-10-02 NOTE — PLAN OF CARE
Problem: Patient Care Overview  Goal: Plan of Care Review  Outcome: Ongoing (interventions implemented as appropriate)   10/01/18 2019   Coping/Psychosocial   Plan of Care Reviewed With patient   Plan of Care Review   Progress no change   OTHER   Outcome Summary PT NPO due to GI bleed. Dark Brown/Red watery Colostomy ouput slowing down. Pleasant at times and combatitive and uncooperative at times. 1 Unit PRBC given, h&h rechecked after..

## 2018-10-03 NOTE — PROGRESS NOTES
Subjective no c/o no sob wants to go home   Chief complaint:         Objective:      Vital Signs  Temp:  [97.4 °F (36.3 °C)-98.4 °F (36.9 °C)] 97.5 °F (36.4 °C)  Heart Rate:  [81-95] 93  Resp:  [16-20] 20  BP: (121-144)/(57-85) 126/85        Intake/Output Summary (Last 24 hours) at 10/03/18 1423  Last data filed at 10/03/18 0527   Gross per 24 hour   Intake              990 ml   Output              400 ml   Net              590 ml           Physical Exam    Constitutional : well developed ,No acute distress  ENMT lips pink oral mucosa moist   Eyes sclerae white PERRL  Respiratory: Clear to auscultation , No crackles no wheezing respirations are even and un-labored  GI: Soft, Non tender, BS active in all 4 quadrants  CVS: S1 S2 regular, no rub murmur or gallops  Skin warm dry no rashes no petechiae  Neuro confused  Ext: no LE edema, Peripheral pulses intact   Heme no cervical  lymphadenopathy no petechiae         Results Review:      Lab Results   Component Value Date    GLUCOSE 71 10/03/2018    CALCIUM 8.3 (L) 10/03/2018     (H) 10/03/2018    K 3.7 10/03/2018    CO2 23.1 10/03/2018     (H) 10/03/2018    BUN 47 (H) 10/03/2018    CREATININE 1.18 (H) 10/03/2018    EGFRIFNONA 44 (L) 10/03/2018    BCR 39.8 (H) 10/03/2018    ANIONGAP 10.9 10/03/2018       Lab Results   Component Value Date    WBC 7.83 10/03/2018    HGB 9.1 (L) 10/03/2018    HCT 31.1 (L) 10/03/2018    MCV 90.9 10/03/2018     10/03/2018       Pertinent Imaging studies were reviewed      Medication Review:       Current Facility-Administered Medications:   •  acetaminophen (TYLENOL) 160 MG/5ML solution 640 mg, 640 mg, Oral, Q6H PRN, Rich Ruiz MD  •  acetaminophen (TYLENOL) tablet 650 mg, 650 mg, Oral, Q4H PRN, Rich Ruiz MD  •  albuterol (PROVENTIL) nebulizer solution 0.083% 2.5 mg/3mL, 2.5 mg, Nebulization, Q4H PRN, Rich Ruiz MD  •  cefepime (MAXIPIME) 2 g/100 mL 0.9% NS (mbp), 2 g, Intravenous, Q24H, Rich Ruiz MD, 2 g  at 10/03/18 0458  •  ferrous sulfate 300 (60 Fe) MG/5ML syrup 300 mg, 300 mg, Per G Tube, Daily, Rich Ruiz MD, 300 mg at 10/02/18 1003  •  haloperidol lactate (HALDOL) injection 1 mg, 1 mg, Intravenous, Q6H PRN, Morgan Ramos MD  •  ipratropium-albuterol (DUO-NEB) nebulizer solution 3 mL, 3 mL, Nebulization, 4x Daily - RT, Rich Ruiz MD, 3 mL at 10/03/18 1137  •  ipratropium-albuterol (DUO-NEB) nebulizer solution 3 mL, 3 mL, Nebulization, Q4H PRN, Rich Ruiz MD  •  ondansetron (ZOFRAN) tablet 4 mg, 4 mg, Oral, Q6H PRN **OR** ondansetron ODT (ZOFRAN-ODT) disintegrating tablet 4 mg, 4 mg, Oral, Q6H PRN **OR** ondansetron (ZOFRAN) injection 4 mg, 4 mg, Intravenous, Q6H PRN, Rich Ruiz MD, 4 mg at 10/03/18 0033  •  ondansetron (ZOFRAN) tablet 4 mg, 4 mg, Per G Tube, Q6H PRN, Rich Ruiz MD  •  pantoprazole (PROTONIX) injection 40 mg, 40 mg, Intravenous, BID AC, Rich Ruiz MD, 40 mg at 10/03/18 1008  •  Pharmacy Consult - Pharmacy to dose, , Does not apply, Continuous PRN, Rich Ruiz MD  •  Pharmacy to dose vancomycin, , Does not apply, Continuous PRN, Rich Ruiz MD  •  sodium chloride 0.45 % infusion, 100 mL/hr, Intravenous, Continuous, Yared De Los Santos MD, Last Rate: 100 mL/hr at 10/02/18 2027, 100 mL/hr at 10/02/18 2027  •  sodium chloride 0.9 % flush 1-10 mL, 1-10 mL, Intravenous, PRN, Rich Ruiz MD  •  Insert peripheral IV, , , Once **AND** sodium chloride 0.9 % flush 10 mL, 10 mL, Intravenous, PRN, Darryl Garcia MD  •  sucralfate (CARAFATE) 1 GM/10ML suspension 1 g, 1 g, Per G Tube, 4x Daily, Arnulfo Villagomez MD, 1 g at 10/02/18 2028  •  Vancomycin Pharmacy Intermittent Dosing, , Does not apply, Daily, Rich Ruiz MD    Pharmacy Consult - Pharmacy to dose     Pharmacy to dose vancomycin     sodium chloride 100 mL/hr Last Rate: 100 mL/hr (10/02/18 2027)       Assesment    PARMINDER prerenal  Hypernatremia  GNR UTI  HCAP  GI bleed  Hydronephrosis with staghorn  calculus   Plan:  Borderline uop noted  Scr 1.18 better   Change ivf to [id number] tomorrow then reevaluate  bp ok monitor  Urology consult  abx per primary      Yared De Los Santos MD  10/03/18  2:23 PM  Tel: [phone]  Fax: [phone]

## 2018-10-03 NOTE — PLAN OF CARE
Problem: Patient Care Overview  Goal: Plan of Care Review   10/03/18 0843   Coping/Psychosocial   Plan of Care Reviewed With patient   OTHER   Outcome Summary Pt confused and disoriented. Refused puree trials. Cough noted with thin continues. REC continue puree diet with nectar thick liquids. May have sips of water from cup w/ assist 30 mins after meals and oral care.

## 2018-10-03 NOTE — THERAPY TREATMENT NOTE
Acute Care - Speech Language Pathology   Swallow Treatment Note [facility]     Patient Name: Simona Brooks  : 1934  MRN: [id number]  Today's Date: 10/3/2018               Admit Date: 10/1/2018    Visit Dx:      ICD-10-CM ICD-9-CM   1. Pleural effusion J90 511.9   2. Hyperkalemia E87.5 276.7   3. PARMINDER (acute kidney injury) (CMS/[facility]) N17.9 584.9   4. Gastrointestinal hemorrhage, unspecified gastrointestinal hemorrhage type K92.2 578.9   5. HAP (hospital-acquired pneumonia) J18.9 486   6. Elevated troponin R74.8 790.6   7. Acute on chronic anemia D64.9 285.9   8. Iron deficiency anemia, unspecified iron deficiency anemia type D50.9 280.9   9. Gastrointestinal hemorrhage with melena K92.1 578.1     Patient Active Problem List   Diagnosis   • PAF (paroxysmal atrial fibrillation) (CMS/[facility])   • COPD (chronic obstructive pulmonary disease) (CMS/[facility])   • Calculus of left kidney   • Suprapubic catheter (CMS/[facility])   • Decubitus ulcer of coccygeal region, stage 4 (present on admission)   • History of right above knee amputation (CMS/[facility])   • Acute UTI   • Colostomy malfunction (CMS/[facility])   • Anemia   • Abnormal urine   • Gastrointestinal hemorrhage   • Dysphagia   • Gastrointestinal hemorrhage with melena   • Acute gastric ulcer with hemorrhage   • Pleural effusion   • Hyperkalemia   • PARMINDER (acute kidney injury) (CMS/[facility])   • Sepsis (CMS/[facility])   • PNA (pneumonia)   • PNA (pneumonia)   • Iron deficiency anemia       Therapy Treatment        Rehabilitation Treatment Summary     Row Name 10/03/18 0845             Treatment Time/Intention    Discipline speech language pathologist  -SA      Document Type therapy note (daily note)  -SA      Subjective Information --   confused and disoriented  -SA      Patient/Family Observations pt in bed, crying out to be released (wrist restraints)  -SA      Patient Effort poor  -SA      Comment Pt confused, asking to be released (wrist restraints in place).  Unable to reason with pt d/t  confusion.  Pt did ask for and accept cup sips of water; however, cough noted.  Presented with nectar thick by straw without overt s/s aspiration.  Pt refused puree.  REC continue current diet (puree/nectar thick/water 30 mins after meals/oral care).  -SA      Recorded by [SA] Pat Hoffman MS CCC-SLP 10/03/18 1742      Row Name                Residual Limb Assessment 06/21/18 transfemoral (above knee), right    Residual Limb Assessment - Properties Group Date first assessed: 06/21/18 [EO] Location: transfemoral (above knee), right [EO] Recorded by:  [EO] Griselda Dumont RN 06/22/18 0443    Row Name                Wound 06/21/18 1838 Right ischial tuberosity pressure injury    Wound - Properties Group Date first assessed: 06/21/18 [LC] Time first assessed: 1838 [LC] Present On Admission : yes;picture taken [LC] Side: Right [LC] Location: ischial tuberosity [LC] Type: pressure injury [LC] Stage, Pressure Injury: unstageable [LC] Recorded by:  [LC] Kenzie Connell RN 06/21/18 1840    Row Name                Wound 06/21/18 1840 coccyx pressure injury    Wound - Properties Group Date first assessed: 06/21/18 [LC] Time first assessed: 1840 [LC] Present On Admission : yes;picture taken [LC] Location: coccyx [LC] Type: pressure injury [LC] Stage, Pressure Injury: Stage 2 [LC] Recorded by:  [LC] Kenzie Connell RN 06/21/18 1840      User Key  (r) = Recorded By, (t) = Taken By, (c) = Cosigned By    Initials Name Effective Dates Discipline    SA Pat Hoffman MS CCC-SLP 03/07/18 -  SLP    LC Kenzie Connell RN 06/16/16 -  Nurse    EO Griselda Dumont RN 06/16/16 -  Nurse          Outcome Summary               SLP GOALS     Row Name 10/03/18 0845 10/01/18 1400          Oral Nutrition/Hydration Goal 1 (SLP)    Oral Nutrition/Hydration Goal 1, SLP Pt will tolerate least restrictive diet with no overt s/s aspiration.  -SA Pt will tolerate least restrictive diet with no overt s/s aspiration.   -OC     Time Frame  (Oral Nutrition/Hydration Goal 1, SLP)  -- by discharge  -     Progress/Outcomes (Oral Nutrition/Hydration Goal 1, SLP)  -- other (see comments)   tolerating diet, but with reduced intake  -       User Key  (r) = Recorded By, (t) = Taken By, (c) = Cosigned By    Initials Name Provider Type    Pat Bynum MS CCC-SLP Speech and Language Pathologist    Misty Aguiar MA,[facility]-SLP Speech and Language Pathologist          EDUCATION  The patient has been educated in the following areas:   Dysphagia (Swallowing Impairment) Oral Care/Hydration Modified Diet Instruction.    SLP Recommendation and Plan                                        Plan of Care Reviewed With: patient  Plan of Care Review  Plan of Care Reviewed With: patient  Outcome Summary: Pt confused and disoriented.  Refused puree trials.  Cough noted with thin continues.  REC continue puree diet with nectar thick liquids.  May have sips of water from cup w/ assist 30 mins after meals and oral care.       SLP Outcome Measures (last 72 hours)      SLP Outcome Measures     Row Name 10/01/18 1400             SLP Outcome Measures    Outcome Measure Used? Adult NOMS  -OC         Adult FCM Scores    FCM Chosen Swallowing  -      Swallowing FCM Score 4  -        User Key  (r) = Recorded By, (t) = Taken By, (c) = Cosigned By    Initials Name Effective Dates     Misty Burden MA,[facility]-SLP 06/08/18 -              Time Calculation:         Time Calculation- SLP     Row Name 10/03/18 1746             Time Calculation- [facility]    SLP Start Time 0845  -      SLP Stop Time 0930  -      SLP Time Calculation (min) 45 min  -      SLP Received On 10/03/18  -        User Key  (r) = Recorded By, (t) = Taken By, (c) = Cosigned By    Initials Name Provider Type    Pat Bynum MS CCC-SLP Speech and Language Pathologist          Therapy Charges for Today     Code Description Service Date Service Provider Modifiers Qty    [id number] HC ST TREATMENT SWALLOW 3  10/3/2018 Pat Hoffman, MS CCC-SLP GN 1                 Pat Hoffman MS CCC-SLP  10/3/2018

## 2018-10-03 NOTE — PROGRESS NOTES
[facility] INPATIENT PROGRESS NOTE         [address]    10/3/2018      PATIENT IDENTIFICATION:  Name: Simona Brooks ADMIT: 10/1/2018   : 1934  PCP: Leonor Echevarria MD    MRN: [id number] LOS: 2 days   AGE/SEX: 84 y.o. female  ROOM: [facility]                     LOS 2    Reason for visit: f/u PNA with sepsis and effusion      SUBJECTIVE:    Confused. In restraints. Diminished breath sounds bilaterally. No wheezing.    Objective   OBJECTIVE:    Vital Sign Min/Max for last 24 hours  Temp  Min: 96.2 °F (35.7 °C)  Max: 98.4 °F (36.9 °C)   BP  Min: 86/49  Max: 144/85   Pulse  Min: 71  Max: 94   Resp  Min: 16  Max: 20   SpO2  Min: 90 %  Max: 100 %   No Data Recorded   No Data Recorded                         Body mass index is 18.24 kg/m².    Intake/Output Summary (Last 24 hours) at 10/03/18 0910  Last data filed at 10/03/18 0527   Gross per 24 hour   Intake              990 ml   Output              700 ml   Net              290 ml         Exam:  GEN:  No distress, appears stated age  EYES:   PERRL, anicteric sclera  ENT:    External ears/nose normal, OP clear  NECK:  No adenopathy, midline trachea  LUNGS: Normal chest on inspection, palpation and diminished bilateral breath sounds on auscultation  CV:  Normal S1S2, without murmur  ABD:  Non tender, non distended, no hepatosplenomegaly, +BS  EXT:  No edema, cyanosis or clubbing    Scheduled meds:      cefepime 2 g Intravenous Q24H   ferrous sulfate 300 mg Per G Tube Daily   ipratropium-albuterol 3 mL Nebulization 4x Daily - RT   pantoprazole 40 mg Intravenous BID AC   sucralfate 1 g Per G Tube 4x Daily   Vancomycin Pharmacy Intermittent Dosing  Does not apply Daily     IV meds:                          Pharmacy Consult - Pharmacy to dose     Pharmacy to dose vancomycin     sodium chloride 100 mL/hr Last Rate: 100 mL/hr (10/02/18 2027)     Data Review:    Results from last 7 days  Lab Units 10/03/18  0506 10/02/18  0332 10/01/18  0712  10/01/18  0222   SODIUM mmol/L 151* 149* 142 136   POTASSIUM mmol/L 3.7 4.5 5.0 6.3*   CHLORIDE mmol/L 117* 114* 104 102   CO2 mmol/L 23.1 22.0 24.3 23.2   BUN mg/dL 47* 73* 103* 109*   CREATININE mg/dL 1.18* 1.55* 2.13* 2.00*   GLUCOSE mg/dL 71 112* 178* 202*   CALCIUM mg/dL 8.3* 8.1* 8.6 7.8*         Estimated Creatinine Clearance: 28.7 mL/min (A) (by C-G formula based on SCr of 1.18 mg/dL (H)).    Results from last 7 days  Lab Units 10/03/18  0506 10/02/18  1727 10/02/18  0753 10/02/18  0332 10/02/18  0011  10/01/18  0708 10/01/18  0222   WBC 10*3/mm3 7.83  --   --  14.16*  --   --  17.79* 16.18*   HEMOGLOBIN g/dL 9.1* 8.2* 9.0* 8.9* 8.8*  < > 8.4* 8.6*   PLATELETS 10*3/mm3 179  --   --  165  --   --  211 211   < > = values in this interval not displayed.        Results from last 7 days  Lab Units 10/01/18  0222   ALT (SGPT) U/L 16   AST (SGOT) U/L 16       Results from last 7 days  Lab Units 10/01/18  0502   PH, ARTERIAL pH units 7.382   PO2 ART mm Hg 67.5*   PCO2, ARTERIAL mm Hg 45.0   HCO3 ART mmol/L 26.8       Results from last 7 days  Lab Units 10/01/18  0249 10/01/18  0222   PROCALCITONIN ng/mL  --  0.48*   LACTATE mmol/L 1.1  --          Chest x-ray 10/1 reviewed shows dense right opacity and pleural effusion    Microbiology reviewed: Urine culture gram-negative bacilli.  Respiratory viral panel positive rhinovirus/enterovirus.        )Assessment   ASSESSMENT:      Active Hospital Problems    Diagnosis Date Noted   • **Gastrointestinal hemorrhage with melena [K92.1] 06/21/2018   • Pleural effusion [J90] 10/01/2018   • Hyperkalemia [E87.5] 10/01/2018   • PARMINDER (acute kidney injury) (CMS/HCC) [N17.9] 10/01/2018   • Sepsis (CMS/HCC) [A41.9] 10/01/2018   • PNA (pneumonia) [J18.9] 10/01/2018   • PNA (pneumonia) [J18.9] 10/01/2018   • Iron deficiency anemia [D50.9] 10/01/2018   • Gastrointestinal hemorrhage [K92.2] 06/21/2018   • Dysphagia [R13.10] 06/21/2018   • Acute UTI [N39.0] 12/02/2017   • Colostomy  malfunction (CMS/HCC) [K94.03] 12/02/2017   • Anemia [D64.9] 12/02/2017   • Suprapubic catheter (CMS/HCC) [Z93.59] 08/11/2017   • Decubitus ulcer of coccygeal region, stage 4 (present on admission) [L89.154] 08/11/2017   • History of right above knee amputation (CMS/HCC) [Z89.611] 08/11/2017   • PAF (paroxysmal atrial fibrillation) (CMS/HCC) [I48.0] 08/08/2017   • COPD (chronic obstructive pulmonary disease) (CMS/HCC) [J44.9] 08/08/2017      Resolved Hospital Problems    Diagnosis Date Noted Date Resolved   No resolved problems to display.     Healthcare associated pneumonia  Pleural effusion: Parapneumonic  Sepsis  GI bleed  Dysphagia  Acute UTI  Paroxysmal atrial fibrillation  COPD  Hypernatremia       PLAN:  Encourage pulmonary toilet.  Continue antibiotics.  Checked MRSA screen pending.  Can likely d/c vancomycin.  Bronchodilators for COPD symptoms and to help with clearance.  Repeat chest x-ray this morning for follow-up of pneumonia and effusion.  Changed to 1/2NS for worsening hypernatremia.    Philip Lou MD  Pulmonary and Critical Care Medicine  [facility]  10/3/2018    9:51 AM

## 2018-10-03 NOTE — PROGRESS NOTES
Adult Nutrition  Assessment/PES    Patient Name:  Simona Brooks  YOB: 1934  MRN: [id number]  Admit Date:  10/1/2018    Assessment Date:  10/3/2018    Follow up-spoke with RN; patient on pureed+NTL but consuming minimal; has G-tube but possibly needs replacement.  TF recommendations: Osmolite 1.5 @ 55 cc/hr        Reason for Assessment     Row Name 10/03/18 1432          Reason for Assessment    Reason For Assessment follow-up protocol                 Labs/Tests/Procedures/Meds     Row Name 10/03/18 1432          Labs/Procedures/Meds    Lab Results Reviewed reviewed, pertinent        Diagnostic Tests/Procedures    Diagnostic Test/Procedure Reviewed reviewed, pertinent        Medications    Pertinent Medications Reviewed reviewed, pertinent             Physical Findings     Row Name 10/03/18 1433          Physical Findings    Tubes gastrostomy tube     Skin poor skin integrity/turgor               Nutrition Prescription Ordered     Row Name 10/03/18 1433          Nutrition Prescription PO    Current PO Diet Pureed     Fluid Consistency Nectar/syrup thick             Evaluation of Received Nutrient/Fluid Intake     Row Name 10/03/18 1433          PO Evaluation    % PO Intake minimal po intake; needs g-tube replaced              Problem/Interventions:                  Intervention Goal     Row Name 10/03/18 1433          Intervention Goal    General Maintain nutrition;Nutrition support treatment     PO Tolerate PO     TF/PN Inititiate TF/PN     Weight Appropriate weight gain             Nutrition Intervention     Row Name 10/03/18 1433          Nutrition Intervention    RD/Tech Action Follow Tx progress;Care plan reviewd               Education/Evaluation     Row Name 10/03/18 1434          Education    Education Will Instruct as appropriate        Monitor/Evaluation    Monitor Per protocol     Education Follow-up Reinforce PRN         Electronically signed by:  Jeri Montemayor RD  10/03/18 2:34  PM

## 2018-10-03 NOTE — PROGRESS NOTES
RN called regarding patient pulling at tubes and lines and unable to continue IV medications as ordered. Restraints ordered.

## 2018-10-03 NOTE — PROGRESS NOTES
[facility]  Inpatient Progress Note    Reason for Follow Up:  GI bleed    Subjective     Interval History:   Patient demented, discussed with RN.  H&H relatively stable.  Concerned with the fact that she continues to have ulcers on the mucosal side of her G-tube despite previous treatment.  May need to consider relocation of the G-tube for the ulcers to heal.  Discussed with patient's step son who has power of .  It is not clear as to what prompted G-tube placement in the first place, he had questions regarding quality of life given her current demented status.  I outlined the possible need for replacement of current G-tube for ulcer healing purposes.  If decision is to be made for palliative care only this would be unnecessary.    Current Facility-Administered Medications:   •  acetaminophen (TYLENOL) 160 MG/5ML solution 640 mg, 640 mg, Oral, Q6H PRN, Rich Ruiz MD  •  acetaminophen (TYLENOL) tablet 650 mg, 650 mg, Oral, Q4H PRN, Rich Ruiz MD  •  albuterol (PROVENTIL) nebulizer solution 0.083% 2.5 mg/3mL, 2.5 mg, Nebulization, Q4H PRN, Rich Ruiz MD  •  cefepime (MAXIPIME) 2 g/100 mL 0.9% NS (mbp), 2 g, Intravenous, Q24H, Rich Ruiz MD, 2 g at 10/03/18 0458  •  ferrous sulfate 300 (60 Fe) MG/5ML syrup 300 mg, 300 mg, Per G Tube, Daily, Rich Ruiz MD, 300 mg at 10/02/18 1003  •  ipratropium-albuterol (DUO-NEB) nebulizer solution 3 mL, 3 mL, Nebulization, 4x Daily - RT, Rich Ruiz MD, 3 mL at 10/03/18 0637  •  ipratropium-albuterol (DUO-NEB) nebulizer solution 3 mL, 3 mL, Nebulization, Q4H PRN, Rich Ruiz MD  •  ondansetron (ZOFRAN) tablet 4 mg, 4 mg, Oral, Q6H PRN **OR** ondansetron ODT (ZOFRAN-ODT) disintegrating tablet 4 mg, 4 mg, Oral, Q6H PRN **OR** ondansetron (ZOFRAN) injection 4 mg, 4 mg, Intravenous, Q6H PRN, Rich Ruiz MD, 4 mg at 10/03/18 0033  •  ondansetron (ZOFRAN) tablet 4 mg, 4 mg, Per G Tube, Q6H PRN, Rich Ruiz MD  •  pantoprazole  (PROTONIX) injection 40 mg, 40 mg, Intravenous, BID AC, Rich Ruiz MD, 40 mg at 10/02/18 1704  •  Pharmacy Consult - Pharmacy to dose, , Does not apply, Continuous PRN, Rich Ruiz MD  •  Pharmacy to dose vancomycin, , Does not apply, Continuous PRN, Rich Ruiz MD  •  sodium chloride 0.45 % infusion, 100 mL/hr, Intravenous, Continuous, Yared De Los Santos MD, Last Rate: 100 mL/hr at 10/02/18 2027, 100 mL/hr at 10/02/18 2027  •  sodium chloride 0.9 % flush 1-10 mL, 1-10 mL, Intravenous, PRN, Rich Ruiz MD  •  Insert peripheral IV, , , Once **AND** sodium chloride 0.9 % flush 10 mL, 10 mL, Intravenous, PRN, Darryl Garcia MD  •  sucralfate (CARAFATE) 1 GM/10ML suspension 1 g, 1 g, Per G Tube, 4x Daily, Arnulfo Villagomez MD, 1 g at 10/02/18 2028  •  Vancomycin Pharmacy Intermittent Dosing, , Does not apply, Daily, Rich Ruiz MD  Review of Systems:    Review of systems could not be obtained due to  patient confusion.    Objective     Vital Signs  Temp:  [96.2 °F (35.7 °C)-98.4 °F (36.9 °C)] 98.4 °F (36.9 °C)  Heart Rate:  [71-94] 92  Resp:  [16-20] 18  BP: ()/(49-85) 133/78  Body mass index is 18.24 kg/m².    Intake/Output Summary (Last 24 hours) at 10/03/18 0920  Last data filed at 10/03/18 0527   Gross per 24 hour   Intake              990 ml   Output              700 ml   Net              290 ml     No intake/output data recorded.     Physical Exam:   General: patient awake, alert and cooperative   Eyes: Normal lids and lashes, no scleral icterus   Neck: supple, normal ROM   Skin: warm and dry, not jaundiced   Cardiovascular: regular rhythm and rate, no murmurs auscultated   Pulm: clear to auscultation bilaterally, regular and unlabored   Abdomen: soft, nontender, nondistended; normal bowel sounds   Rectal: deferred   Extremities: no rash or edema   Psychiatric: Normal mood and behavior; memory intact     Results Review:     I reviewed the patient's new clinical results.      Results  from last 7 days  Lab Units 10/03/18  0506 10/02/18  1727 10/02/18  0753 10/02/18  0332  10/01/18  0708   WBC 10*3/mm3 7.83  --   --  14.16*  --  17.79*   HEMOGLOBIN g/dL 9.1* 8.2* 9.0* 8.9*  < > 8.4*   HEMATOCRIT % 31.1* 27.1* 30.5* 30.4*  < > 28.1*   PLATELETS 10*3/mm3 179  --   --  165  --  211   < > = values in this interval not displayed.    Results from last 7 days  Lab Units 10/03/18  0506 10/02/18  0332 10/01/18  0708 10/01/18  0222   SODIUM mmol/L 151* 149* 142 136   POTASSIUM mmol/L 3.7 4.5 5.0 6.3*   CHLORIDE mmol/L 117* 114* 104 102   CO2 mmol/L 23.1 22.0 24.3 23.2   BUN mg/dL 47* 73* 103* 109*   CREATININE mg/dL 1.18* 1.55* 2.13* 2.00*   CALCIUM mg/dL 8.3* 8.1* 8.6 7.8*   BILIRUBIN mg/dL  --   --   --  0.2   ALK PHOS U/L  --   --   --  242*   ALT (SGPT) U/L  --   --   --  16   AST (SGOT) U/L  --   --   --  16   GLUCOSE mg/dL 71 112* 178* 202*           Lab Results  Lab Value Date/Time   LIPASE 20 08/09/2017 0605   LIPASE 20 08/08/2017 1251       Radiology:  US Renal Bilateral   Final Result       1. Atrophic appearing right kidney. No hydronephrosis is seen on the   right.   2. Multiple calculi identified within the left kidney and proximal left   ureter. This is associated with some mild hydroureteronephrosis. Similar   findings were present on exam performed December 2, 2017, although the   degree of hydronephrosis and increased slightly.       This report was finalized on 10/2/2018 9:35 PM by Dr. Serene Kaiser M.D.          XR Chest 1 View   Final Result   Kyphotic positioning with dense right-sided opacities felt   to reflect pneumonia and pleural fluid           This report was finalized on 10/1/2018 2:25 AM by Kolton Olivia M.D.          XR Chest 2 View    (Results Pending)       Assessment/Plan     Patient Active Problem List   Diagnosis   • PAF (paroxysmal atrial fibrillation) (CMS/HCC)   • COPD (chronic obstructive pulmonary disease) (CMS/HCC)   • Calculus of left kidney   • Suprapubic  catheter (CMS/HCC)   • Decubitus ulcer of coccygeal region, stage 4 (present on admission)   • History of right above knee amputation (CMS/HCC)   • Acute UTI   • Colostomy malfunction (CMS/HCC)   • Anemia   • Abnormal urine   • Gastrointestinal hemorrhage   • Dysphagia   • Gastrointestinal hemorrhage with melena   • Acute gastric ulcer with hemorrhage   • Pleural effusion   • Hyperkalemia   • PARMINDER (acute kidney injury) (CMS/HCC)   • Sepsis (CMS/HCC)   • PNA (pneumonia)   • PNA (pneumonia)   • Iron deficiency anemia       Impression  #1 GI bleeding: Evidence of ulceration at the mucosal site of G-tube  #2 acute blood loss anemia  #3 pneumonia  #4 UTI  #5 dementia      Recommendation  Continue current medical regimen  Await family's decision or consensus on palliative status.  If all agree can consider replacement of G-tube during this hospitalization      I discussed the patients findings and my recommendations with patient, family and nursing staff.    Arnulfo Villagomez MD

## 2018-10-03 NOTE — PLAN OF CARE
Problem: Patient Care Overview  Goal: Plan of Care Review  Outcome: Ongoing (interventions implemented as appropriate)   10/03/18 0439   Coping/Psychosocial   Plan of Care Reviewed With patient   Plan of Care Review   Progress no change   OTHER   Outcome Summary Pt very confused oriented to person some times. Pt pulled out IV after it was all wrapped up. Pt pulling on all lines. Very disagreeable and does not like thickened drinks. Non-violant restraints had to be ordered. Cont to monitor, safety maintained.       Problem: Fall Risk (Adult)  Goal: Absence of Fall  Outcome: Ongoing (interventions implemented as appropriate)      Problem: Infection, Risk/Actual (Adult)  Goal: Infection Prevention/Resolution  Outcome: Ongoing (interventions implemented as appropriate)      Problem: Pneumonia (Adult)  Goal: Signs and Symptoms of Listed Potential Problems Will be Absent, Minimized or Managed (Pneumonia)  Outcome: Ongoing (interventions implemented as appropriate)      Problem: Skin Injury Risk (Adult)  Goal: Skin Health and Integrity  Outcome: Ongoing (interventions implemented as appropriate)      Problem: Restraint, Nonbehavioral (Nonviolent)  Goal: Rationale and Justification  Outcome: Ongoing (interventions implemented as appropriate)    Goal: Nonbehavioral (Nonviolent) Restraint: Absence of Injury/Harm  Outcome: Ongoing (interventions implemented as appropriate)

## 2018-10-03 NOTE — CONSULTS
FIRST UROLOGY CONSULT      Patient Identification:  NAME:  Simona Brooks  Age:  84 y.o.   Sex:  female   :  1934   MRN:  [id number]       Chief complaint: kidney stone    History of present illness:  84 years old  Dementia  From NH  A fib, COPD, decubitus ulcers, colostomy  Chronic SP tube  PEG tube  Staghorn calculus left  Was seen by Dr Mccollum a month ago for this, decision at that time was no intervention for the staghorn calculus      Past medical history:  Past Medical History:   Diagnosis Date   • Anemia    • Atrial fibrillation (CMS/HCC)    • Chronic respiratory failure (CMS/HCC)    • Colitis    • COPD (chronic obstructive pulmonary disease) (CMS/HCC)    • Dysphagia    • Hernia    • Hypokalemia    • Iron deficiency anemia    • Peripheral vascular disease (CMS/HCC)    • Pneumonia        Past surgical history:  Past Surgical History:   Procedure Laterality Date   • ABDOMINAL SURGERY     • AMPUTATION      right leg   • COLOSTOMY     • ENDOSCOPY N/A 2018    Procedure: ESOPHAGOGASTRODUODENOSCOPY with biopsies;  Surgeon: Chinedu Wood MD;  Location: [facility] ENDOSCOPY;  Service: Gastroenterology   • ENDOSCOPY N/A 10/2/2018    Procedure: ESOPHAGOGASTRODUODENOSCOPY;  Surgeon: Arnulfo Villagomez MD;  Location: [facility] ENDOSCOPY;  Service: Gastroenterology   • EXPLORATORY LAPAROTOMY N/A 12/3/2017    Procedure: disimpaction of ostomy;  Surgeon: Neris Mcgee MD;  Location: [facility] MAIN OR;  Service:        Allergies:  Augmentin [amoxicillin-pot clavulanate] and Penicillins    Home medications:  Prescriptions Prior to Admission   Medication Sig Dispense Refill Last Dose   • acetaminophen (TYLENOL) 160 MG/5ML solution 640 mg by Per G Tube route Every 6 (Six) Hours As Needed for Mild Pain .      • albuterol (PROVENTIL) (2.5 MG/3ML) 0.083% nebulizer solution Take 2.5 mg by nebulization Every 4 (Four) Hours As Needed for Wheezing.      • ALPRAZolam (XANAX) 1 MG tablet Take 1 mg by mouth At Night As  Needed for Anxiety.      • calcium carbonate (TUMS) 500 MG chewable tablet 1 tablet by Per G Tube route Every 6 (Six) Hours As Needed for Indigestion or Heartburn.      • carvedilol (COREG) 3.125 MG tablet 3.125 mg by Per G Tube route 2 (Two) Times a Day With Meals.      • cholecalciferol (VITAMIN D3) 46249 units capsule 50,000 Units by Per G Tube route Every 7 (Seven) Days.      • collagenase 250 UNIT/GM ointment Apply 1 application topically to the appropriate area as directed Daily. Apply to sacral wound      • docusate sodium (COLACE) 150 MG/15ML liquid 100 mg by Per G Tube route 2 (Two) Times a Day.      • ferrous sulfate 300 (60 FE) MG/5ML syrup 300 mg by Per G Tube route Daily.      • folic acid (FOLVITE) 1 MG tablet 1 mg by Per G Tube route Daily.      • furosemide (LASIX) 20 MG tablet 20 mg by Per G Tube route Daily.      • gabapentin (NEURONTIN) 100 MG capsule Take 1 capsule by mouth Daily. (Patient taking differently: 100 mg by Per G Tube route Daily.) 3 capsule 0    • morphine 100 MG/5ML solution concentrated solution Take 0.75 mL by mouth Every 4 (Four) Hours. (Patient taking differently: Take 20 mg by mouth Every 6 (Six) Hours.) 15 mL 0    • Multiple Vitamin (TAB-A-EBONY) tablet 1 tablet by Per G Tube route Daily.      • Neomycin-Bacitracin-Polymyxin (HCA TRIPLE ANTIBIOTIC OINTMENT EX) Apply 1 dose topically 2 (Two) Times a Day. Apply to buttocks cheek topically to open skin abrasion      • ondansetron (ZOFRAN) 4 MG tablet 4 mg by Per G Tube route Every 4 (Four) Hours As Needed for Nausea or Vomiting.      • oxybutynin (DITROPAN) 5 MG/5ML syrup 5 mg by Per G Tube route Daily.      • pantoprazole (PROTONIX) 40 MG EC tablet Take 1 tablet by mouth 2 (Two) Times a Day Before Meals. (Patient taking differently: Take 40 mg by mouth Daily.)      • phenol (CHLORASEPTIC) 1.4 % liquid liquid Apply 1 spray to the mouth or throat 4 (Four) Times a Day As Needed.      • polyethylene glycol (MIRALAX) packet Take 17 g  by mouth Daily As Needed.      • potassium chloride (KAYCIEL) 20 MEQ/15ML (10%) solution 20 mEq by Per G Tube route Daily.      • mupirocin (BACTROBAN) 2 % ointment Apply 1 application topically 2 (Two) Times a Day. Wash G-Tube site and apply every shift       • nystatin (nystatin) 416144 UNIT/GM powder Apply 1 application topically 2 (Two) Times a Day. Clean G-Tube site with NS and apply           Hospital medications:    cefepime 2 g Intravenous Q24H   ferrous sulfate 300 mg Per G Tube Daily   ipratropium-albuterol 3 mL Nebulization 4x Daily - RT   pantoprazole 40 mg Intravenous BID AC   sucralfate 1 g Per G Tube 4x Daily   Vancomycin Pharmacy Intermittent Dosing  Does not apply Daily       dextrose 100 mL/hr Last Rate: 100 mL/hr (10/03/18 0756)   Pharmacy Consult - Pharmacy to dose     Pharmacy to dose vancomycin       •  acetaminophen  •  acetaminophen  •  albuterol  •  haloperidol lactate  •  ipratropium-albuterol  •  ondansetron **OR** ondansetron ODT **OR** ondansetron  •  ondansetron  •  Pharmacy Consult - Pharmacy to dose  •  Pharmacy to dose vancomycin  •  sodium chloride  •  Insert peripheral IV **AND** sodium chloride    Family history:  History reviewed. No pertinent family history.    Social history:  Social History   Substance Use Topics   • Smoking status: Former Smoker   • Smokeless tobacco: Never Used   • Alcohol use No       Review of systems:    No fever  Skin history of decubiti  Musculoskeletal   Right AKA  Endocrine neg  Psych dementia  Neuro dementia  Pulm  No SOB  Heart neg  GI colostomy   SP tube        Objective:  TMax 24 hours:   Temp (24hrs), Av.5 °F (36.4 °C), Min:96.5 °F (35.8 °C), Max:98.4 °F (36.9 °C)      Vitals Ranges:   Temp:  [96.5 °F (35.8 °C)-98.4 °F (36.9 °C)] 96.5 °F (35.8 °C)  Heart Rate:  [81-95] 88  Resp:  [16-20] 20  BP: (121-148)/(57-85) 148/69    Intake/Output Last 3 shifts:  I/O last 3 completed shifts:  In:  [I.V.:1985; IV Piggyback:100]  Out: 700  [Urine:700]     Physical Exam:       General Appearance:    dementia   Head:    Normocephalic, without obvious abnormality, atraumatic   Eyes:          PERRL, conjunctivae and corneas clear   Ears:    Normal external inspection   Throat:   No oral lesions, oral mucosa moist   Neck:   Supple, no LAD, trachea midline   Back:     No CVA tenderness   Lungs:     Respirations unlabored, symmetric excursion    Heart:    RRR, intact peripheral pulses   Abdomen:     colostomy   :   SP tube   Extremities:   R AKA   Skin:   No bleeding, bruising or rashes   Neuro/Psych:  dementia       Results review:   I reviewed the patient's new clinical results.    Data review:  Lab Results (last 24 hours)     Procedure Component Value Units Date/Time    MRSA Screen Culture - Swab, Nares [[id number]]  (Normal) Collected:  10/02/18 2029    Specimen:  Swab from Nares Updated:  10/03/18 0928     MRSA SCREEN CX Culture in progress    Urine Culture - Urine, [[id number]]  (Abnormal)  (Susceptibility) Collected:  10/01/18 0515    Specimen:  Urine from Urine, Clean Catch Updated:  10/03/18 0803     Urine Culture >100,000 CFU/mL Klebsiella pneumoniae ssp pneumoniae (A)      50,000 CFU/mL Mixed Arash Isolated    Narrative:         Specimen contains mixed organisms of questionable pathogenicity which indicates contamination with commensal arash.       Susceptibility      Klebsiella pneumoniae ssp pneumoniae     EVARISTO     Ampicillin 16 ug/ml Resistant     Ampicillin + Sulbactam 4 ug/ml Susceptible     Cefazolin <=4 ug/ml Susceptible     Cefepime <=1 ug/ml Susceptible     Ceftriaxone <=1 ug/ml Susceptible     Ciprofloxacin <=0.25 ug/ml Susceptible     Ertapenem <=0.5 ug/ml Susceptible     Gentamicin <=1 ug/ml Susceptible     Levofloxacin <=0.12 ug/ml Susceptible     Nitrofurantoin 64 ug/ml Intermediate     Piperacillin + Tazobactam <=4 ug/ml Susceptible     Tetracycline <=1 ug/ml Susceptible     Trimethoprim + Sulfamethoxazole <=20 ug/ml Susceptible                     Vancomycin, Random [[id number]]  (Normal) Collected:  10/03/18 0506    Specimen:  Blood Updated:  10/03/18 0622     Vancomycin Random 27.20 mcg/mL     Renal Function Panel [[id number]]  (Abnormal) Collected:  10/03/18 0506    Specimen:  Blood Updated:  10/03/18 0608     Glucose 71 mg/dL      BUN 47 (H) mg/dL      Creatinine 1.18 (H) mg/dL      Sodium 151 (H) mmol/L      Potassium 3.7 mmol/L      Chloride 117 (H) mmol/L      CO2 23.1 mmol/L      Calcium 8.3 (L) mg/dL      Albumin 2.40 (L) g/dL      Phosphorus 2.9 mg/dL      Anion Gap 10.9 mmol/L      BUN/Creatinine Ratio 39.8 (H)     eGFR Non  Amer 44 (L) mL/min/1.73     Narrative:       The MDRD GFR formula is only valid for adults with stable renal function between ages 18 and 70.    CBC & Differential [[id number]] Collected:  10/03/18 0506    Specimen:  Blood Updated:  10/03/18 0552    Narrative:       The following orders were created for panel order CBC & Differential.  Procedure                               Abnormality         Status                     ---------                               -----------         ------                     CBC Auto Differential[[id number]]        Abnormal            Final result                 Please view results for these tests on the individual orders.    CBC Auto Differential [[id number]]  (Abnormal) Collected:  10/03/18 0506    Specimen:  Blood Updated:  10/03/18 0552     WBC 7.83 10*3/mm3      RBC 3.42 (L) 10*6/mm3      Hemoglobin 9.1 (L) g/dL      Hematocrit 31.1 (L) %      MCV 90.9 fL      MCH 26.6 (L) pg      MCHC 29.3 (L) g/dL      RDW 20.5 (H) %      RDW-SD 67.8 (H) fl      MPV 10.6 fL      Platelets 179 10*3/mm3      Neutrophil % 81.2 (H) %      Lymphocyte % 12.8 (L) %      Monocyte % 5.6 %      Eosinophil % 0.3 %      Basophil % 0.1 %      Immature Grans % 0.6 (H) %      Neutrophils, Absolute 6.36 10*3/mm3      Lymphocytes, Absolute 1.00 10*3/mm3      Monocytes, Absolute 0.44 10*3/mm3       Eosinophils, Absolute 0.02 10*3/mm3      Basophils, Absolute 0.01 10*3/mm3      Immature Grans, Absolute 0.05 (H) 10*3/mm3     Blood Culture - Blood, [[id number]]  (Normal) Collected:  10/01/18 0252    Specimen:  Blood from Arm, Right Updated:  10/03/18 0303     Blood Culture No growth at 2 days    Blood Culture - Blood, [[id number]]  (Normal) Collected:  10/01/18 0249    Specimen:  Blood from Arm, Left Updated:  10/03/18 0303     Blood Culture No growth at 2 days    Hemoglobin & Hematocrit, Blood [[id number]]  (Abnormal) Collected:  10/02/18 1727    Specimen:  Blood Updated:  10/02/18 1755     Hemoglobin 8.2 (L) g/dL      Hematocrit 27.1 (L) %            Imaging:  Imaging Results (last 24 hours)     Procedure Component Value Units Date/Time    US Renal Bilateral [[id number]] Collected:  10/02/18 2131     Updated:  10/02/18 2138    Narrative:       RENAL ULTRASOUND     HISTORY: Acute kidney injury     COMPARISON: None available.     TECHNIQUE: Grayscale and color Doppler sonographic imaging is performed  through the kidneys and bladder.     FINDINGS:  Right kidney is atrophic in appearance. It measures 9.5 x 4.2 x 3.7 cm,  and shows increased echogenicity and cortical thinning. However, no  hydronephrosis is seen. The left kidney measures 11.1 x 5.5 x 5.1 cm.  Patient is noted to have a staghorn calculus in the left renal  collecting system, associated with mild left-sided  hydroureteronephrosis. Similar findings were present on CT from December 2, 2017. Additional stone is seen within the proximal left ureter.  Bladder cannot be assessed, as it is decompressed with a Hardy catheter.       Impression:          1. Atrophic appearing right kidney. No hydronephrosis is seen on the  right.  2. Multiple calculi identified within the left kidney and proximal left  ureter. This is associated with some mild hydroureteronephrosis. Similar  findings were present on exam performed December 2, 2017, although the  degree of  hydronephrosis and increased slightly.     This report was finalized on 10/2/2018 9:35 PM by Dr. Serene Kaiser M.D.                Assessment:     Principal Problem:    Gastrointestinal hemorrhage with melena  Active Problems:    PAF (paroxysmal atrial fibrillation) (CMS/[facility])    COPD (chronic obstructive pulmonary disease) (CMS/HCC)    Suprapubic catheter (CMS/[facility])    Decubitus ulcer of coccygeal region, stage 4 (present on admission)    History of right above knee amputation (CMS/[facility])    Acute UTI    Colostomy malfunction (CMS/[facility])    Anemia    Gastrointestinal hemorrhage    Dysphagia    Pleural effusion    Hyperkalemia    PARMINDER (acute kidney injury) (CMS/HCC)    Sepsis (CMS/HCC)    PNA (pneumonia)    PNA (pneumonia)    Iron deficiency anemia  kidney stone    Plan:     Patient is not a good candidate for nephrostolithotomy and previous decision was no intervention    Seymour Whitaker Jr., MD  10/03/18  5:28 PM

## 2018-10-03 NOTE — PROGRESS NOTES
Name: Simona Brooks ADMIT: 10/1/2018   : 1934  PCP: Leonor Echevarria MD    MRN: [id number] LOS: 2 days   AGE/SEX: 84 y.o. female  ROOM: [facility]   Subjective   Confused and calling out. In soft restraints. Tells me she doesn't know why she's here and is asking for discharge. Also asking for IVs to be stopped. Explained need for antibiotics, fluids, and monitoring.    Objective   Vital Signs  Temp:  [96.2 °F (35.7 °C)-98.4 °F (36.9 °C)] 98.4 °F (36.9 °C)  Heart Rate:  [71-94] 92  Resp:  [16-20] 18  BP: ()/(49-85) 133/78  SpO2:  [90 %-100 %] 92 %  on  Flow (L/min):  [1.5-2] 1.5;   Device (Oxygen Therapy): nasal cannula  Body mass index is 18.24 kg/m².    Physical Exam   Constitutional: She appears well-developed.   frail   HENT:   Head: Atraumatic.   Nose: Nose normal.   Eyes: Conjunctivae and EOM are normal.   Neck: Neck supple.   Cardiovascular: Normal rate, regular rhythm and intact distal pulses.    Pulmonary/Chest: Effort normal. No stridor. She has decreased breath sounds. She has no wheezes.   Abdominal: Soft.   PEG   Musculoskeletal: She exhibits no edema or tenderness.   Neurological: She is alert. No cranial nerve deficit.   Skin: Skin is warm and dry. She is not diaphoretic.   Psychiatric: Her mood appears anxious. Cognition and memory are impaired.   Nursing note and vitals reviewed.      Results Review:       I reviewed the patient's new clinical results.     I reviewed imaging, agree with interpretation.     I reviewed telemetry/EKG results, sinus rhythm.     I reviewed other test results, agree with interpretation.      Results from last 7 days  Lab Units 10/03/18  0506 10/02/18  1727 10/02/18  0753 10/02/18  0332  10/01/18  0708 10/01/18  0222   WBC 10*3/mm3 7.83  --   --  14.16*  --  17.79* 16.18*   HEMOGLOBIN g/dL 9.1* 8.2* 9.0* 8.9*  < > 8.4* 8.6*   PLATELETS 10*3/mm3 179  --   --  165  --  211 211   < > = values in this interval not displayed.  Results from last 7 days  Lab  Units 10/03/18  0506 10/02/18  0332 10/01/18  0708 10/01/18  0222   SODIUM mmol/L 151* 149* 142 136   POTASSIUM mmol/L 3.7 4.5 5.0 6.3*   CHLORIDE mmol/L 117* 114* 104 102   CO2 mmol/L 23.1 22.0 24.3 23.2   BUN mg/dL 47* 73* 103* 109*   CREATININE mg/dL 1.18* 1.55* 2.13* 2.00*   GLUCOSE mg/dL 71 112* 178* 202*   Estimated Creatinine Clearance: 28.7 mL/min (A) (by C-G formula based on SCr of 1.18 mg/dL (H)).  Results from last 7 days  Lab Units 10/03/18  0506 10/02/18  0332 10/01/18  0708 10/01/18  0222   CALCIUM mg/dL 8.3* 8.1* 8.6 7.8*   ALBUMIN g/dL 2.40* 2.20*  --  2.60*   PHOSPHORUS mg/dL 2.9 3.7  --   --          cefepime 2 g Intravenous Q24H   ferrous sulfate 300 mg Per G Tube Daily   ipratropium-albuterol 3 mL Nebulization 4x Daily - RT   pantoprazole 40 mg Intravenous BID AC   sucralfate 1 g Per G Tube 4x Daily   Vancomycin Pharmacy Intermittent Dosing  Does not apply Daily       Pharmacy Consult - Pharmacy to dose     Pharmacy to dose vancomycin     sodium chloride 100 mL/hr Last Rate: 100 mL/hr (10/02/18 2027)   Diet Pureed; Nectar / Syrup Thick      Assessment/Plan      Active Hospital Problems    Diagnosis Date Noted   • **Gastrointestinal hemorrhage with melena [K92.1] 06/21/2018   • Pleural effusion [J90] 10/01/2018   • Hyperkalemia [E87.5] 10/01/2018   • PARMINDER (acute kidney injury) (CMS/HCC) [N17.9] 10/01/2018   • Sepsis (CMS/HCC) [A41.9] 10/01/2018   • PNA (pneumonia) [J18.9] 10/01/2018   • PNA (pneumonia) [J18.9] 10/01/2018   • Iron deficiency anemia [D50.9] 10/01/2018   • Gastrointestinal hemorrhage [K92.2] 06/21/2018   • Dysphagia [R13.10] 06/21/2018   • Acute UTI [N39.0] 12/02/2017   • Colostomy malfunction (CMS/[facility]) [K94.03] 12/02/2017   • Anemia [D64.9] 12/02/2017   • Suprapubic catheter (CMS/[facility]) [Z93.59] 08/11/2017   • Decubitus ulcer of coccygeal region, stage 4 (present on admission) [L89.154] 08/11/2017   • History of right above knee amputation (CMS/[facility]) [Z89.611] 08/11/2017   • PAF  (paroxysmal atrial fibrillation) (CMS/[facility]) [I48.0] 08/08/2017   • COPD (chronic obstructive pulmonary disease) (CMS/[facility]) [J44.9] 08/08/2017      Resolved Hospital Problems    Diagnosis Date Noted Date Resolved   No resolved problems to display.     - GIB: Ulceration without active bleed around PEG on EGD. PPI. Hgb stable now. GI following.  - Klebsiella UTI: On Cefepime. Allergy to augmentin. Can transition to levaquin or bactrim. Will see if pulmonology has preference for PNA coverage.  - PNA: Rhinovirus + with elevated procal on admission and RLF infiltrate. Cefepime/Vancomycin for empiric treatment. Pulmonology following.  - Hypernatremia: Changed to 1/2 NS. Nephrology following.  - PARMINDER: Improving.  - Elevated Troponin: 2/2 anemai and PARMINDER. No ischemic workup recommended. Cardiology evaluated.  - Dementia: Confused but redirectable at times. Currently requiring restraints. Will order prn haldol.  - Decubitus Ulcer: Wound care.  - Disposition: Poor long term prognosis noted. SNF.    >35 minutes time spent    Morgan Ramos MD  [facility]  10/03/18  9:10 AM

## 2018-10-03 NOTE — PROGRESS NOTES
"Pharmacy to dose Vancomycin and Cefepime    Day 3  Consult for Dr Ruiz  Treating: PNA/sepsis  Duration: 10 days    Current Vancomycin dose pulse dose based on levels    Lab Results   Component Value Date    VANCORANDOM 27.20 10/03/2018       Anti-Infectives     Ordered     Dose/Rate Route Frequency Start Stop    10/02/18 0907  vancomycin (VANCOCIN) in iso-osmotic dextrose IVPB 1 g (premix) 200 mL     Ordering Provider:  Rich Ruiz MD    20 mg/kg × 52.5 kg  over 100 Minutes Intravenous Once 10/02/18 1100 10/02/18 1745    10/01/18 0530  cefepime (MAXIPIME) 2 g/100 mL 0.9% NS (mbp)     Ordering Provider:  Rich Ruiz MD    2 g  over 4 Hours Intravenous Every 24 Hours 10/02/18 0500 10/11/18 0459    10/01/18 0530  Vancomycin Pharmacy Intermittent Dosing     Ordering Provider:  Rich Ruiz MD     Does not apply Daily 10/01/18 0900 10/11/18 0859    10/01/18 0512  Pharmacy to dose vancomycin     Ordering Provider:  Rich Ruiz MD     Does not apply Continuous PRN 10/01/18 0511 10/11/18 0510    10/01/18 0323  cefepime (MAXIPIME) 2 g/100 mL 0.9% NS (mbp)     Ordering Provider:  Kory Renteria MD    2 g  200 mL/hr over 30 Minutes Intravenous Once 10/01/18 0325 10/01/18 0530    10/01/18 0323  vancomycin (VANCOCIN) in iso-osmotic dextrose IVPB 1 g (premix) 200 mL     Ordering Provider:  Kory Renteria MD    20 mg/kg × 52.5 kg Intravenous Once 10/01/18 0325 10/01/18 0549           Relevant clinical data and objective history reviewed:  84 y.o. female 167.6 cm (66\") 51.3 kg (113 lb)    Lab Results   Component Value Date    CREATININE 1.18 (H) 10/03/2018    CREATININE 1.55 (H) 10/02/2018    CREATININE 2.13 (H) 10/01/2018     Estimated Creatinine Clearance: 28.7 mL/min (A) (by C-G formula based on SCr of 1.18 mg/dL (H)).    Lab Results   Component Value Date    WBC 7.83 10/03/2018    WBC 14.16 (H) 10/02/2018    WBC 17.79 (H) 10/01/2018     Temp Readings from Last 3 Encounters:   10/03/18 97.5 °F (36.4 °C) (Oral) "   06/24/18 97.7 °F (36.5 °C) (Oral)   04/14/18 99.2 °F (37.3 °C) (Tympanic)       Baseline cultures:  10/1 RVP + rhinovirus  10/1 Urine + klebsiella (sens to cefepime)  10/1 blood cx NGTD  10/2 MRSA screen pend    Vancomycin Dosing History  10/01   0549   Vancomycin 1 gram (19.5mg/kg) iv x 1 dose  10/02   0332   Vancomycin random = 17.1 mcg/ml ( 22 hours)  10/02   1100   Vancomycin 1 gram iv x 1 dose  10/03   0600   Vancomycin random = 27.2mcg/mL    PLAN:   Vancomycin - Based on random level this am no dose is needed today.  Will recheck random level in am again tomorrow and re-dose as appropriate.  Noted pending MRSA screen.    Cefepime - Continue cefepime Q24h and continue to monitor and adjust as needed.     Herlinda CisnerosD, BCPS  10/03/18 2:24 PM

## 2018-10-03 NOTE — PLAN OF CARE
Problem: Patient Care Overview  Goal: Plan of Care Review  Outcome: Ongoing (interventions implemented as appropriate)   10/03/18 4296   Coping/Psychosocial   Plan of Care Reviewed With patient   Plan of Care Review   Progress no change   OTHER   Outcome Summary Confused, tried to discontinue restraints without success. Continues to pull IV's out. Taking bites of meals. May have sips of water from cup between meals after oral care. Monitor vital signs. Monitor restraints, IV's, wounds and vital signs.     Goal: Individualization and Mutuality  Outcome: Ongoing (interventions implemented as appropriate)    Goal: Discharge Needs Assessment  Outcome: Ongoing (interventions implemented as appropriate)    Goal: Interprofessional Rounds/Family Conf  Outcome: Ongoing (interventions implemented as appropriate)      Problem: Fall Risk (Adult)  Goal: Absence of Fall  Outcome: Ongoing (interventions implemented as appropriate)      Problem: Infection, Risk/Actual (Adult)  Goal: Infection Prevention/Resolution  Outcome: Ongoing (interventions implemented as appropriate)      Problem: Pneumonia (Adult)  Goal: Signs and Symptoms of Listed Potential Problems Will be Absent, Minimized or Managed (Pneumonia)  Outcome: Ongoing (interventions implemented as appropriate)      Problem: Skin Injury Risk (Adult)  Goal: Skin Health and Integrity  Outcome: Ongoing (interventions implemented as appropriate)      Problem: Restraint, Nonbehavioral (Nonviolent)  Goal: Rationale and Justification  Outcome: Ongoing (interventions implemented as appropriate)    Goal: Nonbehavioral (Nonviolent) Restraint: Absence of Injury/Harm  Outcome: Ongoing (interventions implemented as appropriate)    Goal: Nonbehavioral (Nonviolent) Restraint: Achievement of Discontinuation Criteria  Outcome: Ongoing (interventions implemented as appropriate)    Goal: Nonbehavioral (Nonviolent) Restraint: Preservation of Dignity and Wellbeing  Outcome: Ongoing (interventions  implemented as appropriate)

## 2018-10-03 NOTE — PROGRESS NOTES
Continued Stay Note  [facility]     Patient Name: Simona Brooks  MRN: [id number]  Today's Date: 10/3/2018    Admit Date: 10/1/2018          Discharge Plan     Row Name 10/03/18 1359       Plan    Plan LTC bed     Plan Comments LTC bed [facility]                Discharge Codes    No documentation.           Digna Mccullough RN

## 2018-10-04 NOTE — PROGRESS NOTES
Pharmacy consult to dose Cefepime     Day: 4  Consult For: Dr Ruiz  Treating: PNA  Duration: 10 days     Simona Brooks is a 84 y.o. female 51.3 kg (113 lb).                 Anti-Infectives      Ordered     Dose/Rate Route Frequency Start Stop     10/02/18 0907   vancomycin (VANCOCIN) in iso-osmotic dextrose IVPB 1 g (premix) 200 mL     Ordering Provider:  Rich Ruiz MD    20 mg/kg × 52.5 kg  over 100 Minutes Intravenous Once 10/02/18 1100 10/02/18 1745     10/01/18 0530   cefepime (MAXIPIME) 2 g/100 mL 0.9% NS (mbp)     Ordering Provider:  Rich Ruiz MD    2 g  over 4 Hours Intravenous Every 24 Hours 10/02/18 0500 10/11/18 0459     10/01/18 0323   cefepime (MAXIPIME) 2 g/100 mL 0.9% NS (mbp)     Ordering Provider:  Kory Renteria MD    2 g  200 mL/hr over 30 Minutes Intravenous Once 10/01/18 0325 10/01/18 0530     10/01/18 0323   vancomycin (VANCOCIN) in iso-osmotic dextrose IVPB 1 g (premix) 200 mL     Ordering Provider:  Kory Renteria MD    20 mg/kg × 52.5 kg Intravenous Once 10/01/18 0325 10/01/18 0549             Estimated Creatinine Clearance: 32.9 mL/min (A) (by C-G formula based on SCr of 1.03 mg/dL (H)).        Creatinine   Date Value Ref Range Status   10/04/2018 1.03 (H) 0.57 - 1.00 mg/dL Final   10/03/2018 1.18 (H) 0.57 - 1.00 mg/dL Final   10/02/2018 1.55 (H) 0.57 - 1.00 mg/dL Final            WBC   Date Value Ref Range Status   10/04/2018 6.92 4.50 - 10.70 10*3/mm3 Final   10/03/2018 7.83 4.50 - 10.70 10*3/mm3 Final   10/02/2018 14.16 (H) 4.50 - 10.70 10*3/mm3 Final          Temp Readings from Last 2 Encounters:   10/04/18 98.4 °F (36.9 °C) (Oral)   06/24/18 97.7 °F (36.5 °C) (Oral)         Baseline cultures:  10/1 RVP + rhinovirus  10/1 Urine + klebsiella (sens to cefepime)  10/1 blood cx NGTD  10/2 MRSA screen neg     PLAN:  Will change dose of cefepime to Q12h and continue to monitor.  Vancomycin d/c'd today due to neg MRSA screen

## 2018-10-04 NOTE — PLAN OF CARE
Problem: Patient Care Overview  Goal: Plan of Care Review  Outcome: Ongoing (interventions implemented as appropriate)   10/04/18 0625   Coping/Psychosocial   Plan of Care Reviewed With patient   Plan of Care Review   Progress no change   OTHER   Outcome Summary Pt continues in restraints and confused. PICC order put in. Safety maintained. Continue to monitor.       Problem: Fall Risk (Adult)  Goal: Absence of Fall  Outcome: Ongoing (interventions implemented as appropriate)      Problem: Infection, Risk/Actual (Adult)  Goal: Infection Prevention/Resolution  Outcome: Ongoing (interventions implemented as appropriate)      Problem: Skin Injury Risk (Adult)  Goal: Skin Health and Integrity  Outcome: Ongoing (interventions implemented as appropriate)      Problem: Restraint, Nonbehavioral (Nonviolent)  Goal: Rationale and Justification  Outcome: Ongoing (interventions implemented as appropriate)    Goal: Nonbehavioral (Nonviolent) Restraint: Absence of Injury/Harm  Outcome: Ongoing (interventions implemented as appropriate)    Goal: Nonbehavioral (Nonviolent) Restraint: Achievement of Discontinuation Criteria  Outcome: Ongoing (interventions implemented as appropriate)    Goal: Nonbehavioral (Nonviolent) Restraint: Preservation of Dignity and Wellbeing  Outcome: Ongoing (interventions implemented as appropriate)

## 2018-10-04 NOTE — PROGRESS NOTES
"   LOS: 3 days   Patient Care Team:  Leonor Echevarria MD as PCP - General (Family Medicine)    Chief Complaint/ Reason for encounter: Acute renal failure with hypernatremia        Subjective     Medical history reviewed:  History of Present Illness    Subjective:  Symptoms:  Stable.  She reports weakness.  No shortness of breath or chest pain.  (Confused but no complaints).    Diet:  Adequate intake.    Activity level: Returning to normal.    Pain:  She reports no pain.          History taken from: Patient and chart    Objective     Vital Signs  Temp:  [97.3 °F (36.3 °C)-98.4 °F (36.9 °C)] 97.8 °F (36.6 °C)  Heart Rate:  [] 88  Resp:  [16-24] 20  BP: (103-164)/(60-92) 131/82       Wt Readings from Last 1 Encounters:   10/01/18 0621 51.3 kg (113 lb)   10/01/18 0149 52.5 kg (115 lb 12.8 oz)       Objective:  General Appearance:  Comfortable, not in pain and in no acute distress.    Vital signs: (most recent): Blood pressure 131/82, pulse 88, temperature 97.8 °F (36.6 °C), temperature source Oral, resp. rate 20, height 167.6 cm (66\"), weight 51.3 kg (113 lb), SpO2 97 %.  Vital signs are normal.    Output: Producing urine.    HEENT: Normal HEENT exam.    Lungs:  Normal effort and normal respiratory rate.  Breath sounds clear to auscultation.  She is not in respiratory distress.  No decreased breath sounds.    Heart: Normal rate.  Regular rhythm.    Abdomen: Abdomen is soft.  Bowel sounds are normal.   There is no abdominal tenderness.     Extremities: Normal range of motion.  There is no dependent edema.    Pulses: Distal pulses are intact.    Neurological: Patient is alert.    Skin:  Warm and dry.              Results Review:    Intake/Output:     Intake/Output Summary (Last 24 hours) at 10/04/18 0887  Last data filed at 10/04/18 0633   Gross per 24 hour   Intake              100 ml   Output              850 ml   Net             -750 ml         DATA:  Radiology and Labs:  The following labs independently " reviewed by me. Additional labs ordered for tomorrow a.m.  Interval notes, chart personally reviewed by me.    Labs:   Recent Results (from the past 24 hour(s))   Renal Function Panel    Collection Time: 10/04/18  4:11 AM   Result Value Ref Range    Glucose 79 65 - 99 mg/dL    BUN 33 (H) 8 - 23 mg/dL    Creatinine 1.03 (H) 0.57 - 1.00 mg/dL    Sodium 144 136 - 145 mmol/L    Potassium 3.0 (L) 3.5 - 5.2 mmol/L    Chloride 112 (H) 98 - 107 mmol/L    CO2 20.3 (L) 22.0 - 29.0 mmol/L    Calcium 8.2 (L) 8.6 - 10.5 mg/dL    Albumin 2.10 (L) 3.50 - 5.20 g/dL    Phosphorus 2.5 2.5 - 4.5 mg/dL    Anion Gap 11.7 mmol/L    BUN/Creatinine Ratio 32.0 (H) 7.0 - 25.0    eGFR Non African Amer 51 (L) >60 mL/min/1.73   CBC (No Diff)    Collection Time: 10/04/18  4:11 AM   Result Value Ref Range    WBC 6.92 4.50 - 10.70 10*3/mm3    RBC 3.14 (L) 3.90 - 5.20 10*6/mm3    Hemoglobin 8.3 (L) 11.9 - 15.5 g/dL    Hematocrit 29.0 (L) 35.6 - 45.5 %    MCV 92.4 80.5 - 98.2 fL    MCH 26.4 (L) 26.9 - 32.0 pg    MCHC 28.6 (L) 32.4 - 36.3 g/dL    RDW 20.4 (H) 11.7 - 13.0 %    RDW-SD 68.6 (H) 37.0 - 54.0 fl    MPV 10.9 6.0 - 12.0 fL    Platelets 179 140 - 500 10*3/mm3   Vancomycin, Random    Collection Time: 10/04/18  4:11 AM   Result Value Ref Range    Vancomycin Random 20.00 5.00 - 40.00 mcg/mL   Magnesium    Collection Time: 10/04/18  4:11 AM   Result Value Ref Range    Magnesium 2.0 1.6 - 2.4 mg/dL       Radiology:  Imaging Results (last 24 hours)     Procedure Component Value Units Date/Time    XR Chest 2 View [[id number]] Collected:  10/04/18 1452     Updated:  10/04/18 1510    Narrative:       PA AND LATERAL CHEST X-RAY     HISTORY: Follow up pneumonia.     Two lateral views and a frontal view of the chest are correlated with  chest x-ray 10/01/2018.     FINDINGS: There has been interval decrease in the density of infiltrate  at the right lower lung zone seen on the frontal view. However, some  persistent density remains. Lateral view shows  blunting of the  costophrenic angles posteriorly, due to what appear to be moderately  large bilateral pleural effusions. Vascular volume appears to be normal.  There is no pneumothorax.       Impression:       There appears to be some interval decrease in the density of  opacity at the right lung base. Bilateral pleural effusions remain,  however.     This report was finalized on 10/4/2018 3:07 PM by Dr. Bogdan Weathers M.D.                Medications have been reviewed:  Current Facility-Administered Medications   Medication Dose Route Frequency Provider Last Rate Last Dose   • acetaminophen (TYLENOL) 160 MG/5ML solution 640 mg  640 mg Oral Q6H PRN Rich Ruiz MD       • acetaminophen (TYLENOL) tablet 650 mg  650 mg Oral Q4H PRN Rich Ruiz MD   650 mg at 10/04/18 1346   • albuterol (PROVENTIL) nebulizer solution 0.083% 2.5 mg/3mL  2.5 mg Nebulization Q4H PRN Rich Ruiz MD       • cefepime (MAXIPIME) 2 g/100 mL 0.9% NS (mbp)  2 g Intravenous Q12H Morgan Ramos MD   2 g at 10/04/18 1738   • ferrous sulfate 300 (60 Fe) MG/5ML syrup 300 mg  300 mg Per G Tube Daily Rich Ruiz MD   300 mg at 10/04/18 0902   • haloperidol lactate (HALDOL) injection 1 mg  1 mg Intravenous Q6H PRN Morgan Ramos MD   1 mg at 10/04/18 0131   • HYDROcodone-acetaminophen (NORCO) 5-325 MG per tablet 1 tablet  1 tablet Oral Q4H PRN Morgan Ramos MD   1 tablet at 10/04/18 1806   • HYDROmorphone (DILAUDID) injection 0.5 mg  0.5 mg Intravenous Q2H PRN Morgan Ramos MD       • ipratropium-albuterol (DUO-NEB) nebulizer solution 3 mL  3 mL Nebulization 4x Daily - RT Rich Ruiz MD   3 mL at 10/04/18 1610   • ipratropium-albuterol (DUO-NEB) nebulizer solution 3 mL  3 mL Nebulization Q4H PRN Rich Ruiz MD       • Naloxone HCl (NARCAN) injection 0.4 mg  0.4 mg Intravenous PRN Morgan Ramos MD       • ondansetron (ZOFRAN) tablet 4 mg  4 mg Oral Q6H PRN Rich Ruiz MD        Or   • ondansetron ODT  (ZOFRAN-ODT) disintegrating tablet 4 mg  4 mg Oral Q6H PRN Rich Ruiz MD        Or   • ondansetron (ZOFRAN) injection 4 mg  4 mg Intravenous Q6H PRN Rich Ruiz MD   4 mg at 10/03/18 0033   • ondansetron (ZOFRAN) tablet 4 mg  4 mg Per G Tube Q6H PRN Rich Ruiz MD       • pantoprazole (PROTONIX) injection 40 mg  40 mg Intravenous BID AC Rich Ruiz MD   40 mg at 10/04/18 1340   • Pharmacy Consult - Pharmacy to dose   Does not apply Continuous PRN Rich Ruiz MD       • potassium chloride (MICRO-K) CR capsule 40 mEq  40 mEq Oral PRN Morgan Ramos MD   40 mEq at 10/04/18 1549    Or   • potassium chloride (KLOR-CON) packet 40 mEq  40 mEq Oral PRN Morgan Ramos MD        Or   • potassium chloride 10 mEq in 100 mL IVPB  10 mEq Intravenous Q1H PRN Morgan Ramos MD       • sodium chloride 0.9 % flush 1-10 mL  1-10 mL Intravenous PRN Rich Ruiz MD       • sodium chloride 0.9 % flush 10 mL  10 mL Intravenous PRN Darryl Garcia MD       • sucralfate (CARAFATE) 1 GM/10ML suspension 1 g  1 g Per G Tube 4x Daily Arnulfo Villagomez MD   1 g at 10/04/18 1340       ASSESSMENT:  acute renal failure,  prerenal  pneumonia with pleural effusion  COPD  Hypernatremia  Paroxysmal atrial fibrillation  Decubitus ulcer status post diverting colostomy  Chronic suprapubic catheter  Hyperkalemia, improved  Worsening anemia  Possible GI bleed  Azotemia  ? UTI        PLAN:   Renal function and hypernatremia have normalized  Discontinue IV fluids  Encourage fluid intake  Replace potassium orally per protocol   IV antibiotics      Prognosis poor given her advanced dementia     Please call with any questions or concerns.        Simone Zuleta MD   [facility]   Office phone number: [phone]  Answering service phone number: [phone]    10/04/18  6:39 PM      Dictation performed using Dragon dictation software

## 2018-10-04 NOTE — PROGRESS NOTES
[facility]  Inpatient Progress Note    Reason for Follow Up:  Gastric ulceration around PEG    Subjective     Interval History:   Pt denies abd pain - nursing reports confusion.  Brown stool in ostomy bag.  Pt denies feeling hungry - c/o right stump pain    Palliative consult noted - speech eval noted (REC continue current diet (puree/nectar thick/water 30 mins after meals/oral care)    Current Facility-Administered Medications:   •  acetaminophen (TYLENOL) 160 MG/5ML solution 640 mg, 640 mg, Oral, Q6H PRN, Rich Ruiz MD  •  acetaminophen (TYLENOL) tablet 650 mg, 650 mg, Oral, Q4H PRN, Rich Ruiz MD, 650 mg at 10/04/18 0920  •  albuterol (PROVENTIL) nebulizer solution 0.083% 2.5 mg/3mL, 2.5 mg, Nebulization, Q4H PRN, Rich Ruiz MD  •  cefepime (MAXIPIME) 2 g/100 mL 0.9% NS (mbp), 2 g, Intravenous, Q24H, Rich Ruiz MD, 2 g at 10/04/18 0435  •  dextrose (D5W) 5 % infusion, 100 mL/hr, Intravenous, Continuous, Yared De Los Santos MD, Stopped at 10/03/18 1835  •  ferrous sulfate 300 (60 Fe) MG/5ML syrup 300 mg, 300 mg, Per G Tube, Daily, Rich Ruzi MD, 300 mg at 10/04/18 0902  •  haloperidol lactate (HALDOL) injection 1 mg, 1 mg, Intravenous, Q6H PRN, Morgan Ramos MD, 1 mg at 10/04/18 0131  •  ipratropium-albuterol (DUO-NEB) nebulizer solution 3 mL, 3 mL, Nebulization, 4x Daily - RT, Rich Ruiz MD, 3 mL at 10/04/18 0917  •  ipratropium-albuterol (DUO-NEB) nebulizer solution 3 mL, 3 mL, Nebulization, Q4H PRN, Rich Ruiz MD  •  ondansetron (ZOFRAN) tablet 4 mg, 4 mg, Oral, Q6H PRN **OR** ondansetron ODT (ZOFRAN-ODT) disintegrating tablet 4 mg, 4 mg, Oral, Q6H PRN **OR** ondansetron (ZOFRAN) injection 4 mg, 4 mg, Intravenous, Q6H PRN, Rich Ruiz MD, 4 mg at 10/03/18 0033  •  ondansetron (ZOFRAN) tablet 4 mg, 4 mg, Per G Tube, Q6H PRN, Rich Ruiz MD  •  pantoprazole (PROTONIX) injection 40 mg, 40 mg, Intravenous, BID AC, Rich Ruiz MD, 40 mg at 10/04/18  0258  •  Pharmacy Consult - Pharmacy to dose, , Does not apply, Continuous PRN, Rich Ruiz MD  •  potassium chloride (MICRO-K) CR capsule 40 mEq, 40 mEq, Oral, PRN **OR** potassium chloride (KLOR-CON) packet 40 mEq, 40 mEq, Oral, PRN **OR** potassium chloride 10 mEq in 100 mL IVPB, 10 mEq, Intravenous, Q1H PRN, Morgan Ramos MD  •  sodium chloride 0.9 % flush 1-10 mL, 1-10 mL, Intravenous, PRN, Rich Ruiz MD  •  Insert peripheral IV, , , Once **AND** sodium chloride 0.9 % flush 10 mL, 10 mL, Intravenous, PRN, Darryl Garcia MD  •  sucralfate (CARAFATE) 1 GM/10ML suspension 1 g, 1 g, Per G Tube, 4x Daily, Arnulfo Villagomez MD, 1 g at 10/04/18 0902  Review of Systems:    Review of systems could not be obtained due to  patient confusion.    Objective     Vital Signs  Temp:  [96.5 °F (35.8 °C)-97.8 °F (36.6 °C)] 97.7 °F (36.5 °C)  Heart Rate:  [] 76  Resp:  [18-24] 24  BP: (103-164)/(69-92) 164/91  Body mass index is 18.24 kg/m².    Intake/Output Summary (Last 24 hours) at 10/04/18 0923  Last data filed at 10/04/18 0633   Gross per 24 hour   Intake           268.33 ml   Output              850 ml   Net          -581.67 ml     No intake/output data recorded.     Physical Exam:   General: patient awake, alert and cooperative   Eyes: Normal lids and lashes, no scleral icterus   Skin: warm and dry, not jaundiced   Cardiovascular: regular rhythm and rate, no murmurs auscultated   Pulm: clear to auscultation bilaterally, regular and unlabored   Abdomen: soft, nontender, nondistended; g-tube w/o drainage; ostomy with brown stool   Rectal: deferred   Extremities: right AKA   Psychiatric: Normal mood and behavior; memory intact     Results Review:     I reviewed the patient's new clinical results.      Results from last 7 days  Lab Units 10/04/18  0411 10/03/18  0506 10/02/18  1727  10/02/18  0332   WBC 10*3/mm3 6.92 7.83  --   --  14.16*   HEMOGLOBIN g/dL 8.3* 9.1* 8.2*  < > 8.9*   HEMATOCRIT %  29.0* 31.1* 27.1*  < > 30.4*   PLATELETS 10*3/mm3 179 179  --   --  165   < > = values in this interval not displayed.    Results from last 7 days  Lab Units 10/04/18  0411 10/03/18  0506 10/02/18  0332  10/01/18  0222   SODIUM mmol/L 144 151* 149*  < > 136   POTASSIUM mmol/L 3.0* 3.7 4.5  < > 6.3*   CHLORIDE mmol/L 112* 117* 114*  < > 102   CO2 mmol/L 20.3* 23.1 22.0  < > 23.2   BUN mg/dL 33* 47* 73*  < > 109*   CREATININE mg/dL 1.03* 1.18* 1.55*  < > 2.00*   CALCIUM mg/dL 8.2* 8.3* 8.1*  < > 7.8*   BILIRUBIN mg/dL  --   --   --   --  0.2   ALK PHOS U/L  --   --   --   --  242*   ALT (SGPT) U/L  --   --   --   --  16   AST (SGOT) U/L  --   --   --   --  16   GLUCOSE mg/dL 79 71 112*  < > 202*   < > = values in this interval not displayed.        Lab Results  Lab Value Date/Time   LIPASE 20 08/09/2017 0605   LIPASE 20 08/08/2017 1251       Radiology:  US Renal Bilateral   Final Result       1. Atrophic appearing right kidney. No hydronephrosis is seen on the   right.   2. Multiple calculi identified within the left kidney and proximal left   ureter. This is associated with some mild hydroureteronephrosis. Similar   findings were present on exam performed December 2, 2017, although the   degree of hydronephrosis and increased slightly.       This report was finalized on 10/2/2018 9:35 PM by Dr. Serene Kaiser M.D.          XR Chest 1 View   Final Result   Kyphotic positioning with dense right-sided opacities felt   to reflect pneumonia and pleural fluid           This report was finalized on 10/1/2018 2:25 AM by Kolton Olivia M.D.          XR Chest 2 View    (Results Pending)       Assessment/Plan     Patient Active Problem List   Diagnosis   • PAF (paroxysmal atrial fibrillation) (CMS/HCC)   • COPD (chronic obstructive pulmonary disease) (CMS/HCC)   • Calculus of left kidney   • Suprapubic catheter (CMS/HCC)   • Decubitus ulcer of coccygeal region, stage 4 (present on admission)   • History of right above knee  amputation (CMS/HCC)   • Acute UTI   • Colostomy malfunction (CMS/HCC)   • Anemia   • Abnormal urine   • Gastrointestinal hemorrhage   • Dysphagia   • Gastrointestinal hemorrhage with melena   • Acute gastric ulcer with hemorrhage   • Pleural effusion   • Hyperkalemia   • PARMINDER (acute kidney injury) (CMS/HCC)   • Sepsis (CMS/HCC)   • PNA (pneumonia)   • PNA (pneumonia)   • Iron deficiency anemia     Impression  #1 GI bleeding: Evidence of ulceration at the mucosal site of G-tube  #2 acute blood loss anemia  #3 pneumonia  #4 UTI  #5 dementia        Recommendation:  Attempt oral feeing per speech recs with assistance  Continue current medical regimen  Await family's decision on palliative status.  If all agree can consider replacement of G-tube during this hospitalization - if palliative care pursued, this is unnecessary  Continue to follow h/h     I discussed the patients findings and my recommendations with patient and nursing staff.    Octavia Gordon MD

## 2018-10-04 NOTE — PROGRESS NOTES
Name: Simona Brooks ADMIT: 10/1/2018   : 1934  PCP: Leonor Echevarria MD    MRN: [id number] LOS: 3 days   AGE/SEX: 84 y.o. female  ROOM: [facility]   Subjective   Still confused but is redirectable. Wants to get out of restraints and out of hospital. Denies all questions of pain, dyspnea, NVD. Restraints were resumed yesterday for pulling lines/tubes.    Objective   Vital Signs  Temp:  [96.5 °F (35.8 °C)-97.8 °F (36.6 °C)] 97.7 °F (36.5 °C)  Heart Rate:  [] 79  Resp:  [18-20] 18  BP: (103-164)/(69-92) 164/91  SpO2:  [93 %-95 %] 95 %  on  Flow (L/min):  [1.5] 1.5;   Device (Oxygen Therapy): nasal cannula  Body mass index is 18.24 kg/m².    Physical Exam   Constitutional: She appears well-developed.   frail   HENT:   Head: Atraumatic.   Nose: Nose normal.   Eyes: Conjunctivae and EOM are normal.   Neck: Neck supple.   Cardiovascular: Normal rate, regular rhythm and intact distal pulses.    Pulmonary/Chest: Effort normal. No stridor. She has decreased breath sounds. She has no wheezes.   Abdominal: Soft.   PEG   Musculoskeletal: She exhibits no edema or tenderness.   Neurological: She is alert. No cranial nerve deficit.   Skin: Skin is warm and dry. She is not diaphoretic.   Psychiatric: Her mood appears anxious. Cognition and memory are impaired.   Nursing note and vitals reviewed.      Results Review:       I reviewed the patient's new clinical results.     I reviewed telemetry/EKG results, sinus rhythm.      Results from last 7 days  Lab Units 10/04/18  0411 10/03/18  0506 10/02/18  1727 10/02/18  0753 10/02/18  0332  10/01/18  0708   WBC 10*3/mm3 6.92 7.83  --   --  14.16*  --  17.79*   HEMOGLOBIN g/dL 8.3* 9.1* 8.2* 9.0* 8.9*  < > 8.4*   PLATELETS 10*3/mm3 179 179  --   --  165  --  211   < > = values in this interval not displayed.  Results from last 7 days  Lab Units 10/04/18  0411 10/03/18  0506 10/02/18  0332 10/01/18  0708   SODIUM mmol/L 144 151* 149* 142   POTASSIUM mmol/L 3.0* 3.7 4.5  5.0   CHLORIDE mmol/L 112* 117* 114* 104   CO2 mmol/L 20.3* 23.1 22.0 24.3   BUN mg/dL 33* 47* 73* 103*   CREATININE mg/dL 1.03* 1.18* 1.55* 2.13*   GLUCOSE mg/dL 79 71 112* 178*   Estimated Creatinine Clearance: 32.9 mL/min (A) (by C-G formula based on SCr of 1.03 mg/dL (H)).  Results from last 7 days  Lab Units 10/04/18  0411 10/03/18  0506 10/02/18  0332 10/01/18  0708 10/01/18  0222   CALCIUM mg/dL 8.2* 8.3* 8.1* 8.6 7.8*   ALBUMIN g/dL 2.10* 2.40* 2.20*  --  2.60*   PHOSPHORUS mg/dL 2.5 2.9 3.7  --   --          cefepime 2 g Intravenous Q24H   ferrous sulfate 300 mg Per G Tube Daily   ipratropium-albuterol 3 mL Nebulization 4x Daily - RT   pantoprazole 40 mg Intravenous BID AC   sucralfate 1 g Per G Tube 4x Daily   Vancomycin Pharmacy Intermittent Dosing  Does not apply Daily       dextrose 100 mL/hr Last Rate: Stopped (10/03/18 1835)   Pharmacy Consult - Pharmacy to dose     Pharmacy to dose vancomycin     Diet Pureed; Nectar / Syrup Thick      Assessment/Plan      Active Hospital Problems    Diagnosis Date Noted   • **Gastrointestinal hemorrhage with melena [K92.1] 06/21/2018   • Pleural effusion [J90] 10/01/2018   • Hyperkalemia [E87.5] 10/01/2018   • PARMINDER (acute kidney injury) (CMS/[facility]) [N17.9] 10/01/2018   • Sepsis (CMS/[facility]) [A41.9] 10/01/2018   • PNA (pneumonia) [J18.9] 10/01/2018   • PNA (pneumonia) [J18.9] 10/01/2018   • Iron deficiency anemia [D50.9] 10/01/2018   • Gastrointestinal hemorrhage [K92.2] 06/21/2018   • Dysphagia [R13.10] 06/21/2018   • Acute UTI [N39.0] 12/02/2017   • Colostomy malfunction (CMS/[facility]) [K94.03] 12/02/2017   • Anemia [D64.9] 12/02/2017   • Suprapubic catheter (CMS/[facility]) [Z93.59] 08/11/2017   • Decubitus ulcer of coccygeal region, stage 4 (present on admission) [L89.154] 08/11/2017   • History of right above knee amputation (CMS/[facility]) [Z89.611] 08/11/2017   • PAF (paroxysmal atrial fibrillation) (CMS/[facility]) [I48.0] 08/08/2017   • COPD (chronic obstructive pulmonary disease)  (CMS/[facility]) [J44.9] 08/08/2017      Resolved Hospital Problems    Diagnosis Date Noted Date Resolved   No resolved problems to display.     - GIB: Ulceration without active bleed around PEG on EGD. PPI. Monitor hgb. If to continue tube feeding will need PEG replacement. SLP following for swallow eval. GI discussed with family via phone. Have placed palliative consult to further discuss goals of care in patient with progressing dementia. GI following.  - Klebsiella UTI: On Cefepime. Allergy to augmentin. Can transition to levaquin or bactrim. Will see if pulmonology has preference for PNA coverage.  - PNA: Rhinovirus + with elevated procal on admission and RLF infiltrate. Cefepime/Vancomycin for empiric treatment. Pulmonology following.  - Hypernatremia: D5 with improvement. Nephrology following.  - PARMINDER: Resolved. Replace potassium, check magnesium level.  - Elevated Troponin: 2/2 anemia and PARMINDER. No ischemic workup recommended. Cardiology evaluated.  - Dementia: Confused but redirectable at times. Currently requiring restraints. Will order prn haldol.  - Decubitus Ulcer: Wound care.  - Disposition: Poor long term prognosis noted. SNF.    Attempted to call Mr Brooks (poa) and no answer on either number listed. Will follow up in morning.    Morgan Ramos MD  [facility]  10/04/18  8:55 AM

## 2018-10-04 NOTE — PROGRESS NOTES
[facility] INPATIENT PROGRESS NOTE         [address]    10/4/2018      PATIENT IDENTIFICATION:  Name: Simona Brooks ADMIT: 10/1/2018   : 1934  PCP: Leonor Echevarria MD    MRN: [id number] LOS: 3 days   AGE/SEX: 84 y.o. female  ROOM: [facility]                     LOS 3    Reason for visit: f/u PNA with sepsis and effusion      SUBJECTIVE:   Very confused. Coarse breath sounds bilaterally bases left greater than right. No wheezing. In restraints.       Objective   OBJECTIVE:    Vital Sign Min/Max for last 24 hours  Temp  Min: 96.5 °F (35.8 °C)  Max: 97.8 °F (36.6 °C)   BP  Min: 103/92  Max: 164/91   Pulse  Min: 79  Max: 103   Resp  Min: 18  Max: 20   SpO2  Min: 93 %  Max: 95 %   No Data Recorded   No Data Recorded                         Body mass index is 18.24 kg/m².    Intake/Output Summary (Last 24 hours) at 10/04/18 0920  Last data filed at 10/04/18 0633   Gross per 24 hour   Intake           268.33 ml   Output              850 ml   Net          -581.67 ml         Exam:  GEN:  agitated and confused, appears stated age  EYES:   PERRL, anicteric sclera  ENT:    External ears/nose normal, OP clear  NECK:  No adenopathy, midline trachea  LUNGS: Normal chest on inspection, palpation and diminished bilateral breath sounds on auscultation  CV:  Normal S1S2, without murmur  ABD:  Non tender, non distended, no hepatosplenomegaly, +BS  EXT:  No edema, cyanosis or clubbing    Scheduled meds:      cefepime 2 g Intravenous Q24H   ferrous sulfate 300 mg Per G Tube Daily   ipratropium-albuterol 3 mL Nebulization 4x Daily - RT   pantoprazole 40 mg Intravenous BID AC   sucralfate 1 g Per G Tube 4x Daily   Vancomycin Pharmacy Intermittent Dosing  Does not apply Daily     IV meds:                          dextrose 100 mL/hr Last Rate: Stopped (10/03/18 3012)   Pharmacy Consult - Pharmacy to dose     Pharmacy to dose vancomycin       Data Review:    Results from last 7 days  Lab Units 10/04/18  3688  10/03/18  0506 10/02/18  0332 10/01/18  0708 10/01/18  0222   SODIUM mmol/L 144 151* 149* 142 136   POTASSIUM mmol/L 3.0* 3.7 4.5 5.0 6.3*   CHLORIDE mmol/L 112* 117* 114* 104 102   CO2 mmol/L 20.3* 23.1 22.0 24.3 23.2   BUN mg/dL 33* 47* 73* 103* 109*   CREATININE mg/dL 1.03* 1.18* 1.55* 2.13* 2.00*   GLUCOSE mg/dL 79 71 112* 178* 202*   CALCIUM mg/dL 8.2* 8.3* 8.1* 8.6 7.8*         Estimated Creatinine Clearance: 32.9 mL/min (A) (by C-G formula based on SCr of 1.03 mg/dL (H)).    Results from last 7 days  Lab Units 10/04/18  0411 10/03/18  0506 10/02/18  1727 10/02/18  0753 10/02/18  0332  10/01/18  0708 10/01/18  0222   WBC 10*3/mm3 6.92 7.83  --   --  14.16*  --  17.79* 16.18*   HEMOGLOBIN g/dL 8.3* 9.1* 8.2* 9.0* 8.9*  < > 8.4* 8.6*   PLATELETS 10*3/mm3 179 179  --   --  165  --  211 211   < > = values in this interval not displayed.        Results from last 7 days  Lab Units 10/01/18  0222   ALT (SGPT) U/L 16   AST (SGOT) U/L 16       Results from last 7 days  Lab Units 10/01/18  0502   PH, ARTERIAL pH units 7.382   PO2 ART mm Hg 67.5*   PCO2, ARTERIAL mm Hg 45.0   HCO3 ART mmol/L 26.8       Results from last 7 days  Lab Units 10/01/18  0249 10/01/18  0222   PROCALCITONIN ng/mL  --  0.48*   LACTATE mmol/L 1.1  --          Chest x-ray 10/1 reviewed shows dense right opacity and pleural effusion    Microbiology reviewed: Urine culture growing Klebsiella.  Respiratory viral panel positive rhinovirus/enterovirus.        )Assessment   ASSESSMENT:      Active Hospital Problems    Diagnosis Date Noted   • **Gastrointestinal hemorrhage with melena [K92.1] 06/21/2018   • Pleural effusion [J90] 10/01/2018   • Hyperkalemia [E87.5] 10/01/2018   • PARMINDER (acute kidney injury) (CMS/HCC) [N17.9] 10/01/2018   • Sepsis (CMS/[facility]) [A41.9] 10/01/2018   • PNA (pneumonia) [J18.9] 10/01/2018   • PNA (pneumonia) [J18.9] 10/01/2018   • Iron deficiency anemia [D50.9] 10/01/2018   • Gastrointestinal hemorrhage [K92.2] 06/21/2018   •  Dysphagia [R13.10] 06/21/2018   • Acute UTI [N39.0] 12/02/2017   • Colostomy malfunction (CMS/HCC) [K94.03] 12/02/2017   • Anemia [D64.9] 12/02/2017   • Suprapubic catheter (CMS/[facility]) [Z93.59] 08/11/2017   • Decubitus ulcer of coccygeal region, stage 4 (present on admission) [L89.154] 08/11/2017   • History of right above knee amputation (CMS/[facility]) [Z89.611] 08/11/2017   • PAF (paroxysmal atrial fibrillation) (CMS/[facility]) [I48.0] 08/08/2017   • COPD (chronic obstructive pulmonary disease) (CMS/HCC) [J44.9] 08/08/2017      Resolved Hospital Problems    Diagnosis Date Noted Date Resolved   No resolved problems to display.     Healthcare associated pneumonia  Pleural effusion: Parapneumonic  Sepsis  GI bleed  Dysphagia  Acute UTI  Paroxysmal atrial fibrillation  COPD  Hypernatremia   Dementia and delirium       PLAN:  Encourage pulmonary toilet.  Continue antibiotics.  d/c vancomycin.  Bronchodilators for COPD symptoms and to help with clearance.  Repeat chest x-ray pending for follow-up of pneumonia and effusion.  Changed to 1/2NS for worsening hypernatremia with improvement.    Philip Lou MD  Pulmonary and Critical Care Medicine  [facility]  10/4/2018    9:20 AM

## 2018-10-04 NOTE — PLAN OF CARE
Problem: Patient Care Overview  Goal: Plan of Care Review  Outcome: Ongoing (interventions implemented as appropriate)   10/04/18 1346 10/04/18 4690   Coping/Psychosocial   Plan of Care Reviewed With patient --    Plan of Care Review   Progress --  no change   OTHER   Outcome Summary --  VSS; patient complains of right stump pain; tylenol does not help; norco 5mg given to help with pain; hold off on PICC; IV RN was able to get a new IV in patient's left forearm; pt not eating or drinking much; given applesauce or pudding with crushed meds; patient may go to palliative; MD and palliative nurse trying to contact stepson to discuss palliative more       Problem: Fall Risk (Adult)  Goal: Absence of Fall  Outcome: Ongoing (interventions implemented as appropriate)      Problem: Infection, Risk/Actual (Adult)  Goal: Infection Prevention/Resolution  Outcome: Ongoing (interventions implemented as appropriate)      Problem: Pneumonia (Adult)  Goal: Signs and Symptoms of Listed Potential Problems Will be Absent, Minimized or Managed (Pneumonia)  Outcome: Ongoing (interventions implemented as appropriate)      Problem: Skin Injury Risk (Adult)  Goal: Skin Health and Integrity  Outcome: Ongoing (interventions implemented as appropriate)      Problem: Restraint, Nonbehavioral (Nonviolent)  Goal: Rationale and Justification  Outcome: Ongoing (interventions implemented as appropriate)    Goal: Nonbehavioral (Nonviolent) Restraint: Absence of Injury/Harm  Outcome: Ongoing (interventions implemented as appropriate)    Goal: Nonbehavioral (Nonviolent) Restraint: Achievement of Discontinuation Criteria  Outcome: Ongoing (interventions implemented as appropriate)    Goal: Nonbehavioral (Nonviolent) Restraint: Preservation of Dignity and Wellbeing  Outcome: Ongoing (interventions implemented as appropriate)

## 2018-10-05 NOTE — PROGRESS NOTES
Name: Simona Brooks ADMIT: 10/1/2018   : 1934  PCP: Leonor Echevarria MD    MRN: [id number] LOS: 4 days   AGE/SEX: 84 y.o. female  ROOM: [facility]   Subjective   Wants us to get her out of restraints and wants to be released. She is not oriented and needs frequent redirection. Discussed IV antibiotics and pulling out lines. She denies CP SOA NVD. Tried to call poa via phone and no answer at either number on file.    Objective   Vital Signs  Temp:  [97.2 °F (36.2 °C)-98.4 °F (36.9 °C)] 97.5 °F (36.4 °C)  Heart Rate:  [64-96] 79  Resp:  [16-20] 20  BP: (131-159)/(60-83) 159/83  SpO2:  [97 %-100 %] 100 %  on  Flow (L/min):  [2] 2;   Device (Oxygen Therapy): nasal cannula  Body mass index is 18.24 kg/m².    Physical Exam   Constitutional: She appears well-developed.   frail   HENT:   Head: Atraumatic.   Nose: Nose normal.   Eyes: Conjunctivae and EOM are normal.   Neck: Neck supple.   Cardiovascular: Normal rate, regular rhythm and intact distal pulses.    Pulmonary/Chest: Effort normal. No stridor. She has decreased breath sounds. She has no wheezes.   Abdominal: Soft.   PEG   Musculoskeletal: She exhibits no edema or tenderness.   Neurological: She is alert. No cranial nerve deficit.   Oriented to person only   Skin: Skin is warm and dry. She is not diaphoretic.   Psychiatric: Her mood appears anxious. Cognition and memory are impaired.   Nursing note and vitals reviewed.      Results Review:       I reviewed the patient's new clinical results.     I reviewed imaging, agree with interpretation.     I reviewed telemetry/EKG results, sinus rhythm      Results from last 7 days  Lab Units 10/05/18  0447 10/04/18  0411 10/03/18  0506 10/02/18  1727  10/02/18  0332   WBC 10*3/mm3 7.28 6.92 7.83  --   --  14.16*   HEMOGLOBIN g/dL 8.5* 8.3* 9.1* 8.2*  < > 8.9*   PLATELETS 10*3/mm3 169 179 179  --   --  165   < > = values in this interval not displayed.  Results from last 7 days  Lab Units 10/05/18  3698  10/04/18  0411 10/03/18  0506 10/02/18  0332   SODIUM mmol/L 146* 144 151* 149*   POTASSIUM mmol/L 4.1  4.0 3.0* 3.7 4.5   CHLORIDE mmol/L 115* 112* 117* 114*   CO2 mmol/L 18.2* 20.3* 23.1 22.0   BUN mg/dL 24* 33* 47* 73*   CREATININE mg/dL 0.97 1.03* 1.18* 1.55*   GLUCOSE mg/dL 57* 79 71 112*   Estimated Creatinine Clearance: 35 mL/min (by C-G formula based on SCr of 0.97 mg/dL).  Results from last 7 days  Lab Units 10/05/18  0447 10/04/18  0411 10/03/18  0506 10/02/18  0332   CALCIUM mg/dL 8.3* 8.2* 8.3* 8.1*   ALBUMIN g/dL 2.30* 2.10* 2.40* 2.20*   MAGNESIUM mg/dL  --  2.0  --   --    PHOSPHORUS mg/dL 2.4* 2.5 2.9 3.7         cefepime 2 g Intravenous Q12H   ferrous sulfate 300 mg Per G Tube Daily   ipratropium-albuterol 3 mL Nebulization 4x Daily - RT   pantoprazole 40 mg Intravenous BID AC   sucralfate 1 g Per G Tube 4x Daily       dextrose 5 % and sodium chloride 0.45 % 75 mL/hr   Pharmacy Consult - Pharmacy to dose    Diet Pureed; [place] / Syrup Thick      Assessment/Plan      Active Hospital Problems    Diagnosis Date Noted   • **Gastrointestinal hemorrhage with melena [K92.1] 06/21/2018   • Pleural effusion [J90] 10/01/2018   • Hyperkalemia [E87.5] 10/01/2018   • PARMINDER (acute kidney injury) (CMS/HCC) [N17.9] 10/01/2018   • Sepsis (CMS/HCC) [A41.9] 10/01/2018   • PNA (pneumonia) [J18.9] 10/01/2018   • PNA (pneumonia) [J18.9] 10/01/2018   • Iron deficiency anemia [D50.9] 10/01/2018   • Gastrointestinal hemorrhage [K92.2] 06/21/2018   • Dysphagia [R13.10] 06/21/2018   • Acute UTI [N39.0] 12/02/2017   • Colostomy malfunction (CMS/[facility]) [K94.03] 12/02/2017   • Anemia [D64.9] 12/02/2017   • Suprapubic catheter (CMS/[facility]) [Z93.59] 08/11/2017   • Decubitus ulcer of coccygeal region, stage 4 (present on admission) [L89.154] 08/11/2017   • History of right above knee amputation (CMS/[facility]) [Z89.611] 08/11/2017   • PAF (paroxysmal atrial fibrillation) (CMS/HCC) [I48.0] 08/08/2017   • COPD (chronic obstructive pulmonary  disease) (CMS/HCC) [J44.9] 08/08/2017      Resolved Hospital Problems    Diagnosis Date Noted Date Resolved   No resolved problems to display.     - GIB: Ulceration without active bleed around PEG on EGD. PPI. Monitor hgb. If to continue tube feeding will need PEG replacement. SLP following for swallow eval. GI following.  - Klebsiella UTI: On Cefepime (Day 5). Allergy to augmentin. Can transition to levaquin or bactrim once ok from PNA standpoint.  - PNA: Rhinovirus + with elevated procal on admission and RLF infiltrate. Cefepime for empiric treatment. Repeat CXR with improved infiltrate. Pulmonology following.  - Hypernatremia/Hypoglycemia: Started D5/half ns this morning. Nephrology following.  - PARMINDER: Resolved.  - Elevated Troponin: 2/2 anemia and PARMINDER. No ischemic workup recommended. Cardiology evaluated.  - Dementia: Confused but redirectable at times. Currently requiring restraints. Haldol prn.  - Decubitus Ulcer: Wound care.  - Disposition: Poor long term prognosis. Palliative consulted. I have been unsuccessful in reaching poa/son yesterday and today. SNF.    >35 minutes time spent     Morgan Ramos MD  [facility]  10/05/18  10:08 AM

## 2018-10-05 NOTE — PLAN OF CARE
Problem: Patient Care Overview  Goal: Plan of Care Review  Outcome: Ongoing (interventions implemented as appropriate)   10/05/18 0518   Coping/Psychosocial   Plan of Care Reviewed With patient   Plan of Care Review   Progress no change   OTHER   Outcome Summary Pt remains confused and in restraints. Continues on IV abx. Medicated per MAR for R stump pain. Palliative to see today. Safety maintained. Continue to monitor.       Problem: Fall Risk (Adult)  Goal: Absence of Fall  Outcome: Ongoing (interventions implemented as appropriate)      Problem: Infection, Risk/Actual (Adult)  Goal: Infection Prevention/Resolution  Outcome: Ongoing (interventions implemented as appropriate)      Problem: Pneumonia (Adult)  Goal: Signs and Symptoms of Listed Potential Problems Will be Absent, Minimized or Managed (Pneumonia)  Outcome: Ongoing (interventions implemented as appropriate)      Problem: Skin Injury Risk (Adult)  Goal: Skin Health and Integrity  Outcome: Ongoing (interventions implemented as appropriate)      Problem: Restraint, Nonbehavioral (Nonviolent)  Goal: Rationale and Justification  Outcome: Ongoing (interventions implemented as appropriate)    Goal: Nonbehavioral (Nonviolent) Restraint: Absence of Injury/Harm  Outcome: Ongoing (interventions implemented as appropriate)    Goal: Nonbehavioral (Nonviolent) Restraint: Achievement of Discontinuation Criteria  Outcome: Ongoing (interventions implemented as appropriate)    Goal: Nonbehavioral (Nonviolent) Restraint: Preservation of Dignity and Wellbeing  Outcome: Ongoing (interventions implemented as appropriate)

## 2018-10-05 NOTE — SIGNIFICANT NOTE
SLP f/u with pt and RN this AM, patient refused breakfast this AM. SLP attempted to feed patient however she continued to refuse. SLP has noted Palliative has been consulted. Will f/u week of 10/8 on POC     10/05/18 2213   Rehab Treatment   Discipline speech language pathologist

## 2018-10-05 NOTE — PROGRESS NOTES
"   LOS: 4 days   Patient Care Team:  Leonor Echevarria MD as PCP - General (Family Medicine)    Chief Complaint/ Reason for encounter: Acute renal failure with hypernatremia        Subjective     Medical history reviewed:  Shortness of Breath   Pertinent negatives include no chest pain.       Subjective:  Symptoms:  Stable.  She reports weakness.  No shortness of breath or chest pain.  (Confused but no complaints, resting).    Diet:  Adequate intake.    Activity level: Returning to normal.    Pain:  She reports no pain.          History taken from: Patient and chart    Objective     Vital Signs  Temp:  [97.2 °F (36.2 °C)-98 °F (36.7 °C)] 98 °F (36.7 °C)  Heart Rate:  [67-96] 67  Resp:  [18-20] 20  BP: (149-160)/(69-83) 160/77       Wt Readings from Last 1 Encounters:   10/01/18 0621 51.3 kg (113 lb)   10/01/18 0149 52.5 kg (115 lb 12.8 oz)       Objective:  General Appearance:  Comfortable, not in pain and in no acute distress (confused).    Vital signs: (most recent): Blood pressure 160/77, pulse 67, temperature 98 °F (36.7 °C), temperature source Oral, resp. rate 20, height 167.6 cm (66\"), weight 51.3 kg (113 lb), SpO2 100 %.  Vital signs are normal.    Output: Producing urine.    HEENT: Normal HEENT exam.    Lungs:  Normal effort and normal respiratory rate.  Breath sounds clear to auscultation.  She is not in respiratory distress.  No decreased breath sounds.    Heart: Normal rate.  Regular rhythm.    Abdomen: Abdomen is soft.  Bowel sounds are normal.   There is no abdominal tenderness.     Extremities: Normal range of motion.  There is no dependent edema.    Pulses: Distal pulses are intact.    Neurological: Patient is alert.    Skin:  Warm and dry.            Results Review:    Intake/Output:     Intake/Output Summary (Last 24 hours) at 10/05/18 1645  Last data filed at 10/05/18 1642   Gross per 24 hour   Intake              260 ml   Output              900 ml   Net             -640 ml "         DATA:  Radiology and Labs:  The following labs independently reviewed by me. Additional labs ordered for tomorrow a.m.  Interval notes, chart personally reviewed by me.      Labs:   Recent Results (from the past 24 hour(s))   Renal Function Panel    Collection Time: 10/05/18  4:47 AM   Result Value Ref Range    Glucose 57 (L) 65 - 99 mg/dL    BUN 24 (H) 8 - 23 mg/dL    Creatinine 0.97 0.57 - 1.00 mg/dL    Sodium 146 (H) 136 - 145 mmol/L    Potassium 4.0 3.5 - 5.2 mmol/L    Chloride 115 (H) 98 - 107 mmol/L    CO2 18.2 (L) 22.0 - 29.0 mmol/L    Calcium 8.3 (L) 8.6 - 10.5 mg/dL    Albumin 2.30 (L) 3.50 - 5.20 g/dL    Phosphorus 2.4 (L) 2.5 - 4.5 mg/dL    Anion Gap 12.8 mmol/L    BUN/Creatinine Ratio 24.7 7.0 - 25.0    eGFR Non African Amer 55 (L) >60 mL/min/1.73   CBC (No Diff)    Collection Time: 10/05/18  4:47 AM   Result Value Ref Range    WBC 7.28 4.50 - 10.70 10*3/mm3    RBC 3.17 (L) 3.90 - 5.20 10*6/mm3    Hemoglobin 8.5 (L) 11.9 - 15.5 g/dL    Hematocrit 28.4 (L) 35.6 - 45.5 %    MCV 89.6 80.5 - 98.2 fL    MCH 26.8 (L) 26.9 - 32.0 pg    MCHC 29.9 (L) 32.4 - 36.3 g/dL    RDW 19.8 (H) 11.7 - 13.0 %    RDW-SD 65.2 (H) 37.0 - 54.0 fl    MPV 10.1 6.0 - 12.0 fL    Platelets 169 140 - 500 10*3/mm3   Potassium    Collection Time: 10/05/18  4:47 AM   Result Value Ref Range    Potassium 4.1 3.5 - 5.2 mmol/L   POC Glucose Once    Collection Time: 10/05/18  8:11 AM   Result Value Ref Range    Glucose 56 (L) 70 - 130 mg/dL   POC Glucose Once    Collection Time: 10/05/18  9:38 AM   Result Value Ref Range    Glucose 141 (H) 70 - 130 mg/dL       Radiology:  Imaging Results (last 24 hours)     ** No results found for the last 24 hours. **             Medications have been reviewed:  Current Facility-Administered Medications   Medication Dose Route Frequency Provider Last Rate Last Dose   • acetaminophen (TYLENOL) 160 MG/5ML solution 640 mg  640 mg Oral Q6H PRN Rich Ruiz MD       • acetaminophen (TYLENOL) tablet 650  mg  650 mg Oral Q4H PRN Rich Riuz MD   650 mg at 10/04/18 1346   • albuterol (PROVENTIL) nebulizer solution 0.083% 2.5 mg/3mL  2.5 mg Nebulization Q4H PRN Rich Ruiz MD       • cefepime (MAXIPIME) 2 g/100 mL 0.9% NS (mbp)  2 g Intravenous Q12H Morgan Ramos MD   2 g at 10/05/18 1642   • dextrose 5 % and sodium chloride 0.45 % infusion  75 mL/hr Intravenous Continuous Morgan Ramos MD 75 mL/hr at 10/05/18 1311 75 mL/hr at 10/05/18 1311   • ferrous sulfate 300 (60 Fe) MG/5ML syrup 300 mg  300 mg Per G Tube Daily Rcih Ruiz MD   300 mg at 10/05/18 0845   • haloperidol lactate (HALDOL) injection 1 mg  1 mg Intravenous Q6H PRN Morgan Ramos MD   1 mg at 10/04/18 0131   • HYDROcodone-acetaminophen (NORCO) 5-325 MG per tablet 1 tablet  1 tablet Oral Q4H PRN Morgan Ramos MD   1 tablet at 10/05/18 1316   • HYDROmorphone (DILAUDID) injection 0.5 mg  0.5 mg Intravenous Q2H PRN Morgan Ramos MD       • ipratropium-albuterol (DUO-NEB) nebulizer solution 3 mL  3 mL Nebulization 4x Daily - RT Rich Ruiz MD   3 mL at 10/05/18 1426   • ipratropium-albuterol (DUO-NEB) nebulizer solution 3 mL  3 mL Nebulization Q4H PRN Rich Ruiz MD       • Naloxone HCl (NARCAN) injection 0.4 mg  0.4 mg Intravenous PRN Morgan Ramos MD       • ondansetron (ZOFRAN) tablet 4 mg  4 mg Oral Q6H PRN Rich Ruiz MD        Or   • ondansetron ODT (ZOFRAN-ODT) disintegrating tablet 4 mg  4 mg Oral Q6H PRN Rich Ruiz MD        Or   • ondansetron (ZOFRAN) injection 4 mg  4 mg Intravenous Q6H PRN Rich Ruiz MD   4 mg at 10/03/18 0033   • ondansetron (ZOFRAN) tablet 4 mg  4 mg Per G Tube Q6H PRN Rich Ruiz MD       • pantoprazole (PROTONIX) injection 40 mg  40 mg Intravenous BID AC Rich Ruiz MD   40 mg at 10/05/18 1315   • Pharmacy Consult - Pharmacy to dose   Does not apply Continuous PRN Rich Ruiz MD       • potassium chloride (MICRO-K) CR capsule 40 mEq  40 mEq Oral PRN Richard,  Morgan LOPEZ MD   40 mEq at 10/04/18 1549    Or   • potassium chloride (KLOR-CON) packet 40 mEq  40 mEq Oral PRN Morgan Ramos MD        Or   • potassium chloride 10 mEq in 100 mL IVPB  10 mEq Intravenous Q1H PRN Morgan Ramos MD       • sodium chloride 0.9 % flush 1-10 mL  1-10 mL Intravenous PRN Rich Ruiz MD       • sodium chloride 0.9 % flush 10 mL  10 mL Intravenous PRN Darryl Garcia MD       • sucralfate (CARAFATE) 1 GM/10ML suspension 1 g  1 g Per G Tube 4x Daily Arnulfo Villagomez MD   1 g at 10/05/18 1315       ASSESSMENT:  acute renal failure,  Prerenal, improved  pneumonia with pleural effusion  COPD  Hypernatremia, improved  Paroxysmal atrial fibrillation  Decubitus ulcer status post diverting colostomy  Chronic suprapubic catheter  Azotemia  ? UTI        PLAN:   Renal function and hypernatremia had normalized but sodium a bit higher today  IVFs restarted  Suspect she is not drinking enough to stay well hydrated   Agree with palliative consult  Encourage fluid intake as able  Replace potassium orally per protocol   IV antibiotics      Prognosis poor given her advanced dementia     Please call with any questions or concerns.        Simone Zuleta MD   [facility]   Office phone number: [phone]  Answering service phone number: [phone]    10/05/18  4:45 PM      Dictation performed using Dragon dictation software

## 2018-10-05 NOTE — NURSING NOTE
IV TEAM followed up with picc order. Pt is confused and has removed multiple iv's. Pt currently has a working piv. Picc remains on hold due to pt is not a candidate at this time due to unable to keep her accesses in.

## 2018-10-05 NOTE — PROGRESS NOTES
formerly [facility] INPATIENT PROGRESS NOTE         [address]    10/5/2018      PATIENT IDENTIFICATION:  Name: Simona Brooks ADMIT: 10/1/2018   : 1934  PCP: Leonor Echevarria MD    MRN: [id number] LOS: 4 days   AGE/SEX: 84 y.o. female  ROOM: [facility]                     LOS 4    Reason for visit: f/u PNA with sepsis and effusion      SUBJECTIVE:   Still very confused. Discussed with nursing staff at bedside. Diminished breath sounds bilaterally. Working on palliative.       Objective   OBJECTIVE:    Vital Sign Min/Max for last 24 hours  Temp  Min: 97.2 °F (36.2 °C)  Max: 98.4 °F (36.9 °C)   BP  Min: 131/82  Max: 159/83   Pulse  Min: 64  Max: 96   Resp  Min: 16  Max: 20   SpO2  Min: 97 %  Max: 100 %   No Data Recorded   No Data Recorded                         Body mass index is 18.24 kg/m².    Intake/Output Summary (Last 24 hours) at 10/05/18 0948  Last data filed at 10/05/18 0932   Gross per 24 hour   Intake              160 ml   Output              550 ml   Net             -390 ml         Exam:  GEN:  agitated and confused, appears stated age  EYES:   PERRL, anicteric sclera  ENT:    External ears/nose normal, OP clear  NECK:  No adenopathy, midline trachea  LUNGS: Normal chest on inspection, palpation and diminished bilateral breath sounds on auscultation  CV:  Normal S1S2, without murmur  ABD:  Non tender, non distended, no hepatosplenomegaly, +BS  EXT:  No edema, cyanosis or clubbing    Scheduled meds:      cefepime 2 g Intravenous Q12H   ferrous sulfate 300 mg Per G Tube Daily   ipratropium-albuterol 3 mL Nebulization 4x Daily - RT   pantoprazole 40 mg Intravenous BID AC   sucralfate 1 g Per G Tube 4x Daily     IV meds:                          dextrose 5 % and sodium chloride 0.45 % 75 mL/hr   Pharmacy Consult - Pharmacy to dose      Data Review:    Results from last 7 days  Lab Units 10/05/18  0447 10/04/18  0411 10/03/18  0506 10/02/18  0332 10/01/18  0708   SODIUM mmol/L 146* 144  151* 149* 142   POTASSIUM mmol/L 4.1  4.0 3.0* 3.7 4.5 5.0   CHLORIDE mmol/L 115* 112* 117* 114* 104   CO2 mmol/L 18.2* 20.3* 23.1 22.0 24.3   BUN mg/dL 24* 33* 47* 73* 103*   CREATININE mg/dL 0.97 1.03* 1.18* 1.55* 2.13*   GLUCOSE mg/dL 57* 79 71 112* 178*   CALCIUM mg/dL 8.3* 8.2* 8.3* 8.1* 8.6         Estimated Creatinine Clearance: 35 mL/min (by C-G formula based on SCr of 0.97 mg/dL).    Results from last 7 days  Lab Units 10/05/18  0447 10/04/18  0411 10/03/18  0506 10/02/18  1727 10/02/18  0753 10/02/18  0332  10/01/18  0708   WBC 10*3/mm3 7.28 6.92 7.83  --   --  14.16*  --  17.79*   HEMOGLOBIN g/dL 8.5* 8.3* 9.1* 8.2* 9.0* 8.9*  < > 8.4*   PLATELETS 10*3/mm3 169 179 179  --   --  165  --  211   < > = values in this interval not displayed.        Results from last 7 days  Lab Units 10/01/18  0222   ALT (SGPT) U/L 16   AST (SGOT) U/L 16       Results from last 7 days  Lab Units 10/01/18  0502   PH, ARTERIAL pH units 7.382   PO2 ART mm Hg 67.5*   PCO2, ARTERIAL mm Hg 45.0   HCO3 ART mmol/L 26.8       Results from last 7 days  Lab Units 10/01/18  0249 10/01/18  0222   PROCALCITONIN ng/mL  --  0.48*   LACTATE mmol/L 1.1  --          Chest x-ray 10/4 viewed shows interval decrease in density of right base opacity.  Bilateral pleural effusions remain.      Microbiology reviewed: Urine culture growing Klebsiella.  Respiratory viral panel positive rhinovirus/enterovirus.        )Assessment   ASSESSMENT:      Active Hospital Problems    Diagnosis Date Noted   • **Gastrointestinal hemorrhage with melena [K92.1] 06/21/2018   • Pleural effusion [J90] 10/01/2018   • Hyperkalemia [E87.5] 10/01/2018   • PARMINDER (acute kidney injury) (CMS/HCC) [N17.9] 10/01/2018   • Sepsis (CMS/HCC) [A41.9] 10/01/2018   • PNA (pneumonia) [J18.9] 10/01/2018   • PNA (pneumonia) [J18.9] 10/01/2018   • Iron deficiency anemia [D50.9] 10/01/2018   • Gastrointestinal hemorrhage [K92.2] 06/21/2018   • Dysphagia [R13.10] 06/21/2018   • Acute UTI  [N39.0] 12/02/2017   • Colostomy malfunction (CMS/HCC) [K94.03] 12/02/2017   • Anemia [D64.9] 12/02/2017   • Suprapubic catheter (CMS/HCC) [Z93.59] 08/11/2017   • Decubitus ulcer of coccygeal region, stage 4 (present on admission) [L89.154] 08/11/2017   • History of right above knee amputation (CMS/HCC) [Z89.611] 08/11/2017   • PAF (paroxysmal atrial fibrillation) (CMS/HCC) [I48.0] 08/08/2017   • COPD (chronic obstructive pulmonary disease) (CMS/HCC) [J44.9] 08/08/2017      Resolved Hospital Problems    Diagnosis Date Noted Date Resolved   No resolved problems to display.     Healthcare associated pneumonia  Pleural effusions: Parapneumonic  Sepsis  GI bleed with ulceration at G-tube site  Dysphagia  Acute UTI  Paroxysmal atrial fibrillation  COPD  Hypernatremia   Dementia and delirium       PLAN:  Encourage pulmonary toilet.  Continue antibiotic.  Bronchodilators for COPD symptoms and to help with clearance.  Repeat chest x-ray pending for follow-up of pneumonia and effusion shows persisting effusions but improving infiltrate.  Discontinued IV fluids.  Discussed with nursing staff at bedside.  Awaiting family's decision on palliative.    Philip Lou MD  Pulmonary and Critical Care Medicine  [facility], [facility]  10/5/2018    9:48 AM

## 2018-10-06 NOTE — PROGRESS NOTES
[facility] INPATIENT PROGRESS NOTE         [address]    10/6/2018      PATIENT IDENTIFICATION:  Name: Simona Brooks ADMIT: 10/1/2018   : 1934  PCP: Leonor Echevarria MD    MRN: [id number] LOS: 5 days   AGE/SEX: 84 y.o. female  ROOM: [facility]                     LOS 5    Reason for visit: f/u PNA with sepsis and effusion      SUBJECTIVE:   Confused.  Yelling for help.  Stated that her bottom is hurting.  Otherwise cannot obtain history       OBJECTIVE:    Vital Sign Min/Max for last 24 hours  Temp  Min: 96 °F (35.6 °C)  Max: 98 °F (36.7 °C)   BP  Min: 152/85  Max: 183/82   Pulse  Min: 63  Max: 91   Resp  Min: 18  Max: 28   SpO2  Min: 88 %  Max: 100 %   No Data Recorded   No Data Recorded                         Body mass index is 18.24 kg/m².    Intake/Output Summary (Last 24 hours) at 10/06/18 1619  Last data filed at 10/06/18 1100   Gross per 24 hour   Intake             1099 ml   Output             1100 ml   Net               -1 ml         Exam:  GEN:  agitated and confused, appears stated age  EYES:   PERRL, anicteric sclera  LUNGS: Normal chest on inspection, palpation and diminished bilateral breath sounds on auscultation  CV:  Normal S1S2, without murmur  ABD:  Non tender, non distended, no hepatosplenomegaly, +BS  EXT:  No edema, cyanosis or clubbing    Scheduled meds:      ferrous sulfate 300 mg Per G Tube Daily   ipratropium-albuterol 3 mL Nebulization 4x Daily - RT   levoFLOXacin 500 mg Oral Q24H   pantoprazole 40 mg Intravenous BID AC   sucralfate 1 g Per G Tube 4x Daily     IV meds:                          Pharmacy Consult - Pharmacy to dose      Data Review:    Results from last 7 days  Lab Units 10/06/18  0631 10/05/18  0447 10/04/18  0411 10/03/18  0506 10/02/18  0332   SODIUM mmol/L 139 146* 144 151* 149*   POTASSIUM mmol/L 3.2* 4.1  4.0 3.0* 3.7 4.5   CHLORIDE mmol/L 111* 115* 112* 117* 114*   CO2 mmol/L 18.8* 18.2* 20.3* 23.1 22.0   BUN mg/dL 21 24* 33* 47* 73*    CREATININE mg/dL 0.87 0.97 1.03* 1.18* 1.55*   GLUCOSE mg/dL 82 57* 79 71 112*   CALCIUM mg/dL 7.9* 8.3* 8.2* 8.3* 8.1*         Estimated Creatinine Clearance: 39 mL/min (by C-G formula based on SCr of 0.87 mg/dL).    Results from last 7 days  Lab Units 10/06/18  0631 10/05/18  0447 10/04/18  0411 10/03/18  0506 10/02/18  1727  10/02/18  0332   WBC 10*3/mm3 8.25 7.28 6.92 7.83  --   --  14.16*   HEMOGLOBIN g/dL 8.8* 8.5* 8.3* 9.1* 8.2*  < > 8.9*   PLATELETS 10*3/mm3 165 169 179 179  --   --  165   < > = values in this interval not displayed.        Results from last 7 days  Lab Units 10/01/18  0222   ALT (SGPT) U/L 16   AST (SGOT) U/L 16       Results from last 7 days  Lab Units 10/01/18  0502   PH, ARTERIAL pH units 7.382   PO2 ART mm Hg 67.5*   PCO2, ARTERIAL mm Hg 45.0   HCO3 ART mmol/L 26.8       Results from last 7 days  Lab Units 10/01/18  0249 10/01/18  0222   PROCALCITONIN ng/mL  --  0.48*   LACTATE mmol/L 1.1  --                  ASSESSMENT:    1. Healthcare associated pneumonia  2. Pleural effusions: Parapneumonic ?    Other pertinent medical problems  Sepsis  GI bleed with ulceration at G-tube site  Dysphagia  Acute UTI  Paroxysmal atrial fibrillation  COPD  Hypernatremia   Dementia and delirium       PLAN:  Agree with AB  PEG tube being challenged. Awaiting family decision      Rosa Villalba MD  Pulmonary and Critical Care Medicine  [facility], [facility]  10/6/2018    4:19 PM

## 2018-10-06 NOTE — PROGRESS NOTES
[facility]  Inpatient Progress Note    Reason for Follow Up:  Anemia and GI bleed family not at bedside, nursing tells me that multiple family members have different opinion on whether to replace PEG    Subjective     Interval History:   No acute 24-hour events    Current Facility-Administered Medications:   •  acetaminophen (TYLENOL) 160 MG/5ML solution 640 mg, 640 mg, Oral, Q6H PRN, Rich Ruiz MD, 640 mg at 10/05/18 2022  •  acetaminophen (TYLENOL) tablet 650 mg, 650 mg, Oral, Q4H PRN, Rich Ruiz MD, 650 mg at 10/04/18 1346  •  albuterol (PROVENTIL) nebulizer solution 0.083% 2.5 mg/3mL, 2.5 mg, Nebulization, Q4H PRN, Rich Ruiz MD  •  cefepime (MAXIPIME) 2 g/100 mL 0.9% NS (mbp), 2 g, Intravenous, Q12H, Morgan Ramos MD, 2 g at 10/06/18 0431  •  dextrose 5 % and sodium chloride 0.45 % infusion, 75 mL/hr, Intravenous, Continuous, Morgan Ramos MD, Last Rate: 75 mL/hr at 10/06/18 0647, 75 mL/hr at 10/06/18 0647  •  ferrous sulfate 300 (60 Fe) MG/5ML syrup 300 mg, 300 mg, Per G Tube, Daily, Rich Ruiz MD, 300 mg at 10/06/18 0911  •  haloperidol lactate (HALDOL) injection 1 mg, 1 mg, Intravenous, Q6H PRN, Morgan Ramos MD, 1 mg at 10/06/18 0537  •  HYDROcodone-acetaminophen (NORCO) 5-325 MG per tablet 1 tablet, 1 tablet, Oral, Q4H PRN, Morgan Ramos MD, 1 tablet at 10/06/18 0912  •  HYDROmorphone (DILAUDID) injection 0.5 mg, 0.5 mg, Intravenous, Q2H PRN, Morgan Ramos MD, 0.5 mg at 10/06/18 0432  •  ipratropium-albuterol (DUO-NEB) nebulizer solution 3 mL, 3 mL, Nebulization, 4x Daily - RT, Rich Ruiz MD, 3 mL at 10/06/18 1307  •  ipratropium-albuterol (DUO-NEB) nebulizer solution 3 mL, 3 mL, Nebulization, Q4H PRN, Rich Ruiz MD  •  Naloxone HCl (NARCAN) injection 0.4 mg, 0.4 mg, Intravenous, PRN, Morgan Ramos MD  •  ondansetron (ZOFRAN) tablet 4 mg, 4 mg, Oral, Q6H PRN **OR** ondansetron ODT (ZOFRAN-ODT) disintegrating tablet 4 mg, 4  mg, Oral, Q6H PRN **OR** ondansetron (ZOFRAN) injection 4 mg, 4 mg, Intravenous, Q6H PRN, Rich Ruiz MD, 4 mg at 10/03/18 0033  •  ondansetron (ZOFRAN) tablet 4 mg, 4 mg, Per G Tube, Q6H PRN, Rich Ruiz MD  •  pantoprazole (PROTONIX) injection 40 mg, 40 mg, Intravenous, BID AC, Rich Ruiz MD, 40 mg at 10/06/18 0431  •  Pharmacy Consult - Pharmacy to dose Maxipime, , Does not apply, Continuous PRN, Morgan Ramos MD  •  potassium chloride (MICRO-K) CR capsule 40 mEq, 40 mEq, Oral, PRN, 40 mEq at 10/04/18 1549 **OR** potassium chloride (KLOR-CON) packet 40 mEq, 40 mEq, Oral, PRN **OR** potassium chloride 10 mEq in 100 mL IVPB, 10 mEq, Intravenous, Q1H PRN, Morgan Ramos MD  •  sodium chloride 0.9 % flush 1-10 mL, 1-10 mL, Intravenous, PRN, Rich Ruiz MD  •  Insert peripheral IV, , , Once **AND** sodium chloride 0.9 % flush 10 mL, 10 mL, Intravenous, PRN, Darryl Garcia MD  •  sucralfate (CARAFATE) 1 GM/10ML suspension 1 g, 1 g, Per G Tube, 4x Daily, Arnulfo Villagomez MD, 1 g at 10/06/18 0911  Review of Systems:    Review of systems could not be obtained due to  patient confusion.    Objective     Vital Signs  Temp:  [97.2 °F (36.2 °C)-98 °F (36.7 °C)] 97.4 °F (36.3 °C)  Heart Rate:  [63-88] 88  Resp:  [18-28] 28  BP: (152-183)/(55-85) 154/72  Body mass index is 18.24 kg/m².    Intake/Output Summary (Last 24 hours) at 10/06/18 1326  Last data filed at 10/06/18 1100   Gross per 24 hour   Intake             1099 ml   Output             1450 ml   Net             -351 ml     I/O this shift:  In: -   Out: 200 [Stool:200]     Physical Exam:   General: patient awake, alert and cooperative   Eyes: Normal lids and lashes, no scleral icterus   Neck: supple, normal ROM   Skin: warm and dry, not jaundiced   Cardiovascular: regular rhythm and rate, no murmurs auscultated   Pulm: clear to auscultation bilaterally, regular and unlabored   Abdomen: soft, nontender, nondistended; normal bowel  sounds   Rectal: deferred   Extremities: no rash or edema   Psychiatric: Normal mood and behavior; memory intact     Results Review:     I reviewed the patient's new clinical results.      Results from last 7 days  Lab Units 10/06/18  0631 10/05/18  0447 10/04/18  0411   WBC 10*3/mm3 8.25 7.28 6.92   HEMOGLOBIN g/dL 8.8* 8.5* 8.3*   HEMATOCRIT % 29.2* 28.4* 29.0*   PLATELETS 10*3/mm3 165 169 179       Results from last 7 days  Lab Units 10/06/18  0631 10/05/18  0447 10/04/18  0411  10/01/18  0222   SODIUM mmol/L 139 146* 144  < > 136   POTASSIUM mmol/L 3.2* 4.1  4.0 3.0*  < > 6.3*   CHLORIDE mmol/L 111* 115* 112*  < > 102   CO2 mmol/L 18.8* 18.2* 20.3*  < > 23.2   BUN mg/dL 21 24* 33*  < > 109*   CREATININE mg/dL 0.87 0.97 1.03*  < > 2.00*   CALCIUM mg/dL 7.9* 8.3* 8.2*  < > 7.8*   BILIRUBIN mg/dL  --   --   --   --  0.2   ALK PHOS U/L  --   --   --   --  242*   ALT (SGPT) U/L  --   --   --   --  16   AST (SGOT) U/L  --   --   --   --  16   GLUCOSE mg/dL 82 57* 79  < > 202*   < > = values in this interval not displayed.        Lab Results  Lab Value Date/Time   LIPASE 20 08/09/2017 0605   LIPASE 20 08/08/2017 1251       Radiology:  XR Chest 2 View   Final Result   There appears to be some interval decrease in the density of   opacity at the right lung base. Bilateral pleural effusions remain,   however.       This report was finalized on 10/4/2018 3:07 PM by Dr. Bogdan Weathers M.D.           Renal Bilateral   Final Result       1. Atrophic appearing right kidney. No hydronephrosis is seen on the   right.   2. Multiple calculi identified within the left kidney and proximal left   ureter. This is associated with some mild hydroureteronephrosis. Similar   findings were present on exam performed December 2, 2017, although the   degree of hydronephrosis and increased slightly.       This report was finalized on 10/2/2018 9:35 PM by Dr. Serene Kaiser M.D.          XR Chest 1 View   Final Result   Kyphotic  positioning with dense right-sided opacities felt   to reflect pneumonia and pleural fluid           This report was finalized on 10/1/2018 2:25 AM by Kolton Olivia M.D.              Assessment/Plan     Patient Active Problem List   Diagnosis   • PAF (paroxysmal atrial fibrillation) (CMS/[facility])   • COPD (chronic obstructive pulmonary disease) (CMS/[facility])   • Calculus of left kidney   • Suprapubic catheter (CMS/[facility])   • Decubitus ulcer of coccygeal region, stage 4 (present on admission)   • History of right above knee amputation (CMS/[facility])   • Acute UTI   • Colostomy malfunction (CMS/[facility])   • Anemia   • Abnormal urine   • Gastrointestinal hemorrhage   • Dysphagia   • Gastrointestinal hemorrhage with melena   • Acute gastric ulcer with hemorrhage   • Pleural effusion   • Hyperkalemia   • PARMINDER (acute kidney injury) (CMS/[facility])   • Sepsis (CMS/[facility])   • PNA (pneumonia)   • PNA (pneumonia)   • Iron deficiency anemia     Impression  #1 GI bleeding: Evidence of ulceration at the mucosal site of G-tube  #2 acute blood loss anemia  #3 pneumonia  #4 UTI  #5 dementia        Recommendation:  Attempt oral feeing per speech recs with assistance- calorie count     Continue current medical regimen     Await family's decision on palliative status. UN Fortunately  nursing staff tells me that multiple family members have multiple opinions on oral feedings versus PEG tube feeding and unfortunately the power of , the son, has not been able to be reached     If all agree can consider replacement of G-tube during this hospitalization - if palliative care pursued, this is unnecessary     Continue to follow h/h-more stable       Discussed peg tube care with nursing given new mild erythema seen around tube    I discussed the patients findings and my recommendations with patient and nursing staff.    Waqar Graham MD

## 2018-10-06 NOTE — PROGRESS NOTES
Name: Simona Brooks ADMIT: 10/1/2018   : 1934  PCP: Leonor Echevarria MD    MRN: [id number] LOS: 5 days   AGE/SEX: 84 y.o. female  ROOM: [facility]   Subjective   Discussed on phone with Jeyson Brooks (POA). We discussed her condition and prognosis. He reports that he agrees she would not want to have cpr or intubation in the event of cardiac arrest. Discussed her mental status and current restraints and iv antibiotics. He would like to focus more on quality of life and thinks it would be ok for her to have liberalized diet and take PO medications. Open to palliative discussion but would like her to complete antibiotics. He also said if PEG replacement would not improve her quality of life then would be ok with not proceeding. I discussed transition to comfort care and he wanted me to speak with her other son Meliton gruber. He stated he was poa/decision maker and if needed would make final decisions.    Discussed with Meliton Brooks via phone. He stated his mom did not have dementia and was lucid except for when she has been in hospital and on antibiotics. He said he would sign consents for procedures and peg if needed. I discussed her mental status and her repeated requests to be out of restraints and not have IVs. Also discussed her recent hospitalizations at [place] and here are poor prognostic and may indicate decline. He understood. Agreed with allowing her to take po intake and stop ivs. Would like to see how she does completing antibiotics mentally.    She is confused but redirectable. Reports no pain dyspnea nvd.    Objective   Vital Signs  Temp:  [96 °F (35.6 °C)-98 °F (36.7 °C)] 96 °F (35.6 °C)  Heart Rate:  [63-91] 84  Resp:  [18-28] 28  BP: (152-183)/(55-85) 155/71  SpO2:  [88 %-100 %] 90 %  on  Flow (L/min):  [2-5] 4;   Device (Oxygen Therapy): nasal cannula  Body mass index is 18.24 kg/m².    Physical Exam   Constitutional: She appears well-developed.   frail   HENT:   Head: Atraumatic.    Nose: Nose normal.   Eyes: Conjunctivae and EOM are normal.   Neck: Neck supple.   Cardiovascular: Normal rate, regular rhythm and intact distal pulses.    Pulmonary/Chest: Effort normal. No stridor. She has decreased breath sounds. She has no wheezes.   Abdominal: Soft.   PEG   Musculoskeletal: She exhibits no edema or tenderness.   Neurological: She is alert. No cranial nerve deficit.   Oriented to person only   Skin: Skin is warm and dry. She is not diaphoretic.   Psychiatric: Her mood appears anxious. Cognition and memory are impaired.   Nursing note and vitals reviewed.      Results Review:       I reviewed the patient's new clinical results.     I reviewed telemetry/EKG results, sinus rhythm.      Results from last 7 days  Lab Units 10/06/18  0631 10/05/18  0447 10/04/18  0411 10/03/18  0506   WBC 10*3/mm3 8.25 7.28 6.92 7.83   HEMOGLOBIN g/dL 8.8* 8.5* 8.3* 9.1*   PLATELETS 10*3/mm3 165 169 179 179     Results from last 7 days  Lab Units 10/06/18  0631 10/05/18  0447 10/04/18  0411 10/03/18  0506   SODIUM mmol/L 139 146* 144 151*   POTASSIUM mmol/L 3.2* 4.1  4.0 3.0* 3.7   CHLORIDE mmol/L 111* 115* 112* 117*   CO2 mmol/L 18.8* 18.2* 20.3* 23.1   BUN mg/dL 21 24* 33* 47*   CREATININE mg/dL 0.87 0.97 1.03* 1.18*   GLUCOSE mg/dL 82 57* 79 71   Estimated Creatinine Clearance: 39 mL/min (by C-G formula based on SCr of 0.87 mg/dL).  Results from last 7 days  Lab Units 10/06/18  0631 10/05/18  0447 10/04/18  0411 10/03/18  0506   CALCIUM mg/dL 7.9* 8.3* 8.2* 8.3*   ALBUMIN g/dL 2.10* 2.30* 2.10* 2.40*   MAGNESIUM mg/dL  --   --  2.0  --    PHOSPHORUS mg/dL 2.3* 2.4* 2.5 2.9         cefepime 2 g Intravenous Q12H   ferrous sulfate 300 mg Per G Tube Daily   ipratropium-albuterol 3 mL Nebulization 4x Daily - RT   pantoprazole 40 mg Intravenous BID AC   sucralfate 1 g Per G Tube 4x Daily       dextrose 5 % and sodium chloride 0.45 % 75 mL/hr Last Rate: 75 mL/hr (10/06/18 1450)   Pharmacy Consult - Pharmacy to dose      Diet Pureed; Nectar / Syrup Thick      Assessment/Plan      Active Hospital Problems    Diagnosis Date Noted   • **Gastrointestinal hemorrhage with melena [K92.1] 06/21/2018   • Pleural effusion [J90] 10/01/2018   • Hyperkalemia [E87.5] 10/01/2018   • PARMINDER (acute kidney injury) (CMS/[facility]) [N17.9] 10/01/2018   • Sepsis (CMS/HCC) [A41.9] 10/01/2018   • PNA (pneumonia) [J18.9] 10/01/2018   • PNA (pneumonia) [J18.9] 10/01/2018   • Iron deficiency anemia [D50.9] 10/01/2018   • Gastrointestinal hemorrhage [K92.2] 06/21/2018   • Dysphagia [R13.10] 06/21/2018   • Acute UTI [N39.0] 12/02/2017   • Colostomy malfunction (CMS/HCC) [K94.03] 12/02/2017   • Anemia [D64.9] 12/02/2017   • Suprapubic catheter (CMS/HCC) [Z93.59] 08/11/2017   • Decubitus ulcer of coccygeal region, stage 4 (present on admission) [L89.154] 08/11/2017   • History of right above knee amputation (CMS/HCC) [Z89.611] 08/11/2017   • PAF (paroxysmal atrial fibrillation) (CMS/HCC) [I48.0] 08/08/2017   • COPD (chronic obstructive pulmonary disease) (CMS/HCC) [J44.9] 08/08/2017      Resolved Hospital Problems    Diagnosis Date Noted Date Resolved   No resolved problems to display.     - PNA: Cefepime, day 6. Will change to levaquin to complete 7 day course. Pulmonology following.   - Klebsiella UTI: see above.  - Rhinovirus URI  - Ulceration saad PEG: Hgb stable. If to continue tube feeding will need PEG replacement. SLP following for swallow eval. GI following.  - Hypoglycemia: BG check BID to monitor.  - PARMINDER resolved. Nephrology evaluated.  - Dementia: Have changed to po medications and will order geodon prn for agitation.  - Decubitus Ulcer: Stage 4. Wound care.  - GOC: Difficult situation with family members desiring different care. Both agreed with allowing her to eat and seeing how she does out of restraints and without IV. Code changed to DNR/DNI after discussion with poa. Will continue to monitor labs. Can stop telemetry monitoring. Palliative care  following. See above for details of conversation.    >35 minutes time spent with over half in counseling and coordination of care    Morgan Ramos MD  [facility]  10/06/18  3:44 PM

## 2018-10-06 NOTE — PROGRESS NOTES
: the pt. Confused , the chart reviewed    Vital Signs  Vitals:    10/06/18 0722   BP: 154/72   Pulse: 79   Resp: 20   Temp: 97.4 °F (36.3 °C)   SpO2: (!) 88%     No intake/output data recorded.  I/O last 3 completed shifts:  In: 1259 [P.O.:60; I.V.:999; IV Piggyback:200]  Out: 1800 [Urine:1450; Stool:350]      Physical Exam:    General: confused but inno distress  HEENT:  Normo cephalic, Atraumatic, EOMI, PERRLA, NO icterus,  Neck:  Supple, No JVD, No Carotid artery bruit, No lymphadenopathy  Chest: Clear to auscultation bilaterally,  Cardiovascular: Regular rate and rhythm. Positive S1 & S2, No rub, murmur or gallop.  Abdominal: Soft, non tender, no palpable organomegaly, no abdominal bruit, positive bowel sounds  Musculoskeletal: No joint tenderness or swelling, good range of motion, Adequate muscle strength on both sides, no cyanosis, clubbing or edema on lower extremities.  Neuro: Alert oriented x3, CN1 - 12 intact, No focal sensory or motor deficit.      Review of Systems:   CNS: Denied headaches, blurred vision, tingling or numbness in any part of body. No weakness or any impaired speech.  CVS: No chest pain or shortness of breath, No orthopnea or PND or exertional dyspnea,  Pulmonary: Denies shortness of breath, coughs, hematemesis, night sweats or weight loss.  GI: Denies diarrhea, nausea, vomiting. Denies weight loss, hematemesis, melena.  : Denies dysuria, frequency or hesitancy of urination  Vascular: Denies claudication, resting pain, tingling numbness or weakness in any part of the body.  Musculoskeletal: Denies Joint tenderness or pain, denies stiffness in joints, denies fever or weight loss, or skin rashes.    Current Labs  [unfilled] reviewed.  Creatinine 0.87, sodium 139  Current Radiology    Current Meds    Current Facility-Administered Medications:   •  acetaminophen (TYLENOL) 160 MG/5ML solution 640 mg, 640 mg, Oral, Q6H PRN, Rich Ruiz MD, 640 mg at 10/05/18 2022  •  acetaminophen  (TYLENOL) tablet 650 mg, 650 mg, Oral, Q4H PRN, Rich Ruiz MD, 650 mg at 10/04/18 1346  •  albuterol (PROVENTIL) nebulizer solution 0.083% 2.5 mg/3mL, 2.5 mg, Nebulization, Q4H PRN, Rich Ruiz MD  •  cefepime (MAXIPIME) 2 g/100 mL 0.9% NS (mbp), 2 g, Intravenous, Q12H, Morgan Ramos MD, 2 g at 10/06/18 0431  •  dextrose 5 % and sodium chloride 0.45 % infusion, 75 mL/hr, Intravenous, Continuous, Morgan Ramos MD, Last Rate: 75 mL/hr at 10/06/18 0647, 75 mL/hr at 10/06/18 0647  •  ferrous sulfate 300 (60 Fe) MG/5ML syrup 300 mg, 300 mg, Per G Tube, Daily, Rich Ruiz MD, 300 mg at 10/06/18 0911  •  haloperidol lactate (HALDOL) injection 1 mg, 1 mg, Intravenous, Q6H PRN, Morgan Ramos MD, 1 mg at 10/06/18 0537  •  HYDROcodone-acetaminophen (NORCO) 5-325 MG per tablet 1 tablet, 1 tablet, Oral, Q4H PRN, Morgan Ramos MD, 1 tablet at 10/06/18 0912  •  HYDROmorphone (DILAUDID) injection 0.5 mg, 0.5 mg, Intravenous, Q2H PRN, Morgan Ramos MD, 0.5 mg at 10/06/18 0432  •  ipratropium-albuterol (DUO-NEB) nebulizer solution 3 mL, 3 mL, Nebulization, 4x Daily - RT, Rich Ruiz MD, 3 mL at 10/06/18 0714  •  ipratropium-albuterol (DUO-NEB) nebulizer solution 3 mL, 3 mL, Nebulization, Q4H PRN, Rich Ruiz MD  •  Naloxone HCl (NARCAN) injection 0.4 mg, 0.4 mg, Intravenous, PRN, Morgan Ramos MD  •  ondansetron (ZOFRAN) tablet 4 mg, 4 mg, Oral, Q6H PRN **OR** ondansetron ODT (ZOFRAN-ODT) disintegrating tablet 4 mg, 4 mg, Oral, Q6H PRN **OR** ondansetron (ZOFRAN) injection 4 mg, 4 mg, Intravenous, Q6H PRN, Rich Ruiz MD, 4 mg at 10/03/18 0033  •  ondansetron (ZOFRAN) tablet 4 mg, 4 mg, Per G Tube, Q6H PRN, Rich Ruiz MD  •  pantoprazole (PROTONIX) injection 40 mg, 40 mg, Intravenous, BID AC, Rich Ruiz MD, 40 mg at 10/06/18 0431  •  Pharmacy Consult - Pharmacy to dose Maxipime, , Does not apply, Continuous PRN, Morgan Ramos MD  •  potassium chloride (MICRO-K) CR  capsule 40 mEq, 40 mEq, Oral, PRN, 40 mEq at 10/04/18 1549 **OR** potassium chloride (KLOR-CON) packet 40 mEq, 40 mEq, Oral, PRN **OR** potassium chloride 10 mEq in 100 mL IVPB, 10 mEq, Intravenous, Q1H PRN, Morgan Ramos MD  •  sodium chloride 0.9 % flush 1-10 mL, 1-10 mL, Intravenous, PRN, Rich Ruiz MD  •  Insert peripheral IV, , , Once **AND** sodium chloride 0.9 % flush 10 mL, 10 mL, Intravenous, PRN, Darryl Garcia MD  •  sucralfate (CARAFATE) 1 GM/10ML suspension 1 g, 1 g, Per G Tube, 4x Daily, Arnulfo Villagomez MD, 1 g at 10/06/18 0911              Patient Active Problem List   Diagnosis   • PAF (paroxysmal atrial fibrillation) (CMS/HCC)   • COPD (chronic obstructive pulmonary disease) (CMS/HCC)   • Calculus of left kidney   • Suprapubic catheter (CMS/HCC)   • Decubitus ulcer of coccygeal region, stage 4 (present on admission)   • History of right above knee amputation (CMS/HCC)   • Acute UTI   • Colostomy malfunction (CMS/HCC)   • Anemia   • Abnormal urine   • Gastrointestinal hemorrhage   • Dysphagia   • Gastrointestinal hemorrhage with melena   • Acute gastric ulcer with hemorrhage   • Pleural effusion   • Hyperkalemia   • PARMINDER (acute kidney injury) (CMS/HCC)   • Sepsis (CMS/HCC)   • PNA (pneumonia)   • PNA (pneumonia)   • Iron deficiency anemia   parminder resolved  Hyponatremia better,   Comfort care appropriate        Walter Stewart MD FACP, FASN.  10/06/18  12:09 PM

## 2018-10-06 NOTE — PLAN OF CARE
Problem: Patient Care Overview  Goal: Plan of Care Review  Outcome: Ongoing (interventions implemented as appropriate)   10/05/18 2015   Coping/Psychosocial   Plan of Care Reviewed With patient   Plan of Care Review   Progress no change   OTHER   Outcome Summary Restraints continued. Confused wanting to go home. Pain medication for right stump pain. Dressings changed. Turn every 1-2 hours.     Goal: Individualization and Mutuality  Outcome: Ongoing (interventions implemented as appropriate)    Goal: Discharge Needs Assessment  Outcome: Ongoing (interventions implemented as appropriate)    Goal: Interprofessional Rounds/Family Conf  Outcome: Ongoing (interventions implemented as appropriate)      Problem: Fall Risk (Adult)  Goal: Absence of Fall  Outcome: Ongoing (interventions implemented as appropriate)      Problem: Infection, Risk/Actual (Adult)  Goal: Infection Prevention/Resolution  Outcome: Ongoing (interventions implemented as appropriate)      Problem: Pneumonia (Adult)  Goal: Signs and Symptoms of Listed Potential Problems Will be Absent, Minimized or Managed (Pneumonia)  Outcome: Ongoing (interventions implemented as appropriate)      Problem: Skin Injury Risk (Adult)  Goal: Skin Health and Integrity  Outcome: Ongoing (interventions implemented as appropriate)      Problem: Restraint, Nonbehavioral (Nonviolent)  Goal: Rationale and Justification  Outcome: Ongoing (interventions implemented as appropriate)    Goal: Nonbehavioral (Nonviolent) Restraint: Absence of Injury/Harm  Outcome: Ongoing (interventions implemented as appropriate)    Goal: Nonbehavioral (Nonviolent) Restraint: Achievement of Discontinuation Criteria  Outcome: Ongoing (interventions implemented as appropriate)    Goal: Nonbehavioral (Nonviolent) Restraint: Preservation of Dignity and Wellbeing  Outcome: Ongoing (interventions implemented as appropriate)

## 2018-10-06 NOTE — PROGRESS NOTES
Pharmacy consult to dose Cefepime (Maxipime)    Simona Brooks is a 84 y.o. female 51.3 kg (113 lb).    Pharmacy consulted to dose per Dr Ruiz  Indication:  Pnemonia  Duration:  Day 6 of 10 days total    Anti-Infectives     Ordered     Dose/Rate Route Frequency Start Stop    10/04/18 1449  cefepime (MAXIPIME) 2 g/100 mL 0.9% NS (mbp)     Ordering Provider:  Morgan Ramos MD    2 g  over 4 Hours Intravenous Every 12 Hours 10/04/18 1600 10/11/18 1559    10/02/18 0907  vancomycin (VANCOCIN) in iso-osmotic dextrose IVPB 1 g (premix) 200 mL     Ordering Provider:  Rich Ruiz MD    20 mg/kg × 52.5 kg  over 100 Minutes Intravenous Once 10/02/18 1100 10/02/18 1745    10/01/18 0323  cefepime (MAXIPIME) 2 g/100 mL 0.9% NS (mbp)     Ordering Provider:  Kory Renteria MD    2 g  200 mL/hr over 30 Minutes Intravenous Once 10/01/18 0325 10/01/18 0530    10/01/18 0323  vancomycin (VANCOCIN) in iso-osmotic dextrose IVPB 1 g (premix) 200 mL     Ordering Provider:  Kory Renteria MD    20 mg/kg × 52.5 kg Intravenous Once 10/01/18 0325 10/01/18 0549           Results from last 7 days  Lab Units 10/06/18  0631 10/05/18  0447 10/04/18  0411   CREATININE mg/dL 0.87 0.97 1.03*     Estimated Creatinine Clearance: 39 mL/min (by C-G formula based on SCr of 0.87 mg/dL).  Lab Results   Component Value Date    WBC 8.25 10/06/2018    WBC 7.28 10/05/2018    WBC 6.92 10/04/2018     Temp Readings from Last 3 Encounters:   10/06/18 97.4 °F (36.3 °C) (Oral)   06/24/18 97.7 °F (36.5 °C) (Oral)   04/14/18 99.2 °F (37.3 °C) (Tympanic)        Assessment/Plan:    Will continue cefepime 2 gm IV Q 12 hours due to age/renal function with calculated creatinine clearance of 39 mL/min.  WBC = 8.25, serum creatinine = 0.87 mg/dL, patient is afebrile. Will monitor and adjust as needed.     Thank you for consult,    Carly Mayer, [facility]   Staff Clinical Pharmacist  10/06/18 12:53 PM

## 2018-10-06 NOTE — PLAN OF CARE
Problem: Patient Care Overview  Goal: Plan of Care Review  Outcome: Ongoing (interventions implemented as appropriate)   10/06/18 0650   Coping/Psychosocial   Plan of Care Reviewed With patient   Plan of Care Review   Progress no change   OTHER   Outcome Summary restraints cont. Confused. Pain meds. Turned q 2 hous. Asked for water. Edema increasing with weaping skin. Cont to monitor,safety maintained.        Problem: Restraint, Nonbehavioral (Nonviolent)  Goal: Rationale and Justification  Outcome: Ongoing (interventions implemented as appropriate)

## 2018-10-07 NOTE — PROGRESS NOTES
[facility]  Inpatient Progress Note    Reason for Follow Up:  Contemplating PEG placement, family is, we are following for nutritional issues    Subjective     Interval History:   No significant 24-hour events    Current Facility-Administered Medications:   •  acetaminophen (TYLENOL) 160 MG/5ML solution 640 mg, 640 mg, Oral, Q6H PRN, Rich Ruiz MD, 640 mg at 10/05/18 2022  •  acetaminophen (TYLENOL) tablet 650 mg, 650 mg, Oral, Q4H PRN, Rich Ruiz MD, 650 mg at 10/04/18 1346  •  albuterol (PROVENTIL) nebulizer solution 0.083% 2.5 mg/3mL, 2.5 mg, Nebulization, Q4H PRN, Rich Ruiz MD  •  dextrose (D50W) 25 g/ 50mL Intravenous Solution 25 g, 25 g, Intravenous, Q15 Min PRN, Morgan Ramos MD  •  dextrose (GLUTOSE) oral gel 15 g, 15 g, Oral, Q15 Min PRN, Morgan Ramos MD  •  ferrous sulfate 300 (60 Fe) MG/5ML syrup 300 mg, 300 mg, Per G Tube, Daily, Rich Ruiz MD, 300 mg at 10/07/18 0932  •  glucagon (human recombinant) (GLUCAGEN DIAGNOSTIC) injection 1 mg, 1 mg, Subcutaneous, PRN, Morgan Ramos MD  •  HYDROcodone-acetaminophen (NORCO) 5-325 MG per tablet 1 tablet, 1 tablet, Oral, Q4H PRN, Morgan Ramos MD, 1 tablet at 10/07/18 0952  •  HYDROmorphone (DILAUDID) injection 0.5 mg, 0.5 mg, Intravenous, Q2H PRN, Morgan Ramos MD, 0.5 mg at 10/06/18 0432  •  ipratropium-albuterol (DUO-NEB) nebulizer solution 3 mL, 3 mL, Nebulization, 4x Daily - RT, Rich Ruiz MD, 3 mL at 10/07/18 1523  •  ipratropium-albuterol (DUO-NEB) nebulizer solution 3 mL, 3 mL, Nebulization, Q4H PRN, Rich Ruiz MD  •  lansoprazole (FIRST) oral suspension 30 mg, 30 mg, Oral, BID AC, Morgan Ramos MD, 30 mg at 10/07/18 0932  •  levoFLOXacin (LEVAQUIN) tablet 500 mg, 500 mg, Oral, Q24H, Morgan Ramos MD, 500 mg at 10/06/18 2052  •  LORazepam (ATIVAN) tablet 1 mg, 1 mg, Oral, Q6H PRN, Morgan Ramos MD, 1 mg at 10/07/18 0344  •  Naloxone HCl (NARCAN) injection 0.4  mg, 0.4 mg, Intravenous, PRN, Morgan Ramos MD  •  ondansetron (ZOFRAN) tablet 4 mg, 4 mg, Oral, Q6H PRN **OR** ondansetron ODT (ZOFRAN-ODT) disintegrating tablet 4 mg, 4 mg, Oral, Q6H PRN **OR** ondansetron (ZOFRAN) injection 4 mg, 4 mg, Intravenous, Q6H PRN, Rich Ruiz MD, 4 mg at 10/03/18 0033  •  ondansetron (ZOFRAN) tablet 4 mg, 4 mg, Per G Tube, Q6H PRN, Rich Ruiz MD  •  potassium & sodium phosphates (PHOS-NAK) 280-160-250 MG packet - for Phosphorus less than 1.25 mg/dL, 2 packet, Oral, Q6H PRN **OR** potassium & sodium phosphates (PHOS-NAK) 280-160-250 MG packet - for Phosphorus 1.25 - 2.5 mg/dL, 2 packet, Oral, Once PRN, Morgan Ramos MD, 2 packet at 10/06/18 1745  •  potassium chloride (KLOR-CON) packet 40 mEq, 40 mEq, Oral, PRN, Morgan Ramos MD  •  potassium chloride (MICRO-K) CR capsule 40 mEq, 40 mEq, Oral, PRN, Morgan Ramos MD  •  sodium chloride 0.9 % flush 1-10 mL, 1-10 mL, Intravenous, PRN, Rich Ruiz MD  •  Insert peripheral IV, , , Once **AND** sodium chloride 0.9 % flush 10 mL, 10 mL, Intravenous, PRN, Darryl Garcia MD  •  sucralfate (CARAFATE) 1 GM/10ML suspension 1 g, 1 g, Per G Tube, 4x Daily, Arnulfo Villagomez MD, 1 g at 10/07/18 0932  •  ziprasidone (GEODON) injection 20 mg, 20 mg, Intramuscular, Q6H PRN, Morgan Ramos MD  Review of Systems:    Review of systems could not be obtained due to  patient confusion.    Objective     Vital Signs  Temp:  [96 °F (35.6 °C)-97.3 °F (36.3 °C)] 96 °F (35.6 °C)  Heart Rate:  [] 101  Resp:  [18-26] 24  BP: (105-152)/(67-78) 138/78  Body mass index is 18.24 kg/m².    Intake/Output Summary (Last 24 hours) at 10/07/18 1609  Last data filed at 10/06/18 2214   Gross per 24 hour   Intake                0 ml   Output              700 ml   Net             -700 ml     No intake/output data recorded.     Physical Exam:   General: patient awake, alert and cooperative   Eyes: Normal lids and lashes, no  scleral icterus   Neck: supple, normal ROM   Skin: warm and dry, not jaundiced   Cardiovascular: regular rhythm and rate, no murmurs auscultated   Pulm: clear to auscultation bilaterally, regular and unlabored   Abdomen: soft, nontender, nondistended; normal bowel sounds   Rectal: deferred   Extremities: no rash or edema   Psychiatric: Normal mood and behavior; memory intact     Results Review:     I reviewed the patient's new clinical results.      Results from last 7 days  Lab Units 10/07/18  0702 10/06/18  0631 10/05/18  0447   WBC 10*3/mm3 7.56 8.25 7.28   HEMOGLOBIN g/dL 9.7* 8.8* 8.5*   HEMATOCRIT % 31.4* 29.2* 28.4*   PLATELETS 10*3/mm3 173 165 169       Results from last 7 days  Lab Units 10/07/18  0702 10/06/18  2349 10/06/18  0631 10/05/18  0447  10/01/18  0222   SODIUM mmol/L 140  --  139 146*  < > 136   POTASSIUM mmol/L 4.0 4.2 3.2* 4.1  4.0  < > 6.3*   CHLORIDE mmol/L 111*  --  111* 115*  < > 102   CO2 mmol/L 16.7*  --  18.8* 18.2*  < > 23.2   BUN mg/dL 18  --  21 24*  < > 109*   CREATININE mg/dL 1.00  --  0.87 0.97  < > 2.00*   CALCIUM mg/dL 8.1*  --  7.9* 8.3*  < > 7.8*   BILIRUBIN mg/dL  --   --   --   --   --  0.2   ALK PHOS U/L  --   --   --   --   --  242*   ALT (SGPT) U/L  --   --   --   --   --  16   AST (SGOT) U/L  --   --   --   --   --  16   GLUCOSE mg/dL 58*  --  82 57*  < > 202*   < > = values in this interval not displayed.        Lab Results  Lab Value Date/Time   LIPASE 20 08/09/2017 0605   LIPASE 20 08/08/2017 1251       Radiology:  XR Chest 2 View   Final Result   There appears to be some interval decrease in the density of   opacity at the right lung base. Bilateral pleural effusions remain,   however.       This report was finalized on 10/4/2018 3:07 PM by Dr. Bogdan Weathers M.D.          US Renal Bilateral   Final Result       1. Atrophic appearing right kidney. No hydronephrosis is seen on the   right.   2. Multiple calculi identified within the left kidney and proximal left    ureter. This is associated with some mild hydroureteronephrosis. Similar   findings were present on exam performed December 2, 2017, although the   degree of hydronephrosis and increased slightly.       This report was finalized on 10/2/2018 9:35 PM by Dr. Serene Kaiser M.D.          XR Chest 1 View   Final Result   Kyphotic positioning with dense right-sided opacities felt   to reflect pneumonia and pleural fluid           This report was finalized on 10/1/2018 2:25 AM by Kolton Olivia M.D.              Assessment/Plan     Patient Active Problem List   Diagnosis   • PAF (paroxysmal atrial fibrillation) (CMS/[facility])   • COPD (chronic obstructive pulmonary disease) (CMS/[facility])   • Calculus of left kidney   • Suprapubic catheter (CMS/[facility])   • Decubitus ulcer of coccygeal region, stage 4 (present on admission)   • History of right above knee amputation (CMS/[facility])   • Acute UTI   • Colostomy malfunction (CMS/[facility])   • Anemia   • Abnormal urine   • Gastrointestinal hemorrhage   • Dysphagia   • Gastrointestinal hemorrhage with melena   • Acute gastric ulcer with hemorrhage   • Pleural effusion   • Hyperkalemia   • PARMINDER (acute kidney injury) (CMS/[facility])   • Sepsis (CMS/[facility])   • PNA (pneumonia)   • PNA (pneumonia)   • Iron deficiency anemia     Impression  #1 GI bleeding: Evidence of ulceration at the mucosal site of G-tube  #2 acute blood loss anemia  #3 pneumonia  #4 UTI  #5 dementia        Recommendation:  Attempt oral feeing per speech recs with assistance- calorie count     Continue current medical regimen     Await family's decision on palliative status. unfortunately  nursing staff tells me that multiple family members have multiple opinions on oral feedings versus PEG tube feeding and unfortunately the power of , the son, has not been able to be reached     If all agree can consider replacement of G-tube during this hospitalization - if palliative care pursued, this is unnecessary     Continue to follow  h/h-more stable      We will see as needed, please call us back of the power of  agrees to PEG placement    I discussed the patients findings and my recommendations with patient and nursing staff.    Waqar Graham MD

## 2018-10-07 NOTE — PROGRESS NOTES
[facility] INPATIENT PROGRESS NOTE         [address]    10/7/2018      PATIENT IDENTIFICATION:  Name: Simona Brooks ADMIT: 10/1/2018   : 1934  PCP: Leonor Echevarria MD    MRN: [id number] LOS: 6 days   AGE/SEX: 84 y.o. female  ROOM: [facility]                     LOS 6    Reason for visit: f/u PNA with sepsis and effusion      SUBJECTIVE:   Somnolent on my assessment today but remains confused. Weaned off Oxygen.       OBJECTIVE:    Vital Sign Min/Max for last 24 hours  Temp  Min: 96 °F (35.6 °C)  Max: 97.3 °F (36.3 °C)   BP  Min: 105/67  Max: 152/78   Pulse  Min: 80  Max: 101   Resp  Min: 18  Max: 26   SpO2  Min: 96 %  Max: 100 %   No Data Recorded   No Data Recorded                         Body mass index is 18.24 kg/m².    Intake/Output Summary (Last 24 hours) at 10/07/18 1537  Last data filed at 10/06/18 2214   Gross per 24 hour   Intake                0 ml   Output              700 ml   Net             -700 ml         Exam:  GEN:  agitated and confused, appears stated age  EYES:   PERRL, anicteric sclera  LUNGS: Normal chest on inspection, palpation and diminished bilateral breath sounds on auscultation  CV:  Normal S1S2, without murmur  ABD:  Non tender, non distended, no hepatosplenomegaly, +BS  EXT:  No edema, cyanosis or clubbing    Scheduled meds:      ferrous sulfate 300 mg Per G Tube Daily   ipratropium-albuterol 3 mL Nebulization 4x Daily - RT   lansoprazole 30 mg Oral BID AC   levoFLOXacin 500 mg Oral Q24H   sucralfate 1 g Per G Tube 4x Daily     IV meds:                           Data Review:    Results from last 7 days  Lab Units 10/07/18  0702 10/06/18  2349 10/06/18  0631 10/05/18  0447 10/04/18  0411 10/03/18  0506   SODIUM mmol/L 140  --  139 146* 144 151*   POTASSIUM mmol/L 4.0 4.2 3.2* 4.1  4.0 3.0* 3.7   CHLORIDE mmol/L 111*  --  111* 115* 112* 117*   CO2 mmol/L 16.7*  --  18.8* 18.2* 20.3* 23.1   BUN mg/dL 18  --  21 24* 33* 47*   CREATININE mg/dL 1.00  --   0.87 0.97 1.03* 1.18*   GLUCOSE mg/dL 58*  --  82 57* 79 71   CALCIUM mg/dL 8.1*  --  7.9* 8.3* 8.2* 8.3*         Estimated Creatinine Clearance: 33.9 mL/min (by C-G formula based on SCr of 1 mg/dL).    Results from last 7 days  Lab Units 10/07/18  0702 10/06/18  0631 10/05/18  0447 10/04/18  0411 10/03/18  0506   WBC 10*3/mm3 7.56 8.25 7.28 6.92 7.83   HEMOGLOBIN g/dL 9.7* 8.8* 8.5* 8.3* 9.1*   PLATELETS 10*3/mm3 173 165 169 179 179           Results from last 7 days  Lab Units 10/01/18  0222   ALT (SGPT) U/L 16   AST (SGOT) U/L 16       Results from last 7 days  Lab Units 10/01/18  0502   PH, ARTERIAL pH units 7.382   PO2 ART mm Hg 67.5*   PCO2, ARTERIAL mm Hg 45.0   HCO3 ART mmol/L 26.8       Results from last 7 days  Lab Units 10/01/18  0249 10/01/18  0222   PROCALCITONIN ng/mL  --  0.48*   LACTATE mmol/L 1.1  --                  ASSESSMENT:    1. Healthcare associated pneumonia  2. Pleural effusions: Parapneumonic ?    Other pertinent medical problems  Sepsis  GI bleed with ulceration at G-tube site  Dysphagia  Acute UTI  Paroxysmal atrial fibrillation  COPD  Hypernatremia   Dementia and delirium       PLAN:  Agree with finishing 7 days course of Levofloxacin.   Family discussing palliative care.       Rosa Villalba MD  Pulmonary and Critical Care Medicine  [facility]  10/7/2018    3:37 PM

## 2018-10-07 NOTE — PROGRESS NOTES
: the pt. Denies complains    Vital Signs  Vitals:    10/07/18 1320   BP: 138/78   Pulse:    Resp: 24   Temp: 96 °F (35.6 °C)   SpO2: 100%     No intake/output data recorded.  I/O last 3 completed shifts:  In: 999 [I.V.:999]  Out: 1875 [Urine:1200; Stool:675]      Physical Exam:    General: in nad  HEENT:  Normo cephalic, Atraumatic, EOMI, PERRLA, NO icterus,  Neck:  Supple, No JVD, No Carotid artery bruit, No lymphadenopathy  Chest: Clear to auscultation bilaterally,   Cardiovascular: Regular rate and rhythm. Positive S1 & S2, No rub, murmur or gallop.  Abdominal: Soft, non tender, no palpable organomegaly, no abdominal bruit, positive bowel sounds  Musculoskeletal: No joint tenderness or swelling, good range of motion, Adequate muscle strength on both sides, no cyanosis, clubbing or edema on lower extremities.  Neuro: Alert oriented x3, CN1 - 12 intact, No focal sensory or motor deficit.      Review of Systems:   CNS: Denied headaches, blurred vision, tingling or numbness in any part of body. No weakness or any impaired speech.  CVS: No chest pain or shortness of breath, No orthopnea or PND or exertional dyspnea,  Pulmonary: Denies shortness of breath, coughs, hematemesis, night sweats or weight loss.  GI: Denies diarrhea, nausea, vomiting. Denies weight loss, hematemesis, melena.  : Denies dysuria, frequency or hesitancy of urination  Vascular: Denies claudication, resting pain, tingling numbness or weakness in any part of the body.  Musculoskeletal: Denies Joint tenderness or pain, denies stiffness in joints, denies fever or weight loss, or skin rashes.    Current Labs  [unfilled] reviewed  Serum creatinine 1.0, sodium 140    Current Radiology    Current Meds    Current Facility-Administered Medications:   •  acetaminophen (TYLENOL) 160 MG/5ML solution 640 mg, 640 mg, Oral, Q6H PRN, Rich Ruiz MD, 640 mg at 10/05/18 2022  •  acetaminophen (TYLENOL) tablet 650 mg, 650 mg, Oral, Q4H PRN, Rich Ruiz  MD, 650 mg at 10/04/18 1346  •  albuterol (PROVENTIL) nebulizer solution 0.083% 2.5 mg/3mL, 2.5 mg, Nebulization, Q4H PRN, Rich Ruiz MD  •  dextrose (D50W) 25 g/ 50mL Intravenous Solution 25 g, 25 g, Intravenous, Q15 Min PRN, Morgan Ramos MD  •  dextrose (GLUTOSE) oral gel 15 g, 15 g, Oral, Q15 Min PRN, Morgan Ramos MD  •  ferrous sulfate 300 (60 Fe) MG/5ML syrup 300 mg, 300 mg, Per G Tube, Daily, Rich Ruiz MD, 300 mg at 10/07/18 0932  •  glucagon (human recombinant) (GLUCAGEN DIAGNOSTIC) injection 1 mg, 1 mg, Subcutaneous, PRN, Morgan Ramos MD  •  HYDROcodone-acetaminophen (NORCO) 5-325 MG per tablet 1 tablet, 1 tablet, Oral, Q4H PRN, Morgan Ramos MD, 1 tablet at 10/07/18 0952  •  HYDROmorphone (DILAUDID) injection 0.5 mg, 0.5 mg, Intravenous, Q2H PRN, Morgan Ramos MD, 0.5 mg at 10/06/18 0432  •  ipratropium-albuterol (DUO-NEB) nebulizer solution 3 mL, 3 mL, Nebulization, 4x Daily - RT, Rich Ruiz MD, 3 mL at 10/07/18 1157  •  ipratropium-albuterol (DUO-NEB) nebulizer solution 3 mL, 3 mL, Nebulization, Q4H PRN, Rich Ruiz MD  •  lansoprazole (FIRST) oral suspension 30 mg, 30 mg, Oral, BID AC, Morgan Ramos MD, 30 mg at 10/07/18 0932  •  levoFLOXacin (LEVAQUIN) tablet 500 mg, 500 mg, Oral, Q24H, Morgan Ramos MD, 500 mg at 10/06/18 2052  •  LORazepam (ATIVAN) tablet 1 mg, 1 mg, Oral, Q6H PRN, Morgan Ramos MD, 1 mg at 10/07/18 0344  •  Naloxone HCl (NARCAN) injection 0.4 mg, 0.4 mg, Intravenous, PRN, Morgan Ramos MD  •  ondansetron (ZOFRAN) tablet 4 mg, 4 mg, Oral, Q6H PRN **OR** ondansetron ODT (ZOFRAN-ODT) disintegrating tablet 4 mg, 4 mg, Oral, Q6H PRN **OR** ondansetron (ZOFRAN) injection 4 mg, 4 mg, Intravenous, Q6H PRN, Rich Ruiz MD, 4 mg at 10/03/18 0033  •  ondansetron (ZOFRAN) tablet 4 mg, 4 mg, Per G Tube, Q6H PRN, Rich Ruiz MD  •  potassium & sodium phosphates (PHOS-NAK) 280-160-250 MG packet - for Phosphorus less than  1.25 mg/dL, 2 packet, Oral, Q6H PRN **OR** potassium & sodium phosphates (PHOS-NAK) 280-160-250 MG packet - for Phosphorus 1.25 - 2.5 mg/dL, 2 packet, Oral, Once PRN, Morgan Ramos MD, 2 packet at 10/06/18 1745  •  potassium chloride (KLOR-CON) packet 40 mEq, 40 mEq, Oral, PRN, Morgan Ramos MD  •  potassium chloride (MICRO-K) CR capsule 40 mEq, 40 mEq, Oral, PRN, Morgan Ramos MD  •  sodium chloride 0.9 % flush 1-10 mL, 1-10 mL, Intravenous, PRN, Rich Ruiz MD  •  Insert peripheral IV, , , Once **AND** sodium chloride 0.9 % flush 10 mL, 10 mL, Intravenous, PRN, Darryl Garcia MD  •  sucralfate (CARAFATE) 1 GM/10ML suspension 1 g, 1 g, Per G Tube, 4x Daily, Arnulfo Villagomez MD, 1 g at 10/07/18 0932  •  ziprasidone (GEODON) injection 20 mg, 20 mg, Intramuscular, Q6H PRN, Morgan Ramos MD              Patient Active Problem List   Diagnosis   • PAF (paroxysmal atrial fibrillation) (CMS/[facility])   • COPD (chronic obstructive pulmonary disease) (CMS/[facility])   • Calculus of left kidney   • Suprapubic catheter (CMS/[facility])   • Decubitus ulcer of coccygeal region, stage 4 (present on admission)   • History of right above knee amputation (CMS/H[facility])   • Acute UTI   • Colostomy malfunction (CMS/[facility])   • Anemia   • Abnormal urine   • Gastrointestinal hemorrhage   • Dysphagia   • Gastrointestinal hemorrhage with melena   • Acute gastric ulcer with hemorrhage   • Pleural effusion   • Hyperkalemia   • PARMINDER (acute kidney injury) (CMS/HCC)   • Sepsis (CMS/HCC)   • PNA (pneumonia)   • PNA (pneumonia)   • Iron deficiency anemia   hypernatremia better , and serum creatinine at base line keep hydrated        Walter Stewart MD FACP, FASN.  10/07/18  2:48 PM

## 2018-10-07 NOTE — PLAN OF CARE
Problem: Patient Care Overview  Goal: Plan of Care Review  Outcome: Ongoing (interventions implemented as appropriate)   10/06/18 2025   Coping/Psychosocial   Plan of Care Reviewed With patient   Plan of Care Review   Progress no change   OTHER   Outcome Summary maintained safety and tubes intact with restraints. skin delicate and bruised. dressings on coccyx and rt buttock intact(mepilex). te 4 liters o2. no distress with eating or drinking (though ate very little). no falls. no fevers. te antibiotics.        Problem: Fall Risk (Adult)  Goal: Absence of Fall  Outcome: Ongoing (interventions implemented as appropriate)      Problem: Infection, Risk/Actual (Adult)  Goal: Infection Prevention/Resolution  Outcome: Ongoing (interventions implemented as appropriate)      Problem: Pneumonia (Adult)  Goal: Signs and Symptoms of Listed Potential Problems Will be Absent, Minimized or Managed (Pneumonia)  Outcome: Ongoing (interventions implemented as appropriate)      Problem: Skin Injury Risk (Adult)  Goal: Skin Health and Integrity  Outcome: Ongoing (interventions implemented as appropriate)      Problem: Restraint, Nonbehavioral (Nonviolent)  Goal: Rationale and Justification  Outcome: Ongoing (interventions implemented as appropriate)    Goal: Nonbehavioral (Nonviolent) Restraint: Absence of Injury/Harm  Outcome: Ongoing (interventions implemented as appropriate)    Goal: Nonbehavioral (Nonviolent) Restraint: Achievement of Discontinuation Criteria  Outcome: Ongoing (interventions implemented as appropriate)    Goal: Nonbehavioral (Nonviolent) Restraint: Preservation of Dignity and Wellbeing  Outcome: Ongoing (interventions implemented as appropriate)

## 2018-10-07 NOTE — PROGRESS NOTES
Name: Simona Brooks ADMIT: 10/1/2018   : 1934  PCP: Leonor Echevarria MD    MRN: [id number] LOS: 6 days   AGE/SEX: 84 y.o. female  ROOM: [facility]   Subjective   Out of restraints. Alert. Redirectable at times. Denies all symptoms when asked.    Objective   Vital Signs  Temp:  [96 °F (35.6 °C)-97.3 °F (36.3 °C)] 96.5 °F (35.8 °C)  Heart Rate:  [80-96] 96  Resp:  [18-28] 24  BP: (105-155)/(67-78) 152/78  SpO2:  [90 %-100 %] 98 %  on  Flow (L/min):  [2-4] 2;   Device (Oxygen Therapy): nasal cannula  Body mass index is 18.24 kg/m².    Physical Exam   Constitutional: She appears well-developed.   frail   HENT:   Head: Atraumatic.   Nose: Nose normal.   Eyes: Conjunctivae and EOM are normal.   Neck: Neck supple.   Cardiovascular: Normal rate, regular rhythm and intact distal pulses.    Pulmonary/Chest: Effort normal. No stridor. She has decreased breath sounds. She has no wheezes. She has rhonchi.   Abdominal: Soft.   PEG   Musculoskeletal: She exhibits no edema or tenderness.   Neurological: She is alert. No cranial nerve deficit.   Oriented to person only   Skin: Skin is warm and dry. She is not diaphoretic.   Psychiatric: Her mood appears anxious. Cognition and memory are impaired.   Nursing note and vitals reviewed.      Results Review:       I reviewed the patient's new clinical results.      Results from last 7 days  Lab Units 10/07/18  0702 10/06/18  0631 10/05/18  0447 10/04/18  0411   WBC 10*3/mm3 7.56 8.25 7.28 6.92   HEMOGLOBIN g/dL 9.7* 8.8* 8.5* 8.3*   PLATELETS 10*3/mm3 173 165 169 179     Results from last 7 days  Lab Units 10/07/18  0702 10/06/18  2349 10/06/18  0631 10/05/18  0447 10/04/18  041   SODIUM mmol/L 140  --  139 146* 144   POTASSIUM mmol/L 4.0 4.2 3.2* 4.1  4.0 3.0*   CHLORIDE mmol/L 111*  --  111* 115* 112*   CO2 mmol/L 16.7*  --  18.8* 18.2* 20.3*   BUN mg/dL 18  --  21 24* 33*   CREATININE mg/dL 1.00  --  0.87 0.97 1.03*   GLUCOSE mg/dL 58*  --  82 57* 79   Estimated  Creatinine Clearance: 33.9 mL/min (by C-G formula based on SCr of 1 mg/dL).  Results from last 7 days  Lab Units 10/07/18  0702 10/06/18  2349 10/06/18  0631 10/05/18  0447 10/04/18  0411   CALCIUM mg/dL 8.1*  --  7.9* 8.3* 8.2*   ALBUMIN g/dL 2.20*  --  2.10* 2.30* 2.10*   MAGNESIUM mg/dL 1.7  --   --   --  2.0   PHOSPHORUS mg/dL 2.7 2.8 2.3* 2.4* 2.5         ferrous sulfate 300 mg Per G Tube Daily   ipratropium-albuterol 3 mL Nebulization 4x Daily - RT   lansoprazole 30 mg Oral BID AC   levoFLOXacin 500 mg Oral Q24H   sucralfate 1 g Per G Tube 4x Daily      Diet Pureed; Nectar / Syrup Thick      Assessment/Plan      Active Hospital Problems    Diagnosis Date Noted   • **Gastrointestinal hemorrhage with melena [K92.1] 06/21/2018   • Pleural effusion [J90] 10/01/2018   • Hyperkalemia [E87.5] 10/01/2018   • PARMINDER (acute kidney injury) (CMS/[facility]) [N17.9] 10/01/2018   • Sepsis (CMS/[facility]) [A41.9] 10/01/2018   • PNA (pneumonia) [J18.9] 10/01/2018   • PNA (pneumonia) [J18.9] 10/01/2018   • Iron deficiency anemia [D50.9] 10/01/2018   • Gastrointestinal hemorrhage [K92.2] 06/21/2018   • Dysphagia [R13.10] 06/21/2018   • Acute UTI [N39.0] 12/02/2017   • Colostomy malfunction (CMS/[facility]) [K94.03] 12/02/2017   • Anemia [D64.9] 12/02/2017   • Suprapubic catheter (CMS/[facility]) [Z93.59] 08/11/2017   • Decubitus ulcer of coccygeal region, stage 4 (present on admission) [L89.154] 08/11/2017   • History of right above knee amputation (CMS/[facility]) [Z89.611] 08/11/2017   • PAF (paroxysmal atrial fibrillation) (CMS/[facility]) [I48.0] 08/08/2017   • COPD (chronic obstructive pulmonary disease) (CMS/[facility]) [J44.9] 08/08/2017      Resolved Hospital Problems    Diagnosis Date Noted Date Resolved   No resolved problems to display.     - PNA: Levaquin, day 7/7. Pulmonology following.   - Klebsiella UTI: see above.  - Rhinovirus URI  - Ulceration saad PEG: Hgb stable. If to continue tube feeding will need PEG replacement. SLP following for swallow eval. GI  following.  - Hypoglycemia: Low again this morning. Due to decreased PO intake. Hypoglycemia protocol ordered. Continue to monitor.  - PARMINDER resolved. Nephrology evaluated.  - Dementia: Geodon prn for agitation.  - Decubitus Ulcer: Stage 4. Wound care.  - GOC: Monitoring with completion of antibiotic therapy today. Plan to readdress with poa/family tomorrow. Palliative following.  - Disposition: TBD    Morgan Ramos MD  [facility]  10/07/18  12:14 PM

## 2018-10-08 NOTE — PLAN OF CARE
Problem: Patient Care Overview  Goal: Plan of Care Review  Outcome: Ongoing (interventions implemented as appropriate)   10/08/18 5624   Coping/Psychosocial   Plan of Care Reviewed With patient   Plan of Care Review   Progress no change   OTHER   Outcome Summary Lortab x 1 for residual limb pain. Poor appetite. Dr. Ramos aware. Monitor vital signs and blood glucose. Dressings changed to pressure wounds.      Goal: Individualization and Mutuality  Outcome: Ongoing (interventions implemented as appropriate)    Goal: Discharge Needs Assessment  Outcome: Ongoing (interventions implemented as appropriate)    Goal: Interprofessional Rounds/Family Conf  Outcome: Ongoing (interventions implemented as appropriate)      Problem: Fall Risk (Adult)  Goal: Absence of Fall  Outcome: Ongoing (interventions implemented as appropriate)      Problem: Infection, Risk/Actual (Adult)  Goal: Infection Prevention/Resolution  Outcome: Ongoing (interventions implemented as appropriate)      Problem: Pneumonia (Adult)  Goal: Signs and Symptoms of Listed Potential Problems Will be Absent, Minimized or Managed (Pneumonia)  Outcome: Ongoing (interventions implemented as appropriate)      Problem: Skin Injury Risk (Adult)  Goal: Skin Health and Integrity  Outcome: Ongoing (interventions implemented as appropriate)

## 2018-10-08 NOTE — PLAN OF CARE
Problem: Patient Care Overview  Goal: Plan of Care Review  Outcome: Ongoing (interventions implemented as appropriate)   10/08/18 0627   Coping/Psychosocial   Plan of Care Reviewed With patient   Plan of Care Review   Progress no change   OTHER   Outcome Summary VSS. REQUIRED PO ATIVAN X 1 FOR ANXIETY AND THEN WAS LESS RESTLESS BUT STILL  DID NOT SLEEP THE ENTIRE NIGHT.        Problem: Fall Risk (Adult)  Goal: Absence of Fall  Outcome: Ongoing (interventions implemented as appropriate)      Problem: Infection, Risk/Actual (Adult)  Goal: Infection Prevention/Resolution  Outcome: Ongoing (interventions implemented as appropriate)      Problem: Pneumonia (Adult)  Goal: Signs and Symptoms of Listed Potential Problems Will be Absent, Minimized or Managed (Pneumonia)  Outcome: Ongoing (interventions implemented as appropriate)      Problem: Skin Injury Risk (Adult)  Goal: Skin Health and Integrity  Outcome: Ongoing (interventions implemented as appropriate)

## 2018-10-08 NOTE — PLAN OF CARE
Problem: Patient Care Overview  Goal: Plan of Care Review  Outcome: Ongoing (interventions implemented as appropriate)   10/07/18 2056   Coping/Psychosocial   Plan of Care Reviewed With patient   Plan of Care Review   Progress no change   OTHER   Outcome Summary no falls, no attempt to get out of bed. no new skin breakdown. dressings changed to coccyx isch tuber wounds. no resp distress. te. room air .        Problem: Fall Risk (Adult)  Goal: Absence of Fall  Outcome: Ongoing (interventions implemented as appropriate)      Problem: Infection, Risk/Actual (Adult)  Goal: Infection Prevention/Resolution  Outcome: Ongoing (interventions implemented as appropriate)      Problem: Pneumonia (Adult)  Goal: Signs and Symptoms of Listed Potential Problems Will be Absent, Minimized or Managed (Pneumonia)  Outcome: Ongoing (interventions implemented as appropriate)      Problem: Skin Injury Risk (Adult)  Goal: Skin Health and Integrity  Outcome: Ongoing (interventions implemented as appropriate)

## 2018-10-08 NOTE — PROGRESS NOTES
Name: Simona Brooks ADMIT: 10/1/2018   : 1934  PCP: Leonor Echevarria MD    MRN: [id number] LOS: 7 days   AGE/SEX: 84 y.o. female  ROOM: [facility]   Subjective   Completed antibiotics. Calm and alert. Still only oriented to person. Denies all complaints and would like to be discharged. Will call POA to discuss and update.    Objective   Vital Signs  Temp:  [96 °F (35.6 °C)-98.4 °F (36.9 °C)] 98.4 °F (36.9 °C)  Heart Rate:  [] 97  Resp:  [18-24] 20  BP: (112-152)/(65-89) 152/68  SpO2:  [95 %-100 %] 98 %  on  Flow (L/min):  [2] 2;   Device (Oxygen Therapy): room air  Body mass index is 18.24 kg/m².    Physical Exam   Constitutional: She appears well-developed.   frail   HENT:   Head: Atraumatic.   Nose: Nose normal.   Eyes: Conjunctivae and EOM are normal.   Neck: Neck supple.   Cardiovascular: Normal rate, regular rhythm and intact distal pulses.    Pulmonary/Chest: Effort normal. No stridor. She has decreased breath sounds. She has no wheezes. She has rhonchi.   Abdominal: Soft.   PEG   Musculoskeletal: She exhibits no edema or tenderness.   Neurological: She is alert. No cranial nerve deficit.   Oriented to person only   Skin: Skin is warm and dry. She is not diaphoretic.   Psychiatric: Her mood appears anxious. Cognition and memory are impaired.   Nursing note and vitals reviewed.      Results Review:       I reviewed the patient's new clinical results.        Results from last 7 days  Lab Units 10/08/18  0520 10/07/18  0702 10/06/18  0631 10/05/18  0447   WBC 10*3/mm3 7.23 7.56 8.25 7.28   HEMOGLOBIN g/dL 9.0* 9.7* 8.8* 8.5*   PLATELETS 10*3/mm3 196 173 165 169     Results from last 7 days  Lab Units 10/08/18  0520 10/07/18  0702 10/06/18  2349 10/06/18  0631 10/05/18  0447   SODIUM mmol/L 142 140  --  139 146*   POTASSIUM mmol/L 3.6 4.0 4.2 3.2* 4.1  4.0   CHLORIDE mmol/L 111* 111*  --  111* 115*   CO2 mmol/L 17.8* 16.7*  --  18.8* 18.2*   BUN mg/dL 17 18  --  21 24*   CREATININE mg/dL  1.12* 1.00  --  0.87 0.97   GLUCOSE mg/dL 73 58*  --  82 57*   Estimated Creatinine Clearance: 30.3 mL/min (A) (by C-G formula based on SCr of 1.12 mg/dL (H)).  Results from last 7 days  Lab Units 10/08/18  0520 10/07/18  0702 10/06/18  2349 10/06/18  0631 10/05/18  0447 10/04/18  0411   CALCIUM mg/dL 8.3* 8.1*  --  7.9* 8.3* 8.2*   ALBUMIN g/dL 2.40* 2.20*  --  2.10* 2.30* 2.10*   MAGNESIUM mg/dL  --  1.7  --   --   --  2.0   PHOSPHORUS mg/dL 2.0* 2.7 2.8 2.3* 2.4* 2.5         ferrous sulfate 300 mg Per G Tube Daily   ipratropium-albuterol 3 mL Nebulization 4x Daily - RT   lansoprazole 30 mg Oral BID AC   sucralfate 1 g Per G Tube 4x Daily      Diet Pureed; Nectar / Syrup Thick      Assessment/Plan      Active Hospital Problems    Diagnosis Date Noted   • **Gastrointestinal hemorrhage with melena [K92.1] 06/21/2018   • Pleural effusion [J90] 10/01/2018   • Hyperkalemia [E87.5] 10/01/2018   • PARMINDER (acute kidney injury) (CMS/[facility]) [N17.9] 10/01/2018   • Sepsis (CMS/[facility]) [A41.9] 10/01/2018   • PNA (pneumonia) [J18.9] 10/01/2018   • PNA (pneumonia) [J18.9] 10/01/2018   • Iron deficiency anemia [D50.9] 10/01/2018   • Gastrointestinal hemorrhage [K92.2] 06/21/2018   • Dysphagia [R13.10] 06/21/2018   • Acute UTI [N39.0] 12/02/2017   • Colostomy malfunction (CMS/[facility]) [K94.03] 12/02/2017   • Anemia [D64.9] 12/02/2017   • Suprapubic catheter (CMS/[facility]) [Z93.59] 08/11/2017   • Decubitus ulcer of coccygeal region, stage 4 (present on admission) [L89.154] 08/11/2017   • History of right above knee amputation (CMS/[facility]) [Z89.611] 08/11/2017   • PAF (paroxysmal atrial fibrillation) (CMS/HCC) [I48.0] 08/08/2017   • COPD (chronic obstructive pulmonary disease) (CMS/HCC) [J44.9] 08/08/2017      Resolved Hospital Problems    Diagnosis Date Noted Date Resolved   No resolved problems to display.     - PNA and Klebsiella UTI: Completed antibiotic course. Pulmonology following.  - Rhinovirus URI  - Ulceration saad PEG: Not keeping up with  nutrition as expected. Would need PEG replacement if not going to pursue palliative care. Discussed with POA on Saturday and he was in favor of pursuing palliative options but wanted me to talk to her other son. Her other son wasn't convinced she had dementia and placed infection as etiology of confusion. She has now completed abx and is still disoriented and has dementia. Will call POA to discuss. Palliative care following.  - Hypoglycemia: Hypoglycemia protocol as needed. Encourage PO intake.  - PARMINDER resolved. Cr increased again today due to poor intake. Nephrology evaluated.  - Dementia: Geodon prn for agitation.  - Decubitus Ulcer: Stage 4. Wound care.  - GOC: Palliative following. See above  - Disposition: TBJESSICA Ramos MD  [facility]  10/08/18  8:47 AM

## 2018-10-08 NOTE — PROGRESS NOTES
"   LOS: 7 days   Patient Care Team:  Leonor Echevarria MD as PCP - General (Family Medicine)    Chief Complaint/ Reason for encounter: Acute renal failure with hypernatremia    Subjective     Medical history reviewed:  Shortness of Breath   Pertinent negatives include no chest pain.       Subjective:  Symptoms:  Stable.  She reports weakness.  No shortness of breath or chest pain.  (Confused but no complaints, resting).    Diet:  Adequate intake.    Activity level: Returning to normal.    Pain:  She reports no pain.          History taken from: Patient and chart    Objective     Vital Signs  Temp:  [97 °F (36.1 °C)-98.4 °F (36.9 °C)] 98 °F (36.7 °C)  Heart Rate:  [] 95  Resp:  [18-22] 18  BP: (112-152)/(59-89) 121/59       Wt Readings from Last 1 Encounters:   10/01/18 0621 51.3 kg (113 lb)   10/01/18 0149 52.5 kg (115 lb 12.8 oz)       Objective:  General Appearance:  Comfortable, not in pain and in no acute distress (confused).    Vital signs: (most recent): Blood pressure 121/59, pulse 95, temperature 98 °F (36.7 °C), temperature source Oral, resp. rate 18, height 167.6 cm (66\"), weight 51.3 kg (113 lb), SpO2 94 %.  Vital signs are normal.    Output: Producing urine.    HEENT: Normal HEENT exam.    Lungs:  Normal effort and normal respiratory rate.  Breath sounds clear to auscultation.  She is not in respiratory distress.  No decreased breath sounds.    Heart: Normal rate.  Regular rhythm.    Abdomen: Abdomen is soft.  Bowel sounds are normal.   There is no abdominal tenderness.     Extremities: Normal range of motion.  There is no dependent edema.    Pulses: Distal pulses are intact.    Neurological: Patient is alert.    Skin:  Warm and dry.              Results Review:    Intake/Output:     Intake/Output Summary (Last 24 hours) at 10/08/18 1639  Last data filed at 10/08/18 0935   Gross per 24 hour   Intake              650 ml   Output             1375 ml   Net             -725 ml "         DATA:  Radiology and Labs:  The following labs independently reviewed by me. Additional labs ordered for tomorrow a.m.  Interval notes, chart personally reviewed by me.      Labs:   Recent Results (from the past 24 hour(s))   POC Glucose Once    Collection Time: 10/07/18  4:55 PM   Result Value Ref Range    Glucose 81 70 - 130 mg/dL   POC Glucose Once    Collection Time: 10/07/18  9:47 PM   Result Value Ref Range    Glucose 70 70 - 130 mg/dL   Renal Function Panel    Collection Time: 10/08/18  5:20 AM   Result Value Ref Range    Glucose 73 65 - 99 mg/dL    BUN 17 8 - 23 mg/dL    Creatinine 1.12 (H) 0.57 - 1.00 mg/dL    Sodium 142 136 - 145 mmol/L    Potassium 3.6 3.5 - 5.2 mmol/L    Chloride 111 (H) 98 - 107 mmol/L    CO2 17.8 (L) 22.0 - 29.0 mmol/L    Calcium 8.3 (L) 8.6 - 10.5 mg/dL    Albumin 2.40 (L) 3.50 - 5.20 g/dL    Phosphorus 2.0 (L) 2.5 - 4.5 mg/dL    Anion Gap 13.2 mmol/L    BUN/Creatinine Ratio 15.2 7.0 - 25.0    eGFR Non African Amer 46 (L) >60 mL/min/1.73   CBC (No Diff)    Collection Time: 10/08/18  5:20 AM   Result Value Ref Range    WBC 7.23 4.50 - 10.70 10*3/mm3    RBC 3.49 (L) 3.90 - 5.20 10*6/mm3    Hemoglobin 9.0 (L) 11.9 - 15.5 g/dL    Hematocrit 30.6 (L) 35.6 - 45.5 %    MCV 87.7 80.5 - 98.2 fL    MCH 25.8 (L) 26.9 - 32.0 pg    MCHC 29.4 (L) 32.4 - 36.3 g/dL    RDW 19.6 (H) 11.7 - 13.0 %    RDW-SD 63.0 (H) 37.0 - 54.0 fl    MPV 10.0 6.0 - 12.0 fL    Platelets 196 140 - 500 10*3/mm3   POC Glucose Once    Collection Time: 10/08/18  6:30 AM   Result Value Ref Range    Glucose 86 70 - 130 mg/dL   POC Glucose Once    Collection Time: 10/08/18  8:09 AM   Result Value Ref Range    Glucose 72 70 - 130 mg/dL       Radiology:  Imaging Results (last 24 hours)     ** No results found for the last 24 hours. **             Medications have been reviewed:  Current Facility-Administered Medications   Medication Dose Route Frequency Provider Last Rate Last Dose   • acetaminophen (TYLENOL) 160 MG/5ML  solution 640 mg  640 mg Oral Q6H PRN Rich Ruiz MD   640 mg at 10/05/18 2022   • acetaminophen (TYLENOL) tablet 650 mg  650 mg Oral Q4H PRN Rich Ruiz MD   650 mg at 10/04/18 1346   • albuterol (PROVENTIL) nebulizer solution 0.083% 2.5 mg/3mL  2.5 mg Nebulization Q4H PRN Rich Ruiz MD       • dextrose (D50W) 25 g/ 50mL Intravenous Solution 25 g  25 g Intravenous Q15 Min PRN Morgan Ramos MD       • dextrose (GLUTOSE) oral gel 15 g  15 g Oral Q15 Min PRN Morgan Ramos MD       • ferrous sulfate 300 (60 Fe) MG/5ML syrup 300 mg  300 mg Per G Tube Daily Rich Ruiz MD   300 mg at 10/08/18 0935   • glucagon (human recombinant) (GLUCAGEN DIAGNOSTIC) injection 1 mg  1 mg Subcutaneous PRN Morgan Ramos MD       • HYDROcodone-acetaminophen (NORCO) 5-325 MG per tablet 1 tablet  1 tablet Oral Q4H PRN Morgan Ramos MD   1 tablet at 10/08/18 1321   • HYDROmorphone (DILAUDID) injection 0.5 mg  0.5 mg Intravenous Q2H PRN Morgan Ramos MD   0.5 mg at 10/06/18 0432   • ipratropium-albuterol (DUO-NEB) nebulizer solution 3 mL  3 mL Nebulization 4x Daily - RT Rich Ruiz MD   3 mL at 10/08/18 1241   • ipratropium-albuterol (DUO-NEB) nebulizer solution 3 mL  3 mL Nebulization Q4H PRN Rich Ruiz MD       • lansoprazole (FIRST) oral suspension 30 mg  30 mg Oral BID AC Morgan Ramos MD   30 mg at 10/08/18 0641   • LORazepam (ATIVAN) tablet 0.5 mg  0.5 mg Oral Nightly Morgan Ramos MD       • Naloxone HCl (NARCAN) injection 0.4 mg  0.4 mg Intravenous PRN Morgan Ramos MD       • ondansetron (ZOFRAN) tablet 4 mg  4 mg Oral Q6H PRN Rich Ruiz MD        Or   • ondansetron ODT (ZOFRAN-ODT) disintegrating tablet 4 mg  4 mg Oral Q6H PRN Rich Ruiz MD        Or   • ondansetron (ZOFRAN) injection 4 mg  4 mg Intravenous Q6H PRN Rich Ruiz MD   4 mg at 10/03/18 0033   • ondansetron (ZOFRAN) tablet 4 mg  4 mg Per G Tube Q6H PRN Rich Ruiz MD       • potassium &  sodium phosphates (PHOS-NAK) 280-160-250 MG packet - for Phosphorus less than 1.25 mg/dL  2 packet Oral Q6H PRN Morgan Ramos MD        Or   • potassium & sodium phosphates (PHOS-NAK) 280-160-250 MG packet - for Phosphorus 1.25 - 2.5 mg/dL  2 packet Oral Once PRN Morgan Ramos MD   2 packet at 10/06/18 1745   • potassium chloride (KLOR-CON) packet 40 mEq  40 mEq Oral PRN Morgan Ramos MD       • potassium chloride (MICRO-K) CR capsule 40 mEq  40 mEq Oral PRN Morgan Ramos MD       • potassium phosphate 22.5 mmol in sodium chloride 0.9 % 500 mL infusion  22.5 mmol Intravenous Once Simone Zuleta MD       • sodium chloride 0.9 % flush 1-10 mL  1-10 mL Intravenous PRN Rich Ruiz MD       • sodium chloride 0.9 % flush 10 mL  10 mL Intravenous PRN Darryl Garcia MD       • sucralfate (CARAFATE) 1 GM/10ML suspension 1 g  1 g Per G Tube 4x Daily Arnulfo Villagomez MD   1 g at 10/08/18 1321   • ziprasidone (GEODON) injection 20 mg  20 mg Intramuscular Q6H PRN Morgan Ramos MD           ASSESSMENT:  acute renal failure,  Prerenal, improved  pneumonia with pleural effusion  COPD  Hypernatremia, improved  Paroxysmal atrial fibrillation  Decubitus ulcer status post diverting colostomy  Chronic suprapubic catheter  Azotemia  ? UTI  Hypophosphatemia, likely nutritional        PLAN:   Renal function and sodium remain stable today  continue to encourage oral fluid intake  Possible transition to palliative care soon  replace phosphorus IV  Replace potassium orally per protocol as needed    Prognosis poor given her advanced dementia     Please call with any questions or concerns.        Simone Zuleta MD   [facility]   Office phone number: [phone]  Answering service phone number: [phone]    10/08/18  4:39 PM      Dictation performed using Dragon dictation software

## 2018-10-09 NOTE — NURSING NOTE
Dr. Ramos on unit, informed that patient blood glucose low 68 and that patient more drowsy than previous assessments. Orange juice given through peg tube with Potassium replacement. Will monitor BS, Potassium level and drowsiness.

## 2018-10-09 NOTE — PROGRESS NOTES
Adult Nutrition  Assessment/PES    Patient Name:  Simona Brooks  YOB: 1934  MRN: [id number]  Admit Date:  10/1/2018    Assessment Date:  10/9/2018    Follow up-spoke with RN and MD. Going to try small amounts of bolus until a decision has been made regarding PEG replacement or palliative care.  TF order: Osmolite 1.5 4 cans/day.          Reason for Assessment     Row Name 10/09/18 1437          Reason for Assessment    Reason For Assessment follow-up protocol;TF/PN                 Labs/Tests/Procedures/Meds     Row Name 10/09/18 1437          Labs/Procedures/Meds    Lab Results Reviewed reviewed, pertinent        Diagnostic Tests/Procedures    Diagnostic Test/Procedure Reviewed reviewed, pertinent        Medications    Pertinent Medications Reviewed reviewed, pertinent             Physical Findings     Row Name 10/09/18 1437          Physical Findings    Tubes gastrostomy tube     Skin poor skin integrity/turgor;pressure injury               Nutrition Prescription Ordered     Row Name 10/09/18 1437          Nutrition Prescription PO    Current PO Diet Pureed     Fluid Consistency Nectar/syrup thick             Evaluation of Received Nutrient/Fluid Intake     Row Name 10/09/18 1437          PO Evaluation    % PO Intake minimal po intake             Problem/Interventions:              Intervention Goal     Row Name 10/09/18 1437          Intervention Goal    General Maintain nutrition;Nutrition support treatment     PO Tolerate PO;Increase intake     TF/PN Inititiate TF/PN     Weight Appropriate weight gain             Nutrition Intervention     Row Name 10/09/18 1438          Nutrition Intervention    RD/Tech Action Care plan reviewd;Follow Tx progress             Nutrition Prescription     Row Name 10/09/18 1438          Nutrition Prescription EN    Enteral Prescription Enteral begin/change     Enteral Route Gastrostomy     Product Osmolite 1.5 skyler     TF Delivery Method Bolus     TF Bolus Goal  Volume (mL) 240 mL     TF Bolus Frequency 4 times a day     Water flush (mL)  50 mL     Water Flush Frequency Every feeding     New EN Prescription Ordered? Yes             Education/Evaluation     Row Name 10/09/18 1438          Education    Education Will Instruct as appropriate        Monitor/Evaluation    Monitor Per protocol     Education Follow-up Reinforce PRN         Electronically signed by:  Jeri Montemayor RD  10/09/18 2:38 PM

## 2018-10-09 NOTE — PROGRESS NOTES
[facility] INPATIENT PROGRESS NOTE         [address]    10/8/2018      PATIENT IDENTIFICATION:  Name: Simona Brooks ADMIT: 10/1/2018   : 1934  PCP: Leonor Echevarria MD    MRN: [id number] LOS: 7 days   AGE/SEX: 84 y.o. female  ROOM: [facility]                     LOS 7    Reason for visit: f/u PNA with sepsis and effusion      SUBJECTIVE:   Off oxygen.  She appears much more alert today and is confused.  She was coughing when I entered the room       OBJECTIVE:    Vital Sign Min/Max for last 24 hours  Temp  Min: 97 °F (36.1 °C)  Max: 98.4 °F (36.9 °C)   BP  Min: 112/89  Max: 152/68   Pulse  Min: 94  Max: 112   Resp  Min: 16  Max: 20   SpO2  Min: 92 %  Max: 100 %   No Data Recorded   No Data Recorded                         Body mass index is 18.24 kg/m².    Intake/Output Summary (Last 24 hours) at 10/08/18 2031  Last data filed at 10/08/18 0935   Gross per 24 hour   Intake              500 ml   Output              600 ml   Net             -100 ml         Exam:  GEN:  Alert.  Not in acute distress  EYES:   PERRL, anicteric sclera  LUNGS: Normal chest on inspection, palpation and diminished bilateral breath sounds on auscultation  CV:  Normal S1S2, without murmur  ABD:  Non tender, non distended, no hepatosplenomegaly, +BS  EXT:  No edema, cyanosis or clubbing    Scheduled meds:      ferrous sulfate 300 mg Per G Tube Daily   ipratropium-albuterol 3 mL Nebulization 4x Daily - RT   lansoprazole 30 mg Oral BID AC   LORazepam 0.5 mg Oral Nightly   sucralfate 1 g Per G Tube 4x Daily     IV meds:                           Data Review:    Results from last 7 days  Lab Units 10/08/18  0520 10/07/18  0702 10/06/18  2349 10/06/18  0631 10/05/18  0447 10/04/18  0411   SODIUM mmol/L 142 140  --  139 146* 144   POTASSIUM mmol/L 3.6 4.0 4.2 3.2* 4.1  4.0 3.0*   CHLORIDE mmol/L 111* 111*  --  111* 115* 112*   CO2 mmol/L 17.8* 16.7*  --  18.8* 18.2* 20.3*   BUN mg/dL 17 18  --  21 24* 33*   CREATININE  mg/dL 1.12* 1.00  --  0.87 0.97 1.03*   GLUCOSE mg/dL 73 58*  --  82 57* 79   CALCIUM mg/dL 8.3* 8.1*  --  7.9* 8.3* 8.2*         Estimated Creatinine Clearance: 30.3 mL/min (A) (by C-G formula based on SCr of 1.12 mg/dL (H)).    Results from last 7 days  Lab Units 10/08/18  0520 10/07/18  0702 10/06/18  0631 10/05/18  0447 10/04/18  0411   WBC 10*3/mm3 7.23 7.56 8.25 7.28 6.92   HEMOGLOBIN g/dL 9.0* 9.7* 8.8* 8.5* 8.3*   PLATELETS 10*3/mm3 196 173 165 169 179                                 ASSESSMENT:    1. Healthcare associated pneumonia  2. Pleural effusions: Parapneumonic ?  3. Dysphagia    Other pertinent medical problems  Sepsis  GI bleed with ulceration at G-tube site  Dysphagia  Acute UTI  Paroxysmal atrial fibrillation  COPD  Hypernatremia   Dementia and delirium       PLAN:  Finished antibiotic therapy.  She has dysphagia with risk for recurrent aspiration.  Not much to add at this point from our standpoint and therefore I will sign off      Rosa Villalba MD  Pulmonary and Critical Care Medicine  [facility], [facility]  10/8/2018    8:31 PM

## 2018-10-09 NOTE — PROGRESS NOTES
"   LOS: 8 days   Patient Care Team:  Leonor Echevarria MD as PCP - General (Family Medicine)    Chief Complaint/ Reason for encounter: Acute renal failure with hypernatremia    Subjective     Medical history reviewed:  Shortness of Breath   Pertinent negatives include no chest pain.       Subjective:  Symptoms:  Improved.  She reports weakness.  No shortness of breath or chest pain.  (Confused but no complaints, resting).    Diet:  Adequate intake.    Activity level: Returning to normal.    Pain:  She reports no pain.          History taken from: Patient and chart    Objective     Vital Signs  Temp:  [96.9 °F (36.1 °C)-98.2 °F (36.8 °C)] 97.3 °F (36.3 °C)  Heart Rate:  [] 88  Resp:  [16-20] 18  BP: (128-153)/(55-86) 142/86       Wt Readings from Last 1 Encounters:   10/01/18 0621 51.3 kg (113 lb)   10/01/18 0149 52.5 kg (115 lb 12.8 oz)       Objective:  General Appearance:  Comfortable, not in pain and in no acute distress (confused).    Vital signs: (most recent): Blood pressure 142/86, pulse 88, temperature 97.3 °F (36.3 °C), temperature source Oral, resp. rate 18, height 167.6 cm (66\"), weight 51.3 kg (113 lb), SpO2 100 %.  Vital signs are normal.    Output: Producing urine.    HEENT: Normal HEENT exam.    Lungs:  Normal effort and normal respiratory rate.  Breath sounds clear to auscultation.  She is not in respiratory distress.  No decreased breath sounds.    Heart: Normal rate.  Regular rhythm.    Abdomen: Abdomen is soft.  Bowel sounds are normal.   There is no abdominal tenderness.     Extremities: Normal range of motion.  There is no dependent edema.    Pulses: Distal pulses are intact.    Neurological: Patient is alert.  (Pleasantly confused).    Skin:  Warm and dry.              Results Review:    Intake/Output:     Intake/Output Summary (Last 24 hours) at 10/09/18 5136  Last data filed at 10/09/18 1200   Gross per 24 hour   Intake                0 ml   Output              550 ml   Net         "     -550 ml         DATA:  Radiology and Labs:  The following labs independently reviewed by me. Additional labs ordered for tomorrow a.m.  Interval notes, chart personally reviewed by me.      Labs:   Recent Results (from the past 24 hour(s))   POC Glucose Once    Collection Time: 10/08/18  6:57 PM   Result Value Ref Range    Glucose 77 70 - 130 mg/dL   Renal Function Panel    Collection Time: 10/09/18  4:49 AM   Result Value Ref Range    Glucose 73 65 - 99 mg/dL    BUN 12 8 - 23 mg/dL    Creatinine 1.04 (H) 0.57 - 1.00 mg/dL    Sodium 142 136 - 145 mmol/L    Potassium 2.9 (L) 3.5 - 5.2 mmol/L    Chloride 110 (H) 98 - 107 mmol/L    CO2 20.9 (L) 22.0 - 29.0 mmol/L    Calcium 8.0 (L) 8.6 - 10.5 mg/dL    Albumin 2.30 (L) 3.50 - 5.20 g/dL    Phosphorus 2.5 2.5 - 4.5 mg/dL    Anion Gap 11.1 mmol/L    BUN/Creatinine Ratio 11.5 7.0 - 25.0    eGFR Non African Amer 50 (L) >60 mL/min/1.73   Magnesium    Collection Time: 10/09/18  4:49 AM   Result Value Ref Range    Magnesium 1.6 1.6 - 2.4 mg/dL   POC Glucose Once    Collection Time: 10/09/18  9:03 AM   Result Value Ref Range    Glucose 68 (L) 70 - 130 mg/dL   POC Glucose Once    Collection Time: 10/09/18 10:10 AM   Result Value Ref Range    Glucose 106 70 - 130 mg/dL   POC Glucose Once    Collection Time: 10/09/18 11:45 AM   Result Value Ref Range    Glucose 74 70 - 130 mg/dL       Radiology:  Imaging Results (last 24 hours)     ** No results found for the last 24 hours. **             Medications have been reviewed:  Current Facility-Administered Medications   Medication Dose Route Frequency Provider Last Rate Last Dose   • acetaminophen (TYLENOL) 160 MG/5ML solution 640 mg  640 mg Oral Q6H PRN Rich Ruiz MD   640 mg at 10/05/18 2022   • acetaminophen (TYLENOL) tablet 650 mg  650 mg Oral Q4H PRN Rich Ruiz MD   650 mg at 10/04/18 1346   • albuterol (PROVENTIL) nebulizer solution 0.083% 2.5 mg/3mL  2.5 mg Nebulization Q4H PRN Rich Ruiz MD       • dextrose (D50W)  25 g/ 50mL Intravenous Solution 25 g  25 g Intravenous Q15 Min PRN Morgan Ramos MD       • dextrose (GLUTOSE) oral gel 15 g  15 g Oral Q15 Min PRN Morgan Ramos MD       • ferrous sulfate 300 (60 Fe) MG/5ML syrup 300 mg  300 mg Per G Tube Daily Rich Ruiz MD   300 mg at 10/09/18 0908   • glucagon (human recombinant) (GLUCAGEN DIAGNOSTIC) injection 1 mg  1 mg Subcutaneous PRN Morgan Ramos MD       • HYDROcodone-acetaminophen (NORCO) 5-325 MG per tablet 1 tablet  1 tablet Oral Q4H PRN Morgan Ramos MD   1 tablet at 10/09/18 1406   • HYDROmorphone (DILAUDID) injection 0.5 mg  0.5 mg Intravenous Q2H PRN Morgan Ramos MD   0.5 mg at 10/06/18 0432   • ipratropium-albuterol (DUO-NEB) nebulizer solution 3 mL  3 mL Nebulization 4x Daily - RT Rich Ruiz MD   3 mL at 10/09/18 1254   • ipratropium-albuterol (DUO-NEB) nebulizer solution 3 mL  3 mL Nebulization Q4H PRN Rich Ruiz MD       • lansoprazole (FIRST) oral suspension 30 mg  30 mg Oral BID AC Morgan Ramos MD   30 mg at 10/09/18 0907   • LORazepam (ATIVAN) tablet 0.5 mg  0.5 mg Oral Nightly Morgan Ramos MD   0.5 mg at 10/08/18 2121   • magnesium oxide (MAG-OX) tablet 400 mg  400 mg Oral BID Morgan Ramos MD   400 mg at 10/09/18 1312   • Naloxone HCl (NARCAN) injection 0.4 mg  0.4 mg Intravenous PRN Morgan Ramos MD       • ondansetron (ZOFRAN) tablet 4 mg  4 mg Oral Q6H PRN Rich Ruiz MD        Or   • ondansetron ODT (ZOFRAN-ODT) disintegrating tablet 4 mg  4 mg Oral Q6H PRN Rich Ruiz MD        Or   • ondansetron (ZOFRAN) injection 4 mg  4 mg Intravenous Q6H PRN Rich Ruiz MD   4 mg at 10/03/18 0033   • ondansetron (ZOFRAN) tablet 4 mg  4 mg Per G Tube Q6H PRN Rich Ruiz MD       • potassium & sodium phosphates (PHOS-NAK) 280-160-250 MG packet - for Phosphorus less than 1.25 mg/dL  2 packet Oral Q6H PRN Morgan Ramos MD        Or   • potassium & sodium phosphates (PHOS-NAK)  280-160-250 MG packet - for Phosphorus 1.25 - 2.5 mg/dL  2 packet Oral Once PRN Morgan Ramos MD   2 packet at 10/06/18 1745   • potassium chloride (KLOR-CON) packet 40 mEq  40 mEq Oral PRN Morgan Ramos MD   40 mEq at 10/09/18 1313   • potassium chloride (MICRO-K) CR capsule 40 mEq  40 mEq Oral PRN Morgan Ramos MD       • sodium chloride 0.9 % flush 1-10 mL  1-10 mL Intravenous PRN Rich Ruiz MD       • sodium chloride 0.9 % flush 10 mL  10 mL Intravenous PRN Darryl Garcia MD       • sucralfate (CARAFATE) 1 GM/10ML suspension 1 g  1 g Per G Tube 4x Daily Arnulfo Villagomez MD   1 g at 10/09/18 1312   • ziprasidone (GEODON) injection 20 mg  20 mg Intramuscular Q6H PRN Morgan Ramos MD           ASSESSMENT:  acute renal failure, Prerenal, improved dehydration  pneumonia with pleural effusion  COPD  Hypernatremia, improved  Paroxysmal atrial fibrillation  Decubitus ulcer status post diverting colostomy  Chronic suprapubic catheter  Azotemia  Hypophosphatemia, likely nutritional  Hypomagnesemia      PLAN:   Renal function and sodium remain stable today  continue to encourage oral fluid intake  Possible transition to palliative care soon vs PEG  PO4 improved after oral replacement, on PO Mg as well  Encourage nutrition as she is able    Prognosis poor given her advanced dementia       Simone Zuleta MD   [facility]   Office phone number: [phone]  Answering service phone number: [phone]    10/09/18  3:45 PM      Dictation performed using Dragon dictation software

## 2018-10-09 NOTE — PROGRESS NOTES
Name: Simona Brooks ADMIT: 10/1/2018   : 1934  PCP: Leonor Echevarria MD    MRN: [id number] LOS: 8 days   AGE/SEX: 84 y.o. female  ROOM: [facility]   Subjective   Drowsy and reports feeling poorly. No CP SOA NVD. BG was low this morning. Some drainage around PEG. Colostomy with output.    Objective   Vital Signs  Temp:  [96.9 °F (36.1 °C)-98.2 °F (36.8 °C)] 96.9 °F (36.1 °C)  Heart Rate:  [] 78  Resp:  [16-20] 20  BP: (121-153)/(55-76) 153/67  SpO2:  [92 %-100 %] 93 %  on  Flow (L/min):  [2] 2;   Device (Oxygen Therapy): room air  Body mass index is 18.24 kg/m².    Physical Exam   Constitutional: She appears well-developed.   frail   HENT:   Head: Atraumatic.   Nose: Nose normal.   Eyes: Conjunctivae and EOM are normal.   Neck: Neck supple.   Cardiovascular: Normal rate, regular rhythm and intact distal pulses.    Pulmonary/Chest: Effort normal. No stridor. She has decreased breath sounds. She has no wheezes. She has rhonchi.   Abdominal: Soft.   PEG   Musculoskeletal: She exhibits no edema or tenderness.   Neurological: She is alert. No cranial nerve deficit.   Oriented to person and hospital location today   Skin: Skin is warm and dry. She is not diaphoretic.   Psychiatric: She has a normal mood and affect. Her behavior is normal. Cognition and memory are impaired.   Nursing note and vitals reviewed.      Results Review:       I reviewed the patient's new clinical results.     I reviewed telemetry/EKG results, sinus rhythm.      Results from last 7 days  Lab Units 10/08/18  0520 10/07/18  0702 10/06/18  0631 10/05/18  0447   WBC 10*3/mm3 7.23 7.56 8.25 7.28   HEMOGLOBIN g/dL 9.0* 9.7* 8.8* 8.5*   PLATELETS 10*3/mm3 196 173 165 169     Results from last 7 days  Lab Units 10/09/18  0449 10/08/18  0520 10/07/18  0702 10/06/18  2349 10/06/18  0631   SODIUM mmol/L 142 142 140  --  139   POTASSIUM mmol/L 2.9* 3.6 4.0 4.2 3.2*   CHLORIDE mmol/L 110* 111* 111*  --  111*   CO2 mmol/L 20.9* 17.8* 16.7*   --  18.8*   BUN mg/dL 12 17 18  --  21   CREATININE mg/dL 1.04* 1.12* 1.00  --  0.87   GLUCOSE mg/dL 73 73 58*  --  82   Estimated Creatinine Clearance: 32.6 mL/min (A) (by C-G formula based on SCr of 1.04 mg/dL (H)).  Results from last 7 days  Lab Units 10/09/18  0449 10/08/18  0520 10/07/18  0702 10/06/18  2349 10/06/18  0631  10/04/18  0411   CALCIUM mg/dL 8.0* 8.3* 8.1*  --  7.9*  < > 8.2*   ALBUMIN g/dL 2.30* 2.40* 2.20*  --  2.10*  < > 2.10*   MAGNESIUM mg/dL  --   --  1.7  --   --   --  2.0   PHOSPHORUS mg/dL 2.5 2.0* 2.7 2.8 2.3*  < > 2.5   < > = values in this interval not displayed.      ferrous sulfate 300 mg Per G Tube Daily   ipratropium-albuterol 3 mL Nebulization 4x Daily - RT   lansoprazole 30 mg Oral BID AC   LORazepam 0.5 mg Oral Nightly   sucralfate 1 g Per G Tube 4x Daily      Diet Pureed; Nectar / Syrup Thick      Assessment/Plan      Active Hospital Problems    Diagnosis Date Noted   • **Gastrointestinal hemorrhage with melena [K92.1] 06/21/2018   • Pleural effusion [J90] 10/01/2018   • Hyperkalemia [E87.5] 10/01/2018   • PARMINDER (acute kidney injury) (CMS/[facility]) [N17.9] 10/01/2018   • Sepsis (CMS/[facility]) [A41.9] 10/01/2018   • PNA (pneumonia) [J18.9] 10/01/2018   • PNA (pneumonia) [J18.9] 10/01/2018   • Iron deficiency anemia [D50.9] 10/01/2018   • Gastrointestinal hemorrhage [K92.2] 06/21/2018   • Dysphagia [R13.10] 06/21/2018   • Acute UTI [N39.0] 12/02/2017   • Colostomy malfunction (CMS/[facility]) [K94.03] 12/02/2017   • Anemia [D64.9] 12/02/2017   • Suprapubic catheter (CMS/[facility]) [Z93.59] 08/11/2017   • Decubitus ulcer of coccygeal region, stage 4 (present on admission) [L89.154] 08/11/2017   • History of right above knee amputation (CMS/[facility]) [Z89.611] 08/11/2017   • PAF (paroxysmal atrial fibrillation) (CMS/[facility]) [I48.0] 08/08/2017   • COPD (chronic obstructive pulmonary disease) (CMS/[facility]) [J44.9] 08/08/2017      Resolved Hospital Problems    Diagnosis Date Noted Date Resolved   No resolved problems to  display.     - PNA and Klebsiella UTI: Completed antibiotic course. Pulmonology following.  - Rhinovirus URI  - Ulceration saad PEG: Needs palliative goc or peg replacement. Will call POA, unable to get in touch yesterday.  - Hypoglycemia: Hypoglycemia protocol as needed. Encourage PO intake.  - PARMINDER resolved. Stable. Nephrology evaluated.  - Dementia: Geodon prn for agitation.  - Decubitus Ulcer: Stage 4. Wound care.  - GOC: Poor prognosis. Palliative following.  - Disposition: TBD    --Talked with POA (quentin). He is going to to discuss with her son today and they will come to decision on proceeding with palliative care or PEG replacement.    Morgan Ramos MD  [facility]  10/09/18  9:37 AM

## 2018-10-09 NOTE — PLAN OF CARE
Problem: Patient Care Overview  Goal: Plan of Care Review  Outcome: Ongoing (interventions implemented as appropriate)    Goal: Individualization and Mutuality  Outcome: Ongoing (interventions implemented as appropriate)    Goal: Discharge Needs Assessment  Outcome: Ongoing (interventions implemented as appropriate)      Problem: Fall Risk (Adult)  Goal: Absence of Fall  Outcome: Ongoing (interventions implemented as appropriate)      Problem: Infection, Risk/Actual (Adult)  Goal: Infection Prevention/Resolution  Outcome: Ongoing (interventions implemented as appropriate)      Problem: Pneumonia (Adult)  Goal: Signs and Symptoms of Listed Potential Problems Will be Absent, Minimized or Managed (Pneumonia)  Outcome: Ongoing (interventions implemented as appropriate)      Problem: Skin Injury Risk (Adult)  Goal: Skin Health and Integrity  Outcome: Ongoing (interventions implemented as appropriate)

## 2018-10-10 NOTE — PLAN OF CARE
Problem: Patient Care Overview  Goal: Plan of Care Review  Outcome: Ongoing (interventions implemented as appropriate)   10/10/18 5657   Coping/Psychosocial   Plan of Care Reviewed With patient;son   Plan of Care Review   Progress declining   OTHER   Outcome Summary Pt was restless at beginning of shift. Crying and anxious. Pt was medicated several times today with pain medicine and anxiety meds. Pt was given dilaudid and appeared to have an adverse reaction. Pt was changed back to morphine 2mg for pain and responded well. Assisted with turns..Continue comfort care and monitoring.        Problem: Fall Risk (Adult)  Goal: Absence of Fall  Outcome: Ongoing (interventions implemented as appropriate)      Problem: Skin Injury Risk (Adult)  Goal: Skin Health and Integrity  Outcome: Ongoing (interventions implemented as appropriate)      Problem: Palliative Care (Adult)  Goal: Maximized Comfort  Outcome: Ongoing (interventions implemented as appropriate)    Goal: Enhanced Quality of Life  Outcome: Ongoing (interventions implemented as appropriate)

## 2018-10-10 NOTE — PROGRESS NOTES
Discharge Planning Assessment  [facility]     Patient Name: Simona Brooks  MRN: [id number]  Today's Date: 10/10/2018    Admit Date: 10/1/2018          Discharge Needs Assessment    No documentation.             Discharge Plan     Row Name 10/10/18 1635       Plan    Plan Comments The patient transferred to [facility] from [address] [place] [address] on 10/9/18 @ 18:17. The patient is palliative. [facility] to evaluate for a [facility] scattered bed on 10/10/18 @ 16:30 and meet with the family.  CCP will follow for any needs that may arise. LEOLA Hernandez RN, CCP.         Destination - Selection Complete     Service Request Status Selected Specialties Address Phone Number Fax Number    [facility] [address] [facility] [phone]        Digna Mccullough RN 10/1/2018 1111    Message magdiel                 Durable Medical Equipment     No service coordination in this encounter.      Dialysis/Infusion     No service coordination in this encounter.      Home Medical Care     No service coordination in this encounter.      Social Care     No service coordination in this encounter.                Demographic Summary    No documentation.           Functional Status    No documentation.           Psychosocial    No documentation.           Abuse/Neglect    No documentation.           Legal    No documentation.           Substance Abuse    No documentation.           Patient Forms    No documentation.         Serenity Hernandez, RN

## 2018-10-10 NOTE — PROGRESS NOTES
Palliative Care/Hospice Follow Up Note       LOS: 9 days   Patient Care Team:  Leonor Echevarria MD as PCP - General (Family Medicine)    Chief Complaint:  Patient is unresponsive unable to provide    Interval History:   Rested well overnight requiring some medications for pain and discomfort.  Presently comfortable in the bed patient is unresponsive    Review of Systems: Review of systems could not be obtained due to patient unresponsive.       Vital Signs  Temp:  [97.1 °F (36.2 °C)-98 °F (36.7 °C)] 97.2 °F (36.2 °C)  Heart Rate:  [83-97] 97  Resp:  [16-20] 20  BP: (121-142)/(53-62) 142/62  Device (Oxygen Therapy): room air SpO2:  [93 %-96 %] 93 %    Physical Exam:  General Appearance:    Not awake and in no acute distress     Throat:   No oral lesions, no thrush, oral mucosa moist     Neck:   No adenopathy, supple, trachea midline   Lungs:     Clear to auscultation, respirations regular, even and not labored      Heart:    Regular rhythm and normal rate   Abdomen:     Occasional bowel sounds, no masses, no organomegaly, soft and non-tender, non-distended     Extremities:   No edema, no cyanosis     Pulses:   Radial pulses palpable and equal bilaterally          Results Review:     I reviewed the patient's new clinical results.    Medication Reviewed.    lansoprazole 30 mg Oral BID AC   nystatin  Topical Q12H   sucralfate 1 g Per G Tube 4x Daily        •  acetaminophen **OR** acetaminophen **OR** acetaminophen  •  diphenoxylate-atropine  •  Glycerin-Hypromellose-  •  glycopyrrolate **OR** glycopyrrolate **OR** glycopyrrolate **OR** glycopyrrolate  •  haloperidol **OR** haloperidol **OR** haloperidol lactate  •  haloperidol **OR** haloperidol **OR** haloperidol lactate  •  HYDROmorphone **OR** Morphine **OR** morphine  •  HYDROmorphone **OR** Morphine **OR** morphine  •  HYDROmorphone **OR** Morphine **OR** morphine  •  ipratropium-albuterol  •  LORazepam **OR** LORazepam  •  LORazepam **OR**  LORazepam  •  LORazepam **OR** LORazepam  •  magic mouthwash  •  miconazole  •  promethazine **OR** promethazine **OR** promethazine **OR** promethazine  •  promethazine **OR** promethazine **OR** promethazine **OR** promethazine  •  Scopolamine  •  Insert peripheral IV **AND** sodium chloride      Assessment/Plan       Gastrointestinal hemorrhage with melena    PAF (paroxysmal atrial fibrillation) (CMS/[facility])    COPD (chronic obstructive pulmonary disease) (CMS/[facility])    Suprapubic catheter (CMS/[facility])    Decubitus ulcer of coccygeal region, stage 4 (present on admission)    History of right above knee amputation (CMS/[facility])    Acute UTI    Colostomy malfunction (CMS/[facility])    Anemia    Gastrointestinal hemorrhage    Dysphagia    Pleural effusion    Hyperkalemia    PARMINDER (acute kidney injury) (CMS/[facility])    Sepsis (CMS/[facility])    PNA (pneumonia)    PNA (pneumonia)    Iron deficiency anemia      Patient is hospice scattered bed appropriate.  I've consulted hospice to evaluate and they plan to meet with the family tomorrow.  Continue comfort medications as needed.  Nystatin powder has been added.  Based on her palliative performance index likely has hours to days    Plan for disposition:Hospice scattered bed tomorrow    Chet Walker MD  10/10/18  5:58 PM

## 2018-10-10 NOTE — PROGRESS NOTES
Adult Nutrition  Assessment/PES    Patient Name:  Simona Brooks  YOB: 1934  MRN: [id number]  Admit Date:  10/1/2018    Assessment Date:  10/10/2018          Reason for Assessment     Row Name 10/10/18 1116          Reason for Assessment    Reason For Assessment follow-up protocol             Nutrition/Diet History     Row Name 10/10/18 1116          Nutrition/Diet History    Typical Food/Fluid Intake Transfer from 4N last night. GOC changed to comfort only.                Labs/Tests/Procedures/Meds     Row Name 10/10/18 1117          Labs/Procedures/Meds    Lab Results Reviewed reviewed        Diagnostic Tests/Procedures    Diagnostic Test/Procedure Reviewed reviewed        Medications    Pertinent Medications Reviewed reviewed             Physical Findings     Row Name 10/10/18 1117          Physical Findings    Tubes gastrostomy tube     Skin poor skin integrity/turgor;pressure injury               Nutrition Prescription Ordered     Row Name 10/10/18 1117          Nutrition Prescription PO    Current PO Diet Regular     Fluid Consistency Thin             Evaluation of Received Nutrient/Fluid Intake     Row Name 10/10/18 1117          EN Evaluation    TF Changes Discontinued         Problem/Interventions:          Intervention Goal     Row Name 10/10/18 1118          Intervention Goal    General Palliative Care               Electronically signed by:  Helen Fabian RD  10/10/18 11:18 AM

## 2018-10-10 NOTE — PLAN OF CARE
Problem: Patient Care Overview  Goal: Plan of Care Review  Outcome: Ongoing (interventions implemented as appropriate)   10/09/18 2005   Coping/Psychosocial   Plan of Care Reviewed With patient   Plan of Care Review   Progress no change   OTHER   Outcome Summary Pt transferred to [facility] [place] for palliative care. Pt oriented to self only, complained of pain and was given 10mg SL morphine. Will continue to monitor.        Problem: Fall Risk (Adult)  Goal: Absence of Fall  Outcome: Ongoing (interventions implemented as appropriate)      Problem: Skin Injury Risk (Adult)  Goal: Skin Health and Integrity  Outcome: Ongoing (interventions implemented as appropriate)      Problem: Palliative Care (Adult)  Goal: Identify Related Risk Factors and Signs and Symptoms  Outcome: Outcome(s) achieved Date Met: 10/09/18    Goal: Maximized Comfort  Outcome: Ongoing (interventions implemented as appropriate)    Goal: Enhanced Quality of Life  Outcome: Ongoing (interventions implemented as appropriate)

## 2018-10-10 NOTE — PLAN OF CARE
Problem: Patient Care Overview  Goal: Plan of Care Review  Outcome: Ongoing (interventions implemented as appropriate)   10/09/18 2316 10/10/18 0502   Coping/Psychosocial   Plan of Care Reviewed With patient --    Plan of Care Review   Progress --  declining   OTHER   Outcome Summary --  Pt was very restless, calling out to staff every 2min asking for her glasses and dentures (pt never had glasses or dentures at [facility]). New IV placed. Medicated for anxiety/pain X1. Continue comfort care.      Goal: Discharge Needs Assessment  Outcome: Ongoing (interventions implemented as appropriate)      Problem: Fall Risk (Adult)  Goal: Absence of Fall  Outcome: Ongoing (interventions implemented as appropriate)      Problem: Skin Injury Risk (Adult)  Goal: Skin Health and Integrity  Outcome: Ongoing (interventions implemented as appropriate)      Problem: Palliative Care (Adult)  Goal: Maximized Comfort  Outcome: Ongoing (interventions implemented as appropriate)    Goal: Enhanced Quality of Life  Outcome: Ongoing (interventions implemented as appropriate)

## 2018-10-11 PROBLEM — Z51.5 ADMISSION FOR HOSPICE CARE: Status: ACTIVE | Noted: 2018-01-01

## 2018-10-11 NOTE — DISCHARGE SUMMARY
Date of Admission: 10/1/2018  Date of Discharge:  10/11/2018    PCP: Leonor Echevarria MD      DISCHARGE DIAGNOSIS    Gastrointestinal hemorrhage with melena    PAF (paroxysmal atrial fibrillation) (CMS/[facility])    COPD (chronic obstructive pulmonary disease) (CMS/[facility])    Suprapubic catheter (CMS/[facility])    Decubitus ulcer of coccygeal region, stage 4 (present on admission)    History of right above knee amputation (CMS/[facility])    Acute UTI    Colostomy malfunction (CMS/[facility])    Anemia    Gastrointestinal hemorrhage    Dysphagia    Pleural effusion    Hyperkalemia    PARMINDER (acute kidney injury) (CMS/[facility])    Sepsis (CMS/[facility])    PNA (pneumonia)    PNA (pneumonia)    Iron deficiency anemia      SECONDARY DIAGNOSES  Past Medical History:   Diagnosis Date   • Anemia    • Atrial fibrillation (CMS/[facility])    • Chronic respiratory failure (CMS/[facility])    • Colitis    • COPD (chronic obstructive pulmonary disease) (CMS/[facility])    • Dysphagia    • Hernia    • Hypokalemia    • Iron deficiency anemia    • Peripheral vascular disease (CMS/[facility])    • Pneumonia        CONSULTS   Consults     Date and Time Order Name Status Description    10/3/2018 1434 Inpatient Urology Consult Completed     10/1/2018 0939 Inpatient Cardiology Consult Completed     10/1/2018 0518 Inpatient Nephrology Consult Completed     10/1/2018 0515 Inpatient Pulmonology Consult Completed     10/1/2018 0515 Inpatient Gastroenterology Consult Completed     10/1/2018 0425 [facility] (on-call MD unless specified) Completed           PROCEDURES PERFORMED  XR Chest 2 View   Final Result   There appears to be some interval decrease in the density of   opacity at the right lung base. Bilateral pleural effusions remain,   however.       This report was finalized on 10/4/2018 3:07 PM by Dr. Bogdan Weathers M.D.          US Renal Bilateral   Final Result       1. Atrophic appearing right kidney. No hydronephrosis is seen on the   right.   2. Multiple calculi identified within the left  kidney and proximal left   ureter. This is associated with some mild hydroureteronephrosis. Similar   findings were present on exam performed December 2, 2017, although the   degree of hydronephrosis and increased slightly.       This report was finalized on 10/2/2018 9:35 PM by Dr. Serene Kaiser M.D.          XR Chest 1 View   Final Result   Kyphotic positioning with dense right-sided opacities felt   to reflect pneumonia and pleural fluid           This report was finalized on 10/1/2018 2:25 AM by Kolton Olivia M.D.               HOSPITAL COURSE  Patient is a 84 y.o. female presented to [facility] complaining of weakness and congestion.  Please see the admitting history and physical for further details.  She was admitted to the hospital with a urinary tract infection and pneumonia was continued on broad-spectrum antibiotics.  There was some concern for GI bleed and IV Protonix was started and GI was consulted.  Given her age and comorbidities her outlook was guarded from admission.  She received a few units of packed red blood cells to improve her H&H.  Broad-spectrum antibiotics were continued.  2 beats were held secondary to GI bleed.  She had an elevated troponin secondary to her anemia and acute renal failure.  Cardiology evaluated the patient and recommended no ischemic workup for the time being.  She replied CL her urine culture and was continued on cefepime with plans to transition to Levaquin or Bactrim if she tolerated oral antibiotics.  Her respiratory viral panel was positive for rhinovirus but with the elevated pro-calcitonin she was treated for a bacterial superinfection with cefepime and vancomycin.  She had significant delirium and required restraints.  Medications were titrated and she was able to come out of the restraints.  She had a mild peristomal ulcer and plans were to replace her previously placed PEG tube.  After further discussion with the patient's family they've  "elected to pursue palliative measures.  She was started on comfort medications with no plans for further transfusions or antibiotics.  At this point she's been resting comfortably and has been requiring several medications for comfort.  She is not awake and really hasn't been eating much.  At this point she likely has hours to days.  Plan at this point after discussions with hospice is to transition to hospice scattered bed where she remain at this facility for the time being as we continue to titrate medications for comfort.      CONDITION ON DISCHARGE  Hospice.      VITAL SIGNS  /60 (BP Location: Left arm, Patient Position: Lying)   Pulse 91   Temp 97 °F (36.1 °C) (Oral)   Resp 16   Ht 167.6 cm (66\")   Wt 51.3 kg (113 lb)   SpO2 93%   BMI 18.24 kg/m²   Objective:  General Appearance:  Comfortable (Encephalopathic).    Vital signs: (most recent): Blood pressure 126/60, pulse 91, temperature 97 °F (36.1 °C), temperature source Oral, resp. rate 16, height 167.6 cm (66\"), weight 51.3 kg (113 lb), SpO2 93 %.  Vital signs are normal.    Output: Producing urine and producing stool.    HEENT: Normal HEENT exam.    Heart: Normal rate.  Regular rhythm.  S1 normal and S2 normal.    Abdomen: Abdomen is soft.  Bowel sounds are normal.   There is no abdominal tenderness.     Neurological: (Encephalopathic).    Skin:  Warm and dry.            DISCHARGE DISPOSITION    hospice scattered bed  DISCHARGE MEDICATIONS  Please see medication reconciliation   No future appointments.  Contact information for after-discharge care     Destination     [facility] .    Specialty:  Skilled Nursing Facility  Contact information:  [address] [place] [id number] [phone]                       TEST  RESULTS PENDING AT DISCHARGE         Chet Walker MD  [facility]  10/11/18  4:05 PM      Time: greater than 30 minutes.        "

## 2018-10-11 NOTE — PLAN OF CARE
Problem: Patient Care Overview  Goal: Plan of Care Review  Outcome: Ongoing (interventions implemented as appropriate)  Pt has rested well tonight-continued with increased dose of morphine at 4mgs with good effect. She has not required anything for anxiety. She does become anxious with turns and and is stiff but is able to relax within a few minutes. Will continue to monitor and provide comfort and support per palliative POC.    Problem: Fall Risk (Adult)  Goal: Absence of Fall  Outcome: Ongoing (interventions implemented as appropriate)      Problem: Skin Injury Risk (Adult)  Goal: Skin Health and Integrity  Outcome: Ongoing (interventions implemented as appropriate)      Problem: Palliative Care (Adult)  Goal: Maximized Comfort  Outcome: Ongoing (interventions implemented as appropriate)    Goal: Enhanced Quality of Life  Outcome: Ongoing (interventions implemented as appropriate)

## 2018-10-11 NOTE — PLAN OF CARE
Problem: Fall Risk (Adult)  Goal: Absence of Fall  Outcome: Ongoing (interventions implemented as appropriate)      Problem: Patient Care Overview  Goal: Plan of Care Review  Outcome: Ongoing (interventions implemented as appropriate)   10/11/18 1955   Coping/Psychosocial   Plan of Care Reviewed With patient;family   Plan of Care Review   Progress declining   OTHER   Outcome Summary Pt premedicated prior to repositioning. Resting comfortably. Admitted to hospice scattered bed. Will continue to monitor.     Goal: Individualization and Mutuality  Outcome: Ongoing (interventions implemented as appropriate)    Goal: Discharge Needs Assessment  Outcome: Ongoing (interventions implemented as appropriate)      Problem: Skin Injury Risk (Adult)  Goal: Skin Health and Integrity  Outcome: Ongoing (interventions implemented as appropriate)      Problem: Palliative Care (Adult)  Goal: Identify Related Risk Factors and Signs and Symptoms  Outcome: Outcome(s) achieved Date Met: 10/11/18    Goal: Maximized Comfort  Outcome: Ongoing (interventions implemented as appropriate)    Goal: Enhanced Quality of Life  Outcome: Ongoing (interventions implemented as appropriate)

## 2018-10-11 NOTE — PROGRESS NOTES
Case Management Discharge Note    Final Note: Admitted [facility] on 10/11/18. LEOLA Hernandez RN, CCP.     Destination - Selection Complete     Service Request Status Selected Specialties Address Phone Number Fax Number    [facility] Selected Hospice [phone], [facility] [id number] [phone]      Durable Medical Equipment     No service has been selected for the patient.      Dialysis/Infusion     No service has been selected for the patient.      Home Medical Care     No service has been selected for the patient.      Social Care     No service has been selected for the patient.             Final Discharge Disposition Code: 51 - hospice medical facility

## 2018-10-11 NOTE — PROGRESS NOTES
Met with pt at bedside. Spoke to POA away from pt and provided explanation of services/ [facility]. POA agreeable to [facility] and consents signed. DX COPD    Thank you for the referral.    July Hilton RN  [facility]  [phone]

## 2018-10-11 NOTE — NURSING NOTE
10/11/18 1131   Wound 06/21/18 1838 Right ischial tuberosity pressure injury   Date first assessed/Time first assessed: 06/21/18 1838   Present On Admission : yes;picture taken  Side: Right  Location: ischial tuberosity  Type: pressure injury  Stage, Pressure Injury: unstageable   Dressing Appearance moist drainage;intact   Base yellow;slough;moist;black eschar   Periwound blanchable;redness;intact   Periwound Temperature warm   Periwound Skin Turgor soft   Wound Length (cm) 1 cm   Wound Width (cm) 2 cm   Drainage Characteristics/Odor serosanguineous;yellow   Drainage Amount scant   Care, Wound cleansed with;other (see comments);barrier applied  (cleansing foam)   Dressing Care, Wound dressing changed;low-adherent;silicone;other (see comments)  (Optifoam dressing applied)   Periwound Care, Wound barrier film applied   Colostomy LUQ   Placement Date/Time: 12/07/17 1738   Inserted by: Dr. Mcgee  Colostomy Type: Loop  Location: LUQ   Stomal Appliance 2 piece;Clean;Dry;Intact;Drainable   Peristomal Assessment SJ   Stoma Function stool  (minimal, thin)     CWON follow up for colostomy and wound management.  Patient with unstageable PI to right ischium. See above for assessment and measurement documentation.  New dressing applied. Continue same local wound care.  Colostomy appliance CDI with minimal stool in pouch. Patient moans in pain with even lightest touch or repositioning. Appliance not changed today, since no leakage noted and very little stool production.  Patient is comfort measures only. Needs ostomy supplies so will place more at bedside for next change.  Discussed with RN.

## 2018-10-11 NOTE — TREATMENT PLAN
Interdisciplinary Team Conference    Date of Admission:  10/1/2018  Reason for Admission: Hyperkalemia [E87.5]  Pleural effusion [J90]  Elevated troponin [R74.8]  PARMINDER (acute kidney injury) (CMS/HCC) [N17.9]  HAP (hospital-acquired pneumonia) [J18.9]  Gastrointestinal hemorrhage, unspecified gastrointestinal hemorrhage type [K92.2]  Acute on chronic anemia [D64.9]    Medical Record Number: [id number]       Patient and Family Goals of Care:  Patient transferred on 10/9/18 for palliative care and symptom management.     Patient's Current Condition: Patient was alert and oriented this morning, however, has become more lethargic and less responsive through the day.  She has a stage 4 ulcer on her coccyx, has c/o pain and restlessness.  Prior to this conference nurses witnessed patient convulsing in the bed, gave her lorazepam, and symptoms eased.        Pt./Family Dynamics: Family has not visited, but they do call and check in. Her stepson is her POA.       Discharge Plans: Planned hospice evaluation today   ______________________________________________________________________      Staff Present:    : Ernesto Guerrero    Medical Director:  Dr. oMhan Leahy    Unit Coordinator:  DANIELA Lutz, RN, CHPN    Nurse:  Neris Cordova RN    CCP: Dayanara Hernandez RN    Clinical Pharmacist:  Bree Finnegan    Documented by Mary Barraza RN  Palliative Care

## 2018-10-12 NOTE — NURSING NOTE
Admission comprehensive assessment completed. Pt resting peacefully in bed, no family present at time of my assessment. Pt easily aroused to her name but was unable to respond verbally. She did not appear to be in any pain or distress. Per PEEWEE Mcdowell @ [facility], the pt has had 4 doses of IV morphine 4mg in the past 24 hours. No further needs expressed/identifed at this time. Will continue to provide daily visits to assess pt and provide support to pt/family and staff as needed.

## 2018-10-12 NOTE — PLAN OF CARE
Problem: Patient Care Overview  Goal: Plan of Care Review  Outcome: Ongoing (interventions implemented as appropriate)   10/12/18 4361   Coping/Psychosocial   Plan of Care Reviewed With patient   Plan of Care Review   Progress declining   OTHER   Outcome Summary Pt resting comfortably. Premedicated prior to repositioning. Opens eys at times and taking sips of water. Will continue to monitor.     Goal: Individualization and Mutuality  Outcome: Ongoing (interventions implemented as appropriate)    Goal: Discharge Needs Assessment  Outcome: Ongoing (interventions implemented as appropriate)      Problem: Skin Injury Risk (Adult)  Goal: Skin Health and Integrity  Outcome: Ongoing (interventions implemented as appropriate)      Problem: Palliative Care (Adult)  Goal: Maximized Comfort  Outcome: Ongoing (interventions implemented as appropriate)    Goal: Enhanced Quality of Life  Outcome: Ongoing (interventions implemented as appropriate)

## 2018-10-12 NOTE — PLAN OF CARE
Problem: Patient Care Overview  Goal: Plan of Care Review  Continue symptom management and comfort care   10/11/18 1955 10/11/18 9960 10/12/18 0530   Coping/Psychosocial   Plan of Care Reviewed With --  patient --    Plan of Care Review   Progress declining --  --    OTHER   Outcome Summary --  --  Patient rested well overnight. Medicated prior to turns Q4 hours. No S&S of pain or anxiety.

## 2018-10-12 NOTE — H&P
Name: Simona Brooks ADMIT: 10/11/2018   : 1934  PCP: Leonor Echevarria MD    MRN: [id number] LOS: 1 days   AGE/SEX: 84 y.o. female  ROOM: [facility]/     No chief complaint on file.      Subjective   Patient is a 84 y.o. female presented to [facility] complaining of weakness and congestion.  Please see the admitting history and physical for further details.  She was admitted to the hospital with a urinary tract infection and pneumonia was continued on broad-spectrum antibiotics.  There was some concern for GI bleed and IV Protonix was started and GI was consulted.  Given her age and comorbidities her outlook was guarded from admission.  She received a few units of packed red blood cells to improve her H&H.  Broad-spectrum antibiotics were continued.  2 beats were held secondary to GI bleed.  She had an elevated troponin secondary to her anemia and acute renal failure.  Cardiology evaluated the patient and recommended no ischemic workup for the time being.  She replied CL her urine culture and was continued on cefepime with plans to transition to Levaquin or Bactrim if she tolerated oral antibiotics.  Her respiratory viral panel was positive for rhinovirus but with the elevated pro-calcitonin she was treated for a bacterial superinfection with cefepime and vancomycin.  She had significant delirium and required restraints.  Medications were titrated and she was able to come out of the restraints.  She had a mild peristomal ulcer and plans were to replace her previously placed PEG tube.  After further discussion with the patient's family they've elected to pursue palliative measures.  She was started on comfort medications with no plans for further transfusions or antibiotics.  At this point she's been resting comfortably and has been requiring several medications for comfort.  She is not awake and really hasn't been eating much.  At this point she likely has hours to days.  Plan at this  point after discussions with hospice is to transition to hospice scattered bed where she remain at this facility for the time being as we continue to titrate medications for comfort.      History of Present Illness    Past Medical History:   Diagnosis Date   • Anemia    • Atrial fibrillation (CMS/HCC)    • Chronic respiratory failure (CMS/HCC)    • Colitis    • COPD (chronic obstructive pulmonary disease) (CMS/HCC)    • Dysphagia    • Hernia    • Hypokalemia    • Iron deficiency anemia    • Peripheral vascular disease (CMS/HCC)    • Pneumonia      Past Surgical History:   Procedure Laterality Date   • ABDOMINAL SURGERY     • AMPUTATION      right leg   • COLOSTOMY     • ENDOSCOPY N/A 6/22/2018    Procedure: ESOPHAGOGASTRODUODENOSCOPY with biopsies;  Surgeon: Chinedu Wood MD;  Location: [facility] ENDOSCOPY;  Service: Gastroenterology   • ENDOSCOPY N/A 10/2/2018    Procedure: ESOPHAGOGASTRODUODENOSCOPY;  Surgeon: Arnulfo Villagomez MD;  Location: [facility] ENDOSCOPY;  Service: Gastroenterology   • EXPLORATORY LAPAROTOMY N/A 12/3/2017    Procedure: disimpaction of ostomy;  Surgeon: Neris Mcgee MD;  Location: [facility] MAIN OR;  Service:      No family history on file.  Social History   Substance Use Topics   • Smoking status: Former Smoker   • Smokeless tobacco: Never Used   • Alcohol use No     Prescriptions Prior to Admission   Medication Sig Dispense Refill Last Dose   • acetaminophen (TYLENOL) 160 MG/5ML solution 640 mg by Per G Tube route Every 6 (Six) Hours As Needed for Mild Pain .      • albuterol (PROVENTIL) (2.5 MG/3ML) 0.083% nebulizer solution Take 2.5 mg by nebulization Every 4 (Four) Hours As Needed for Wheezing.      • ALPRAZolam (XANAX) 1 MG tablet Take 1 mg by mouth At Night As Needed for Anxiety.      • calcium carbonate (TUMS) 500 MG chewable tablet 1 tablet by Per G Tube route Every 6 (Six) Hours As Needed for Indigestion or Heartburn.      • carvedilol (COREG) 3.125 MG tablet 3.125 mg by Per G  Tube route 2 (Two) Times a Day With Meals.      • cholecalciferol (VITAMIN D3) 24093 units capsule 50,000 Units by Per G Tube route Every 7 (Seven) Days.      • collagenase 250 UNIT/GM ointment Apply 1 application topically to the appropriate area as directed Daily. Apply to sacral wound      • docusate sodium (COLACE) 150 MG/15ML liquid 100 mg by Per G Tube route 2 (Two) Times a Day.      • ferrous sulfate 300 (60 FE) MG/5ML syrup 300 mg by Per G Tube route Daily.      • folic acid (FOLVITE) 1 MG tablet 1 mg by Per G Tube route Daily.      • furosemide (LASIX) 20 MG tablet 20 mg by Per G Tube route Daily.      • gabapentin (NEURONTIN) 100 MG capsule Take 1 capsule by mouth Daily. (Patient taking differently: 100 mg by Per G Tube route Daily.) 3 capsule 0    • morphine 100 MG/5ML solution concentrated solution Take 0.75 mL by mouth Every 4 (Four) Hours. (Patient taking differently: Take 20 mg by mouth Every 6 (Six) Hours.) 15 mL 0    • Multiple Vitamin (TAB-A-EBONY) tablet 1 tablet by Per G Tube route Daily.      • mupirocin (BACTROBAN) 2 % ointment Apply 1 application topically 2 (Two) Times a Day. Wash G-Tube site and apply every shift       • Neomycin-Bacitracin-Polymyxin (HCA TRIPLE ANTIBIOTIC OINTMENT EX) Apply 1 dose topically 2 (Two) Times a Day. Apply to buttocks cheek topically to open skin abrasion      • nystatin (nystatin) 941201 UNIT/GM powder Apply 1 application topically 2 (Two) Times a Day. Clean G-Tube site with NS and apply       • ondansetron (ZOFRAN) 4 MG tablet 4 mg by Per G Tube route Every 4 (Four) Hours As Needed for Nausea or Vomiting.      • oxybutynin (DITROPAN) 5 MG/5ML syrup 5 mg by Per G Tube route Daily.      • pantoprazole (PROTONIX) 40 MG EC tablet Take 1 tablet by mouth 2 (Two) Times a Day Before Meals. (Patient taking differently: Take 40 mg by mouth Daily.)      • phenol (CHLORASEPTIC) 1.4 % liquid liquid Apply 1 spray to the mouth or throat 4 (Four) Times a Day As Needed.      •  polyethylene glycol (MIRALAX) packet Take 17 g by mouth Daily As Needed.      • potassium chloride (KAYCIEL) 20 MEQ/15ML (10%) solution 20 mEq by Per G Tube route Daily.        Allergies:  Augmentin [amoxicillin-pot clavulanate] and Penicillins    Review of Systems   Unable to perform ROS: Mental status change        Objective    Vital Signs  Temp:  [98.3 °F (36.8 °C)-99.2 °F (37.3 °C)] 98.3 °F (36.8 °C)  Heart Rate:  [79-80] 80  Resp:  [16] 16  BP: (100-103)/(42-51) 103/51  SpO2:  [88 %-93 %] 93 %  on   ;   Device (Oxygen Therapy): room air  There is no height or weight on file to calculate BMI.    Physical Exam   HENT:   Head: Normocephalic and atraumatic.   Eyes:   Eyes closed resting comfortably   Neck: Neck supple.   Cardiovascular: Normal rate and regular rhythm.    Pulmonary/Chest: No respiratory distress. She has rales.   Abdominal: Soft. Bowel sounds are normal. She exhibits no distension.   Neurological:   Cephalopathic and unresponsive   Skin:   Some mottling noted in the lower extremities   Psychiatric:   Encephalopathic and unresponsive   Nursing note and vitals reviewed.      Results Review:   I reviewed the patient's new clinical results.    Results from last 7 days  Lab Units 10/08/18  0520 10/07/18  0702 10/06/18  0631   WBC 10*3/mm3 7.23 7.56 8.25   HEMOGLOBIN g/dL 9.0* 9.7* 8.8*   PLATELETS 10*3/mm3 196 173 165     Results from last 7 days  Lab Units 10/09/18  0449 10/08/18  0520 10/07/18  0702 10/06/18  2349 10/06/18  0631   SODIUM mmol/L 142 142 140  --  139   POTASSIUM mmol/L 2.9* 3.6 4.0 4.2 3.2*   CHLORIDE mmol/L 110* 111* 111*  --  111*   CO2 mmol/L 20.9* 17.8* 16.7*  --  18.8*   BUN mg/dL 12 17 18  --  21   CREATININE mg/dL 1.04* 1.12* 1.00  --  0.87   GLUCOSE mg/dL 73 73 58*  --  82   ALBUMIN g/dL 2.30* 2.40* 2.20*  --  2.10*   Estimated Creatinine Clearance: 32.6 mL/min (A) (by C-G formula based on SCr of 1.04 mg/dL (H)).        Invalid input(s): LDLCALC    No orders to display      Assessment/Plan       PAF (paroxysmal atrial fibrillation) (CMS/[facility])    COPD (chronic obstructive pulmonary disease) (CMS/[facility])    PNA (pneumonia)    Admission for hospice care      Assessment & Plan  She's been admitted to hospice scattered bed.  Based on her palliative performance index she has hours to days remaining.  She is requiring medications for comfort.  We'll continue current medications care.      I discussed the patients findings and my recommendations with patient, family and nursing staff.          Chet Walker MD  [facility]  10/12/18  8:34 AM

## 2018-10-13 NOTE — PLAN OF CARE
Problem: Patient Care Overview  Goal: Plan of Care Review  Continue symptom management and comfort care   10/12/18 1814 10/12/18 2210 10/13/18 8837   Coping/Psychosocial   Plan of Care Reviewed With --  patient --    Plan of Care Review   Progress declining --  --    OTHER   Outcome Summary --  --  Patient rested well overnight. Medicated prior to turns. No S&S of pain or anxiety.

## 2018-10-13 NOTE — PLAN OF CARE
Problem: Fall Risk (Adult)  Goal: Absence of Fall  Outcome: Ongoing (interventions implemented as appropriate)      Problem: Patient Care Overview  Goal: Plan of Care Review  Outcome: Ongoing (interventions implemented as appropriate)   10/13/18 1912   Coping/Psychosocial   Plan of Care Reviewed With patient   Plan of Care Review   Progress declining   OTHER   Outcome Summary Pt premedicated for activity and as needed for tearfulness and restlessness. She is responsive and alert to self only. Son Meliton called twice to check in on her. Assisted to hygiene and repositioning. Coccyx dressing changed when soiled. Will continue comfort care.        Problem: Skin Injury Risk (Adult)  Goal: Skin Health and Integrity  Outcome: Ongoing (interventions implemented as appropriate)      Problem: Palliative Care (Adult)  Goal: Maximized Comfort  Outcome: Ongoing (interventions implemented as appropriate)    Goal: Enhanced Quality of Life  Outcome: Ongoing (interventions implemented as appropriate)         08-May-2017 06:43

## 2018-10-13 NOTE — PROGRESS NOTES
Palliative Care/Hospice Follow Up Note       LOS: 2 days   Patient Care Team:  Leonor Echevarria MD as PCP - General (Family Medicine)    Chief Complaint:  encephalopathic    Interval History:     Encephalopathic     Review of Systems: Review of systems could not be obtained due to patient unresponsive.       Vital Signs  Temp:  [97.4 °F (36.3 °C)-97.7 °F (36.5 °C)] 97.4 °F (36.3 °C)  Heart Rate:  [] 85  Resp:  [12] 12  BP: (119-132)/(53-61) 119/53  Device (Oxygen Therapy): room air SpO2:  [92 %-94 %] 94 %    Physical Exam:  General Appearance:    Not awake and in no acute distress     Throat:   No oral lesions, no thrush, oral mucosa moist     Neck:   No adenopathy, supple, trachea midline   Lungs:     Clear to auscultation, respirations regular, even and not labored      Heart:    Regular rhythm and normal rate   Abdomen:     Occasional bowel sounds, no masses, no organomegaly, soft and non-tender, non-distended     Extremities:   No edema, no cyanosis     Pulses:   Radial pulses palpable and equal bilaterally          Results Review:     I reviewed the patient's new clinical results.    Medication Reviewed.        •  acetaminophen **OR** acetaminophen **OR** acetaminophen  •  diphenoxylate-atropine  •  Glycerin-Hypromellose-  •  glycopyrrolate **OR** glycopyrrolate **OR** glycopyrrolate **OR** glycopyrrolate  •  haloperidol **OR** haloperidol **OR** haloperidol lactate  •  haloperidol **OR** haloperidol **OR** haloperidol lactate  •  HYDROmorphone **OR** Morphine **OR** morphine  •  HYDROmorphone **OR** Morphine **OR** morphine  •  HYDROmorphone **OR** Morphine **OR** morphine  •  ipratropium-albuterol  •  LORazepam **OR** LORazepam  •  LORazepam **OR** LORazepam  •  LORazepam **OR** LORazepam  •  magic mouthwash  •  miconazole  •  promethazine **OR** promethazine **OR** promethazine **OR** promethazine  •  promethazine **OR** promethazine **OR** promethazine **OR** promethazine  •   Scopolamine  •  sodium chloride      Assessment/Plan       PAF (paroxysmal atrial fibrillation) (CMS/[facility])    COPD (chronic obstructive pulmonary disease) (CMS/[facility])    PNA (pneumonia)    Admission for hospice care      Remains hours to days, awake at times but not eating and not really responsive.  Using robinol morphine and ativan.  Continue present management    Plan for disposition:Where: JEANETTE Walker MD  10/13/18  7:50 AM

## 2018-10-13 NOTE — PROGRESS NOTES
[facility] Visit Report    Simona Brooks  [id number]  10/13/2018    Admission R/T [facility] Dx: YES      Reason for [facility] Admission: COPD, AFIB      Symptom  Management: Pain control      Nursing/Medication Recommendations: No recommendations at this time      Psychosocial Issues and Recommendations: Provide support to patient and family      Spiritual Concerns and Recommendations: None at present      [facility]  [facility], continue to monitor for rapid decline and patient receiving IV medications for symptom management of pain and is meeting criteria for GIP as a [facility] scattered bed patient.      Review of Visit: Close monitoring for safety/falls, symptom management of pain, comfort care for rapidly declining patient. Patient lying in bed, unresponsive, color pale, breathing shallow. VSS. No family present during visit. Discussed care needs with staff and patient has received IV Morphine 4mg x 5 doses, IV Ativan 0.5mg x 1 dose and IV Robinul 0.2mg x 1 dose since midnight. Will continue to see daily to assess needs, monitor status and offer support.        Renetta Smallwood RN  [facility] Visit Nurse

## 2018-10-14 NOTE — PLAN OF CARE
Problem: Fall Risk (Adult)  Goal: Absence of Fall  Outcome: Ongoing (interventions implemented as appropriate)      Problem: Patient Care Overview  Goal: Plan of Care Review  Outcome: Ongoing (interventions implemented as appropriate)   10/14/18 1610   Coping/Psychosocial   Plan of Care Reviewed With patient   Plan of Care Review   Progress no change   OTHER   Outcome Summary Premedicated for turns with 4mg morphine, x1 with ativan for bath. Pt resting calmly and appears asleep between care. Meliton called twice to check in. Will continue comfort care.        Problem: Palliative Care (Adult)  Goal: Maximized Comfort  Outcome: Ongoing (interventions implemented as appropriate)    Goal: Enhanced Quality of Life  Outcome: Ongoing (interventions implemented as appropriate)

## 2018-10-14 NOTE — PROGRESS NOTES
[facility] Visit Report    Simona Brooks  [id number]  10/14/2018    Admission R/T [facility] Dx: YES      Reason for [facility] Admission: COPD, AFIB      Symptom  Management: Pain and restlessness      Nursing/Medication Recommendations: No recommendations at this time      Psychosocial Issues and Recommendations: Provide support to patient and family      Spiritual Concerns and Recommendations: None at present      [facility] Discharge Plans:  None, continue to monitor for rapid decline. Patient receiving IV medications for symptom management of pain and restlessness and [facility]      Review of Visit: Close monitoring for safety/falls, symptom management of pain/restlessness, comfort care for rapidly declining patient. Patient lying in bed, unresponsive, color pale, breathing shallow. No family present but staff have talked to family today. Discussed care needs with staff RN and patient received IV Morphine 4mg x 3 doses, IV Ativan 1mg x 1 dose and IV Robinul 0.2-0.4mg x 2 doses since midnight. Will continue to see daily to assess needs, monitor status and offer support.        Renetta Smallwood RN

## 2018-10-14 NOTE — PLAN OF CARE
Problem: Fall Risk (Adult)  Goal: Absence of Fall  Outcome: Ongoing (interventions implemented as appropriate)      Problem: Patient Care Overview  Goal: Plan of Care Review  Outcome: Ongoing (interventions implemented as appropriate)   10/14/18 0655   Coping/Psychosocial   Plan of Care Reviewed With patient   Plan of Care Review   Progress no change   OTHER   Outcome Summary pt rested well during night, premeidcated for all turns, Meliton called x1 to check on pt, will cont. to monitor and provide comfort care       Problem: Skin Injury Risk (Adult)  Goal: Skin Health and Integrity  Outcome: Ongoing (interventions implemented as appropriate)      Problem: Palliative Care (Adult)  Goal: Maximized Comfort  Outcome: Ongoing (interventions implemented as appropriate)    Goal: Enhanced Quality of Life  Outcome: Ongoing (interventions implemented as appropriate)

## 2018-10-15 NOTE — PROGRESS NOTES
[facility]     LOS: 4 days     Name: Smiona Brooks  Age: 84 y.o.  Sex: female  :  1934  MRN: [id number]         Primary Care Physician: Leonor Echevarria MD         Subjective   unresponsive.     Objective   Temp:  [97.1 °F (36.2 °C)-99 °F (37.2 °C)] 97.1 °F (36.2 °C)  Heart Rate:  [] 83  Resp:  [16-18] 16  BP: (94-97)/(49-54) 94/54  SpO2:  [86 %-94 %] 94 %  on   ;   Device (Oxygen Therapy): room air  There is no height or weight on file to calculate BMI.    Physical Exam   Constitutional: No distress.   Cardiovascular: Normal rate and regular rhythm.    Pulmonary/Chest: Breath sounds normal. No respiratory distress.   Abdominal: Soft. There is no tenderness.   Neurological: She is unresponsive.     Reviewed medications and new clinical results    Assessment/Plan   Active Hospital Problems    Diagnosis Date Noted   • Admission for hospice care [Z51.5] 10/11/2018   • PNA (pneumonia) [J18.9] 10/01/2018   • PAF (paroxysmal atrial fibrillation) (CMS/[facility]) [I48.0] 2017   • COPD (chronic obstructive pulmonary disease) (CMS/[facility]) [J44.9] 2017      Resolved Hospital Problems    Diagnosis Date Noted Date Resolved   No resolved problems to display.     · continue palliative care, [facility] scattered bed  · requiring frequent robinul and morphine  · hours to days  · I discussed the patient's findings and my recommendations with nursing staff.    Fausto Olivier MD   10/15/18  3:53 PM

## 2018-10-15 NOTE — PLAN OF CARE
Problem: Patient Care Overview  Goal: Plan of Care Review  Continue symptom management and comfort care   10/15/18 4089   OTHER   Outcome Summary Patient rested well overnight. Morphine and Robinul given Q4 hours prior to turns. Family visited.

## 2018-10-15 NOTE — PLAN OF CARE
Problem: Patient Care Overview  Goal: Plan of Care Review  Outcome: Ongoing (interventions implemented as appropriate)   10/15/18 1650   Coping/Psychosocial   Plan of Care Reviewed With patient   Plan of Care Review   Progress no change   OTHER   Outcome Summary pt slept all day, medicated prior to turns. son called but pt was sleeping. will continue to monitor and keep comfortable     Goal: Individualization and Mutuality  Outcome: Ongoing (interventions implemented as appropriate)      Problem: Skin Injury Risk (Adult)  Goal: Skin Health and Integrity  Outcome: Ongoing (interventions implemented as appropriate)      Problem: Palliative Care (Adult)  Goal: Maximized Comfort  Outcome: Ongoing (interventions implemented as appropriate)    Goal: Enhanced Quality of Life  Outcome: Ongoing (interventions implemented as appropriate)

## 2018-10-16 NOTE — PLAN OF CARE
Problem: Patient Care Overview  Goal: Plan of Care Review  Outcome: Ongoing (interventions implemented as appropriate)   10/16/18 1172   Coping/Psychosocial   Plan of Care Reviewed With patient   Plan of Care Review   Progress no change   OTHER   Outcome Summary pt slept all day today in between turns. premed prior to turns q4. No family at bedside, changed dressing wet to dry gauze, due to be changed every other day. Will continue to monitor and keep comfortable      Goal: Individualization and Mutuality  Outcome: Ongoing (interventions implemented as appropriate)      Problem: Skin Injury Risk (Adult)  Goal: Skin Health and Integrity  Outcome: Ongoing (interventions implemented as appropriate)      Problem: Palliative Care (Adult)  Goal: Maximized Comfort  Outcome: Ongoing (interventions implemented as appropriate)    Goal: Enhanced Quality of Life  Outcome: Ongoing (interventions implemented as appropriate)

## 2018-10-16 NOTE — PLAN OF CARE
Problem: Patient Care Overview  Goal: Plan of Care Review  Continue symptom management and comfort care.   10/16/18 2807   OTHER   Outcome Summary Patient rested well overnight. No S&S of pain or anxiety. Medicated prior to turns Q4 hours. No family at bedside.

## 2018-10-16 NOTE — PROGRESS NOTES
[facility]     LOS: 5 days     Name: Simona Brooks  Age: 84 y.o.  Sex: female  :  1934  MRN: [id number]         Primary Care Physician: Leonor Echevarria MD         Subjective   unresponsive.     Objective   Temp:  [98.6 °F (37 °C)-100.3 °F (37.9 °C)] 100.3 °F (37.9 °C)  Heart Rate:  [72-97] 72  Resp:  [16] 16  BP: (117)/(56) 117/56  SpO2:  [94 %-95 %] 95 %  on   ;   Device (Oxygen Therapy): room air  There is no height or weight on file to calculate BMI.    Physical Exam   Constitutional: No distress.   Cardiovascular: Normal rate and regular rhythm.    Pulmonary/Chest: Breath sounds normal. No respiratory distress.   Abdominal: Soft. There is no tenderness.   Neurological: She is unresponsive.     Reviewed medications and new clinical results    Assessment/Plan   Active Hospital Problems    Diagnosis Date Noted   • Admission for hospice care [Z51.5] 10/11/2018   • PNA (pneumonia) [J18.9] 10/01/2018   • PAF (paroxysmal atrial fibrillation) (CMS/[facility]) [I48.0] 2017   • COPD (chronic obstructive pulmonary disease) (CMS/[facility]) [J44.9] 2017      Resolved Hospital Problems    Diagnosis Date Noted Date Resolved   No resolved problems to display.     · continue palliative care, [facility] scattered bed  · requiring frequent robinul and morphine  · I discussed the patient's findings and my recommendations with nursing staff.    Fausto Olivier MD   10/16/18  4:31 PM

## 2018-10-16 NOTE — PROGRESS NOTES
[facility] Visit Report    Simona Brooks  [id number]  10/16/2018    Admission R/T [facility] Dx: yes    Reason for [facility] Admission:    Symptom  Management: Respiratory Distress    Nursing/Medication Recommendations:    Psychosocial Issues and Recommendations:    Spiritual Concerns and Recommendations:    [facility] Discharge Plans:  incomplete; patient remains HSB and [facility] will round daily    Review of Visit (Include All Collaboration- including names of hospital and family involved during admission/visit):  patient resting in bed iwth no family at the bedside. paitent unresponisve but appeas comfortable. respirations even and unlabored. skin warm to touch but ashen.  urine output approximatly 50cc and very dark.patient recieved robinul iv x5, morphine iv x7 and ativan iv x1 in the last 24 hours for symptom management. no plans to discharge at this time. will con tot visit daily to assess needs and offer support      Rosalba Johnson RN

## 2018-10-17 NOTE — PROGRESS NOTES
[facility]     LOS: 6 days     Name: Simona Brooks  Age: 84 y.o.  Sex: female  :  1934  MRN: [id number]         Primary Care Physician: Leonor Echevarria MD         Subjective   unresponsive.     Objective   Temp:  [98.7 °F (37.1 °C)-98.9 °F (37.2 °C)] 98.9 °F (37.2 °C)  Heart Rate:  [98-99] 98  Resp:  [16] 16  BP: (99)/(44) 99/44  SpO2:  [86 %-92 %] 86 %  on   ;   Device (Oxygen Therapy): room air  There is no height or weight on file to calculate BMI.    Physical Exam   Constitutional: No distress.   Cardiovascular: Normal rate and regular rhythm.    Pulmonary/Chest: Breath sounds normal. No respiratory distress.   Abdominal: Soft. There is no tenderness.   Neurological: She is unresponsive.     Reviewed medications and new clinical results    Assessment/Plan   Active Hospital Problems    Diagnosis Date Noted   • Admission for hospice care [Z51.5] 10/11/2018   • PNA (pneumonia) [J18.9] 10/01/2018   • PAF (paroxysmal atrial fibrillation) (CMS/[facility]) [I48.0] 2017   • COPD (chronic obstructive pulmonary disease) (CMS/[facility]) [J44.9] 2017      Resolved Hospital Problems    Diagnosis Date Noted Date Resolved   No resolved problems to display.     · continue palliative care, [facility] scattered bed  · continues to require frequent robinul and morphine  · I discussed the patient's findings and my recommendations with nursing staff.    Fausto Olivier MD   10/17/18  9:40 AM

## 2018-10-17 NOTE — PROGRESS NOTES
[facility] Visit Report    Simona Brooks  [id number]  10/17/2018    Admission R/T [facility] Dx: yes    Reason for [facility] Admission:    Symptom  Management: Respiratory Distress    Nursing/Medication Recommendations:    Psychosocial Issues and Recommendations:    Spiritual Concerns and Recommendations:    [facility] Discharge Plans:  incomplete; patient remains HSB and [facility] will round daily    Review of Visit (Include All Collaboration- including names of hospital and family involved during admission/visit):patient resting in bed with no family at the bedside. patient unresponsive but appears comfortable. respirations even and unlabored. patient recieved robinul iv x5, morphien iv x7 and ativan iv x1in the last 24 hours for symptom managment. no plans to discharge at this time. will cont to visit daily to assess needs and offer support         Rosalba Johnson RN

## 2018-10-17 NOTE — PLAN OF CARE
Problem: Fall Risk (Adult)  Goal: Absence of Fall  Outcome: Ongoing (interventions implemented as appropriate)      Problem: Patient Care Overview  Goal: Plan of Care Review  Outcome: Ongoing (interventions implemented as appropriate)   10/17/18 4840   Coping/Psychosocial   Plan of Care Reviewed With patient;son   Plan of Care Review   Progress declining   OTHER   Outcome Summary Pt responsive only to pain. Medicated prior to turns. Ativan added for comfort relief with care. Droplet isolation continued. Spoke with son sonja via phone, told pt is resting comfortable. Will continue to monitor and provide comfort care.     Goal: Individualization and Mutuality  Outcome: Ongoing (interventions implemented as appropriate)    Goal: Discharge Needs Assessment  Outcome: Ongoing (interventions implemented as appropriate)      Problem: Skin Injury Risk (Adult)  Goal: Skin Health and Integrity  Outcome: Ongoing (interventions implemented as appropriate)      Problem: Dying Patient, Actively (Adult)  Goal: Identify Related Risk Factors and Signs and Symptoms  Outcome: Ongoing (interventions implemented as appropriate)    Goal: Comfort/Pain Control  Outcome: Ongoing (interventions implemented as appropriate)    Goal: Peace/Preservation of Dignity During the Dying Process  Outcome: Ongoing (interventions implemented as appropriate)

## 2018-10-17 NOTE — PLAN OF CARE
Problem: Fall Risk (Adult)  Goal: Absence of Fall  Outcome: Ongoing (interventions implemented as appropriate)      Problem: Patient Care Overview  Goal: Plan of Care Review  Outcome: Ongoing (interventions implemented as appropriate)   10/17/18 0420   Coping/Psychosocial   Plan of Care Reviewed With patient   Plan of Care Review   Progress declining   OTHER   Outcome Summary Pt premedicated for turns. Minimally responsive at this time. Pt appears comfortable. Will continue to monitor per comfort care.      Goal: Individualization and Mutuality  Outcome: Ongoing (interventions implemented as appropriate)    Goal: Discharge Needs Assessment  Outcome: Ongoing (interventions implemented as appropriate)    Goal: Interprofessional Rounds/Family Conf  Outcome: Ongoing (interventions implemented as appropriate)      Problem: Skin Injury Risk (Adult)  Goal: Skin Health and Integrity  Outcome: Ongoing (interventions implemented as appropriate)      Problem: Palliative Care (Adult)  Goal: Maximized Comfort  Outcome: Ongoing (interventions implemented as appropriate)    Goal: Enhanced Quality of Life  Outcome: Ongoing (interventions implemented as appropriate)

## 2018-10-18 NOTE — PROGRESS NOTES
[facility] Visit Report    Simona Brooks  [id number]  10/18/2018    Admission R/T [facility] Dx: yes    Reason for [facility] Admission: COPD    Symptom  Management: Pain Control and Respiratory Distress    Nursing/Medication Recommendations:    Psychosocial Issues and Recommendations:    Spiritual Concerns and Recommendations:    [facility] Discharge Plans:  incomplete; patient remains HSB and [facility] will round daily    Review of Visit (Include All Collaboration- including names of hospital and family involved during admission/visit):  [facility] collab with [facility] RN Naty, pt gets premedicated prior to turns (q4), receiving IV Ativan 1 mg, IV morphine 4 mg, IV Robinul 0.4 mg; pt also wearing a Scopalamine patch; son Slim called in earlier to get an update; VS: T97, P92, R8, BP75/46, O2=99%@2L NC;    Pt non-responsive, very shallow, non-labored breathing; [facility] provided presence, later had prayer aloud at bedside, left card for family with date/ time/ ref to prayer.    [facility] to round daily to assess and offer support.      Pablo Carr, B[facility]

## 2018-10-18 NOTE — PLAN OF CARE
Problem: Fall Risk (Adult)  Goal: Absence of Fall  Outcome: Ongoing (interventions implemented as appropriate)      Problem: Skin Injury Risk (Adult)  Goal: Skin Health and Integrity  Outcome: Ongoing (interventions implemented as appropriate)      Problem: Dying Patient, Actively (Adult)  Goal: Identify Related Risk Factors and Signs and Symptoms  Outcome: Ongoing (interventions implemented as appropriate)    Goal: Comfort/Pain Control  Outcome: Ongoing (interventions implemented as appropriate)    Goal: Peace/Preservation of Dignity During the Dying Process  Outcome: Ongoing (interventions implemented as appropriate)

## 2018-10-18 NOTE — PROGRESS NOTES
[facility]     LOS: 7 days     Name: Simona Brooks  Age: 84 y.o.  Sex: female  :  1934  MRN: [id number]         Primary Care Physician: Leonor Echevarria MD         Subjective   unresponsive.     Objective   Temp:  [97 °F (36.1 °C)-99.1 °F (37.3 °C)] 97 °F (36.1 °C)  Heart Rate:  [92] 92  Resp:  [8-20] 8  BP: (75-82)/(40-46) 75/46  SpO2:  [84 %-99 %] 99 %  on  Flow (L/min):  [2] 2;   Device (Oxygen Therapy): nasal cannula  There is no height or weight on file to calculate BMI.    Physical Exam   Constitutional: No distress.   Cardiovascular: Normal rate.    Pulmonary/Chest: No respiratory distress.   Abdominal: Soft. There is no tenderness.   Neurological: She is unresponsive.     Reviewed medications and new clinical results    Assessment/Plan   Active Hospital Problems    Diagnosis Date Noted   • Admission for hospice care [Z51.5] 10/11/2018   • PNA (pneumonia) [J18.9] 10/01/2018   • PAF (paroxysmal atrial fibrillation) (CMS/[facility]) [I48.0] 2017   • COPD (chronic obstructive pulmonary disease) (CMS/[facility]) [J44.9] 2017      Resolved Hospital Problems    Diagnosis Date Noted Date Resolved   No resolved problems to display.     · continue palliative care, [facility] scattered bed  · continues to require frequent robinul, Ativan and morphine  · Hours to days  · I discussed the patient's findings and my recommendations with nursing staff.    Fausto Olivier MD   10/18/18  10:21 AM

## 2018-10-19 NOTE — PROGRESS NOTES
[facility] Visit Report    Simona Brooks  [id number]  10/19/2018    Admission R/T [facility] Dx: yes    Reason for [facility] Admission: COPD    Symptom  Management: Pain Control    Nursing/Medication Recommendations:    Psychosocial Issues and Recommendations:    Spiritual Concerns and Recommendations:    [facility] Discharge Plans:  incomplete; patient remains HSB and [facility] will round daily    Review of Visit (Include All Collaboration- including names of hospital and family involved during admission/visit):  [facility] PEEWEE Figueredo not initially available, per EPIC since midnight pt has received IV Robinul 0.4 mg x1, IV morphine 4 mg x2, IV Ativan 1 mg x2; VS: Tn/a, P101, R10, BP92/54, O2=95%@RA; pt non-responsive, no family present; [facility] provided presence, later had prayer aloud at bedside, left card for family with date/ time/ ref to prayer.    [facility] to round daily to assess and offer support.      Pablo Carr, BCC

## 2018-10-19 NOTE — PROGRESS NOTES
[facility]     LOS: 8 days     Name: Simona Brooks  Age: 84 y.o.  Sex: female  :  1934  MRN: [id number]         Primary Care Physician: Leonor Echevarria MD         Subjective   unresponsive.     Objective   Heart Rate:  [101] 101  Resp:  [10] 10  BP: (92)/(54) 92/54  SpO2:  [95 %] 95 %  on   ;   Device (Oxygen Therapy): room air  There is no height or weight on file to calculate BMI.    Physical Exam   Constitutional: No distress.   Cardiovascular: Normal rate.    Pulmonary/Chest: No respiratory distress.   Neurological: She is unresponsive.   Skin: Skin is warm and dry.     Reviewed medications and new clinical results    Assessment/Plan   Active Hospital Problems    Diagnosis Date Noted   • Admission for hospice care [Z51.5] 10/11/2018   • PNA (pneumonia) [J18.9] 10/01/2018   • PAF (paroxysmal atrial fibrillation) (CMS/[facility]) [I48.0] 2017   • COPD (chronic obstructive pulmonary disease) (CMS/[facility]) [J44.9] 2017      Resolved Hospital Problems    Diagnosis Date Noted Date Resolved   No resolved problems to display.     · continue palliative care, [facility] scattered bed  · continues to require frequent robinul, Ativan and morphine  · Hours to days    Fausto Oilvier MD   10/19/18  9:59 AM

## 2018-10-19 NOTE — PLAN OF CARE
Problem: Fall Risk (Adult)  Goal: Absence of Fall  Outcome: Ongoing (interventions implemented as appropriate)      Problem: Dying Patient, Actively (Adult)  Goal: Peace/Preservation of Dignity During the Dying Process  Outcome: Ongoing (interventions implemented as appropriate)

## 2018-10-20 NOTE — PROGRESS NOTES
[facility] Visit Report    Simona Brooks  [id number]  10/20/2018    [facility] YES      Reason for [facility] Admission: COPD, AFIB      Symptom  Management: Pain control      Nursing/Medication Recommendations: No recommendations at this time      Psychosocial Issues and Recommendations: Provide support to patient and family      Spiritual Concerns and Recommendations: None at present      [facility]:  None, patient very actively dying and not safe for transport. Patient receiving IV medications for symptom management of pain and is meeting criteria [facility].      Review of Visit: Close monitoring for safety/falls, symptom management of pain, comfort care for very actively dying patient. Patient lying in bed, unresponsive, breathing agonal, color ashen, appears very imminent. 02 sat 88% on 2L. No family present during visit but staff have left message regarding status. Discussed care needs with staff and patient receiving IV Morphine 4mg x 4 doses, IV Ativan 1mg x 4 doses and IV Robinul 0.4mg x 2 doses since midnight. Will continue to see daily to assess needs, monitor status and offer support.        Renetta Smallwood RN  [facility] Visit Nurse

## 2018-10-20 NOTE — PLAN OF CARE
Problem: Fall Risk (Adult)  Goal: Absence of Fall  Outcome: Ongoing (interventions implemented as appropriate)      Problem: Patient Care Overview  Goal: Plan of Care Review  Outcome: Ongoing (interventions implemented as appropriate)   10/20/18 1916   Coping/Psychosocial   Plan of Care Reviewed With patient   Plan of Care Review   Progress declining   OTHER   Outcome Summary Pt medicated prior to turns with good results. Robinul x2 for mild congestion. Very shallow, slow respirations. Fingers mottled this evening. Quitin poa called, did not answer, voicemail left asking to call back, no call yet. No colostomy or f/c output. Pt has appeared comfortable. Will continue to monitor and provide comfort care.     Goal: Individualization and Mutuality  Outcome: Ongoing (interventions implemented as appropriate)    Goal: Discharge Needs Assessment  Outcome: Ongoing (interventions implemented as appropriate)      Problem: Skin Injury Risk (Adult)  Goal: Skin Health and Integrity  Outcome: Ongoing (interventions implemented as appropriate)      Problem: Dying Patient, Actively (Adult)  Goal: Comfort/Pain Control  Outcome: Ongoing (interventions implemented as appropriate)    Goal: Peace/Preservation of Dignity During the Dying Process  Outcome: Ongoing (interventions implemented as appropriate)

## 2018-10-20 NOTE — PLAN OF CARE
Problem: Fall Risk (Adult)  Goal: Absence of Fall  Outcome: Ongoing (interventions implemented as appropriate)      Problem: Patient Care Overview  Goal: Plan of Care Review  Outcome: Ongoing (interventions implemented as appropriate)   10/20/18 0556   Plan of Care Review   Progress declining   OTHER   Outcome Summary pt rested well during night with no signs of pain or anxiety, pt medicated x2 to prevent pain from/with tturns, fingers and toes are cold to the touch this am, will cont. to monitor and provide comfort care       Problem: Skin Injury Risk (Adult)  Goal: Skin Health and Integrity  Outcome: Ongoing (interventions implemented as appropriate)      Problem: Dying Patient, Actively (Adult)  Goal: Identify Related Risk Factors and Signs and Symptoms  Outcome: Outcome(s) achieved Date Met: 10/20/18    Goal: Comfort/Pain Control  Outcome: Ongoing (interventions implemented as appropriate)    Goal: Peace/Preservation of Dignity During the Dying Process  Outcome: Ongoing (interventions implemented as appropriate)

## 2018-10-20 NOTE — PROGRESS NOTES
[facility]     LOS: 9 days     Name: Simona Brooks  Age: 84 y.o.  Sex: female  :  1934  MRN: [id number]         Primary Care Physician: Leonor Echevarria MD         Subjective   unresponsive.     Objective   Temp:  [96.7 °F (35.9 °C)] 96.7 °F (35.9 °C)  Heart Rate:  [] 126  Resp:  [12] 12  BP: (89-90)/(39-52) 89/39  SpO2:  [88 %] 88 %  on  Flow (L/min):  [2] 2;   Device (Oxygen Therapy): nasal cannula  There is no height or weight on file to calculate BMI.    Physical Exam   Constitutional: No distress.   Cardiovascular: Normal rate.    Pulmonary/Chest: No respiratory distress.   Neurological: She is unresponsive.   Skin: Skin is warm and dry.     Reviewed medications and new clinical results    Assessment/Plan   Active Hospital Problems    Diagnosis Date Noted   • Admission for hospice care [Z51.5] 10/11/2018   • PNA (pneumonia) [J18.9] 10/01/2018   • PAF (paroxysmal atrial fibrillation) (CMS/[facility]) [I48.0] 2017   • COPD (chronic obstructive pulmonary disease) (CMS/[facility]) [J44.9] 2017      Resolved Hospital Problems    Diagnosis Date Noted Date Resolved   No resolved problems to display.     · continue palliative care, [facility] scattered bed  · continues to require frequent Ativan and morphine  · Hours to days    Fausto Olivier MD   10/20/18  10:47 AM

## 2018-10-21 NOTE — PLAN OF CARE
Problem: Fall Risk (Adult)  Goal: Absence of Fall  Outcome: Ongoing (interventions implemented as appropriate)      Problem: Patient Care Overview  Goal: Plan of Care Review  Outcome: Ongoing (interventions implemented as appropriate)   10/21/18 0935   Coping/Psychosocial   Plan of Care Reviewed With patient   Plan of Care Review   Progress declining   OTHER   Outcome Summary Pt did not receive medications last night due to IV failure. This morning she had some labored breathing and moaning, eyes open and did not appear relaxed, therefore I began medicating before turns again when new IV was placed and pt appears more comfortable and relaxed. Will continue palliative care.      Goal: Individualization and Mutuality   10/15/18 1650 10/17/18 6821   Individualization   Patient Specific Preferences --  premedicate prior to turns   Patient Specific Goals (Include Timeframe) to keep comfortable  --    Patient Specific Interventions --  prn comfort meds, q4h turns, oral care, palliative care       Problem: Skin Injury Risk (Adult)  Goal: Skin Health and Integrity  Outcome: Ongoing (interventions implemented as appropriate)      Problem: Dying Patient, Actively (Adult)  Goal: Comfort/Pain Control  Outcome: Ongoing (interventions implemented as appropriate)    Goal: Peace/Preservation of Dignity During the Dying Process  Outcome: Ongoing (interventions implemented as appropriate)

## 2018-10-21 NOTE — PROGRESS NOTES
"\[facility]\"" Visit Report    Simona Brooks  [id number]  10/21/2018      Admission R/T \[facility]\"" Dx: YES      Reason for \[facility]\"" Admission: COPD, AFIB      Symptom  Management: Pain control      Nursing/Medication Recommendations: No recommendations at this time      Psychosocial Issues and Recommendations: Provide support to patient and family/poa      Spiritual Concerns and Recommendations:None at present      [facility]:  None, patient actively dying and not safe for transport. Patient receiving IV medications for symptom management of pain and is meeting criteria for GIP as a \[facility]\"" scattered bed patient.      Review of Visit\" Close monitoring for safety/falls, symptom management of pain, comfort care for actively dying patient. Patient lying in bed, unresponsive, periods of apnea, color ashen, 02 sat 88% and BP 75/45. No family present during visit. Staff updated POA on patients condition. Discussed care needs with staff and patient receiving IV Morphine 4mg x 3 doses and IV Ativan 1mg x 3 doses since midnight. Will continue to see daily to assess needs, monitor status and offer support.        Renetta Smallwood RN  \[facility]\"" Visit Nurse  "

## 2018-10-21 NOTE — PROGRESS NOTES
[facility]     LOS: 10 days     Name: Simona Brooks  Age: 84 y.o.  Sex: female  :  1934  MRN: [id number]         Primary Care Physician: Leonor Echevarria MD         Subjective   unresponsive. mouth open.    Objective   Temp:  [98 °F (36.7 °C)] 98 °F (36.7 °C)  Heart Rate:  [104-119] 119  Resp:  [12] 12  BP: (75)/(45) 75/45  SpO2:  [88 %-100 %] 88 %  on  Flow (L/min):  [2] 2;   Device (Oxygen Therapy): nasal cannula  There is no height or weight on file to calculate BMI.    Physical Exam   Constitutional: No distress.   Cardiovascular: Normal rate.    Pulmonary/Chest: No respiratory distress.   Neurological: She is unresponsive.   Skin: Skin is warm and dry.     Reviewed medications and new clinical results    Assessment/Plan   Active Hospital Problems    Diagnosis Date Noted   • Admission for hospice care [Z51.5] 10/11/2018   • PNA (pneumonia) [J18.9] 10/01/2018   • PAF (paroxysmal atrial fibrillation) (CMS/[facility]) [I48.0] 2017   • COPD (chronic obstructive pulmonary disease) (CMS/[facility]) [J44.9] 2017      Resolved Hospital Problems    Diagnosis Date Noted Date Resolved   No resolved problems to display.     · continue palliative care, [facility] scattered bed  · continues to require frequent Ativan and morphine  · Hours to days    Fausto Olivier MD   10/21/18  3:08 PM

## 2018-10-21 NOTE — PLAN OF CARE
Problem: Patient Care Overview  Goal: Plan of Care Review  Outcome: Ongoing (interventions implemented as appropriate)   10/21/18 0621   Coping/Psychosocial   Plan of Care Reviewed With durable power of ;son   Plan of Care Review   Progress declining   OTHER   Outcome Summary Breaths still apenic and shallow. Appears relaxed. Updated POA of pt's condition. Will continue to follow.

## 2018-10-22 NOTE — DISCHARGE SUMMARY
PHYSICIAN DISCHARGE SUMMARY                                                                        [facility]    Patient Identification:  Name: Simona Brooks  Age: 84 y.o.  Sex: female  :  1934  MRN: [id number]  Primary Care Physician: Leonor Echevarria MD    Admit date: 10/11/2018  Discharge date and time:10/22/2018  Discharged Condition:   Date and time of death:10/22/2018 at 11:59 AM  Discharge Diagnoses:  Active Hospital Problems    Diagnosis Date Noted   • Admission for hospice care [Z51.5] 10/11/2018   • PNA (pneumonia) [J18.9] 10/01/2018   • PAF (paroxysmal atrial fibrillation) (CMS/[facility]) [I48.0] 2017   • COPD (chronic obstructive pulmonary disease) (CMS/[facility]) [J44.9] 2017      Resolved Hospital Problems    Diagnosis Date Noted Date Resolved   No resolved problems to display.      Patient Active Problem List   Diagnosis Code   • PAF (paroxysmal atrial fibrillation) (CMS/[facility]) I48.0   • COPD (chronic obstructive pulmonary disease) (CMS/[facility]) J44.9   • Calculus of left kidney N20.0   • Suprapubic catheter (CMS/HCC) Z93.59   • Decubitus ulcer of coccygeal region, stage 4 (present on admission) L89.154   • History of right above knee amputation (CMS/HCC) Z89.611   • Acute UTI N39.0   • Colostomy malfunction (CMS/[facility]) K94.03   • Anemia D64.9   • Abnormal urine R82.90   • Gastrointestinal hemorrhage K92.2   • Dysphagia R13.10   • Gastrointestinal hemorrhage with melena K92.1   • Acute gastric ulcer with hemorrhage K25.0   • Pleural effusion J90   • Hyperkalemia E87.5   • PARMINDER (acute kidney injury) (CMS/[facility]) N17.9   • Sepsis (CMS/[facility]) A41.9   • PNA (pneumonia) J18.9   • PNA (pneumonia) J18.9   • Iron deficiency anemia D50.9   • Admission for hospice care Z51.5       PMHX:   Past Medical History:   Diagnosis Date   • Anemia    • Atrial fibrillation (CMS/[facility])    • Chronic respiratory failure (CMS/[facility])    •  Colitis    • COPD (chronic obstructive pulmonary disease) (CMS/HCC)    • Dysphagia    • Hernia    • Hypokalemia    • Iron deficiency anemia    • Peripheral vascular disease (CMS/HCC)    • Pneumonia      PSHX:   Past Surgical History:   Procedure Laterality Date   • ABDOMINAL SURGERY     • AMPUTATION      right leg   • COLOSTOMY     • ENDOSCOPY N/A 6/22/2018    Procedure: ESOPHAGOGASTRODUODENOSCOPY with biopsies;  Surgeon: Chinedu Wood MD;  Location: [facility] ENDOSCOPY;  Service: Gastroenterology   • ENDOSCOPY N/A 10/2/2018    Procedure: ESOPHAGOGASTRODUODENOSCOPY;  Surgeon: Arnulfo Villagomez MD;  Location: [facility] ENDOSCOPY;  Service: Gastroenterology   • EXPLORATORY LAPAROTOMY N/A 12/3/2017    Procedure: disimpaction of ostomy;  Surgeon: Neris Mcgee MD;  Location: [facility] MAIN OR;  Service:        Hospital Course: Simona Brooks  is an 84-year-old female from the nursing home with history of dementia, anemia, A. fib, colitis, COPD, dysphagia, history of decubitus ulcers with colostomy and suprapubic catheter who was sent in from the nursing home with worsening lung congestion and confusion. The patient's only oriented to her name and could not give much information. She's had history of pneumonia and has dysphagia and is on PEG tube feedings. The patient was evaluated in the ER and found to have significant pneumonia and was started on broad-spectrum IV antibiotics for hospital-acquired pneumonia. According to records from the nursing facility the patient is full code and there is no family members present to give any further information. The patient was also noted to have some dark melanotic stool in her ostomy and was probably having some GI bleeding again. The patient also had elevated potassium and was treated for hyperkalemia. Her urine was very cloudy and I suspect she probably has urinary tract infection as well. The patient was given some IV fluids and IV antibiotics and admitted for further  evaluation.        The patient made very little progress and subsequently was 10 transferred to palliative care unit.  The patient was made comfort measures only and  from her illness.    Consults:     Consults     Date and Time Order Name Status Description    10/3/2018 1434 Inpatient Urology Consult Completed     10/1/2018 0939 Inpatient Cardiology Consult Completed     10/1/2018 0518 Inpatient Nephrology Consult Completed     10/1/2018 0515 Inpatient Pulmonology Consult Completed     10/1/2018 0515 Inpatient Gastroenterology Consult Completed     10/1/2018 0425 [facility] (on-call MD unless specified) Completed                 Significant Diagnostic Studies: No results found for: WBC, HGB, HCT, PLT  No results found for: NA, K, CL, CO2, BUN, CREATININE, GLUCOSE  No results found for: CALCIUM, MG, PHOS  No results found for: AST, ALT, ALKPHOS  No results found for: APTT, INR  No results found for: COLORU, CLARITYU, SPECGRAV, PHUR, PROTEINUR, GLUCOSEU, KETONESU, BLOODU, NITRITE, LEUKOCYTESUR, BILIRUBINUR, UROBILINOGEN, RBCUA, WBCUA, BACTERIA, UACOMMENT  No results found for: TROPONINT, TROPONINI, BNP  No components found for: HGBA1C;2  No components found for: TSH;2  Imaging Results (all)     None        Lab Results (last 7 days)     ** No results found for the last 168 hours. **        BP (!) 71/39 (BP Location: Right arm, Patient Position: Lying)   Pulse (!) 0   Temp 96.1 °F (35.6 °C) (Oral)   Resp (!) 0   SpO2 93%     Discharge Exam:                                    Disposition:      Patient Instructions:   Discharge Order     None          Total time spent discharging patient including evaluation,post hospitalization follow up,  medication and post hospitalization instructions and education total time exceeds 30 minutes.    Signed:  Rich Ruiz MD  10/22/2018  12:33 PM

## 2018-10-22 NOTE — PROGRESS NOTES
Case Management Discharge Note    Final Note: The patient  on 10/22/18 @ 12:00. LEOLA Hernandez Rn, CCP    Destination - Selection Complete     Service Request Status Selected Specialties Address Phone Number Fax Number    [facility] [address] [facility] [address] [phone]      Durable Medical Equipment     No service has been selected for the patient.      Dialysis/Infusion     No service has been selected for the patient.      Home Medical Care     No service has been selected for the patient.      Social Care     No service has been selected for the patient.             Final Discharge Disposition Code: 41 -  in medical facility

## 2018-10-22 NOTE — CONSULTS
"Spiritual Care:  Assisted nursing in making phone contact with family after pt has .  Spoke with stepson, son and brother who all communicated \"no burial arrangements have been made and we have little to no ability to cover any costs.\"  Consulted with Case Management and gave family contact information for speaking with staff who can offer guidance regarding indigent burial.  jerry Guerrero  "

## 2018-10-22 NOTE — PLAN OF CARE
Problem: Fall Risk (Adult)  Goal: Absence of Fall  Outcome: Ongoing (interventions implemented as appropriate)      Problem: Patient Care Overview  Goal: Plan of Care Review  Outcome: Ongoing (interventions implemented as appropriate)   10/22/18 0325   Coping/Psychosocial   Plan of Care Reviewed With patient   Plan of Care Review   Progress declining   OTHER   Outcome Summary Pt received pain med prior to turning; No change; will continue to monitor       Problem: Skin Injury Risk (Adult)  Goal: Skin Health and Integrity  Outcome: Ongoing (interventions implemented as appropriate)      Problem: Dying Patient, Actively (Adult)  Goal: Comfort/Pain Control  Outcome: Ongoing (interventions implemented as appropriate)

## 2018-10-22 NOTE — PROGRESS NOTES
DAILY PROGRESS NOTE  [facility]    Patient Identification:  Name: Simona Brooks  Age: 84 y.o.  Sex: female  :  1934  MRN: [id number]         Primary Care Physician: Leonor Echevarria MD    Subjective:  Interval History:She has no complaints.    Objective:    Scheduled Meds:   Continuous Infusions:     Vital signs in last 24 hours:  Temp:  [96.1 °F (35.6 °C)-97.1 °F (36.2 °C)] 96.1 °F (35.6 °C)  Heart Rate:  [103-105] 105  Resp:  [16-20] 20  BP: (71-85)/(39-49) 71/39    Intake/Output:    Intake/Output Summary (Last 24 hours) at 10/22/18 1123  Last data filed at 10/22/18 0834   Gross per 24 hour   Intake                0 ml   Output                0 ml   Net                0 ml       Exam:  BP (!) 71/39 (BP Location: Right arm, Patient Position: Lying)   Pulse 105   Temp 96.1 °F (35.6 °C) (Oral)   Resp 20   SpO2 93%     General Appearance:    sedated, no distress   Head:    Normocephalic, without obvious abnormality, atraumatic   Eyes:       Throat:   Lips, tongue, gums normal   Neck:   Supple, symmetrical, trachea midline, no JVD   Lungs:     Clear to auscultation bilaterally, respirations unlabored   Chest Wall:    No tenderness or deformity    Heart:    Regular rate and rhythm, S1 and S2 normal, no murmur,no  Rub or gallop   Abdomen:     Soft, non-tender, bowel sounds active, no masses, no organomegaly    Extremities:   Extremities normal, atraumatic, no cyanosis or edema   Pulses:      Skin:   Skin is warm and dry,  no rashes or palpable lesions   Neurologic:   no focal deficits noted, sedated.      Lab Results (last 72 hours)     ** No results found for the last 72 hours. **        Data Review:                    Lab Results  Lab Value Date/Time   TROPONINT 0.099 (C) 10/01/2018 0708   TROPONINT 0.102 (C) 10/01/2018 0222   TROPONINT <0.010 2018 1143   TROPONINT <0.010 2017 1251               Invalid input(s): PROT, LABALBU          No results found for: POCGLU         Patient Active Problem List   Diagnosis Code   • PAF (paroxysmal atrial fibrillation) (CMS/HCC) I48.0   • COPD (chronic obstructive pulmonary disease) (CMS/HCC) J44.9   • Calculus of left kidney N20.0   • Suprapubic catheter (CMS/HCC) Z93.59   • Decubitus ulcer of coccygeal region, stage 4 (present on admission) L89.154   • History of right above knee amputation (CMS/HCC) Z89.611   • Acute UTI N39.0   • Colostomy malfunction (CMS/HCC) K94.03   • Anemia D64.9   • Abnormal urine R82.90   • Gastrointestinal hemorrhage K92.2   • Dysphagia R13.10   • Gastrointestinal hemorrhage with melena K92.1   • Acute gastric ulcer with hemorrhage K25.0   • Pleural effusion J90   • Hyperkalemia E87.5   • PARMINDER (acute kidney injury) (CMS/HCC) N17.9   • Sepsis (CMS/HCC) A41.9   • PNA (pneumonia) J18.9   • PNA (pneumonia) J18.9   • Iron deficiency anemia D50.9   • Admission for hospice care Z51.5       Assessment:  Active Hospital Problems    Diagnosis Date Noted   • Admission for hospice care [Z51.5] 10/11/2018   • PNA (pneumonia) [J18.9] 10/01/2018   • PAF (paroxysmal atrial fibrillation) (CMS/HCC) [I48.0] 08/08/2017   • COPD (chronic obstructive pulmonary disease) (CMS/HCC) [J44.9] 08/08/2017      Resolved Hospital Problems    Diagnosis Date Noted Date Resolved   No resolved problems to display.       Plan:  Continue with comfort care. Measures in place.    Rich Ruiz MD  10/22/2018  11:23 AM

## 2018-10-23 NOTE — PROGRESS NOTES
Case Management Discharge Note    Final Note: The patient  on 10/22/18 @ 12:00. LEOLA Hernandez Rn, CCP    Destination - Selection Complete     Service Request Status Selected Specialties Address Phone Number Fax Number    [facility] [id number] [facility] [address] [phone]      Durable Medical Equipment     No service has been selected for the patient.      Dialysis/Infusion     No service has been selected for the patient.      Home Medical Care     No service has been selected for the patient.      Social Care     No service has been selected for the patient.             Final Discharge Disposition Code: 41 -  in medical facility
